# Patient Record
Sex: MALE | Race: WHITE | NOT HISPANIC OR LATINO | Employment: OTHER | URBAN - METROPOLITAN AREA
[De-identification: names, ages, dates, MRNs, and addresses within clinical notes are randomized per-mention and may not be internally consistent; named-entity substitution may affect disease eponyms.]

---

## 2023-02-15 PROBLEM — Z95.1 S/P CABG X 3: Status: ACTIVE | Noted: 2023-02-15

## 2023-02-15 PROBLEM — G25.81 RESTLESS LEGS SYNDROME: Status: ACTIVE | Noted: 2023-02-15

## 2023-02-15 PROBLEM — I73.9 PAD (PERIPHERAL ARTERY DISEASE) (CMS-HCC): Status: ACTIVE | Noted: 2023-02-15

## 2023-02-15 PROBLEM — G43.909 HEMICRANIA: Status: ACTIVE | Noted: 2023-02-15

## 2023-02-15 PROBLEM — R60.9 DEPENDENT EDEMA: Status: ACTIVE | Noted: 2023-02-15

## 2023-02-15 PROBLEM — H25.10 NUCLEAR SCLEROSIS: Status: ACTIVE | Noted: 2023-02-15

## 2023-02-15 PROBLEM — I10 BENIGN ESSENTIAL HYPERTENSION: Status: ACTIVE | Noted: 2023-02-15

## 2023-02-15 PROBLEM — M19.072 OSTEOARTHRITIS OF BOTH ANKLES: Status: ACTIVE | Noted: 2023-02-15

## 2023-02-15 PROBLEM — I73.9 VASCULAR CLAUDICATION (CMS-HCC): Status: ACTIVE | Noted: 2023-02-15

## 2023-02-15 PROBLEM — I70.213 ATHEROSCLEROSIS OF NATIVE ARTERY OF BOTH LOWER EXTREMITIES WITH INTERMITTENT CLAUDICATION (CMS-HCC): Status: ACTIVE | Noted: 2023-02-15

## 2023-02-15 PROBLEM — M19.071 OSTEOARTHRITIS OF BOTH ANKLES: Status: ACTIVE | Noted: 2023-02-15

## 2023-02-15 PROBLEM — E78.00 ELEVATED LOW DENSITY LIPOPROTEIN (LDL) CHOLESTEROL LEVEL: Status: ACTIVE | Noted: 2023-02-15

## 2023-02-15 PROBLEM — E53.8 B12 DEFICIENCY: Status: ACTIVE | Noted: 2023-02-15

## 2023-02-15 PROBLEM — Z98.62 S/P PERIPHERAL ARTERY ANGIOPLASTY: Status: ACTIVE | Noted: 2023-02-15

## 2023-02-15 PROBLEM — R35.1 NOCTURIA ASSOCIATED WITH BENIGN PROSTATIC HYPERPLASIA: Status: ACTIVE | Noted: 2023-02-15

## 2023-02-15 PROBLEM — R53.83 FATIGUE: Status: ACTIVE | Noted: 2023-02-15

## 2023-02-15 PROBLEM — H53.2 DIPLOPIA: Status: ACTIVE | Noted: 2023-02-15

## 2023-02-15 PROBLEM — D37.8 NEOPLASM OF UNCERTAIN BEHAVIOR OF PANCREAS: Status: ACTIVE | Noted: 2023-02-15

## 2023-02-15 PROBLEM — G62.9 NEUROPATHY: Status: ACTIVE | Noted: 2023-02-15

## 2023-02-15 PROBLEM — M54.2 CERVICALGIA: Status: ACTIVE | Noted: 2023-02-15

## 2023-02-15 PROBLEM — C83.90: Status: ACTIVE | Noted: 2023-02-15

## 2023-02-15 PROBLEM — I25.10 CAD (CORONARY ARTERY DISEASE): Status: ACTIVE | Noted: 2023-02-15

## 2023-02-15 PROBLEM — D48.5 NEOPLASM OF UNCERTAIN BEHAVIOR OF SKIN: Status: ACTIVE | Noted: 2023-02-15

## 2023-02-15 PROBLEM — H50.30 ESOTROPIA, INTERMITTENT: Status: ACTIVE | Noted: 2023-02-15

## 2023-02-15 PROBLEM — M48.061 LUMBAR CANAL STENOSIS: Status: ACTIVE | Noted: 2023-02-15

## 2023-02-15 PROBLEM — R25.1 TREMOR: Status: ACTIVE | Noted: 2023-02-15

## 2023-02-15 PROBLEM — D50.9 IRON DEFICIENCY ANEMIA: Status: ACTIVE | Noted: 2023-02-15

## 2023-02-15 PROBLEM — I87.322 STASIS DERMATITIS OF LEFT LOWER EXTREMITY DUE TO PERIPHERAL VENOUS HYPERTENSION: Status: ACTIVE | Noted: 2023-02-15

## 2023-02-15 PROBLEM — N40.1 NOCTURIA ASSOCIATED WITH BENIGN PROSTATIC HYPERPLASIA: Status: ACTIVE | Noted: 2023-02-15

## 2023-02-15 PROBLEM — M77.30 CALCANEAL SPUR: Status: ACTIVE | Noted: 2023-02-15

## 2023-02-15 PROBLEM — R26.9 GAIT DIFFICULTY: Status: ACTIVE | Noted: 2023-02-15

## 2023-02-15 PROBLEM — R11.2 NAUSEA AND VOMITING IN ADULT: Status: ACTIVE | Noted: 2023-02-15

## 2023-02-15 PROBLEM — Z95.2 S/P AVR (AORTIC VALVE REPLACEMENT): Status: ACTIVE | Noted: 2023-02-15

## 2023-02-15 RX ORDER — ASPIRIN 325 MG
500 TABLET, DELAYED RELEASE (ENTERIC COATED) ORAL DAILY
COMMUNITY
End: 2023-10-03 | Stop reason: ALTCHOICE

## 2023-02-15 RX ORDER — PITAVASTATIN CALCIUM 2.09 MG/1
1 TABLET, FILM COATED ORAL DAILY
COMMUNITY
Start: 2021-05-08 | End: 2023-07-31

## 2023-02-15 RX ORDER — VALSARTAN AND HYDROCHLOROTHIAZIDE 80; 12.5 MG/1; MG/1
1 TABLET, FILM COATED ORAL DAILY
COMMUNITY
Start: 2020-04-22 | End: 2023-09-18 | Stop reason: ALTCHOICE

## 2023-02-15 RX ORDER — VIT C/E/ZN/COPPR/LUTEIN/ZEAXAN 250MG-90MG
25 CAPSULE ORAL
COMMUNITY
End: 2024-03-26 | Stop reason: WASHOUT

## 2023-02-15 RX ORDER — TRAZODONE HYDROCHLORIDE 50 MG/1
50 TABLET ORAL NIGHTLY
COMMUNITY
End: 2023-05-09

## 2023-02-15 RX ORDER — EPINEPHRINE 0.22MG
100 AEROSOL WITH ADAPTER (ML) INHALATION 2 TIMES DAILY
COMMUNITY
End: 2024-04-09 | Stop reason: WASHOUT

## 2023-02-15 RX ORDER — MULTIVITAMIN
1 TABLET ORAL 2 TIMES DAILY
COMMUNITY
Start: 2015-01-08

## 2023-02-15 RX ORDER — VITAMIN B COMPLEX
1 CAPSULE ORAL NIGHTLY
COMMUNITY

## 2023-02-15 RX ORDER — METOPROLOL SUCCINATE 25 MG/1
1 TABLET, EXTENDED RELEASE ORAL DAILY
COMMUNITY
Start: 2020-04-22 | End: 2023-04-04

## 2023-02-15 RX ORDER — SPIRONOLACTONE 25 MG
1 TABLET ORAL DAILY
COMMUNITY
Start: 2015-01-08 | End: 2023-04-04

## 2023-02-15 RX ORDER — CLOPIDOGREL BISULFATE 75 MG/1
1 TABLET ORAL DAILY
COMMUNITY
Start: 2020-02-25 | End: 2023-07-21 | Stop reason: SDUPTHER

## 2023-02-15 RX ORDER — PERPHENAZINE/AMITRIPTYLINE HCL 2 MG-25 MG
TABLET ORAL
COMMUNITY
End: 2023-10-03 | Stop reason: ALTCHOICE

## 2023-03-01 ENCOUNTER — DOCUMENTATION (OUTPATIENT)
Dept: PRIMARY CARE | Facility: CLINIC | Age: 88
End: 2023-03-01
Payer: MEDICARE

## 2023-03-01 DIAGNOSIS — I10 HYPERTENSION, UNSPECIFIED TYPE: ICD-10-CM

## 2023-03-01 DIAGNOSIS — I25.10 ATHEROSCLEROSIS OF NATIVE CORONARY ARTERY OF NATIVE HEART, UNSPECIFIED WHETHER ANGINA PRESENT: ICD-10-CM

## 2023-03-20 ENCOUNTER — PATIENT OUTREACH (OUTPATIENT)
Dept: PRIMARY CARE | Facility: CLINIC | Age: 88
End: 2023-03-20
Payer: MEDICARE

## 2023-03-20 DIAGNOSIS — I10 HYPERTENSION, UNSPECIFIED TYPE: ICD-10-CM

## 2023-03-20 DIAGNOSIS — I25.10 ATHEROSCLEROSIS OF NATIVE CORONARY ARTERY OF NATIVE HEART, UNSPECIFIED WHETHER ANGINA PRESENT: ICD-10-CM

## 2023-03-20 PROCEDURE — 99490 CHRNC CARE MGMT STAFF 1ST 20: CPT | Performed by: NURSE PRACTITIONER

## 2023-03-21 NOTE — PROGRESS NOTES
[facility] call to patient. He reports he is doing very well. He reports his new home is so much better for them. He continues to montir his blood pressure. Blood pressure readings as follows,   139/82, 70  121/64  69  116/69  67  128/70  68  111/64  64  102/50  73  He reports he is eating well and sleeping well. He did report he has some discoloration of his ankles. Reports it as being brownish/red and warm. He reports this has been ongooing over the past few years. He denies any swelling or edema. It is bilateral. He does have follow up appointment scheduled in May with Vascular Dr for testing that he has done every year. He reports he is eating well and no issues with urination or bowel movements. He reports he and his wife have  been tano active with the [employer].  He denies any concerns at this time.

## 2023-04-04 ENCOUNTER — OFFICE VISIT (OUTPATIENT)
Dept: PRIMARY CARE | Facility: CLINIC | Age: 88
End: 2023-04-04
Payer: MEDICARE

## 2023-04-04 VITALS
BODY MASS INDEX: 26.37 KG/M2 | OXYGEN SATURATION: 99 % | HEART RATE: 55 BPM | HEIGHT: 74 IN | SYSTOLIC BLOOD PRESSURE: 110 MMHG | DIASTOLIC BLOOD PRESSURE: 60 MMHG

## 2023-04-04 DIAGNOSIS — G62.9 NEUROPATHY: ICD-10-CM

## 2023-04-04 DIAGNOSIS — I70.213 ATHEROSCLEROSIS OF NATIVE ARTERY OF BOTH LOWER EXTREMITIES WITH INTERMITTENT CLAUDICATION (CMS-HCC): ICD-10-CM

## 2023-04-04 DIAGNOSIS — I10 BENIGN ESSENTIAL HYPERTENSION: Primary | ICD-10-CM

## 2023-04-04 DIAGNOSIS — I25.10 CORONARY ARTERY DISEASE INVOLVING NATIVE HEART WITHOUT ANGINA PECTORIS, UNSPECIFIED VESSEL OR LESION TYPE: ICD-10-CM

## 2023-04-04 DIAGNOSIS — I73.9 VASCULAR CLAUDICATION (CMS-HCC): ICD-10-CM

## 2023-04-04 PROCEDURE — 1159F MED LIST DOCD IN RCRD: CPT | Performed by: NURSE PRACTITIONER

## 2023-04-04 PROCEDURE — 99214 OFFICE O/P EST MOD 30 MIN: CPT | Performed by: NURSE PRACTITIONER

## 2023-04-04 PROCEDURE — 3074F SYST BP LT 130 MM HG: CPT | Performed by: NURSE PRACTITIONER

## 2023-04-04 PROCEDURE — 1036F TOBACCO NON-USER: CPT | Performed by: NURSE PRACTITIONER

## 2023-04-04 PROCEDURE — 3078F DIAST BP <80 MM HG: CPT | Performed by: NURSE PRACTITIONER

## 2023-04-04 PROCEDURE — 1160F RVW MEDS BY RX/DR IN RCRD: CPT | Performed by: NURSE PRACTITIONER

## 2023-04-04 RX ORDER — LACTOBACILLUS COMBINATION NO.4 3B CELL
1 CAPSULE ORAL DAILY
COMMUNITY

## 2023-04-04 RX ORDER — GLUCOSAMINE/CHONDR SU A SOD 750-600 MG
1 TABLET ORAL EVERY MORNING
COMMUNITY

## 2023-04-04 ASSESSMENT — ENCOUNTER SYMPTOMS
PSYCHIATRIC NEGATIVE: 1
CARDIOVASCULAR NEGATIVE: 1
CONSTITUTIONAL NEGATIVE: 1
RESPIRATORY NEGATIVE: 1

## 2023-04-04 NOTE — ASSESSMENT & PLAN NOTE
Blood pressure heart rate stable now that patient is no longer taking metoprolol no additional changes needed.  Patient feels less fatigued

## 2023-04-04 NOTE — PROGRESS NOTES
"Subjective   Patient ID: Alexis Armijo is a 87 y.o. male who presents for No chief complaint on file..    HPI     Review of Systems   Constitutional: Negative.    Respiratory: Negative.     Cardiovascular: Negative.    Neurological:         Baseline no increase in symptoms or worsening of symptoms   Psychiatric/Behavioral: Negative.         Objective   /60   Pulse 55   Ht 1.88 m (6' 2\")   SpO2 99%   BMI 26.37 kg/m²     Physical Exam  Constitutional:       Appearance: Normal appearance.   Eyes:      Conjunctiva/sclera: Conjunctivae normal.   Cardiovascular:      Rate and Rhythm: Normal rate.      Heart sounds: Murmur heard.   Pulmonary:      Effort: Pulmonary effort is normal.      Breath sounds: Normal breath sounds.   Skin:     General: Skin is warm.      Capillary Refill: Capillary refill takes less than 2 seconds.   Neurological:      General: No focal deficit present.      Mental Status: He is alert. Mental status is at baseline.   Psychiatric:         Mood and Affect: Mood normal.         Assessment/Plan   Problem List Items Addressed This Visit          Nervous    Neuropathy     Symptoms stable, no changes to plan            Circulatory    Atherosclerosis of native artery of both lower extremities with intermittent claudication (CMS/HCC)     Stable         Benign essential hypertension - Primary     Blood pressure heart rate stable now that patient is no longer taking metoprolol no additional changes needed.  Patient feels less fatigued         CAD (coronary artery disease)     Hypertension stable, on Plavix patient also on statin.  Patient is followed by cardiology.  No changes to plan         Vascular claudication (CMS/HCC)     Patient is on Plavix, statin and blood pressure controlled.  Patient is also follow-up by cardiology               "

## 2023-04-04 NOTE — ASSESSMENT & PLAN NOTE
Patient is on Plavix, statin and blood pressure controlled.  Patient is also follow-up by cardiology

## 2023-04-04 NOTE — PATIENT INSTRUCTIONS
Continue to stay active and eat healthy as you do    I have DC'd the metoprolol for you    Please notify office if you need anything we will follow-up per routine every 3 months

## 2023-04-04 NOTE — ASSESSMENT & PLAN NOTE
Hypertension stable, on Plavix patient also on statin.  Patient is followed by cardiology.  No changes to plan

## 2023-04-20 ENCOUNTER — PATIENT OUTREACH (OUTPATIENT)
Dept: PRIMARY CARE | Facility: CLINIC | Age: 88
End: 2023-04-20
Payer: MEDICARE

## 2023-04-20 DIAGNOSIS — G62.9 NEUROPATHY: ICD-10-CM

## 2023-04-20 DIAGNOSIS — I10 BENIGN ESSENTIAL HYPERTENSION: ICD-10-CM

## 2023-04-24 ENCOUNTER — PATIENT OUTREACH (OUTPATIENT)
Dept: PRIMARY CARE | Facility: CLINIC | Age: 88
End: 2023-04-24
Payer: MEDICARE

## 2023-04-24 DIAGNOSIS — G62.9 NEUROPATHY: ICD-10-CM

## 2023-04-24 DIAGNOSIS — I10 BENIGN ESSENTIAL HYPERTENSION: ICD-10-CM

## 2023-04-24 DIAGNOSIS — I25.10 ATHEROSCLEROSIS OF NATIVE CORONARY ARTERY OF NATIVE HEART, UNSPECIFIED WHETHER ANGINA PRESENT: ICD-10-CM

## 2023-04-24 PROCEDURE — 99490 CHRNC CARE MGMT STAFF 1ST 20: CPT | Performed by: NURSE PRACTITIONER

## 2023-04-24 NOTE — PROGRESS NOTES
Received call back from patient. He reports for the most part he is doing well. He reports he continues to have pain in his legs/feet. He has appointment on 5-18-23 with vascular Dr Nicholas for balloon testing to test his circulation. He reports regardless of what shoes he wears, he still has pain. He reports pain had gotten a little better since stopping metoprolol. He reports last evening his arms were hurting. He did not try to take tylenol as he hates to take medications. Instructed on possibly being arthritis and tylenol may help with arthritic pain. He reports he got up and slept in chair and felt better. He reports eating and drinking with no problems. He is taking all other medications. Monitoring his blood pressure with readings of 115/64, 122/74,103/60,110/59,111/61 and pulse 64-71. He denies headaches or dizziness.  No other concerns at this time.

## 2023-05-05 ENCOUNTER — PATIENT OUTREACH (OUTPATIENT)
Dept: PRIMARY CARE | Facility: CLINIC | Age: 88
End: 2023-05-05
Payer: MEDICARE

## 2023-05-05 DIAGNOSIS — I10 BENIGN ESSENTIAL HYPERTENSION: ICD-10-CM

## 2023-05-05 DIAGNOSIS — I25.10 ATHEROSCLEROSIS OF NATIVE CORONARY ARTERY OF NATIVE HEART, UNSPECIFIED WHETHER ANGINA PRESENT: ICD-10-CM

## 2023-05-05 DIAGNOSIS — I73.9 VASCULAR CLAUDICATION (CMS-HCC): ICD-10-CM

## 2023-05-05 DIAGNOSIS — G62.9 NEUROPATHY: ICD-10-CM

## 2023-05-05 PROCEDURE — 99490 CHRNC CARE MGMT STAFF 1ST 20: CPT | Performed by: NURSE PRACTITIONER

## 2023-05-05 NOTE — PROGRESS NOTES
Received call back from patient. He continues to monitor his blood pressure and pulse that he reports readings of : 106/55, 71; 115/64 64; 122/74 67. He was seen by Dr Jenkins  and had EKG and carotid arteries checked. He stated they were fine except he had a few bouts of Afib that lasted only a few minutes. He is to follow up with carotids being rechecked in Oct.  He had pacemaker checked and does not need to return for 3 years. He continues to have legs pain. He is planning to see vascular Dr on 5-18-23 and will have testing done for circulation. 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 He is planning to have 1 removed on 5-15-23 and will have the other removed shortly after. He denies any other concerns. No headaches or dizziness or shortness of breath. Dr Jenkins aware patient stopped metoprolol but told him to keep taking all other medications. Instructed on importance of eliquis and how it prevents blood clots. Patient stated he will keep taking it. Patient had gone previously for physical therapy. He does not feel he needs it again at this time but he is going to discuss with vascular . He is planning to start walking with the weather getting warmer. No concerns at this time. He will call if any questions.

## 2023-05-09 ENCOUNTER — PATIENT OUTREACH (OUTPATIENT)
Dept: PRIMARY CARE | Facility: CLINIC | Age: 88
End: 2023-05-09

## 2023-05-09 ENCOUNTER — OFFICE VISIT (OUTPATIENT)
Dept: PRIMARY CARE | Facility: CLINIC | Age: 88
End: 2023-05-09
Payer: MEDICARE

## 2023-05-09 VITALS
HEART RATE: 79 BPM | HEIGHT: 72 IN | DIASTOLIC BLOOD PRESSURE: 66 MMHG | SYSTOLIC BLOOD PRESSURE: 105 MMHG | OXYGEN SATURATION: 97 % | BODY MASS INDEX: 25.47 KG/M2 | WEIGHT: 188 LBS

## 2023-05-09 DIAGNOSIS — G62.9 NEUROPATHY: ICD-10-CM

## 2023-05-09 DIAGNOSIS — M54.2 NECK PAIN: Primary | ICD-10-CM

## 2023-05-09 DIAGNOSIS — I10 BENIGN ESSENTIAL HYPERTENSION: ICD-10-CM

## 2023-05-09 PROBLEM — I48.0 PAROXYSMAL ATRIAL FIBRILLATION (MULTI): Status: ACTIVE | Noted: 2023-05-09

## 2023-05-09 PROCEDURE — 1160F RVW MEDS BY RX/DR IN RCRD: CPT | Performed by: FAMILY MEDICINE

## 2023-05-09 PROCEDURE — 99214 OFFICE O/P EST MOD 30 MIN: CPT | Performed by: FAMILY MEDICINE

## 2023-05-09 PROCEDURE — 1159F MED LIST DOCD IN RCRD: CPT | Performed by: FAMILY MEDICINE

## 2023-05-09 PROCEDURE — 3078F DIAST BP <80 MM HG: CPT | Performed by: FAMILY MEDICINE

## 2023-05-09 PROCEDURE — 3074F SYST BP LT 130 MM HG: CPT | Performed by: FAMILY MEDICINE

## 2023-05-09 PROCEDURE — 1036F TOBACCO NON-USER: CPT | Performed by: FAMILY MEDICINE

## 2023-05-09 ASSESSMENT — ENCOUNTER SYMPTOMS
CHILLS: 0
SHORTNESS OF BREATH: 0
TINGLING: 0
WEAKNESS: 0
UNEXPECTED WEIGHT CHANGE: 0
VISUAL CHANGE: 0
BLOOD IN STOOL: 0
HEADACHES: 0
NAUSEA: 0
WHEEZING: 0
TROUBLE SWALLOWING: 0
FEVER: 0
NUMBNESS: 0
NECK PAIN: 1
PHOTOPHOBIA: 0
COUGH: 0
SYNCOPE: 0
DIZZINESS: 0
ABDOMINAL PAIN: 0
VOMITING: 0
PARESIS: 0
DIFFICULTY URINATING: 0
DIARRHEA: 0
DYSURIA: 0
LIGHT-HEADEDNESS: 0
CONFUSION: 0

## 2023-05-09 NOTE — PATIENT INSTRUCTIONS
Please return for a follow-up appointment after 6 weeks of physical therapy, earlier if any question or concern.    For assistance with scheduling referrals or consultations, please call [phone]. For laboratory, radiology, and other tests, please call [phone] ([phone] for pediatrics). Please review prescription inserts and published information for possible adverse effects. Return after testing or consultation to review results and recommendations, if symptoms persist, change, worsen, or return, or if you have any question or concern. For non-emergencies, you may call the office. For emergencies, call 9-1-1 or go to the nearest Emergency Department. Please schedule additional appointment(s) to address concern(s) not addressed today.    In general, results will not be released or discussed over the telephone. Results of tests done through [facility] are released via  [employer]:  [email or web address]    Until we complete our transition to the new system, additional information can be found at [email or web address] or on your Android or iOS (iPhone, iPad) device using the [employer] madyson available free of charge in your device's madyson store.

## 2023-05-09 NOTE — PROGRESS NOTES
Received call from patient reporting over the weekend he thinks he twisted hi neck and is still experiencing neck pain. He was wondering if NP would order x-ray. Spoke with NP who wants patient to be seen. Call back to patient to make aware. Appointment scheduled with Dr Velez for today to be assessed.

## 2023-05-09 NOTE — PROGRESS NOTES
"Subjective   Patient ID: Alexis Armijo is a 87 y.o. male who presents for Neck Pain (Pt presents with spouse complaining of neck pain states aggravated on Sunday unable to fully turn head, pt is requesting an xray.BL).  Neck Pain   This is a new problem. The current episode started in the past 7 days (Saturday/Sunday while working in the garage). The problem occurs constantly. The problem has been unchanged. The pain is associated with a twisting injury. The pain is present in the left side. The quality of the pain is described as stabbing. The pain is at a severity of 7/10. The symptoms are aggravated by twisting and position. Stiffness is present All day. Pertinent negatives include no chest pain, fever, headaches, numbness, pain with swallowing, paresis, photophobia, syncope, tingling, trouble swallowing, visual change or weakness. He has tried heat and ice for the symptoms. The treatment provided moderate relief.         Review of Systems   Constitutional:  Negative for chills, fever and unexpected weight change.   HENT:  Negative for ear pain and trouble swallowing.    Eyes:  Negative for photophobia.   Respiratory:  Negative for cough, shortness of breath and wheezing.    Cardiovascular:  Negative for chest pain and syncope.   Gastrointestinal:  Negative for abdominal pain, blood in stool, diarrhea, nausea and vomiting.   Genitourinary:  Negative for difficulty urinating and dysuria.   Musculoskeletal:  Positive for neck pain.   Skin:  Negative for rash.   Neurological:  Negative for dizziness, tingling, syncope, weakness, light-headedness, numbness and headaches.   Psychiatric/Behavioral:  Negative for behavioral problems and confusion.          Objective   /66   Pulse 79   Ht 1.835 m (6' 0.25\")   Wt 85.3 kg (188 lb)   SpO2 97%   BMI 25.32 kg/m²     Physical Exam  Vitals and nursing note reviewed.   Constitutional:       General: He is not in acute distress.     Appearance: Normal appearance. "   HENT:      Head: Normocephalic and atraumatic.   Eyes:      General: No scleral icterus.     Extraocular Movements: Extraocular movements intact.      Conjunctiva/sclera: Conjunctivae normal.   Pulmonary:      Effort: Pulmonary effort is normal. No respiratory distress.   Musculoskeletal:         General: Tenderness (cervical paravertebrals) present. No swelling, deformity or signs of injury.      Cervical back: Decreased range of motion (especially left sidebending, left rotation, extension).   Skin:     General: Skin is warm and dry.      Coloration: Skin is not jaundiced.   Neurological:      Mental Status: He is alert and oriented to person, place, and time. Mental status is at baseline.   Psychiatric:         Mood and Affect: Mood normal.         Thought Content: Thought content normal.         Assessment/Plan   Problem List Items Addressed This Visit          Nervous    Neck pain - Primary     PT, plain films, recheck after 6 weeks.         Relevant Orders    XR cervical spine 2-3 views    Referral to Physical Therapy

## 2023-05-16 ENCOUNTER — TELEPHONE (OUTPATIENT)
Dept: PRIMARY CARE | Facility: CLINIC | Age: 88
End: 2023-05-16
Payer: MEDICARE

## 2023-05-16 NOTE — TELEPHONE ENCOUNTER
----- Message from Vero Novoa LPN sent at 5/15/2023  4:47 PM EDT -----  LM for pt. To call for results.   ----- Message -----  From: Jonah Velez DO  Sent: 5/15/2023   9:06 AM EDT  To: #    Please make sure patient is aware of the comments or MyChart message.   Your cervical spine x-ray report describes moderate to severe degenerative changes.  Recommendations as discussed at your appointment.  Please return or seek medical attention earlier if symptoms change or worsen.

## 2023-05-16 NOTE — TELEPHONE ENCOUNTER
----- Message from Vero Novoa LPN sent at 5/15/2023  4:47 PM EDT -----  LM for pt. To call for results.   ----- Message -----  From: Jonah Velez DO  Sent: 5/15/2023   9:06 AM EDT  To: #    Please make sure patient is aware of the comments or MyChart message.

## 2023-06-07 ENCOUNTER — PATIENT OUTREACH (OUTPATIENT)
Dept: PRIMARY CARE | Facility: CLINIC | Age: 88
End: 2023-06-07
Payer: MEDICARE

## 2023-06-07 DIAGNOSIS — G62.9 NEUROPATHY: ICD-10-CM

## 2023-06-07 DIAGNOSIS — I73.9 VASCULAR CLAUDICATION (CMS-HCC): ICD-10-CM

## 2023-06-07 DIAGNOSIS — I25.10 ATHEROSCLEROSIS OF NATIVE CORONARY ARTERY OF NATIVE HEART, UNSPECIFIED WHETHER ANGINA PRESENT: ICD-10-CM

## 2023-06-07 DIAGNOSIS — I10 BENIGN ESSENTIAL HYPERTENSION: ICD-10-CM

## 2023-06-07 PROCEDURE — 99490 CHRNC CARE MGMT STAFF 1ST 20: CPT | Performed by: NURSE PRACTITIONER

## 2023-06-07 NOTE — PROGRESS NOTES
[facility] call to patient. He is taking all medications as ordered. Appetite good. His blood pressures have been 110/69,100/52,125/67. His biggest concern is his arms are achey and sometimes as night feels that they are shaky.  He feels the strength in his hands is also decreasing and he is having trouble opening jars. Offered to obtain referral for him to see hand specialist. He declines at this time. He is planning to begin a strengthen ing class through the [facility]. Appetite is good. No issues with urination or bowel movements. Taking medications with no problems. He thinks he just has been doing too much over the past few months.  Instructed also on use of stress ball for strengthening fingers and possibly placing pennies on table and picking them up as this helps with dexterity. He does play a game that requires him to  tiles and he feels that does help some. No other concerns noted at this time.Instructed to call with any questions or concerns.

## 2023-07-10 ENCOUNTER — PATIENT OUTREACH (OUTPATIENT)
Dept: PRIMARY CARE | Facility: CLINIC | Age: 88
End: 2023-07-10
Payer: MEDICARE

## 2023-07-10 DIAGNOSIS — I25.10 ATHEROSCLEROSIS OF NATIVE CORONARY ARTERY OF NATIVE HEART, UNSPECIFIED WHETHER ANGINA PRESENT: ICD-10-CM

## 2023-07-10 DIAGNOSIS — G62.9 NEUROPATHY: ICD-10-CM

## 2023-07-10 DIAGNOSIS — I10 BENIGN ESSENTIAL HYPERTENSION: ICD-10-CM

## 2023-07-10 PROCEDURE — 99490 CHRNC CARE MGMT STAFF 1ST 20: CPT | Performed by: NURSE PRACTITIONER

## 2023-07-10 NOTE — PROGRESS NOTES
[facility] call to patient. Spoke with patient's wife. Wife reports patient is currently at therapy at the [facility] for strengthening of his arms,legs and also receives deep massage to his neck. She has noticed a difference with patient's neck pain. Massage seems to help. Patient does his exercises 3 x daily. She reports he has been doing good otherwise. His appetite has been good. No increased shortness of breath. Blood pressure readings as follows 110/69,111/57,105/56,113/67.91/51,104/58. She reports he takes all his medications as ordered.  They have been trying to stay active. They have podiatry appointment today. She denies him having any other changes. Instructed on adequate hydration and safety and fall precautions as she stated his legs tend to get a little shaky. No other concerns noted at this time. She/he will call with any questions/

## 2023-07-21 ENCOUNTER — TELEPHONE (OUTPATIENT)
Dept: PRIMARY CARE | Facility: CLINIC | Age: 88
End: 2023-07-21
Payer: MEDICARE

## 2023-07-21 DIAGNOSIS — I25.10 CORONARY ARTERY DISEASE INVOLVING NATIVE HEART WITHOUT ANGINA PECTORIS, UNSPECIFIED VESSEL OR LESION TYPE: Primary | ICD-10-CM

## 2023-07-21 RX ORDER — CLOPIDOGREL BISULFATE 75 MG/1
75 TABLET ORAL DAILY
Qty: 30 TABLET | Refills: 0 | Status: SHIPPED | OUTPATIENT
Start: 2023-07-21 | End: 2023-08-07

## 2023-07-21 NOTE — TELEPHONE ENCOUNTER
Rx Refill Request Telephone Encounter  PT is OUT    Name:  Alexis Armijo  :  [id number]  Medication Name:    Clopidogrel 75 mg 1 tab every day - 90 day  Specific Pharmacy location:  [facility]  Date of last appointment:  23  Date of next appointment:  na  Best number to reach patient:  [phone]

## 2023-07-29 DIAGNOSIS — E78.00 ELEVATED LOW DENSITY LIPOPROTEIN (LDL) CHOLESTEROL LEVEL: ICD-10-CM

## 2023-07-31 RX ORDER — PITAVASTATIN CALCIUM 2.09 MG/1
1 TABLET, FILM COATED ORAL DAILY
Qty: 90 TABLET | Refills: 0 | Status: SHIPPED | OUTPATIENT
Start: 2023-07-31 | End: 2023-10-12

## 2023-08-05 DIAGNOSIS — I25.10 CORONARY ARTERY DISEASE INVOLVING NATIVE HEART WITHOUT ANGINA PECTORIS, UNSPECIFIED VESSEL OR LESION TYPE: ICD-10-CM

## 2023-08-07 PROBLEM — R29.3 POOR POSTURE: Status: ACTIVE | Noted: 2023-08-07

## 2023-08-07 PROBLEM — E55.9 MILD VITAMIN D DEFICIENCY: Status: ACTIVE | Noted: 2023-08-07

## 2023-08-07 PROBLEM — H16.223 KERATOCONJUNCTIVITIS SICCA OF BOTH EYES NOT SPECIFIED AS SJOGREN'S: Status: ACTIVE | Noted: 2020-09-11

## 2023-08-07 PROBLEM — H01.02A SQUAMOUS BLEPHARITIS OF UPPER AND LOWER EYELIDS OF BOTH EYES: Status: ACTIVE | Noted: 2021-12-20

## 2023-08-07 PROBLEM — G47.00 INSOMNIA: Status: ACTIVE | Noted: 2023-08-07

## 2023-08-07 PROBLEM — R29.898 DECREASED ROM OF NECK: Status: ACTIVE | Noted: 2023-08-07

## 2023-08-07 PROBLEM — H01.02B SQUAMOUS BLEPHARITIS OF UPPER AND LOWER EYELIDS OF BOTH EYES: Status: ACTIVE | Noted: 2021-12-20

## 2023-08-07 RX ORDER — NIACIN (INOSITOL NIACINATE) 400(500MG)
CAPSULE ORAL
COMMUNITY
End: 2023-09-15 | Stop reason: ALTCHOICE

## 2023-08-07 RX ORDER — TRIAMCINOLONE ACETONIDE 1 MG/G
1 CREAM TOPICAL DAILY
COMMUNITY
Start: 2023-05-31 | End: 2024-04-11 | Stop reason: ALTCHOICE

## 2023-08-07 RX ORDER — SPIRONOLACTONE 25 MG
1 TABLET ORAL DAILY
COMMUNITY
Start: 2010-08-03 | End: 2023-10-03 | Stop reason: ALTCHOICE

## 2023-08-07 RX ORDER — CLOPIDOGREL BISULFATE 75 MG/1
75 TABLET ORAL DAILY
Qty: 30 TABLET | Refills: 0 | Status: SHIPPED | OUTPATIENT
Start: 2023-08-07 | End: 2023-08-10 | Stop reason: SDUPTHER

## 2023-08-09 ENCOUNTER — PATIENT OUTREACH (OUTPATIENT)
Dept: PRIMARY CARE | Facility: CLINIC | Age: 88
End: 2023-08-09
Payer: MEDICARE

## 2023-08-09 DIAGNOSIS — I10 BENIGN ESSENTIAL HYPERTENSION: ICD-10-CM

## 2023-08-09 DIAGNOSIS — I25.10 CORONARY ARTERY DISEASE INVOLVING NATIVE HEART WITHOUT ANGINA PECTORIS, UNSPECIFIED VESSEL OR LESION TYPE: ICD-10-CM

## 2023-08-09 DIAGNOSIS — I25.10 ATHEROSCLEROSIS OF NATIVE CORONARY ARTERY OF NATIVE HEART, UNSPECIFIED WHETHER ANGINA PRESENT: ICD-10-CM

## 2023-08-10 ENCOUNTER — PATIENT OUTREACH (OUTPATIENT)
Dept: PRIMARY CARE | Facility: CLINIC | Age: 88
End: 2023-08-10
Payer: MEDICARE

## 2023-08-10 DIAGNOSIS — I73.9 VASCULAR CLAUDICATION (CMS-HCC): ICD-10-CM

## 2023-08-10 DIAGNOSIS — I25.10 CORONARY ARTERY DISEASE INVOLVING NATIVE HEART WITHOUT ANGINA PECTORIS, UNSPECIFIED VESSEL OR LESION TYPE: ICD-10-CM

## 2023-08-10 DIAGNOSIS — I10 BENIGN ESSENTIAL HYPERTENSION: ICD-10-CM

## 2023-08-10 PROCEDURE — 99490 CHRNC CARE MGMT STAFF 1ST 20: CPT | Performed by: NURSE PRACTITIONER

## 2023-08-10 RX ORDER — CLOPIDOGREL BISULFATE 75 MG/1
75 TABLET ORAL DAILY
Qty: 90 TABLET | Refills: 0 | Status: SHIPPED | OUTPATIENT
Start: 2023-08-10 | End: 2023-11-16

## 2023-08-10 RX ORDER — CLOPIDOGREL BISULFATE 75 MG/1
75 TABLET ORAL DAILY
Qty: 90 TABLET | Refills: 0 | OUTPATIENT
Start: 2023-08-10 | End: 2023-11-08

## 2023-08-10 NOTE — PROGRESS NOTES
[facility] call to patient. He reports he is doing well. He continues to go to the [facility] for therapy for his legs. His legs still continue to be crampy/weak most the time. He has remained active. He reports taking all medications as  ordered. He does need 90 d supply of clopidogrel. Sent to NP . Appetite is good. No issues with headaches or dizziness. He reports the following blood pressure and pulses  121/77  81  103/60 72  107/58  65  117/64 71  Reports he does get tired often but he is sleeping well. Up 1 time throughout the night to use the bathroom and then back to bed for a few hours. Denies any concerns at this time. Medicare Wellness visit scheduled for Oct. Patient is changing to Dr Velez. No concerns at this time.

## 2023-09-11 ENCOUNTER — PATIENT OUTREACH (OUTPATIENT)
Dept: PRIMARY CARE | Facility: CLINIC | Age: 88
End: 2023-09-11
Payer: MEDICARE

## 2023-09-11 DIAGNOSIS — I10 BENIGN ESSENTIAL HYPERTENSION: ICD-10-CM

## 2023-09-11 DIAGNOSIS — I25.10 CORONARY ARTERY DISEASE INVOLVING NATIVE HEART WITHOUT ANGINA PECTORIS, UNSPECIFIED VESSEL OR LESION TYPE: ICD-10-CM

## 2023-09-15 ENCOUNTER — PATIENT OUTREACH (OUTPATIENT)
Dept: PRIMARY CARE | Facility: CLINIC | Age: 88
End: 2023-09-15
Payer: MEDICARE

## 2023-09-15 DIAGNOSIS — I10 BENIGN ESSENTIAL HYPERTENSION: ICD-10-CM

## 2023-09-15 DIAGNOSIS — I25.10 CORONARY ARTERY DISEASE INVOLVING NATIVE HEART WITHOUT ANGINA PECTORIS, UNSPECIFIED VESSEL OR LESION TYPE: ICD-10-CM

## 2023-09-15 DIAGNOSIS — I70.213 ATHEROSCLEROSIS OF NATIVE ARTERY OF BOTH LOWER EXTREMITIES WITH INTERMITTENT CLAUDICATION (CMS-HCC): ICD-10-CM

## 2023-09-15 PROCEDURE — 99490 CHRNC CARE MGMT STAFF 1ST 20: CPT | Performed by: NURSE PRACTITIONER

## 2023-09-15 NOTE — PROGRESS NOTES
[facility] call to patient. He has had no further issues with bleeding from his toe. He did follow up with Dr Rajan. He is concerned about taking plavix and eliquis together as he reports his toe bleed so much and wouldn't stop and had to take 2 trips to ER. He is concerned that what if he should fall and hit his head. He stated Dr Retana told him he need to take both because they work in different ways. Instructed if he should ever fall, to call 911. Instructed for him to carry something in his wallet that states he is taking both medications in the event of an accident or if something happens. He reports he will do so. He reports his blood pressures as follows:  105/56,91/51,104/53,92/49,121/77,103/60,107/58, 120/68. He reports he does still take his Diovan even when his blood pressure is a little lower. He is not convinced that he needs to be on this at all. He has upcoming appointment with Dr Velez and will discuss at that time. Instructed I will send a message to  to inquire about blood pressure parameters. Instructed on potential problems of blood pressure being too low. He denies dizziness but does have tiredness. He has follow up appointment with Dr Retana in Dec. He continues to be going for physical therapy. Appetite good. No swelling. Moving bowels every day to every other day. Remains very active. No other concerns at this time. He is planning to get flu shot while at his appointment and does not intend on getting COVID immunization. Update sent to PCP.  Patient will review all medications at his upcoming appointment with

## 2023-09-18 ENCOUNTER — TELEPHONE (OUTPATIENT)
Dept: PRIMARY CARE | Facility: CLINIC | Age: 88
End: 2023-09-18
Payer: MEDICARE

## 2023-09-18 DIAGNOSIS — I10 BENIGN ESSENTIAL HYPERTENSION: Primary | ICD-10-CM

## 2023-09-18 RX ORDER — VALSARTAN 80 MG/1
80 TABLET ORAL DAILY
Qty: 30 TABLET | Refills: 2 | Status: SHIPPED | OUTPATIENT
Start: 2023-09-18 | End: 2023-10-10 | Stop reason: SDUPTHER

## 2023-09-19 ENCOUNTER — TELEPHONE (OUTPATIENT)
Dept: PRIMARY CARE | Facility: CLINIC | Age: 88
End: 2023-09-19
Payer: MEDICARE

## 2023-10-03 ENCOUNTER — OFFICE VISIT (OUTPATIENT)
Dept: PRIMARY CARE | Facility: CLINIC | Age: 88
End: 2023-10-03
Payer: MEDICARE

## 2023-10-03 VITALS
BODY MASS INDEX: 26.28 KG/M2 | OXYGEN SATURATION: 97 % | HEART RATE: 70 BPM | WEIGHT: 194 LBS | DIASTOLIC BLOOD PRESSURE: 86 MMHG | TEMPERATURE: 98.1 F | HEIGHT: 72 IN | SYSTOLIC BLOOD PRESSURE: 150 MMHG

## 2023-10-03 DIAGNOSIS — Z23 NEED FOR VACCINATION: ICD-10-CM

## 2023-10-03 DIAGNOSIS — E53.8 B12 DEFICIENCY: ICD-10-CM

## 2023-10-03 DIAGNOSIS — Z00.00 MEDICARE ANNUAL WELLNESS VISIT, SUBSEQUENT: Primary | ICD-10-CM

## 2023-10-03 DIAGNOSIS — I25.10 CORONARY ARTERY DISEASE INVOLVING NATIVE HEART WITHOUT ANGINA PECTORIS, UNSPECIFIED VESSEL OR LESION TYPE: ICD-10-CM

## 2023-10-03 PROCEDURE — 3077F SYST BP >= 140 MM HG: CPT | Performed by: FAMILY MEDICINE

## 2023-10-03 PROCEDURE — 90662 IIV NO PRSV INCREASED AG IM: CPT | Performed by: FAMILY MEDICINE

## 2023-10-03 PROCEDURE — 1036F TOBACCO NON-USER: CPT | Performed by: FAMILY MEDICINE

## 2023-10-03 PROCEDURE — 1160F RVW MEDS BY RX/DR IN RCRD: CPT | Performed by: FAMILY MEDICINE

## 2023-10-03 PROCEDURE — G0439 PPPS, SUBSEQ VISIT: HCPCS | Performed by: FAMILY MEDICINE

## 2023-10-03 PROCEDURE — G0008 ADMIN INFLUENZA VIRUS VAC: HCPCS | Performed by: FAMILY MEDICINE

## 2023-10-03 PROCEDURE — 1170F FXNL STATUS ASSESSED: CPT | Performed by: FAMILY MEDICINE

## 2023-10-03 PROCEDURE — 3079F DIAST BP 80-89 MM HG: CPT | Performed by: FAMILY MEDICINE

## 2023-10-03 PROCEDURE — 1159F MED LIST DOCD IN RCRD: CPT | Performed by: FAMILY MEDICINE

## 2023-10-03 RX ORDER — KRILL/OM-3/DHA/EPA/PHOSPHO/AST 350MG-90MG
1 CAPSULE ORAL EVERY MORNING
COMMUNITY

## 2023-10-03 ASSESSMENT — ACTIVITIES OF DAILY LIVING (ADL)
TAKING_MEDICATION: INDEPENDENT
DOING_HOUSEWORK: INDEPENDENT
DRESSING: INDEPENDENT
BATHING: INDEPENDENT
MANAGING_FINANCES: INDEPENDENT
GROCERY_SHOPPING: INDEPENDENT

## 2023-10-03 ASSESSMENT — ENCOUNTER SYMPTOMS
ABDOMINAL PAIN: 0
LIGHT-HEADEDNESS: 0
TROUBLE SWALLOWING: 0
DIARRHEA: 0
NAUSEA: 0
LOSS OF SENSATION IN FEET: 0
DIZZINESS: 0
BLOOD IN STOOL: 0
FEVER: 0
COUGH: 0
NUMBNESS: 0
DIFFICULTY URINATING: 0
UNEXPECTED WEIGHT CHANGE: 0
WEAKNESS: 0
CONFUSION: 0
CHILLS: 0
DEPRESSION: 0
SHORTNESS OF BREATH: 0
DYSURIA: 0
VOMITING: 0
OCCASIONAL FEELINGS OF UNSTEADINESS: 0
WHEEZING: 0

## 2023-10-03 ASSESSMENT — PATIENT HEALTH QUESTIONNAIRE - PHQ9
2. FEELING DOWN, DEPRESSED OR HOPELESS: NOT AT ALL
1. LITTLE INTEREST OR PLEASURE IN DOING THINGS: NOT AT ALL
SUM OF ALL RESPONSES TO PHQ9 QUESTIONS 1 AND 2: 0

## 2023-10-03 NOTE — PROGRESS NOTES
"Subjective   Reason for Visit: Alexis Armijo is an 88 y.o. male here for a Medicare Wellness visit.     Past Medical, Surgical, and Family History reviewed and updated in chart.    Reviewed all medications by prescribing practitioner or clinical pharmacist (such as prescriptions, OTCs, herbal therapies and supplements) and documented in the medical record.    HPI    Presents with wife for MWV. BP at home ~120s SBP.    Reports both Plavix and Eliquis per cardiology.    Patient Care Team:  Jonah Velez DO as PCP - General (Family Medicine)  RITU Camara-CNP [facility]  Mirela Sosa RN as Care Manager (Case Management)  Domenic Bill MD as Consulting Physician (Cardiology)  [facility] (Dermatology)  Zbigniew Khan MD PhD as Consulting Physician (Neuro-Ophthalmology)     Review of Systems   Constitutional:  Negative for chills, fever and unexpected weight change.   HENT:  Negative for ear pain and trouble swallowing.    Respiratory:  Negative for cough, shortness of breath and wheezing.    Cardiovascular:  Negative for chest pain.   Gastrointestinal:  Negative for abdominal pain, blood in stool, diarrhea, nausea and vomiting.   Genitourinary:  Negative for difficulty urinating and dysuria.   Skin:  Negative for rash.   Neurological:  Negative for dizziness, syncope, weakness, light-headedness and numbness.   Psychiatric/Behavioral:  Negative for behavioral problems and confusion.        Objective   Vitals:  /86   Pulse 70   Temp 36.7 °C (98.1 °F)   Ht 1.816 m (5' 11.5\")   Wt 88 kg (194 lb)   SpO2 97%   BMI 26.68 kg/m²       Physical Exam  Vitals and nursing note reviewed.   Constitutional:       General: He is not in acute distress.     Appearance: Normal appearance. He is well-developed.   HENT:      Head: Normocephalic and atraumatic.      Nose: Nose normal.   Eyes:      General: No scleral icterus.     Extraocular Movements: " Extraocular movements intact.      Conjunctiva/sclera: Conjunctivae normal.   Neck:      Thyroid: No thyromegaly.      Vascular: No carotid bruit or JVD.   Cardiovascular:      Rate and Rhythm: Normal rate and regular rhythm.      Heart sounds: Normal heart sounds.   Pulmonary:      Effort: Pulmonary effort is normal. No respiratory distress.      Breath sounds: Normal breath sounds.   Abdominal:      General: Bowel sounds are normal. There is no distension.      Palpations: Abdomen is soft. There is no mass.      Tenderness: There is no abdominal tenderness. There is no guarding or rebound.   Musculoskeletal:      Cervical back: Normal range of motion. No tenderness.      Right lower leg: No edema.      Left lower leg: No edema.   Skin:     General: Skin is warm and dry.      Coloration: Skin is not jaundiced.   Neurological:      General: No focal deficit present.      Mental Status: He is alert and oriented to person, place, and time. Mental status is at baseline.   Psychiatric:         Mood and Affect: Mood normal.         Thought Content: Thought content normal.         Assessment/Plan   Problem List Items Addressed This Visit       B12 deficiency    Relevant Orders    Vitamin B12    CAD (coronary artery disease)    Relevant Orders    Comprehensive Metabolic Panel    CBC    Lipid Panel    Medicare annual wellness visit, subsequent - Primary    Current Assessment & Plan     88yM doing fairly well.          Other Visit Diagnoses       Need for vaccination        Relevant Orders    Flu vaccine, high dose seasonal, preservative free

## 2023-10-03 NOTE — PATIENT INSTRUCTIONS
Please return for a follow-up appointment in 6 months, earlier if any question or concern.    Please return for your next wellness visit in 12 months, earlier if any question or concern.      For assistance with scheduling referrals or consultations, please call [phone]. For laboratory, radiology, and other tests, please call [phone] ([phone] for pediatrics). Please review prescription inserts and published information for possible adverse effects of all medications. Return after testing or consultation to review results and recommendations, if symptoms persist, change, worsen, or return, or if you have any question or concern. If you do not get results within 7-10 days, or you have any question or concern, please send a message, call the office ([phone]), or return to the office for a follow-up appointment. For non-emergencies, you may call the office. For emergencies, call 9-1-1 or go to the nearest Emergency Department. Please schedule additional appointment(s) to address concern(s) not addressed today.    In general, results are not released or discussed over the telephone. Results of tests done through [facility] are released via  [employer] (see below).  [email or web address]   [employer] support line: [phone]    Until we complete our transition to the new system, additional information can be found at [email or web address] or on your Android or iOS (iPhone, iPad) device using the [employer] madyson available free of charge in your device's madyson store.

## 2023-10-05 ENCOUNTER — LAB (OUTPATIENT)
Dept: LAB | Facility: LAB | Age: 88
End: 2023-10-05
Payer: MEDICARE

## 2023-10-05 DIAGNOSIS — I25.10 CORONARY ARTERY DISEASE INVOLVING NATIVE HEART WITHOUT ANGINA PECTORIS, UNSPECIFIED VESSEL OR LESION TYPE: ICD-10-CM

## 2023-10-05 DIAGNOSIS — E53.8 B12 DEFICIENCY: ICD-10-CM

## 2023-10-05 LAB
ALBUMIN SERPL BCP-MCNC: 4.4 G/DL (ref 3.4–5)
ALP SERPL-CCNC: 57 U/L (ref 33–136)
ALT SERPL W P-5'-P-CCNC: 14 U/L (ref 10–52)
ANION GAP SERPL CALC-SCNC: 11 MMOL/L (ref 10–20)
AST SERPL W P-5'-P-CCNC: 19 U/L (ref 9–39)
BILIRUB SERPL-MCNC: 1.6 MG/DL (ref 0–1.2)
BUN SERPL-MCNC: 21 MG/DL (ref 6–23)
CALCIUM SERPL-MCNC: 9 MG/DL (ref 8.6–10.3)
CHLORIDE SERPL-SCNC: 104 MMOL/L (ref 98–107)
CHOLEST SERPL-MCNC: 132 MG/DL (ref 0–199)
CHOLESTEROL/HDL RATIO: 2.3
CO2 SERPL-SCNC: 30 MMOL/L (ref 21–32)
CREAT SERPL-MCNC: 0.89 MG/DL (ref 0.5–1.3)
ERYTHROCYTE [DISTWIDTH] IN BLOOD BY AUTOMATED COUNT: 14.7 % (ref 11.5–14.5)
GFR SERPL CREATININE-BSD FRML MDRD: 83 ML/MIN/1.73M*2
GLUCOSE SERPL-MCNC: 88 MG/DL (ref 74–99)
HCT VFR BLD AUTO: 42.8 % (ref 41–52)
HDLC SERPL-MCNC: 56.7 MG/DL
HGB BLD-MCNC: 13.9 G/DL (ref 13.5–17.5)
LDLC SERPL CALC-MCNC: 64 MG/DL (ref 140–190)
MCH RBC QN AUTO: 31.5 PG (ref 26–34)
MCHC RBC AUTO-ENTMCNC: 32.5 G/DL (ref 32–36)
MCV RBC AUTO: 97 FL (ref 80–100)
NON HDL CHOLESTEROL: 75 MG/DL (ref 0–149)
NRBC BLD-RTO: 0 /100 WBCS (ref 0–0)
PLATELET # BLD AUTO: 165 X10*3/UL (ref 150–450)
PMV BLD AUTO: 10.1 FL (ref 7.5–11.5)
POTASSIUM SERPL-SCNC: 4.4 MMOL/L (ref 3.5–5.3)
PROT SERPL-MCNC: 6.3 G/DL (ref 6.4–8.2)
RBC # BLD AUTO: 4.41 X10*6/UL (ref 4.5–5.9)
SODIUM SERPL-SCNC: 141 MMOL/L (ref 136–145)
TRIGL SERPL-MCNC: 55 MG/DL (ref 0–149)
VIT B12 SERPL-MCNC: 1725 PG/ML (ref 211–911)
VLDL: 11 MG/DL (ref 0–40)
WBC # BLD AUTO: 5.8 X10*3/UL (ref 4.4–11.3)

## 2023-10-05 PROCEDURE — 80061 LIPID PANEL: CPT

## 2023-10-05 PROCEDURE — 36415 COLL VENOUS BLD VENIPUNCTURE: CPT

## 2023-10-05 PROCEDURE — 85027 COMPLETE CBC AUTOMATED: CPT

## 2023-10-05 PROCEDURE — 80053 COMPREHEN METABOLIC PANEL: CPT

## 2023-10-05 PROCEDURE — 82607 VITAMIN B-12: CPT

## 2023-10-10 ENCOUNTER — TELEPHONE (OUTPATIENT)
Dept: PRIMARY CARE | Facility: CLINIC | Age: 88
End: 2023-10-10
Payer: MEDICARE

## 2023-10-10 ENCOUNTER — PATIENT OUTREACH (OUTPATIENT)
Dept: PRIMARY CARE | Facility: CLINIC | Age: 88
End: 2023-10-10
Payer: MEDICARE

## 2023-10-10 DIAGNOSIS — I10 BENIGN ESSENTIAL HYPERTENSION: ICD-10-CM

## 2023-10-10 DIAGNOSIS — I25.10 CORONARY ARTERY DISEASE INVOLVING NATIVE HEART WITHOUT ANGINA PECTORIS, UNSPECIFIED VESSEL OR LESION TYPE: ICD-10-CM

## 2023-10-10 NOTE — PROGRESS NOTES
Late entry as not seen noted in patients chart from call on 9-18-23 with Dr's response    Message left on patient's vm regarding change.    Rather than parameters, let's just have him reduce to 80 mg of Valsartan only (no more hydrochlorothiazide). I've sent in a prescription for this. If he continues to get low readings and feels fatigued with lower readings, then we may have to reduce the Valsartan.

## 2023-10-10 NOTE — PROGRESS NOTES
Received message from patient requesting 90 day refill for valsartan to be sent to pharmacy. Request sent to Dr.  Noted previous conversation with Dr's response not added in patient's chart . ( Noted was empty other than left message for patient) Added Dr response to clarify.

## 2023-10-11 DIAGNOSIS — E78.00 ELEVATED LOW DENSITY LIPOPROTEIN (LDL) CHOLESTEROL LEVEL: ICD-10-CM

## 2023-10-12 DIAGNOSIS — I10 BENIGN ESSENTIAL HYPERTENSION: ICD-10-CM

## 2023-10-12 RX ORDER — VALSARTAN 80 MG/1
80 TABLET ORAL DAILY
Qty: 90 TABLET | Refills: 2 | Status: SHIPPED | OUTPATIENT
Start: 2023-10-12 | End: 2023-10-13 | Stop reason: SDUPTHER

## 2023-10-12 RX ORDER — PITAVASTATIN CALCIUM 2.09 MG/1
1 TABLET, FILM COATED ORAL DAILY
Qty: 90 TABLET | Refills: 0 | Status: SHIPPED | OUTPATIENT
Start: 2023-10-12 | End: 2023-12-26

## 2023-10-13 RX ORDER — VALSARTAN 80 MG/1
80 TABLET ORAL DAILY
Qty: 90 TABLET | Refills: 1 | Status: SHIPPED | OUTPATIENT
Start: 2023-10-13 | End: 2024-02-01 | Stop reason: SDUPTHER

## 2023-10-19 ENCOUNTER — PATIENT OUTREACH (OUTPATIENT)
Dept: PRIMARY CARE | Facility: CLINIC | Age: 88
End: 2023-10-19
Payer: MEDICARE

## 2023-10-19 DIAGNOSIS — I25.10 CORONARY ARTERY DISEASE INVOLVING NATIVE HEART WITHOUT ANGINA PECTORIS, UNSPECIFIED VESSEL OR LESION TYPE: ICD-10-CM

## 2023-10-19 DIAGNOSIS — I25.10 ATHEROSCLEROSIS OF NATIVE CORONARY ARTERY OF NATIVE HEART, UNSPECIFIED WHETHER ANGINA PRESENT: ICD-10-CM

## 2023-10-19 DIAGNOSIS — I10 BENIGN ESSENTIAL HYPERTENSION: ICD-10-CM

## 2023-10-19 DIAGNOSIS — E53.8 B12 DEFICIENCY: ICD-10-CM

## 2023-10-19 PROCEDURE — 99490 CHRNC CARE MGMT STAFF 1ST 20: CPT | Performed by: NURSE PRACTITIONER

## 2023-10-19 NOTE — PROGRESS NOTES
[facility] call to patient. He reports he is doing alright. He is going 2x week for physical therapy. He continues to feel weakness in his legs but is working through it. He is eating well. Checking his blood pressure, 127/72. Denies any swelling. No shortness of breath. He did follow up with Dr Velez. He continues to be taking volsartan. Denies really feeling any different. No concerns at this time. Upcoming appointments scheduled.

## 2023-11-13 ENCOUNTER — PATIENT OUTREACH (OUTPATIENT)
Dept: PRIMARY CARE | Facility: CLINIC | Age: 88
End: 2023-11-13
Payer: MEDICARE

## 2023-11-13 DIAGNOSIS — I25.10 CORONARY ARTERY DISEASE INVOLVING NATIVE HEART WITHOUT ANGINA PECTORIS, UNSPECIFIED VESSEL OR LESION TYPE: ICD-10-CM

## 2023-11-13 DIAGNOSIS — I10 BENIGN ESSENTIAL HYPERTENSION: ICD-10-CM

## 2023-11-13 DIAGNOSIS — I25.10 ATHEROSCLEROSIS OF NATIVE CORONARY ARTERY OF NATIVE HEART, UNSPECIFIED WHETHER ANGINA PRESENT: ICD-10-CM

## 2023-11-13 PROCEDURE — 99490 CHRNC CARE MGMT STAFF 1ST 20: CPT | Performed by: NURSE PRACTITIONER

## 2023-11-13 NOTE — PROGRESS NOTES
Received call from patient. He is concerned as he has been having trouble obtain refill on his Plavix. Rfill request sent to Dr Velez. Instructed if he has any further issues to call and we can see where the breakdown with getting medications filled is.  He stated he verified with Dr Grijalva that he is to be taking Plavix an eliquis together from Dr Purvis.  Dr Grijalva is ordering any vascular testing/studies he will be undergoing next week. He denies any dizziness or lightheadedness. He denies really feeling any changes since change with valsartan approx 1-2 months ago. He is going to therapy 2 x week. He does feel as though this has help strengthen his legs. He is still extremely busy and gets fatigued. He reports the following blood pressure readings 127/62;120/65;114/63;130/72;132/71;142/76. He continues to monitor. Reports appetite is good. They remain very active. He does have follow up appt scheduled with dr Velez for January. No other concerns noted at this time. He will call if he continues to have trouble getting medications refilled.

## 2023-11-16 RX ORDER — CLOPIDOGREL BISULFATE 75 MG/1
75 TABLET ORAL DAILY
Qty: 90 TABLET | Refills: 0 | Status: SHIPPED | OUTPATIENT
Start: 2023-11-16 | End: 2024-01-08

## 2023-12-07 ENCOUNTER — PATIENT OUTREACH (OUTPATIENT)
Dept: PRIMARY CARE | Facility: CLINIC | Age: 88
End: 2023-12-07
Payer: MEDICARE

## 2023-12-07 DIAGNOSIS — I25.10 ATHEROSCLEROSIS OF NATIVE CORONARY ARTERY OF NATIVE HEART, UNSPECIFIED WHETHER ANGINA PRESENT: ICD-10-CM

## 2023-12-07 DIAGNOSIS — I25.10 CORONARY ARTERY DISEASE INVOLVING NATIVE HEART WITHOUT ANGINA PECTORIS, UNSPECIFIED VESSEL OR LESION TYPE: ICD-10-CM

## 2023-12-07 DIAGNOSIS — I73.9 VASCULAR CLAUDICATION (CMS-HCC): ICD-10-CM

## 2023-12-07 DIAGNOSIS — I10 BENIGN ESSENTIAL HYPERTENSION: ICD-10-CM

## 2023-12-07 DIAGNOSIS — G62.9 NEUROPATHY: ICD-10-CM

## 2023-12-07 PROCEDURE — 99490 CHRNC CARE MGMT STAFF 1ST 20: CPT | Performed by: FAMILY MEDICINE

## 2023-12-07 NOTE — PROGRESS NOTES
"Call to patient. Stated that he is still having weakness in his legs, continuing to go to PT/rehab for strength building.  His blood pressure is managed with POC in place. Readings are normally in the 120's/70's.  He did say that he \"tripped\" about 2-3 weeks ago, turning too quickly and 'went down'.  Denied any injuries. Reporting appetite is good. Good on all prescriptions at this time, no refills needed.  In agreement to follow up call next month  "

## 2023-12-26 DIAGNOSIS — E78.00 ELEVATED LOW DENSITY LIPOPROTEIN (LDL) CHOLESTEROL LEVEL: ICD-10-CM

## 2023-12-26 RX ORDER — PITAVASTATIN CALCIUM 2.09 MG/1
1 TABLET, FILM COATED ORAL DAILY
Qty: 90 TABLET | Refills: 0 | Status: SHIPPED | OUTPATIENT
Start: 2023-12-26 | End: 2024-02-16 | Stop reason: ALTCHOICE

## 2023-12-28 ENCOUNTER — APPOINTMENT (OUTPATIENT)
Dept: HEMATOLOGY/ONCOLOGY | Facility: CLINIC | Age: 88
End: 2023-12-28
Payer: MEDICARE

## 2024-01-02 ENCOUNTER — OFFICE VISIT (OUTPATIENT)
Dept: HEMATOLOGY/ONCOLOGY | Facility: CLINIC | Age: 89
End: 2024-01-02
Payer: MEDICARE

## 2024-01-02 ENCOUNTER — APPOINTMENT (OUTPATIENT)
Dept: LAB | Facility: CLINIC | Age: 89
End: 2024-01-02
Payer: MEDICARE

## 2024-01-02 VITALS
SYSTOLIC BLOOD PRESSURE: 155 MMHG | HEIGHT: 72 IN | DIASTOLIC BLOOD PRESSURE: 95 MMHG | OXYGEN SATURATION: 97 % | RESPIRATION RATE: 18 BRPM | BODY MASS INDEX: 26.83 KG/M2 | WEIGHT: 198.08 LBS | TEMPERATURE: 96.8 F | HEART RATE: 75 BPM

## 2024-01-02 DIAGNOSIS — C85.10 B-CELL LYMPHOMA, UNSPECIFIED B-CELL LYMPHOMA TYPE, UNSPECIFIED BODY REGION (MULTI): Primary | ICD-10-CM

## 2024-01-02 LAB
ALBUMIN SERPL BCP-MCNC: 4.6 G/DL (ref 3.4–5)
ALP SERPL-CCNC: 56 U/L (ref 33–136)
ALT SERPL W P-5'-P-CCNC: 12 U/L (ref 10–52)
ANION GAP SERPL CALC-SCNC: 12 MMOL/L (ref 10–20)
AST SERPL W P-5'-P-CCNC: 17 U/L (ref 9–39)
BASOPHILS # BLD AUTO: 0.04 X10*3/UL (ref 0–0.1)
BASOPHILS NFR BLD AUTO: 0.7 %
BILIRUB SERPL-MCNC: 1.5 MG/DL (ref 0–1.2)
BUN SERPL-MCNC: 23 MG/DL (ref 6–23)
CALCIUM SERPL-MCNC: 9.2 MG/DL (ref 8.6–10.3)
CHLORIDE SERPL-SCNC: 104 MMOL/L (ref 98–107)
CO2 SERPL-SCNC: 29 MMOL/L (ref 21–32)
CREAT SERPL-MCNC: 0.95 MG/DL (ref 0.5–1.3)
EOSINOPHIL # BLD AUTO: 0.07 X10*3/UL (ref 0–0.4)
EOSINOPHIL NFR BLD AUTO: 1.3 %
ERYTHROCYTE [DISTWIDTH] IN BLOOD BY AUTOMATED COUNT: 14.4 % (ref 11.5–14.5)
GFR SERPL CREATININE-BSD FRML MDRD: 78 ML/MIN/1.73M*2
GLUCOSE SERPL-MCNC: 108 MG/DL (ref 74–99)
HCT VFR BLD AUTO: 43.4 % (ref 41–52)
HGB BLD-MCNC: 14.2 G/DL (ref 13.5–17.5)
IMM GRANULOCYTES # BLD AUTO: 0.01 X10*3/UL (ref 0–0.5)
IMM GRANULOCYTES NFR BLD AUTO: 0.2 % (ref 0–0.9)
LDH SERPL L TO P-CCNC: 161 U/L (ref 84–246)
LYMPHOCYTES # BLD AUTO: 1.47 X10*3/UL (ref 0.8–3)
LYMPHOCYTES NFR BLD AUTO: 27.2 %
MCH RBC QN AUTO: 30.5 PG (ref 26–34)
MCHC RBC AUTO-ENTMCNC: 32.7 G/DL (ref 32–36)
MCV RBC AUTO: 93 FL (ref 80–100)
MONOCYTES # BLD AUTO: 0.67 X10*3/UL (ref 0.05–0.8)
MONOCYTES NFR BLD AUTO: 12.4 %
NEUTROPHILS # BLD AUTO: 3.14 X10*3/UL (ref 1.6–5.5)
NEUTROPHILS NFR BLD AUTO: 58.2 %
NRBC BLD-RTO: 0 /100 WBCS (ref 0–0)
PLATELET # BLD AUTO: 161 X10*3/UL (ref 150–450)
POTASSIUM SERPL-SCNC: 4.1 MMOL/L (ref 3.5–5.3)
PROT SERPL-MCNC: 6.7 G/DL (ref 6.4–8.2)
RBC # BLD AUTO: 4.65 X10*6/UL (ref 4.5–5.9)
SODIUM SERPL-SCNC: 141 MMOL/L (ref 136–145)
WBC # BLD AUTO: 5.4 X10*3/UL (ref 4.4–11.3)

## 2024-01-02 PROCEDURE — 1126F AMNT PAIN NOTED NONE PRSNT: CPT | Performed by: NURSE PRACTITIONER

## 2024-01-02 PROCEDURE — 99213 OFFICE O/P EST LOW 20 MIN: CPT | Performed by: NURSE PRACTITIONER

## 2024-01-02 PROCEDURE — 1159F MED LIST DOCD IN RCRD: CPT | Performed by: NURSE PRACTITIONER

## 2024-01-02 PROCEDURE — 80053 COMPREHEN METABOLIC PANEL: CPT | Performed by: NURSE PRACTITIONER

## 2024-01-02 PROCEDURE — 1160F RVW MEDS BY RX/DR IN RCRD: CPT | Performed by: NURSE PRACTITIONER

## 2024-01-02 PROCEDURE — 85025 COMPLETE CBC W/AUTO DIFF WBC: CPT | Performed by: NURSE PRACTITIONER

## 2024-01-02 PROCEDURE — 36415 COLL VENOUS BLD VENIPUNCTURE: CPT | Performed by: NURSE PRACTITIONER

## 2024-01-02 PROCEDURE — 3077F SYST BP >= 140 MM HG: CPT | Performed by: NURSE PRACTITIONER

## 2024-01-02 PROCEDURE — 3080F DIAST BP >= 90 MM HG: CPT | Performed by: NURSE PRACTITIONER

## 2024-01-02 PROCEDURE — 83615 LACTATE (LD) (LDH) ENZYME: CPT | Performed by: NURSE PRACTITIONER

## 2024-01-02 PROCEDURE — 1036F TOBACCO NON-USER: CPT | Performed by: NURSE PRACTITIONER

## 2024-01-02 ASSESSMENT — ENCOUNTER SYMPTOMS
OCCASIONAL FEELINGS OF UNSTEADINESS: 0
DEPRESSION: 0
LOSS OF SENSATION IN FEET: 0

## 2024-01-02 ASSESSMENT — PATIENT HEALTH QUESTIONNAIRE - PHQ9
SUM OF ALL RESPONSES TO PHQ9 QUESTIONS 1 AND 2: 0
2. FEELING DOWN, DEPRESSED OR HOPELESS: NOT AT ALL
1. LITTLE INTEREST OR PLEASURE IN DOING THINGS: NOT AT ALL

## 2024-01-02 ASSESSMENT — COLUMBIA-SUICIDE SEVERITY RATING SCALE - C-SSRS
6. HAVE YOU EVER DONE ANYTHING, STARTED TO DO ANYTHING, OR PREPARED TO DO ANYTHING TO END YOUR LIFE?: NO
1. IN THE PAST MONTH, HAVE YOU WISHED YOU WERE DEAD OR WISHED YOU COULD GO TO SLEEP AND NOT WAKE UP?: NO
2. HAVE YOU ACTUALLY HAD ANY THOUGHTS OF KILLING YOURSELF?: NO

## 2024-01-02 ASSESSMENT — PAIN SCALES - GENERAL: PAINLEVEL: 0-NO PAIN

## 2024-01-02 NOTE — PROGRESS NOTES
"Patient ID: Ronal Armijo is a 88 y.o. male.    Cancer History:  Marginal zone Non-Hodgkin's lymphoma stage KRIS diagnosed by an abnormal B-cell population and lambda light chain restricted lymphocytes in the peripheral blood.    Interval History:  Patient comes in today for his routine one-year follow-up visit for his indolent B cell non-Hodgkin's lymphoma.  On presentation today he is feeling well.  He denies any fever, chills or night sweats.  No cough, chest pain or shortness of breath.  No nausea or vomiting.  No constipation or diarrhea.  He has had no drenching night sweats.  He has not had no unexplained weight loss and no new or notable lymphadenopathy.  He has not had any interval infections or hospitalizations.  Primary concern is PVD following with vascular.     Review of Systems:  A review of systems has been completed and are negative for complaints except what is stated in the HPI and/or past medical history    Allergies:  Formaldehyde, terbinafine    Medications:   Medication list reviewed with patient and updated in EMR    Past Medical History:  Non-Hodgkin's lymphoma, peripheral arteriosclerosis, atherosclerosis of native artery of both lower extremities with intermittent claudication, aortic valve stenosis, osteoarthritis, dyslipidemia. HTN, MI, CAD status post coronary artery stent.    Past Surgical History:  Presence of drug coated stent in right coronary artery, blepharoplasty of both eyes, appendectomy, bilateral inguinal hernia repair, bilateral total knee replacement  Left lower extremity vascular surgery on 2020 with Dr. Lopez.    Social History:    Never smoker    Vital Signs:  BP (!) 155/95 (BP Location: Right arm, Patient Position: Sitting, BP Cuff Size: Adult)   Pulse 75   Temp 36 °C (96.8 °F) (Temporal)   Resp 18   Ht 1.838 m (6' 0.36\")   Wt 89.8 kg (198 lb 1.3 oz)   SpO2 97%   BMI 26.60 kg/m²     Physical Exam:  ECO  Pain: 0  Constitutional: Well developed, " awake/alert/oriented x3, no distress, alert and cooperative  Eyes: PER. sclera anicteric  ENMT: Oral mucosa moist  Respiratory/Thorax: Breathing is non-labored. Lungs are clear to auscultation bilaterally. No adventitious breath sounds  Cardiovascular: S1-S2. Regular rate and rhythm. No murmurs, rubs, or gallops appreciated  Gastrointestinal: Abdomen soft nontender, nondistended, normal active bowel sounds. No hepatosplenomegly  Musculoskeletal: ROM intact, no joint swelling, normal strength  Extremities: normal extremities, no cyanosis, no edema, no clubbing  Lymphatics: no palpable peripheral lymphadenopathy   Neurologic: alert and oriented x3. Nonfocal exam. No myoclonus  Psychological: Pleasant, appropriate and easily engaged     Lab Results:  January 2, 2024  WBC 5.4, hemoglobin 14.2, hematocrit 43.4, platelets 161,000    CBC date/time       WBC     HGB     HCT     PLT     Neut      29-Dec-2022 10:20   6.7     14.0    42.6    148(L)  4.55  05-Oct-2022 08:12   5.9     14.6    45.8    168     3.44  22-Dec-2021 08:35   4.9     14.7    45.1    165     2.14  16-Jul-2021 08:13   4.7     13.6    42.0    162     2.07  18-Sep-2020 09:35   4.9     14.4    45.2    175     2.41  15-Jul-2020 08:12   5.7     12.9(L) 41.6    182     N/A  08-Jun-2020 08:52   6.2     10.7(L) 35.8(L) 223     2.27    Assessment:  This is an 88-year-old  gentleman with a history of stage KRIS marginal zone Non-Hodgkin's lymphoma with initial small clone of CLL that is asymptomatic and is likely unrelated to his neuropathy and his weakness.    1. Lymphoma: The patient continues to remain asymptomatic without any significant B symptoms. We will continue with watchful waiting, unless he develops B symptoms, dramatically enlarging lymph nodes orcytopenias.     2. Neuropathy: Hand and feet he will continue following with his neurologist.     3. Immunization: The patient will continue with the flu shot every fall and the Pneumovax every five  years. Has had got both COVID vaccines and is declining the booster.    Plan:  - 1 year follow-up  - labs on return CBCd, CMP, LDH  - offered follow-up here PRN. Prefers annual visits.       Kiran Ryan, APRN-CNP

## 2024-01-02 NOTE — PROGRESS NOTES
Pt presents to clinic with spouse, seen for routine follow up with Kiran Ryan.   Allergies reviewed with patient. Medications reconciled with patient.   Patient notes numbness and tingling in BUE and BLE, which he states has been ongoing/worsening over last three years. Patient BLE noted to have PVD and observed to be red and sore, per pt.   NP Kiran notified. No further complaints by pt.

## 2024-01-07 DIAGNOSIS — I25.10 CORONARY ARTERY DISEASE INVOLVING NATIVE HEART WITHOUT ANGINA PECTORIS, UNSPECIFIED VESSEL OR LESION TYPE: ICD-10-CM

## 2024-01-08 RX ORDER — CLOPIDOGREL BISULFATE 75 MG/1
75 TABLET ORAL DAILY
Qty: 90 TABLET | Refills: 0 | Status: SHIPPED | OUTPATIENT
Start: 2024-01-08 | End: 2024-04-29

## 2024-01-11 ENCOUNTER — PATIENT OUTREACH (OUTPATIENT)
Dept: PRIMARY CARE | Facility: CLINIC | Age: 89
End: 2024-01-11
Payer: MEDICARE

## 2024-01-11 DIAGNOSIS — I10 BENIGN ESSENTIAL HYPERTENSION: ICD-10-CM

## 2024-01-11 DIAGNOSIS — R53.82 CHRONIC FATIGUE: ICD-10-CM

## 2024-01-11 DIAGNOSIS — I73.9 PAD (PERIPHERAL ARTERY DISEASE) (CMS-HCC): ICD-10-CM

## 2024-01-11 DIAGNOSIS — R60.9 DEPENDENT EDEMA: ICD-10-CM

## 2024-01-11 DIAGNOSIS — I48.0 PAROXYSMAL ATRIAL FIBRILLATION (MULTI): ICD-10-CM

## 2024-01-11 DIAGNOSIS — I25.10 CORONARY ARTERY DISEASE INVOLVING NATIVE HEART WITHOUT ANGINA PECTORIS, UNSPECIFIED VESSEL OR LESION TYPE: ICD-10-CM

## 2024-01-11 PROCEDURE — 99490 CHRNC CARE MGMT STAFF 1ST 20: CPT | Performed by: FAMILY MEDICINE

## 2024-01-11 NOTE — PROGRESS NOTES
"[facility] monthly call to patient.  Patient is doing \"ok\".  Stated that he has tried to get an appointment with Rheumatology, but was told it would be a 3-5 month wait.  Provided names of other dr within the system to see if they could assist patient sooner.  Blood pressure has been OK, denies any increased edema, SOB, or CP at time of call.  No refills needed.  In agreement to follow up calls in the future  "

## 2024-02-01 DIAGNOSIS — I10 BENIGN ESSENTIAL HYPERTENSION: ICD-10-CM

## 2024-02-01 NOTE — TELEPHONE ENCOUNTER
Pharmacy: [facility]  MED: Valsartan  DOSE: 80 mg once daily  LOV: 10/05/2023  NOV:  04/11/2024

## 2024-02-01 NOTE — TELEPHONE ENCOUNTER
Should have a refill on this, spoke with pt. And he thought so to, but the pharmacy says they don't have one. PC to [facility] states the script was cancelled so they need a new one. Set up for 90 days x 1 to cover to next appt. Pt. States he has been out for 2 weeks.

## 2024-02-02 RX ORDER — VALSARTAN 80 MG/1
80 TABLET ORAL DAILY
Qty: 90 TABLET | Refills: 1 | Status: SHIPPED | OUTPATIENT
Start: 2024-02-02 | End: 2024-06-03

## 2024-02-12 ENCOUNTER — APPOINTMENT (OUTPATIENT)
Dept: CARDIOLOGY | Facility: HOSPITAL | Age: 89
End: 2024-02-12
Payer: MEDICARE

## 2024-02-12 ENCOUNTER — PATIENT OUTREACH (OUTPATIENT)
Dept: PRIMARY CARE | Facility: CLINIC | Age: 89
End: 2024-02-12
Payer: MEDICARE

## 2024-02-12 ENCOUNTER — APPOINTMENT (OUTPATIENT)
Dept: RADIOLOGY | Facility: HOSPITAL | Age: 89
End: 2024-02-12
Payer: MEDICARE

## 2024-02-12 ENCOUNTER — HOSPITAL ENCOUNTER (OUTPATIENT)
Facility: HOSPITAL | Age: 89
Setting detail: OBSERVATION
Discharge: HOME | End: 2024-02-13
Attending: EMERGENCY MEDICINE | Admitting: INTERNAL MEDICINE
Payer: MEDICARE

## 2024-02-12 DIAGNOSIS — I48.0 PAROXYSMAL ATRIAL FIBRILLATION (MULTI): ICD-10-CM

## 2024-02-12 DIAGNOSIS — Z95.1 S/P CABG X 3: ICD-10-CM

## 2024-02-12 DIAGNOSIS — I73.9 PAD (PERIPHERAL ARTERY DISEASE) (CMS-HCC): ICD-10-CM

## 2024-02-12 DIAGNOSIS — R26.9 GAIT DIFFICULTY: ICD-10-CM

## 2024-02-12 DIAGNOSIS — G45.9 TIA (TRANSIENT ISCHEMIC ATTACK): Primary | ICD-10-CM

## 2024-02-12 DIAGNOSIS — R47.01 EXPRESSIVE APHASIA: ICD-10-CM

## 2024-02-12 DIAGNOSIS — I65.23 BILATERAL CAROTID ARTERY STENOSIS: ICD-10-CM

## 2024-02-12 DIAGNOSIS — R53.82 CHRONIC FATIGUE: ICD-10-CM

## 2024-02-12 DIAGNOSIS — I25.10 CORONARY ARTERY DISEASE INVOLVING NATIVE HEART WITHOUT ANGINA PECTORIS, UNSPECIFIED VESSEL OR LESION TYPE: ICD-10-CM

## 2024-02-12 LAB
ALBUMIN SERPL BCP-MCNC: 4.4 G/DL (ref 3.4–5)
ALP SERPL-CCNC: 74 U/L (ref 33–136)
ALT SERPL W P-5'-P-CCNC: 11 U/L (ref 10–52)
ANION GAP SERPL CALC-SCNC: 13 MMOL/L (ref 10–20)
APTT PPP: 41 SECONDS (ref 27–38)
AST SERPL W P-5'-P-CCNC: 17 U/L (ref 9–39)
ATRIAL RATE: 65 BPM
BASOPHILS # BLD AUTO: 0.04 X10*3/UL (ref 0–0.1)
BASOPHILS NFR BLD AUTO: 0.6 %
BILIRUB SERPL-MCNC: 1.3 MG/DL (ref 0–1.2)
BNP SERPL-MCNC: 121 PG/ML (ref 0–99)
BUN SERPL-MCNC: 25 MG/DL (ref 6–23)
CALCIUM SERPL-MCNC: 9 MG/DL (ref 8.6–10.3)
CARDIAC TROPONIN I PNL SERPL HS: 9 NG/L (ref 0–20)
CHLORIDE SERPL-SCNC: 102 MMOL/L (ref 98–107)
CO2 SERPL-SCNC: 27 MMOL/L (ref 21–32)
CREAT SERPL-MCNC: 0.98 MG/DL (ref 0.5–1.3)
EGFRCR SERPLBLD CKD-EPI 2021: 75 ML/MIN/1.73M*2
EOSINOPHIL # BLD AUTO: 0.02 X10*3/UL (ref 0–0.4)
EOSINOPHIL NFR BLD AUTO: 0.3 %
ERYTHROCYTE [DISTWIDTH] IN BLOOD BY AUTOMATED COUNT: 14.3 % (ref 11.5–14.5)
GLUCOSE BLD MANUAL STRIP-MCNC: 92 MG/DL (ref 74–99)
GLUCOSE SERPL-MCNC: 101 MG/DL (ref 74–99)
HCT VFR BLD AUTO: 43.2 % (ref 41–52)
HGB BLD-MCNC: 14 G/DL (ref 13.5–17.5)
IMM GRANULOCYTES # BLD AUTO: 0.02 X10*3/UL (ref 0–0.5)
IMM GRANULOCYTES NFR BLD AUTO: 0.3 % (ref 0–0.9)
INR PPP: 1.1 (ref 0.9–1.1)
LYMPHOCYTES # BLD AUTO: 1.05 X10*3/UL (ref 0.8–3)
LYMPHOCYTES NFR BLD AUTO: 14.8 %
MCH RBC QN AUTO: 30.5 PG (ref 26–34)
MCHC RBC AUTO-ENTMCNC: 32.4 G/DL (ref 32–36)
MCV RBC AUTO: 94 FL (ref 80–100)
MONOCYTES # BLD AUTO: 0.91 X10*3/UL (ref 0.05–0.8)
MONOCYTES NFR BLD AUTO: 12.8 %
NEUTROPHILS # BLD AUTO: 5.05 X10*3/UL (ref 1.6–5.5)
NEUTROPHILS NFR BLD AUTO: 71.2 %
NRBC BLD-RTO: 0 /100 WBCS (ref 0–0)
P AXIS: 5 DEGREES
P OFFSET: 194 MS
P ONSET: 153 MS
PLATELET # BLD AUTO: 170 X10*3/UL (ref 150–450)
POTASSIUM SERPL-SCNC: 4.1 MMOL/L (ref 3.5–5.3)
PR INTERVAL: 264 MS
PROT SERPL-MCNC: 6.6 G/DL (ref 6.4–8.2)
PROTHROMBIN TIME: 12 SECONDS (ref 9.8–12.8)
Q ONSET: 215 MS
QRS COUNT: 11 BEATS
QRS DURATION: 96 MS
QT INTERVAL: 400 MS
QTC CALCULATION(BAZETT): 416 MS
QTC FREDERICIA: 410 MS
R AXIS: 4 DEGREES
RBC # BLD AUTO: 4.59 X10*6/UL (ref 4.5–5.9)
SODIUM SERPL-SCNC: 138 MMOL/L (ref 136–145)
T AXIS: 20 DEGREES
T OFFSET: 415 MS
VENTRICULAR RATE: 65 BPM
WBC # BLD AUTO: 7.1 X10*3/UL (ref 4.4–11.3)

## 2024-02-12 PROCEDURE — 83880 ASSAY OF NATRIURETIC PEPTIDE: CPT | Performed by: PHYSICIAN ASSISTANT

## 2024-02-12 PROCEDURE — 85025 COMPLETE CBC W/AUTO DIFF WBC: CPT | Performed by: EMERGENCY MEDICINE

## 2024-02-12 PROCEDURE — 70498 CT ANGIOGRAPHY NECK: CPT | Performed by: RADIOLOGY

## 2024-02-12 PROCEDURE — 70498 CT ANGIOGRAPHY NECK: CPT

## 2024-02-12 PROCEDURE — 82947 ASSAY GLUCOSE BLOOD QUANT: CPT | Mod: 59

## 2024-02-12 PROCEDURE — 85730 THROMBOPLASTIN TIME PARTIAL: CPT | Performed by: EMERGENCY MEDICINE

## 2024-02-12 PROCEDURE — 80053 COMPREHEN METABOLIC PANEL: CPT | Performed by: EMERGENCY MEDICINE

## 2024-02-12 PROCEDURE — 70496 CT ANGIOGRAPHY HEAD: CPT | Performed by: RADIOLOGY

## 2024-02-12 PROCEDURE — 70450 CT HEAD/BRAIN W/O DYE: CPT | Performed by: RADIOLOGY

## 2024-02-12 PROCEDURE — 2500000001 HC RX 250 WO HCPCS SELF ADMINISTERED DRUGS (ALT 637 FOR MEDICARE OP): Performed by: PHYSICIAN ASSISTANT

## 2024-02-12 PROCEDURE — 84484 ASSAY OF TROPONIN QUANT: CPT | Performed by: EMERGENCY MEDICINE

## 2024-02-12 PROCEDURE — 36415 COLL VENOUS BLD VENIPUNCTURE: CPT | Performed by: EMERGENCY MEDICINE

## 2024-02-12 PROCEDURE — 83036 HEMOGLOBIN GLYCOSYLATED A1C: CPT | Mod: GEALAB | Performed by: PHYSICIAN ASSISTANT

## 2024-02-12 PROCEDURE — 83735 ASSAY OF MAGNESIUM: CPT | Performed by: FAMILY MEDICINE

## 2024-02-12 PROCEDURE — 70450 CT HEAD/BRAIN W/O DYE: CPT | Mod: 59

## 2024-02-12 PROCEDURE — 99222 1ST HOSP IP/OBS MODERATE 55: CPT | Performed by: PHYSICIAN ASSISTANT

## 2024-02-12 PROCEDURE — 2500000001 HC RX 250 WO HCPCS SELF ADMINISTERED DRUGS (ALT 637 FOR MEDICARE OP): Performed by: INTERNAL MEDICINE

## 2024-02-12 PROCEDURE — 99285 EMERGENCY DEPT VISIT HI MDM: CPT | Mod: 25 | Performed by: EMERGENCY MEDICINE

## 2024-02-12 PROCEDURE — G0378 HOSPITAL OBSERVATION PER HR: HCPCS

## 2024-02-12 PROCEDURE — 85610 PROTHROMBIN TIME: CPT | Performed by: EMERGENCY MEDICINE

## 2024-02-12 PROCEDURE — 93005 ELECTROCARDIOGRAM TRACING: CPT

## 2024-02-12 PROCEDURE — 2550000001 HC RX 255 CONTRASTS: Performed by: INTERNAL MEDICINE

## 2024-02-12 RX ORDER — CLOPIDOGREL BISULFATE 75 MG/1
75 TABLET ORAL DAILY
Status: DISCONTINUED | OUTPATIENT
Start: 2024-02-13 | End: 2024-02-13 | Stop reason: HOSPADM

## 2024-02-12 RX ORDER — CLOPIDOGREL BISULFATE 75 MG/1
75 TABLET ORAL DAILY
Status: DISCONTINUED | OUTPATIENT
Start: 2024-02-12 | End: 2024-02-12

## 2024-02-12 RX ORDER — PANTOPRAZOLE SODIUM 40 MG/1
40 TABLET, DELAYED RELEASE ORAL
Status: DISCONTINUED | OUTPATIENT
Start: 2024-02-13 | End: 2024-02-13 | Stop reason: HOSPADM

## 2024-02-12 RX ORDER — ATORVASTATIN CALCIUM 80 MG/1
80 TABLET, FILM COATED ORAL NIGHTLY
Status: DISCONTINUED | OUTPATIENT
Start: 2024-02-12 | End: 2024-02-13 | Stop reason: HOSPADM

## 2024-02-12 RX ORDER — ENOXAPARIN SODIUM 100 MG/ML
40 INJECTION SUBCUTANEOUS EVERY 24 HOURS
Status: DISCONTINUED | OUTPATIENT
Start: 2024-02-12 | End: 2024-02-12

## 2024-02-12 RX ORDER — PANTOPRAZOLE SODIUM 40 MG/10ML
40 INJECTION, POWDER, LYOPHILIZED, FOR SOLUTION INTRAVENOUS
Status: DISCONTINUED | OUTPATIENT
Start: 2024-02-13 | End: 2024-02-13 | Stop reason: HOSPADM

## 2024-02-12 RX ORDER — VALSARTAN 160 MG/1
80 TABLET ORAL DAILY
Status: DISCONTINUED | OUTPATIENT
Start: 2024-02-12 | End: 2024-02-13 | Stop reason: HOSPADM

## 2024-02-12 RX ORDER — CHOLECALCIFEROL (VITAMIN D3) 25 MCG
1000 TABLET ORAL
Status: DISCONTINUED | OUTPATIENT
Start: 2024-02-12 | End: 2024-02-13 | Stop reason: HOSPADM

## 2024-02-12 RX ORDER — LABETALOL HYDROCHLORIDE 5 MG/ML
10 INJECTION, SOLUTION INTRAVENOUS EVERY 10 MIN PRN
Status: DISCONTINUED | OUTPATIENT
Start: 2024-02-12 | End: 2024-02-13 | Stop reason: HOSPADM

## 2024-02-12 RX ORDER — ASPIRIN 81 MG/1
81 TABLET ORAL DAILY
Status: DISCONTINUED | OUTPATIENT
Start: 2024-02-12 | End: 2024-02-12

## 2024-02-12 RX ORDER — HYDRALAZINE HYDROCHLORIDE 20 MG/ML
10 INJECTION INTRAMUSCULAR; INTRAVENOUS
Status: DISCONTINUED | OUTPATIENT
Start: 2024-02-12 | End: 2024-02-13 | Stop reason: HOSPADM

## 2024-02-12 RX ORDER — HYDRALAZINE HYDROCHLORIDE 25 MG/1
25 TABLET, FILM COATED ORAL EVERY 6 HOURS PRN
Status: DISCONTINUED | OUTPATIENT
Start: 2024-02-14 | End: 2024-02-13 | Stop reason: HOSPADM

## 2024-02-12 RX ORDER — POLYETHYLENE GLYCOL 3350 17 G/17G
17 POWDER, FOR SOLUTION ORAL DAILY
Status: DISCONTINUED | OUTPATIENT
Start: 2024-02-12 | End: 2024-02-13 | Stop reason: HOSPADM

## 2024-02-12 RX ADMIN — APIXABAN 2.5 MG: 2.5 TABLET, FILM COATED ORAL at 21:06

## 2024-02-12 RX ADMIN — ATORVASTATIN CALCIUM 80 MG: 80 TABLET, FILM COATED ORAL at 21:06

## 2024-02-12 RX ADMIN — IOHEXOL 85 ML: 350 INJECTION, SOLUTION INTRAVENOUS at 15:48

## 2024-02-12 SDOH — SOCIAL STABILITY: SOCIAL INSECURITY: WERE YOU ABLE TO COMPLETE ALL THE BEHAVIORAL HEALTH SCREENINGS?: YES

## 2024-02-12 SDOH — SOCIAL STABILITY: SOCIAL INSECURITY: DO YOU FEEL UNSAFE GOING BACK TO THE PLACE WHERE YOU ARE LIVING?: NO

## 2024-02-12 SDOH — SOCIAL STABILITY: SOCIAL INSECURITY: ARE THERE ANY APPARENT SIGNS OF INJURIES/BEHAVIORS THAT COULD BE RELATED TO ABUSE/NEGLECT?: NO

## 2024-02-12 SDOH — SOCIAL STABILITY: SOCIAL INSECURITY: DOES ANYONE TRY TO KEEP YOU FROM HAVING/CONTACTING OTHER FRIENDS OR DOING THINGS OUTSIDE YOUR HOME?: NO

## 2024-02-12 SDOH — SOCIAL STABILITY: SOCIAL INSECURITY: ABUSE: ADULT

## 2024-02-12 SDOH — SOCIAL STABILITY: SOCIAL INSECURITY: DO YOU FEEL ANYONE HAS EXPLOITED OR TAKEN ADVANTAGE OF YOU FINANCIALLY OR OF YOUR PERSONAL PROPERTY?: NO

## 2024-02-12 SDOH — SOCIAL STABILITY: SOCIAL INSECURITY: HAVE YOU HAD THOUGHTS OF HARMING ANYONE ELSE?: NO

## 2024-02-12 SDOH — SOCIAL STABILITY: SOCIAL INSECURITY: ARE YOU OR HAVE YOU BEEN THREATENED OR ABUSED PHYSICALLY, EMOTIONALLY, OR SEXUALLY BY ANYONE?: NO

## 2024-02-12 SDOH — SOCIAL STABILITY: SOCIAL INSECURITY: HAS ANYONE EVER THREATENED TO HURT YOUR FAMILY OR YOUR PETS?: NO

## 2024-02-12 ASSESSMENT — ENCOUNTER SYMPTOMS
UNEXPECTED WEIGHT CHANGE: 0
WOUND: 0
FACIAL ASYMMETRY: 0
WEAKNESS: 0
CONSTIPATION: 0
DYSURIA: 0
HEADACHES: 1
LIGHT-HEADEDNESS: 0
SPEECH DIFFICULTY: 1
TREMORS: 1
CONFUSION: 0
FREQUENCY: 0
VOMITING: 0
PALPITATIONS: 0
TROUBLE SWALLOWING: 0
SEIZURES: 0
CHEST TIGHTNESS: 0
SHORTNESS OF BREATH: 0
DIFFICULTY URINATING: 0
PHOTOPHOBIA: 0
FEVER: 0
DECREASED CONCENTRATION: 0
SLEEP DISTURBANCE: 0
EYE REDNESS: 0
DIZZINESS: 0
HEMATURIA: 0
MYALGIAS: 0
WHEEZING: 0
NUMBNESS: 0
SORE THROAT: 0
COUGH: 0
FLANK PAIN: 0
ABDOMINAL PAIN: 0
DIARRHEA: 0
HALLUCINATIONS: 0
RHINORRHEA: 0
NAUSEA: 0
DIAPHORESIS: 0

## 2024-02-12 ASSESSMENT — ACTIVITIES OF DAILY LIVING (ADL)
BATHING: INDEPENDENT
JUDGMENT_ADEQUATE_SAFELY_COMPLETE_DAILY_ACTIVITIES: YES
GROOMING: INDEPENDENT
WALKS IN HOME: INDEPENDENT
LACK_OF_TRANSPORTATION: NO
HEARING - LEFT EAR: HEARING AID
ADEQUATE_TO_COMPLETE_ADL: YES
TOILETING: INDEPENDENT
PATIENT'S MEMORY ADEQUATE TO SAFELY COMPLETE DAILY ACTIVITIES?: YES
FEEDING YOURSELF: INDEPENDENT
DRESSING YOURSELF: INDEPENDENT
HEARING - RIGHT EAR: HEARING AID
LACK_OF_TRANSPORTATION: NO

## 2024-02-12 ASSESSMENT — COGNITIVE AND FUNCTIONAL STATUS - GENERAL
DAILY ACTIVITIY SCORE: 24
PATIENT BASELINE BEDBOUND: NO
DAILY ACTIVITIY SCORE: 24
MOBILITY SCORE: 24
MOBILITY SCORE: 24

## 2024-02-12 ASSESSMENT — PATIENT HEALTH QUESTIONNAIRE - PHQ9
SUM OF ALL RESPONSES TO PHQ9 QUESTIONS 1 & 2: 0
1. LITTLE INTEREST OR PLEASURE IN DOING THINGS: NOT AT ALL
2. FEELING DOWN, DEPRESSED OR HOPELESS: NOT AT ALL

## 2024-02-12 ASSESSMENT — PAIN - FUNCTIONAL ASSESSMENT: PAIN_FUNCTIONAL_ASSESSMENT: 0-10

## 2024-02-12 ASSESSMENT — PAIN DESCRIPTION - LOCATION: LOCATION: FOOT

## 2024-02-12 ASSESSMENT — LIFESTYLE VARIABLES
AUDIT-C TOTAL SCORE: 0
SKIP TO QUESTIONS 9-10: 1
HOW OFTEN DO YOU HAVE 6 OR MORE DRINKS ON ONE OCCASION: NEVER
HAVE PEOPLE ANNOYED YOU BY CRITICIZING YOUR DRINKING: NO
HOW MANY STANDARD DRINKS CONTAINING ALCOHOL DO YOU HAVE ON A TYPICAL DAY: PATIENT DOES NOT DRINK
AUDIT-C TOTAL SCORE: 0
HAVE YOU EVER FELT YOU SHOULD CUT DOWN ON YOUR DRINKING: NO
EVER FELT BAD OR GUILTY ABOUT YOUR DRINKING: NO
HOW OFTEN DO YOU HAVE A DRINK CONTAINING ALCOHOL: NEVER
EVER HAD A DRINK FIRST THING IN THE MORNING TO STEADY YOUR NERVES TO GET RID OF A HANGOVER: NO

## 2024-02-12 ASSESSMENT — PAIN DESCRIPTION - ORIENTATION: ORIENTATION: RIGHT;LEFT

## 2024-02-12 ASSESSMENT — PAIN SCALES - GENERAL: PAINLEVEL_OUTOF10: 2

## 2024-02-12 NOTE — PROGRESS NOTES
"[facility] monthly call to patient.  Spoke with patient and his wife.  Said that they tried to get him a doctors appointment this morning for the patient, and they were told that it would be the end of May.  When asked what was going on, his wife said that he has been having \"episodes\", the last one happening on Saturday.  Said that he starts mumbling, cannot speak, loss of coordination.  When the patient sits down, it resolves \"after a while\" and then has a headache.  Suggested that they go to the ER to be seen.  Both patient and his wife asked again about a doctors appointment.  Called PCP office, spoke with Dr Agustin Schwartz's nurse, who agrees that the patient should be seen at the ER, as the PCP would send him to ER to be evaluated as they do not have the appropriate equipment in the office.    Call back to patient and spouse, explained the importance of being seen and evaluated in the ER.  Patient agrees to be seen in the ER, but asked if possible to call them to let them know that he is coming.  Call placed to the ER at [facility], information given regarding patient.    "

## 2024-02-12 NOTE — PROGRESS NOTES
"Pharmacy Medication History Review    Alexis Armijo \"Radha" is a 88 y.o. male admitted for TIA (transient ischemic attack). Pharmacy reviewed the patient's [id number] medications and allergies for accuracy.    The list below reflectives the updated PTA list. Please review each medication in order reconciliation for additional clarification and justification.  Prior to Admission medications    Medication Sig Start Date End Date Taking? Authorizing Provider   apixaban (Eliquis) 2.5 mg tablet Take 1 tablet (2.5 mg) by mouth 2 times a day. 11/15/21   Historical Provider, MD   b complex vitamins capsule Take 1 capsule by mouth once daily at bedtime.    Historical Provider, MD   cholecalciferol (Vitamin D-3) 25 MCG (1000 UT) capsule Take 1 capsule (25 mcg) by mouth 2 times a day.    Historical Provider, MD   clopidogrel (Plavix) 75 mg tablet TAKE 1 TABLET BY MOUTH EVERY DAY  Patient taking differently: Take 1 tablet (75 mg) by mouth once daily in the morning. 1/8/24   Jonah Velez,    coenzyme Q-10 100 mg capsule Take 1 capsule (100 mg) by mouth 2 times a day.    Historical Provider, MD   otcpm-aq-2-dha-epa-phospho-ast (Omega-3 Krill Oil) [id number] mg capsule Take 1 capsule by mouth once daily in the morning.    Historical Provider, MD   lactobacillus combination no.4 (Probiotic) 3 billion cell capsule Take 1 capsule by mouth once daily.    Historical Provider, MD   Livalo 2 mg tablet TAKE 1 TABLET DAILY  Patient taking differently: Take 1 tablet by mouth once daily in the evening. 12/26/23   Yosvany George MD   lutein-zeaxanthin 25-5 mg capsule Take 1 capsule by mouth once daily in the morning.    Historical Provider, MD   msm-collagen-coQ10-herb no.226 [phone] mg tablet Take 1 capsule by mouth once daily.    Historical Provider, MD   multivitamin with minerals (multivitamin with folic acid) tablet Take 1 tablet by mouth 2 times a day. 1/8/15   Historical Provider, MD   triamcinolone (Kenalog) " 0.1 % cream Apply 1 Application topically once daily. 5/31/23   Historical Provider, MD   valsartan (Diovan) 80 mg tablet Take 1 tablet (80 mg) by mouth once daily.  Patient taking differently: Take 1 tablet (80 mg) by mouth once daily in the morning. 2/2/24   Jonah Velez,         The list below reflectives the updated allergy list. Please review each documented allergy for additional clarification and justification.  Allergies  Reviewed by John Puckett RN on 2/12/2024   No Known Allergies         Below are additional concerns with the patient's PTA list.      Nicole Grande CPhT

## 2024-02-12 NOTE — H&P
History Of Present Illness  Ronal Armijo is a 88 y.o. male with medical history of HTN, HLD, CAD and PPM presenting with intermittent expressive aphasia, dysarthria and headaches that began 1 week ago.  First episode was 1 week ago with second episode occurring on Saturday while he and his wife were eating dinner.  He states he knew what he wanted to say about was unable to form the words.  Wife reports garbled speech.  Both episodes lasted about 10 mins and resolved without intervention but patient then developed severe headache and went to bed.  He reports feeling fine the next morning.  He denies vision changes, CP, SOB, LH, weakness, numbness, tingling or n/v with these episodes.  He has chronic BUE tremors that are unchanged.  He called his PCP this morning to be seen and was sent to ED. Of note, he took himself off valsartan about six months ago as he said it was making his blood pressure high.  He and his wife are very active and have been in good health and have traveled extensively with their camper.  They had one son with Down Syndrome who passed away in 2023 at age 57.  Patient currently denies any symptoms and wife feels he is acting like his usual self.    In the ED, /99 HR 91 afebrile, SpO2 99% on room air.  Labs s/f , trop neg.  EKG with atrial paced rhythm 65 bpm, non ischemic. CT head without acute changes but showing diffuse volume loss.  He was recommended OBS tele for CVA rule out.  NIHSS 0 in ED.        Past Medical History  He has a past medical history of Age-related nuclear cataract, unspecified eye (05/26/2022), Atherosclerosis of native arteries of extremities with intermittent claudication, bilateral legs (CMS/HCC) (01/03/2023), Atherosclerotic heart disease of native coronary artery without angina pectoris (10/04/2022), Diplopia (02/18/2022), Essential (primary) hypertension (10/04/2022), Heart disease (2010), Other abnormal glucose, Other intervertebral disc degeneration,  lumbar region, Personal history of colonic polyps, 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    Surgical History  He has a past surgical history that includes Other surgical history (02/10/2015); Other surgical history (07/20/2015); Cardiac pacemaker placement (04/16/2018); Total knee arthroplasty (04/16/2018); Colonoscopy w/ polypectomy (01/25/2017); Knee arthroscopy w/ debridement (01/25/2017); Appendectomy (01/25/2017); Coronary angioplasty with stent (01/25/2017); Esophagogastroduodenoscopy (01/25/2017); Hernia repair (01/25/2017); Other surgical history (01/25/2017); and CT angio aorta and bilateral iliofemoral runoff w and or wo IV contrast (11/23/2019).     Social History  He reports that he has never smoked. He has never used smokeless tobacco. He reports that he does not drink alcohol and does not use drugs.    Family History  No family history on file.     Allergies  Patient has no known allergies.    Review of Systems   Constitutional:  Negative for diaphoresis, fever and unexpected weight change.   HENT:  Negative for rhinorrhea, sore throat and trouble swallowing.    Eyes:  Negative for photophobia, redness and visual disturbance.   Respiratory:  Negative for cough, chest tightness, shortness of breath and wheezing.    Cardiovascular:  Negative for chest pain, palpitations and leg swelling.   Gastrointestinal:  Negative for abdominal pain, constipation, diarrhea, nausea and vomiting.   Endocrine: Negative for polyuria.   Genitourinary:  Negative for difficulty urinating, dysuria, flank pain, frequency, hematuria and urgency.   Musculoskeletal:  Negative for gait problem and myalgias.   Skin:   Negative for rash and wound.   Allergic/Immunologic: Negative for immunocompromised state.   Neurological:  Positive for tremors, speech difficulty and headaches. Negative for dizziness, seizures, syncope, facial asymmetry, weakness, light-headedness and numbness.   Psychiatric/Behavioral:  Negative for confusion, decreased concentration, hallucinations and sleep disturbance.         Physical Exam   Physical Exam  Gen: NAD, hearing aides present  Eyes:  EOM intact  ENT: MMM  Neck: No JVD  Respiratory: CTAB, no wheezes/rhonchi  Cardiac: RRR, +SM  Abdomen: soft, NT, +BS  Extremities: no edema or cyanosis, chronic skin changes at bilateral ankles  Neuro: No focal deficits, alert and oriented x 3, NIHSS 0  Psych:  appropriate mood and behavior    Last Recorded Vitals  /82   Pulse 60   Temp 36.2 °C (97.2 °F) (Skin)   Resp 18   Wt 88.5 kg (195 lb)   SpO2 96%        Assessment/Plan   Principal Problem:    TIA (transient ischemic attack)  -Rule out CVA  -OBS tele  -CT head without acute abnormality  -unable to have MRI brain/MRA head/neck 2/2 PPM  -CT angio head and neck  -bedside swallow eval prior to diet  -ASA/Statin  -lipid panel 10/23: , LDL 64  -A1C  -Q4H neuro checks  -permissive hypertension  -echo  -neuro consult  -PT/OT    HTN/HLD  -non compliant with valsartan x 6 months  -allow permissive HTN  -await home meds    CAD  -reports that he is on plavix and eliquis  -trop neg, EKG nonischemic   -await med list  -tele     DVT prophy  -lovenox    Dispo:  OBS Tele for stroke rule out, eLOS<48 hours    Rocio Clifton PA-C  D/w Dr Farias

## 2024-02-12 NOTE — ED TRIAGE NOTES
Patient here for stuttering Has episodes where he begins to stutter and become uncoordinated. Last episode was Saturday. No hx of cortes. States he gets a bad headache after these symptoms. No complaints or signs right now

## 2024-02-12 NOTE — PROGRESS NOTES
02/12/24 1531   Discharge Planning   Living Arrangements Spouse/significant other   Support Systems Spouse/significant other   Assistance Needed A&OX3; independent with ADLs with cane at times; drives; room air baseline and room air now;on Eliquis at home prior to admit   Type of Residence Private residence   Number of Stairs to Enter Residence 0   Number of Stairs Within Residence 0   Do you have animals or pets at home? No   Who is requesting discharge planning? Provider   Patient expects to be discharged to: patient from [place] at [facility]. [place] [facility] back home to independent living with spouse with no needs - pending neuro workup   Does the patient need discharge transport arranged? Yes   RoundTrip coordination needed? Yes   Has discharge transport been arranged? No   Financial Resource Strain   How hard is it for you to pay for the very basics like food, housing, medical care, and heating? Not hard   Housing Stability   In the last 12 months, was there a time when you were not able to pay the mortgage or rent on time? N   In the last 12 months, how many places have you lived? 1   In the last 12 months, was there a time when you did not have a steady place to sleep or slept in a shelter (including now)? N   Transportation Needs   In the past 12 months, has lack of transportation kept you from medical appointments or from getting medications? no   In the past 12 months, has lack of transportation kept you from meetings, work, or from getting things needed for daily living? No     02/12/2024 1534pm  Spoke with patient's spouse bediside in ED (patient off the floor to restroom). Patient and spouse are from Independent Living at [facility]. Spouse denies any home going needs at this time.

## 2024-02-12 NOTE — ED PROVIDER NOTES
HPI   Chief Complaint   Patient presents with    stutter     Has episodes where he begins to stutter and become uncoordinated. Last episode was Saturday. No hx of cortes. States he gets a bad headache after these symptoms. No complaints or signs right now       Alexis is a 88-year-old gentleman who presents with complaint of having episodes with difficulty speaking.  Happened approximately a week ago where he was trying to talk and was making sense his coordination was off and afterwards he had a headache.  The speech episode lasted a few minutes the headache episode lasted a few hours then went away and stayed away for approximately 6 days until this past Saturday when it occurred again.  He had an episode at that time where he also could not speak and was uncoordinated he knew what he wanted to say but could not get the words out his wife says he was jittery.  For the past year or so he has been having trouble with resting tremors of both arms and to eat he put a ball up to his face and scoops it and because otherwise he drops the food.  He denies any fever chills cough or cold.  No recent falls or trauma.  He has been to his doctor but has not had diagnosis for this.  He called the primary to make appointment they recommended he come for evaluation treatment.                          [place] Coma Scale Score: 15         NIH Stroke Scale: 0             Patient History   Past Medical History:   Diagnosis Date    Age-related nuclear cataract, unspecified eye 05/26/2022    Nuclear sclerosis    Atherosclerosis of native arteries of extremities with intermittent claudication, bilateral legs (CMS/HCC) 01/03/2023    Atherosclerosis of native artery of both lower extremities with intermittent claudication    Atherosclerotic heart disease of native coronary artery without angina pectoris 10/04/2022    CAD (coronary artery disease)    Diplopia 02/18/2022    Diplopia    Essential (primary) hypertension 10/04/2022    Benign  essential hypertension    Heart disease 2010    Other abnormal glucose     Hemoglobin A1c less than 7.0%    Other intervertebral disc degeneration, lumbar region     Degenerative disc disease, lumbar    Personal history of colonic polyps     History of colonic polyps    Personal history of malignant neoplasm of other organs and systems     History of squamous cell carcinoma    Personal history of other diseases of the circulatory system 04/16/2018    History of hypertension    Personal history of other endocrine, nutritional and metabolic disease 04/16/2018    History of hyperlipidemia    Personal history of other medical treatment     History of stress test    Presence of prosthetic heart valve 01/03/2023    S/P AVR (aortic valve replacement)    Spondylolysis, lumbar region     Lumbar spondylolysis     Past Surgical History:   Procedure Laterality Date    APPENDECTOMY  01/25/2017    Appendectomy    CARDIAC PACEMAKER PLACEMENT  04/16/2018    Pacemaker Placement    COLONOSCOPY W/ POLYPECTOMY  01/25/2017    Complete Colonoscopy For Polyp Removal    CORONARY ANGIOPLASTY WITH STENT PLACEMENT  01/25/2017    Cath Stent Placement    CT AORTA AND BILATERAL ILIOFEMORAL RUNOFF ANGIOGRAM W AND/OR WO IV CONTRAST  11/23/2019    CT AORTA AND BILATERAL ILIOFEMORAL RUNOFF ANGIOGRAM W AND/OR WO IV CONTRAST 11/23/2019 [facility]    ESOPHAGOGASTRODUODENOSCOPY  01/25/2017    Esophagogastroduodenoscopy With Biopsy    HERNIA REPAIR  01/25/2017    Inguinal Hernia Repair    KNEE ARTHROSCOPY W/ DEBRIDEMENT  01/25/2017    Arthroscopy Knee Left    OTHER SURGICAL HISTORY  02/10/2015    Stent Indications: Acute Myocardial Infarction    OTHER SURGICAL HISTORY  07/20/2015    Blood Vessel Repair Direct Leg    OTHER SURGICAL HISTORY  01/25/2017    Excision Of Lesion Face    TOTAL KNEE ARTHROPLASTY  04/16/2018    Knee Replacement     No family history on file.  Social History     Tobacco Use    Smoking status: Never    Smokeless tobacco:  Never   Vaping Use    Vaping Use: Never used   Substance Use Topics    Alcohol use: Never    Drug use: Never       Physical Exam   ED Triage Vitals [02/12/24 1143]   Temperature Heart Rate Respirations BP   36.2 °C (97.2 °F) 91 18 (!) 150/99      Pulse Ox Temp Source Heart Rate Source Patient Position   99 % Skin Monitor Lying      BP Location FiO2 (%)     Right arm --       Physical Exam  Vitals reviewed.   Constitutional:       General: He is awake.      Appearance: Normal appearance.   HENT:      Head: Normocephalic.      Nose: Nose normal.   Cardiovascular:      Rate and Rhythm: Normal rate and regular rhythm.   Pulmonary:      Effort: Pulmonary effort is normal.      Breath sounds: Normal breath sounds.   Abdominal:      Palpations: Abdomen is soft.   Musculoskeletal:      Cervical back: Normal range of motion.   Skin:     General: Skin is warm.      Capillary Refill: Capillary refill takes less than 2 seconds.   Neurological:      Mental Status: He is alert.      Comments: Patient has resting tremor bilateral upper extremity which is mild at this time.  Normal speech.  No significant deficit other than hearing aids in place.         ED Course & MDM   ED Course as of 02/12/24 1431   Mon Feb 12, 2024   1220 At this time is to workup for possible TIA.  Patient currently has no symptoms NIH stroke scale of 0.  Spoke to patient and his wife at bedside about the differential.  Complex migraine is a consideration.  Patient does not have a history of previous headaches though.  Awaiting CT scan and labs patient is VAN NEGATIVE. [RZ]   1234 EKG done at 1225 interpreted by me shows atrial paced rhythm 65 beats minute no obvious ischemia no significant change versus old March 23, 2020 except less ectopy in today's EKG.  No sign of STEMI. [RZ]   1430 To the hospitalist and patient will be hospitalized overnight I updated the patient and his wife.  Workup for TIA. [RZ]      ED Course User Index  [RZ] Braulio Gramajo MD          Diagnoses as of 02/12/24 1431   TIA (transient ischemic attack)       Medical Decision Making      Procedure  Procedures     Braulio Gramajo MD  02/12/24 1431

## 2024-02-13 ENCOUNTER — APPOINTMENT (OUTPATIENT)
Dept: VASCULAR MEDICINE | Facility: HOSPITAL | Age: 89
End: 2024-02-13
Payer: MEDICARE

## 2024-02-13 ENCOUNTER — APPOINTMENT (OUTPATIENT)
Dept: CARDIOLOGY | Facility: HOSPITAL | Age: 89
End: 2024-02-13
Payer: MEDICARE

## 2024-02-13 VITALS
RESPIRATION RATE: 18 BRPM | WEIGHT: 198 LBS | HEIGHT: 72 IN | BODY MASS INDEX: 26.82 KG/M2 | OXYGEN SATURATION: 98 % | HEART RATE: 60 BPM | SYSTOLIC BLOOD PRESSURE: 165 MMHG | DIASTOLIC BLOOD PRESSURE: 88 MMHG | TEMPERATURE: 97.7 F

## 2024-02-13 DIAGNOSIS — I65.23 BILATERAL CAROTID ARTERY STENOSIS: Primary | ICD-10-CM

## 2024-02-13 LAB
EST. AVERAGE GLUCOSE BLD GHB EST-MCNC: 108 MG/DL
GLUCOSE BLD MANUAL STRIP-MCNC: 75 MG/DL (ref 74–99)
GLUCOSE BLD MANUAL STRIP-MCNC: 82 MG/DL (ref 74–99)
HBA1C MFR BLD: 5.4 %

## 2024-02-13 PROCEDURE — 99231 SBSQ HOSP IP/OBS SF/LOW 25: CPT | Performed by: PHYSICIAN ASSISTANT

## 2024-02-13 PROCEDURE — 82947 ASSAY GLUCOSE BLOOD QUANT: CPT

## 2024-02-13 PROCEDURE — 93880 EXTRACRANIAL BILAT STUDY: CPT | Performed by: SURGERY

## 2024-02-13 PROCEDURE — 2500000001 HC RX 250 WO HCPCS SELF ADMINISTERED DRUGS (ALT 637 FOR MEDICARE OP): Performed by: INTERNAL MEDICINE

## 2024-02-13 PROCEDURE — 93306 TTE W/DOPPLER COMPLETE: CPT

## 2024-02-13 PROCEDURE — 2500000002 HC RX 250 W HCPCS SELF ADMINISTERED DRUGS (ALT 637 FOR MEDICARE OP, ALT 636 FOR OP/ED): Mod: MUE | Performed by: PHYSICIAN ASSISTANT

## 2024-02-13 PROCEDURE — 99222 1ST HOSP IP/OBS MODERATE 55: CPT | Performed by: PHYSICIAN ASSISTANT

## 2024-02-13 PROCEDURE — G0378 HOSPITAL OBSERVATION PER HR: HCPCS

## 2024-02-13 PROCEDURE — 97165 OT EVAL LOW COMPLEX 30 MIN: CPT | Mod: GO

## 2024-02-13 PROCEDURE — 2500000004 HC RX 250 GENERAL PHARMACY W/ HCPCS (ALT 636 FOR OP/ED): Performed by: PHYSICIAN ASSISTANT

## 2024-02-13 PROCEDURE — 99222 1ST HOSP IP/OBS MODERATE 55: CPT | Performed by: PSYCHIATRY & NEUROLOGY

## 2024-02-13 PROCEDURE — 93880 EXTRACRANIAL BILAT STUDY: CPT

## 2024-02-13 RX ORDER — ATORVASTATIN CALCIUM 80 MG/1
80 TABLET, FILM COATED ORAL NIGHTLY
Qty: 30 TABLET | Refills: 0 | Status: SHIPPED | OUTPATIENT
Start: 2024-02-13 | End: 2024-02-16 | Stop reason: SDUPTHER

## 2024-02-13 RX ADMIN — Medication 1000 UNITS: at 10:44

## 2024-02-13 RX ADMIN — PANTOPRAZOLE SODIUM 40 MG: 40 TABLET, DELAYED RELEASE ORAL at 10:44

## 2024-02-13 RX ADMIN — POLYETHYLENE GLYCOL 3350 17 G: 17 POWDER, FOR SOLUTION ORAL at 10:44

## 2024-02-13 RX ADMIN — PERFLUTREN 1 ML OF DILUTION: 6.52 INJECTION, SUSPENSION INTRAVENOUS at 10:02

## 2024-02-13 RX ADMIN — CLOPIDOGREL 75 MG: 75 TABLET ORAL at 10:44

## 2024-02-13 RX ADMIN — APIXABAN 2.5 MG: 2.5 TABLET, FILM COATED ORAL at 10:44

## 2024-02-13 ASSESSMENT — ENCOUNTER SYMPTOMS
HEADACHES: 0
FEVER: 0
WOUND: 0
FACIAL ASYMMETRY: 0
NUMBNESS: 0
ADENOPATHY: 0
SLEEP DISTURBANCE: 0
DIFFICULTY URINATING: 0
SORE THROAT: 0
DIZZINESS: 0
CHILLS: 0
SPEECH DIFFICULTY: 0
TROUBLE SWALLOWING: 0
JOINT SWELLING: 0
WEAKNESS: 0
NECK PAIN: 0
ARTHRALGIAS: 0
VOMITING: 0
PALPITATIONS: 0
SEIZURES: 0
COLOR CHANGE: 0
FATIGUE: 0
CONSTIPATION: 0
DIARRHEA: 0
COUGH: 0
WHEEZING: 0
ABDOMINAL PAIN: 0
NAUSEA: 0
CONFUSION: 0
SHORTNESS OF BREATH: 0
LIGHT-HEADEDNESS: 0

## 2024-02-13 ASSESSMENT — ACTIVITIES OF DAILY LIVING (ADL): ADL_ASSISTANCE: INDEPENDENT

## 2024-02-13 ASSESSMENT — PAIN SCALES - GENERAL
PAINLEVEL_OUTOF10: 0 - NO PAIN
PAINLEVEL_OUTOF10: 2

## 2024-02-13 ASSESSMENT — COGNITIVE AND FUNCTIONAL STATUS - GENERAL: DAILY ACTIVITIY SCORE: 24

## 2024-02-13 ASSESSMENT — PAIN - FUNCTIONAL ASSESSMENT
PAIN_FUNCTIONAL_ASSESSMENT: 0-10
PAIN_FUNCTIONAL_ASSESSMENT: 0-10

## 2024-02-13 NOTE — DISCHARGE INSTRUCTIONS
Follow up in 6 months for repeat carotid ultrasound    Call cardiology office in 2-3 days to get results from echocardiogram and to schedule follow up in next couple of weeks

## 2024-02-13 NOTE — DISCHARGE SUMMARY
Discharge Diagnosis  TIA (transient ischemic attack), carotid stenosis, occluded left jugular vein    Issues Requiring Follow-Up  Increased statin to Lipitor 80 mg  Continue Plavix and eliquis  PCP follow up in 1 week  Cardiology and neuro follow ups in 2-3 weeks  Vascular surgery recommends repeat carotid us in 6 months    Discharge Meds     Your medication list        START taking these medications        Instructions Last Dose Given Next Dose Due   atorvastatin 80 mg tablet  Commonly known as: Lipitor      Take 1 tablet (80 mg) by mouth once daily at bedtime.              CHANGE how you take these medications        Instructions Last Dose Given Next Dose Due   clopidogrel 75 mg tablet  Commonly known as: Plavix  What changed: when to take this      TAKE 1 TABLET BY MOUTH EVERY DAY       valsartan 80 mg tablet  Commonly known as: Diovan  What changed: when to take this      Take 1 tablet (80 mg) by mouth once daily.              CONTINUE taking these medications        Instructions Last Dose Given Next Dose Due   apixaban 2.5 mg tablet  Commonly known as: Eliquis           b complex vitamins capsule           cholecalciferol 25 MCG (1000 UT) capsule  Commonly known as: Vitamin D-3           coenzyme Q-10 100 mg capsule           lutein-zeaxanthin 25-5 mg capsule           msm-collagen-coQ10-herb no.[phone] mg tablet           multivitamin tablet           Omega-3 Krill Oil 795-27-46-50 mg capsule  Generic drug: krill-om-3-dha-epa-phospho-ast           Probiotic 3 billion cell capsule  Generic drug: lactobacillus combination no.4           triamcinolone 0.1 % cream  Commonly known as: Kenalog                  STOP taking these medications      Livalo 2 mg tablet  Generic drug: pitavastatin calcium                  Where to Get Your Medications        These medications were sent to [facility]/pharmacy #[id number] [address] [address]      Phone: [phone]   atorvastatin  80 mg tablet         Test Results Pending At Discharge  Pending Labs       No current pending labs.            Hospital Course   Ronal Armijo is a 88 y.o. male with medical history of HTN, HLD, CAD and PPM presenting with intermittent expressive aphasia, dysarthria and headaches that began 1 week ago.  First episode was 1 week ago with second episode occurring on Saturday while he and his wife were eating dinner.  He states he knew what he wanted to say about was unable to form the words.  Wife reports garbled speech.  Both episodes lasted about 10 mins and resolved without intervention but patient then developed severe headache and went to bed.  He reports feeling fine the next morning.  He denies vision changes, CP, SOB, LH, weakness, numbness, tingling or n/v with these episodes.  He has chronic BUE tremors that are unchanged.  He called his PCP this morning to be seen and was sent to ED. Of note, he took himself off valsartan about six months ago as he said it was making his blood pressure high.  He and his wife are very active and have been in good health and have traveled extensively with their camper.  They had one son with Down Syndrome who passed away in 2023 at age 57.  Patient currently denies any symptoms and wife feels he is acting like his usual self.     Evaluated by neuro who felt likely TIAs, continue Eliquis and Plavix, do not add ASA at this time but follow up with cardiologist.  Rec vascular surgery consult for carotid and jugular disease.  Vascular surgery recommends repeat carotid us in 6 months and no intervention needed at this time.  Echo was not read at time of discharge but patient instructed to call his cardiologist office to schedule follow up in 2 weeks.      On day of discharge, patient denied CP, SOB, n/v/diarrhea/constipation, was tolerating diet and ambulating without issue.  Patient appeared hemodynamically stable at time of discharge. Discussed with attending physician who agreed  with discharge plan.      Pertinent Physical Exam At Time of Discharge  Physical Exam  Physical Exam  Gen: NAD  Eyes:  EOM intact  ENT: MMM  Neck: No JVD  Respiratory: CTAB, no wheezes/rhonchi  Cardiac: RRR, no murmurs rubs or gallops  Abdomen: soft, NT, +BS  Extremities: no edema or cyanosis  Neuro: No focal deficits, alert and oriented x 3, NIHSS 0  Psych:  appropriate mood and behavior    Outpatient Follow-Up  Future Appointments   Date Time Provider Department Center   4/11/2024  9:30 AM Jonah Velez DO [id number] [facility]   5/23/2024 10:00 AM [facility] VASC 3 [facility]   5/23/2024 11:00 AM Penelope Olivas PA-C [id number] [facility]   1/2/2025  9:30 AM Kiran Ryan APRN-CNP [id number] [facility]         Rocio Clifton PA-C

## 2024-02-13 NOTE — CARE PLAN
The patient's goals for the shift include      The clinical goals for the shift include Pt will have no worsening CVA symptoms throughout shift.      Problem: General Stroke  Goal: Establish a mutual long term goal with patient by discharge  Outcome: Progressing  Goal: Demonstrate improvement in neurological exam throughout the shift  Outcome: Progressing  Goal: Maintain BP within ordered limits throughout shift  Outcome: Progressing  Goal: Participate in treatment (ie., meds, therapy) throughout shift  Outcome: Progressing  Goal: Out of bed three times today  Outcome: Progressing     Problem: Skin  Goal: Decreased wound size/increased tissue granulation at next dressing change  Outcome: Progressing  Goal: Participates in plan/prevention/treatment measures  Outcome: Progressing  Goal: Prevent/manage excess moisture  Outcome: Progressing     Problem: Skin  Goal: Decreased wound size/increased tissue granulation at next dressing change  Outcome: Progressing  Goal: Participates in plan/prevention/treatment measures  Outcome: Progressing  Goal: Prevent/manage excess moisture  Outcome: Progressing     Problem: Fall/Injury  Goal: Not fall by end of shift  Outcome: Progressing  Goal: Be free from injury by end of the shift  Outcome: Progressing  Goal: Verbalize understanding of personal risk factors for fall in the hospital  Outcome: Progressing

## 2024-02-13 NOTE — PROGRESS NOTES
02/13/24 1505   Discharge Planning   Living Arrangements Spouse/significant other   Support Systems Spouse/significant other   Assistance Needed Alert and oriented x 3, Independent with ADL's, Drives, Cane, Walker, Wheel chair, Shower chair, Grab bars.   Type of Residence Private residence   Number of Stairs to Enter Residence 0   Number of Stairs Within Residence 0   Do you have animals or pets at home? No   Who is requesting discharge planning? Provider   Home or Post Acute Services None   Patient expects to be discharged to: Home with spouse with no discharge needs identified   Does the patient need discharge transport arranged? No   RoundTrip coordination needed? No   Has discharge transport been arranged? No   Patient Choice   Provider Choice list and CMS website (https://medicare.gov/care-compare#search) for post-acute Quality and Resource Measure Data were provided and reviewed with: Patient   Patient / Family choosing to utilize agency / facility established prior to hospitalization No

## 2024-02-13 NOTE — CARE PLAN
The patient's goals for the shift include      The clinical goals for the shift include keep safe    Over the shift, the patient did remain safe throughout the shift. Patient throughout the shift without issues.

## 2024-02-13 NOTE — CONSULTS
Inpatient consult to Vascular Surgery  Consult performed by: Farrah Moore PA-C  Consult ordered by: Rocio Clifton PA-C  Reason for consult: carotid stenosis        Reason For Consult  Carotid stenosis, TIA    History Of Present Illness  Ronal Armijo is a 88 y.o. male with past medical history of HTN, HLD, CAD, pAfib on Eliquis and plavix and PPM presenting with expressive aphasia, dysarthria and headaches. First occurrence was 1 week ago, most recent episode was this past Saturday. He was unable to form the words he intended to say, wife reported garbled speech. Episodes lasted about 10 minutes, resolved without intervention, followed by a severe headache. Tried to make an appointment with PCP who referred him to ER for further evaluation. CT head without acute changes. CTA head and neck with no evidence of intracranial aneurysm, vascular malformation or branch occlusion. extensive atherosclerotic calcifications at left carotid bifurcation. Vascular surgery consulted for further evaluation. Pt seen and evaluated at bedside. Resting comfortably wife at bedside. No complaints. Eager to go home.      Past Medical History  He has a past medical history of Age-related nuclear cataract, unspecified eye (05/26/2022), Atherosclerosis of native arteries of extremities with intermittent claudication, bilateral legs (CMS/HCC) (01/03/2023), Atherosclerotic heart disease of native coronary artery without angina pectoris (10/04/2022), Diplopia (02/18/2022), Essential (primary) hypertension (10/04/2022), Heart disease (2010), Other abnormal glucose, Other intervertebral disc degeneration, lumbar region, Personal history of colonic polyps, Personal history of malignant neoplasm of other organs and systems, Personal history of other diseases of the circulatory system (04/16/2018), Personal history of other endocrine, nutritional and metabolic disease (04/16/2018), Personal history of other medical treatment, 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    Surgical History  He has a past surgical history that includes Other surgical history (02/10/2015); Other surgical history (07/20/2015); Cardiac pacemaker placement (04/16/2018); Total knee arthroplasty (04/16/2018); Colonoscopy w/ polypectomy (01/25/2017); Knee arthroscopy w/ debridement (01/25/2017); Appendectomy (01/25/2017); Coronary angioplasty with stent (01/25/2017); Esophagogastroduodenoscopy (01/25/2017); Hernia repair (01/25/2017); Other surgical history (01/25/2017); and CT angio aorta and bilateral iliofemoral runoff w and or wo IV contrast (11/23/2019).     Social History  He reports that he has never smoked. He has never used smokeless tobacco. He reports that he does not drink alcohol and does not use drugs.    Family History  No family history on file.     Allergies  Patient has no known allergies.    Review of Systems   Constitutional:  Negative for chills, fatigue and fever.   HENT:  Negative for congestion, sore throat and trouble swallowing.    Eyes:  Negative for visual disturbance.   Respiratory:  Negative for cough, shortness of breath and wheezing.    Cardiovascular:  Negative for chest pain, palpitations and leg swelling.   Gastrointestinal:  Negative for abdominal pain, constipation, diarrhea, nausea and vomiting.   Endocrine: Negative for cold intolerance and heat intolerance.   Genitourinary:  Negative for difficulty urinating.   Musculoskeletal:  Negative for arthralgias, joint swelling and neck pain.   Skin:  Negative for color change and wound.   Neurological:  Negative for dizziness, seizures, syncope, facial asymmetry, speech difficulty, weakness, light-headedness, numbness and headaches.   Hematological:  Negative for adenopathy.   Psychiatric/Behavioral:  Negative for behavioral problems, confusion and sleep disturbance.         Physical Exam  Constitutional:       Appearance: Normal appearance.   HENT:       Head: Normocephalic.      Right Ear: External ear normal.      Left Ear: External ear normal.      Nose: Nose normal.      Mouth/Throat:      Mouth: Mucous membranes are moist.   Eyes:      Extraocular Movements: Extraocular movements intact.   Neck:      Vascular: No carotid bruit.   Cardiovascular:      Rate and Rhythm: Normal rate and regular rhythm.      Pulses: Normal pulses.   Pulmonary:      Effort: Pulmonary effort is normal. No respiratory distress.      Breath sounds: Normal breath sounds.   Abdominal:      Palpations: Abdomen is soft.      Tenderness: There is no abdominal tenderness.   Musculoskeletal:         General: No swelling or tenderness.      Cervical back: Normal range of motion and neck supple.      Right lower leg: No edema.      Left lower leg: No edema.   Skin:     General: Skin is warm and dry.      Capillary Refill: Capillary refill takes less than 2 seconds.      Findings: No lesion.   Neurological:      General: No focal deficit present.      Mental Status: He is alert and oriented to person, place, and time. Mental status is at baseline.   Psychiatric:         Mood and Affect: Mood normal.         Behavior: Behavior normal.         Thought Content: Thought content normal.         Judgment: Judgment normal.          Last Recorded Vitals  Blood pressure (!) 174/95, pulse 82, temperature 36.3 °C (97.3 °F), temperature source Temporal, resp. rate 18, height 1.829 m (6'), weight 89.8 kg (198 lb), SpO2 94 %.    Results for orders placed or performed during the hospital encounter of 02/12/24 (from the past 24 hour(s))   CBC and Auto Differential   Result Value Ref Range    WBC 7.1 4.4 - 11.3 x10*3/uL    nRBC 0.0 0.0 - 0.0 /100 WBCs    RBC 4.59 4.50 - 5.90 x10*6/uL    Hemoglobin 14.0 13.5 - 17.5 g/dL    Hematocrit 43.2 41.0 - 52.0 %    MCV 94 80 - 100 fL    MCH 30.5 26.0 - 34.0 pg    MCHC 32.4 32.0 - 36.0 g/dL    RDW 14.3 11.5 - 14.5 %    Platelets 170 150 - 450 x10*3/uL    Neutrophils % 71.2  40.0 - 80.0 %    Immature Granulocytes %, Automated 0.3 0.0 - 0.9 %    Lymphocytes % 14.8 13.0 - 44.0 %    Monocytes % 12.8 2.0 - 10.0 %    Eosinophils % 0.3 0.0 - 6.0 %    Basophils % 0.6 0.0 - 2.0 %    Neutrophils Absolute 5.05 1.60 - 5.50 x10*3/uL    Immature Granulocytes Absolute, Automated 0.02 0.00 - 0.50 x10*3/uL    Lymphocytes Absolute 1.05 0.80 - 3.00 x10*3/uL    Monocytes Absolute 0.91 (H) 0.05 - 0.80 x10*3/uL    Eosinophils Absolute 0.02 0.00 - 0.40 x10*3/uL    Basophils Absolute 0.04 0.00 - 0.10 x10*3/uL   Comprehensive metabolic panel   Result Value Ref Range    Glucose 101 (H) 74 - 99 mg/dL    Sodium 138 136 - 145 mmol/L    Potassium 4.1 3.5 - 5.3 mmol/L    Chloride 102 98 - 107 mmol/L    Bicarbonate 27 21 - 32 mmol/L    Anion Gap 13 10 - 20 mmol/L    Urea Nitrogen 25 (H) 6 - 23 mg/dL    Creatinine 0.98 0.50 - 1.30 mg/dL    eGFR 75 >60 mL/min/1.73m*2    Calcium 9.0 8.6 - 10.3 mg/dL    Albumin 4.4 3.4 - 5.0 g/dL    Alkaline Phosphatase 74 33 - 136 U/L    Total Protein 6.6 6.4 - 8.2 g/dL    AST 17 9 - 39 U/L    Bilirubin, Total 1.3 (H) 0.0 - 1.2 mg/dL    ALT 11 10 - 52 U/L   Troponin I, High Sensitivity   Result Value Ref Range    Troponin I, High Sensitivity 9 0 - 20 ng/L   Protime-INR   Result Value Ref Range    Protime 12.0 9.8 - 12.8 seconds    INR 1.1 0.9 - 1.1   APTT   Result Value Ref Range    aPTT 41 (H) 27 - 38 seconds   Hemoglobin A1C   Result Value Ref Range    Hemoglobin A1C 5.4 see below %    Estimated Average Glucose 108 Not Established mg/dL   B-Type Natriuretic Peptide   Result Value Ref Range     (H) 0 - 99 pg/mL   ECG 12 lead   Result Value Ref Range    Ventricular Rate 65 BPM    Atrial Rate 65 BPM    MO Interval 264 ms    QRS Duration 96 ms    QT Interval 400 ms    QTC Calculation(Bazett) 416 ms    P Axis 5 degrees    R Axis 4 degrees    T Axis 20 degrees    QRS Count 11 beats    Q Onset 215 ms    P Onset 153 ms    P Offset 194 ms    T Offset 415 ms    QTC Fredericia 410 ms    POCT GLUCOSE   Result Value Ref Range    POCT Glucose 92 74 - 99 mg/dL   POCT GLUCOSE   Result Value Ref Range    POCT Glucose 75 74 - 99 mg/dL   Transthoracic Echo (TTE) Complete   Result Value Ref Range    BSA 2.14 m2   POCT GLUCOSE   Result Value Ref Range    POCT Glucose 82 74 - 99 mg/dL      Scheduled medications  apixaban, 2.5 mg, oral, BID  atorvastatin, 80 mg, oral, Nightly  cholecalciferol, 1,000 Units, oral, BID  clopidogrel, 75 mg, oral, Daily  pantoprazole, 40 mg, oral, Daily before breakfast   Or  pantoprazole, 40 mg, intravenous, Daily before breakfast  polyethylene glycol, 17 g, oral, Daily  [Held by provider] valsartan, 80 mg, oral, Daily      Continuous medications     PRN medications  PRN medications: hydrALAZINE **FOLLOWED BY** [START ON 2/14/2024] hydrALAZINE, labetaloL, oxygen     Relevant Results  CT head non contrast 2/12/24  IMPRESSION:  No acute intracranial hemorrhage or mass-effect.    CTA 2/12/24  Neck      Right carotid  The common carotid artery is normal. There are minor atherosclerotic  changes at the carotid bifurcation without measurable luminal  narrowing or filling defect. The cervical, petrous and cavernous  portions are normal caliber with extensive atherosclerotic  calcifications within the cavernous and supraclinoid portions.      Left carotid  The common carotid artery is normal. There are extensive  atherosclerotic changes at the carotid bifurcation with a proximally  50% residual luminal diameter. No associated filling defect to  suggest thrombus or dissection.. The cervical internal carotid artery  is otherwise normal caliber. There are extensive atherosclerotic  calcifications in the petrous, cavernous and supraclinoid portions..      Vertebrals  The vertebral arteries are asymmetrical with dominance of the left  vertebral artery. Vertebral artery origin is are normal. No evidence  of compression or filling defect within the cervical vertebral  arteries. The  vertebrobasilar junction exhibits marked mineralization  without luminal narrowing. The basilar artery is normal.      Soft tissue neck  Enlarged thrombosed left jugular vein. There is no evidence of mass,  cyst or adenopathy within the neck      Intracranial  There is no evidence of aneurysm, vascular malformation or branch  occlusion.      IMPRESSION:  * Extensive atherosclerotic calcifications in the petrous and  cavernous as well as supraclinoid portions of both internal carotid  arteries. Luminal narrowing not excluded at these levels *Extensive  atherosclerotic calcification at the left carotid bifurcation with  50% residual lumen *No evidence of intracranial aneurysm, vascular  malformation or branch occlusion *Occluded left jugular vein.    Carotid Duplex US 10/30/23  Right: 20-49% internal carotid stenosis. mild plaque was noted at the common, bulb and internal.  PSV:113, EDV:29  Left: 20-49% internal carotid stenosis. Mild plaque was noted at the common. Mild to moderate plaque at the bulb. Moderate plque noted at the internal.   PSV:80, EDV:24    Carotid Duplex US 2/13/24  Right: <50% stenosis of the right proximal internal carotid artery.  PSV:55, EDV:12  Left: <50 % stenosis of the left proximal internal carotid artery  PSV:74, EDV:15       Assessment/Plan    88 year old male possible TIAs vs possible complex migraines. CTA neck revealed 50% left carotid stenosis. Carotid duplex today with less than 50% stenosis bilateral carotid arteries. Echo pending. No indication for acute vascular intervention. Continue Eliquis and plavix per cardiology and neurology. High dose statin. Follow up outpatient with vascular surgery for carotid stenosis surveillance. Patient follows with cardiology Dr Sheth who has been monitoring routine carotid screening, discussed with patient OK to continue with Dr Sheth for routine carotid screening. Discussed above course of care and treatment plan with Dr. Cuenca (Vascular  surgery) who is in agreement. Discussed with primary hospitalist team. Of note patient sees Dr Lopez for PVD and lower extremity intermittent claudication has repeat testing and follow up visit scheduled in May.

## 2024-02-13 NOTE — PROGRESS NOTES
Physical Therapy    Physical Therapy Screen/DC    Patient Name: Ronal Armijo  MRN: [id number]  Today's Date: 2/13/2024        Assessment/Plan      IP OR SWING BED PT PLAN  Inpatient or Swing Bed: Inpatient  PT Plan  PT - OK to Discharge: Yes (screen/DC)      Subjective   General Visit Information:  General  Missed Visit: No  Missed Visit Reason: Cancel (Pt status discussed with OT; no acute PT needs indicated, pt demonstrates S/I without symptoms as assessed. Screen/cancel 1123)          Education Documentation  No documentation found.  Education Comments  No comments found.

## 2024-02-13 NOTE — PROGRESS NOTES
"Occupational Therapy    Evaluation    Patient Name: Alexis Armijo \"Radha"  MRN: [id number]  Today's Date: 2/13/2024  Time Calculation  Start Time: 0848  Stop Time: 0858  Time Calculation (min): 10 min    Assessment  IP OT Assessment  OT Assessment: Pt presents at independent level for ADL's and mobility. No further OT needs  Prognosis: Excellent  Barriers to Discharge: None  Evaluation/Treatment Tolerance: Patient tolerated treatment well  Medical Staff Made Aware: Yes  End of Session Communication: Bedside nurse  End of Session Patient Position: Alarm on (Pt was sitting EOB with call light)  Plan:  No Skilled OT: Independent with ADLs  OT Frequency: OT eval only  OT Discharge Recommendations: No further acute OT  OT Recommended Transfer Status: Independent  OT - OK to Discharge: Yes (per OT POC)    Subjective   Current Problem:  1. TIA (transient ischemic attack)        2. Expressive aphasia  Transthoracic Echo (TTE) Complete    Transthoracic Echo (TTE) Complete      3. Bilateral carotid artery stenosis  Vascular US Carotid Artery Duplex Bilateral    Vascular US Carotid Artery Duplex Bilateral        General:  General  Reason for Referral: Pt is a 87 y/o male admitted for TIA  Past Medical History Relevant to Rehab: medical history of HTN, HLD, CAD and PPM  Family/Caregiver Present: No  Prior to Session Communication: Bedside nurse  Patient Position Received: Bed, 3 rail up, Alarm off, not on at start of session  General Comment: Pt pleasant and agreeable to OT eval.  Precautions:  Medical Precautions: Fall precautions     Pain:  Pain Assessment  Pain Assessment: 0-10  Pain Score: 2  Pain Type: Chronic pain  Pain Location: Foot  Pain Orientation:  (both)    Objective   Cognition:  Overall Cognitive Status: Within Functional Limits  Orientation Level: Oriented X4     Home Living:  Type of Home: Independent living  Lives With: Spouse  Home Adaptive Equipment: Cane, Walker rolling or standard  Home Access: No " concerns  Bathroom Shower/Tub: Walk-in shower  Bathroom Equipment: Grab bars in shower, Built-in shower seat, Grab bars around toilet   Prior Function:  Level of [place]: Independent with ADLs and functional transfers, Independent with homemaking with ambulation  Receives Help From: Family  ADL Assistance: Independent  Homemaking Assistance: Independent  Ambulatory Assistance: Independent  Prior Function Comments: Pt uses cane as needed. Pt does his own meals and laundry     ADL:  ADL Comments: Based on pt's current function anticipate independent with ADL's  Activity Tolerance:  Endurance: Tolerates 10 - 20 min exercise with multiple rests  Bed Mobility/Transfers: Bed Mobility  Bed Mobility: Yes  Bed Mobility 1  Bed Mobility 1: Supine to sitting  Level of Assistance 1: Independent    Transfers  Transfer: Yes  Transfer 1  Transfer From 1: Sit to, Stand to  Transfer to 1: Sit, Stand  Technique 1: Sit to stand, Stand to sit  Transfer Level of Assistance 1: Independent    Ambulation/Gait Training:  Ambulation/Gait Training  Ambulation/Gait Training Performed: Yes  Ambulation/Gait Training 1  Surface 1: Level tile  Device 1: No device  Assistance 1: Distant supervision  Comments/Distance (ft) 1: Pt ambulated household distance with no AD at supervision level  Sitting Balance:  Static Sitting Balance  Static Sitting-Balance Support: Feet supported, No upper extremity supported  Static Sitting-Level of Assistance: Independent  Dynamic Sitting Balance  Dynamic Sitting-Balance Support: Feet supported, No upper extremity supported  Dynamic Sitting-Comments: independent  Standing Balance:  Static Standing Balance  Static Standing-Balance Support: No upper extremity supported  Static Standing-Level of Assistance: Independent  Dynamic Standing Balance  Dynamic Standing-Balance Support: No upper extremity supported  Dynamic Standing-Comments: supervision    Sensation:  Light Touch: No apparent deficits  Strength:  Strength  Comments: B UE's: 5/5      Hand Function:  Hand Function  Coordination: Functional  Extremities: RUE   RUE : Within Functional Limits and LUE   LUE: Within Functional Limits    Outcome Measures: [facility] Daily Activity  Putting on and taking off regular lower body clothing: None  Bathing (including washing, rinsing, drying): None  Putting on and taking off regular upper body clothing: None  Toileting, which includes using toilet, bedpan or urinal: None  Taking care of personal grooming such as brushing teeth: None  Eating Meals: None  Daily Activity - Total Score: 24      Education Documentation  No documentation found.  Education Comments  No comments found.

## 2024-02-13 NOTE — CONSULTS
"Inpatient consult to Neurology  Consult performed by: Jonathon Spring MD  Consult ordered by: Rocio Clifton PA-C          History Of Present Illness  Alexis Armijo \"Ronal\" is a 88 y.o. male presenting with two recent episodes suggestive of TIAs.  The first episode was one week ago, consisting of expressive aphasia/dysarthria, and headache, lasted about 10 minutes, with another similar episode this past weekend, also lasting 10 minutes.  These episodes were witnessed by his wife.  I also spoke with the hospitalist yesterday: Dr. Bob Farias.  The patient is a good historian, and he reports his cardiologist is Dr. Sheth.  He is on Eliquis and Plavix, for a history of PAF, he has a pacemaker- MRI cannot be done.  Other PMH: HTN, HLD, CAD.  There is no prior history of TIA/stroke.  He does have a history of migraine headaches- remote.  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, bilateral legs (CMS/HCC) 01/03/2023    Atherosclerosis of native artery of both lower extremities with intermittent claudication    Atherosclerotic heart disease of native coronary artery without angina pectoris 10/04/2022    CAD (coronary artery disease)    Diplopia 02/18/2022    Diplopia    Essential (primary) hypertension 10/04/2022    Benign essential hypertension    Heart disease 2010    Other abnormal glucose     Hemoglobin A1c less than 7.0%    Other intervertebral disc degeneration, lumbar region     Degenerative disc disease, lumbar    Personal history of colonic polyps     History of colonic polyps    Personal history of malignant neoplasm of other organs and systems     History of squamous cell carcinoma    Personal history of other diseases of the circulatory system 04/16/2018    History of hypertension    Personal history of other endocrine, " nutritional and metabolic disease 04/16/2018    History of hyperlipidemia    Personal history of other medical treatment     History of stress test    Presence of prosthetic heart valve 01/03/2023    S/P AVR (aortic valve replacement)    Spondylolysis, lumbar region     Lumbar spondylolysis     Surgical History  Past Surgical History:   Procedure Laterality Date    APPENDECTOMY  01/25/2017    Appendectomy    CARDIAC PACEMAKER PLACEMENT  04/16/2018    Pacemaker Placement    COLONOSCOPY W/ POLYPECTOMY  01/25/2017    Complete Colonoscopy For Polyp Removal    CORONARY ANGIOPLASTY WITH STENT PLACEMENT  01/25/2017    Cath Stent Placement    CT AORTA AND BILATERAL ILIOFEMORAL RUNOFF ANGIOGRAM W AND/OR WO IV CONTRAST  11/23/2019    CT AORTA AND BILATERAL ILIOFEMORAL RUNOFF ANGIOGRAM W AND/OR WO IV CONTRAST 11/23/2019 [facility]    ESOPHAGOGASTRODUODENOSCOPY  01/25/2017    Esophagogastroduodenoscopy With Biopsy    HERNIA REPAIR  01/25/2017    Inguinal Hernia Repair    KNEE ARTHROSCOPY W/ DEBRIDEMENT  01/25/2017    Arthroscopy Knee Left    OTHER SURGICAL HISTORY  02/10/2015    Stent Indications: Acute Myocardial Infarction    OTHER SURGICAL HISTORY  07/20/2015    Blood Vessel Repair Direct Leg    OTHER SURGICAL HISTORY  01/25/2017    Excision Of Lesion Face    TOTAL KNEE ARTHROPLASTY  04/16/2018    Knee Replacement     Social History  Social History     Tobacco Use    Smoking status: Never    Smokeless tobacco: Never   Vaping Use    Vaping Use: Never used   Substance Use Topics    Alcohol use: Never    Drug use: Never     Allergies  Patient has no known allergies.  Medications Prior to Admission   Medication Sig Dispense Refill Last Dose    apixaban (Eliquis) 2.5 mg tablet Take 1 tablet (2.5 mg) by mouth 2 times a day.   2/12/2024 at am    b complex vitamins capsule Take 1 capsule by mouth once daily at bedtime.   2/11/2024 at pm    cholecalciferol (Vitamin D-3) 25 MCG (1000 UT) capsule Take 1 capsule (25 mcg) by  mouth 2 times a day.   2/12/2024 at am    clopidogrel (Plavix) 75 mg tablet TAKE 1 TABLET BY MOUTH EVERY DAY (Patient taking differently: Take 1 tablet (75 mg) by mouth once daily in the morning.) 90 tablet 0 2/12/2024 at am    coenzyme Q-10 100 mg capsule Take 1 capsule (100 mg) by mouth 2 times a day.   2/12/2024 at am    krill-om-3-dha-epa-phospho-ast (Omega-3 Krill Oil) 919-61-58-50 mg capsule Take 1 capsule by mouth once daily in the morning.   2/12/2024 at am    lactobacillus combination no.4 (Probiotic) 3 billion cell capsule Take 1 capsule by mouth once daily.   2/11/2024 at pm    Livalo 2 mg tablet TAKE 1 TABLET DAILY (Patient taking differently: Take 1 tablet by mouth once daily in the evening.) 90 tablet 0 2/11/2024 at pm    lutein-zeaxanthin 25-5 mg capsule Take 1 capsule by mouth once daily in the morning.   2/12/2024 at am    msm-collagen-coQ10-herb no.226 134-180-193-80 mg tablet Take 1 capsule by mouth once daily.       multivitamin with minerals (multivitamin with folic acid) tablet Take 1 tablet by mouth 2 times a day.   2/12/2024 at am    triamcinolone (Kenalog) 0.1 % cream Apply 1 Application topically once daily.   2/12/2024 at am    valsartan (Diovan) 80 mg tablet Take 1 tablet (80 mg) by mouth once daily. (Patient taking differently: Take 1 tablet (80 mg) by mouth once daily in the morning.) 90 tablet 1 2/12/2024 at am       Review of Systems  Neurological Exam  Mental Status  Awake. Oriented to person, place, time and situation. Recent and remote memory are intact. Language is fluent with no aphasia. Attention and concentration are normal. Fund of knowledge is appropriate for level of education.    Cranial Nerves  CN II: Visual fields full to confrontation.  CN III, IV, VI: Extraocular movements intact bilaterally.   Right pupil: 3 mm. Round. Reactive to light.   Left pupil: 3 mm. Round. Reactive to light.  CN V: Facial sensation is normal.  CN VII: Full and symmetric facial movement.  CN  VIII:  Right: Hearing is decreased.  Left: Hearing is decreased.  CN IX, X: Palate elevates symmetrically  CN XI: Shoulder shrug strength is normal.  CN XII: Tongue midline without atrophy or fasciculations.    Motor  Normal muscle bulk throughout. No fasciculations present. Normal muscle tone. Mild coarse tremor in the upper extremities, in the outstretched position..   Strength is 5/5 throughout all four extremities.    Sensory  Sensation is intact to light touch, pinprick, vibration and proprioception in all four extremities.    Coordination  Right: Finger-to-nose normal.Left: Finger-to-nose normal.    Gait  Casual gait is normal including stance, stride, and arm swing.      Physical Exam  Constitutional:       General: He is awake.   Eyes:      Extraocular Movements: Extraocular movements intact.   Neurological:      Motor: Motor strength is normal.      Last Recorded Vitals  Blood pressure (!) 174/95, pulse 82, temperature 36.3 °C (97.3 °F), temperature source Temporal, resp. rate 18, height 1.829 m (6'), weight 89.8 kg (198 lb), SpO2 94 %.    Relevant Results        NIH Stroke Scale  1A. Level of Consciousness: Alert, Keenly Responsive  1B. Ask Month and Age: Both Questions Right  1C. Blink Eyes & Squeeze Hands: Performs Both Tasks  2. Best Gaze: Normal  3. Visual: No Visual Loss  4. Facial Palsy: Normal Symmetrical Movements  5A. Motor - Left Arm: No Drift  5B. Motor - Right Arm: No Drift  6A. Motor - Left Leg: No Drift  6B. Motor - Right Leg: No Drift  7. Limb Ataxia: Absent  8. Sensory Loss: Normal  9. Best Language: No Aphasia  10. Dysarthria: Normal  11. Extinction and Inattention: No Abnormality  NIH Stroke Scale: 0           [place] Coma Scale  Best Eye Response: Spontaneous  Best Verbal Response: Oriented  Best Motor Response: Follows commands  [place] Coma Scale Score: 15                 I have personally reviewed the following imaging results CT angio head and neck w and wo IV contrast    Result  Date: 2/12/2024  Interpreted By:  Chris Rodriguez, STUDY: CT ANGIO HEAD AND NECK W AND WO IV CONTRAST;  2/12/2024 3:46 pm   INDICATION: Signs/Symptoms:expressive aphasia, CVA rule out, unable to have MR.   COMPARISON: None.   ACCESSION NUMBER(S): [id number]   ORDERING CLINICIAN: EMETERIO ADHIKARI   TECHNIQUE: Following intravenous injection of contrast material, CT was acquired from the aortic arch to the vertex of the skull. Multiplanar reconstructions and 3D reconstructions were made.3D reconstructions, MIPs, Volume Rendered, or Surface Shading image were performed at a separate workstation   FINDINGS: Chest   The aortic arch and origin of great vessels are normal. The visualized mediastinum and pulmonary apices are normal.   Neck   Right carotid The common carotid artery is normal. There are minor atherosclerotic changes at the carotid bifurcation without measurable luminal narrowing or filling defect. The cervical, petrous and cavernous portions are normal caliber with extensive atherosclerotic calcifications within the cavernous and supraclinoid portions.   Left carotid The common carotid artery is normal. There are extensive atherosclerotic changes at the carotid bifurcation with a proximally 50% residual luminal diameter. No associated filling defect to suggest thrombus or dissection.. The cervical internal carotid artery is otherwise normal caliber. There are extensive atherosclerotic calcifications in the petrous, cavernous and supraclinoid portions..   Vertebrals   The vertebral arteries are asymmetrical with dominance of the left vertebral artery. Vertebral artery origin is are normal. No evidence of compression or filling defect within the cervical vertebral arteries. The vertebrobasilar junction exhibits marked mineralization without luminal narrowing. The basilar artery is normal.   Soft tissue neck     Enlarged thrombosed left jugular vein. There is no evidence of mass, cyst or adenopathy within the  neck   Intracranial   There is no evidence of aneurysm, vascular malformation or branch occlusion.       * Extensive atherosclerotic calcifications in the petrous and cavernous as well as supraclinoid portions of both internal carotid arteries. Luminal narrowing not excluded at these levels *Extensive atherosclerotic calcification at the left carotid bifurcation with 50% residual lumen *No evidence of intracranial aneurysm, vascular malformation or branch occlusion *Occluded left jugular vein.   MACRO: none   Signed by: Chris Rodriguez 2/12/2024 4:15 PM Dictation workstation:   [id number]    ECG 12 lead    Result Date: 2/12/2024  Atrial-paced rhythm with prolonged AV conduction Abnormal ECG When compared with ECG of 23-MAR-2020 15:26, Premature ventricular complexes are no longer Present See ED provider note for full interpretation and clinical correlation Confirmed by Kelly Jimenez ([id number]) on 2/12/2024 3:39:05 PM    CT head wo IV contrast    Result Date: 2/12/2024  Interpreted By:  Cammie Swartz, STUDY: CT HEAD WO IV CONTRAST;  2/12/2024 1:33 pm   INDICATION: Signs/Symptoms:Stroke Evaluation.   COMPARISON: 11/23/2019   ACCESSION NUMBER(S): [id number]   ORDERING CLINICIAN: VICKI WALTER   TECHNIQUE: Unenhanced CT images of the head were obtained.   FINDINGS: The ventricles, cisterns and sulci are prominent, consistent with diffuse volume loss. There are areas of nonspecific white matter hypodensity, which are probably age related or microvascular in nature. These findings are similar to the previous exam. There is no acute intracranial hemorrhage, mass effect or midline shift. No extraaxial fluid collection.   No focal calvarial lesion.   Visualized paranasal sinuses are clear.       No acute intracranial hemorrhage or mass-effect.   MACRO: None.   Signed by: Cammie Swartz 2/12/2024 1:57 PM Dictation workstation:   [id number]  . Reviewed     Assessment/Plan   Principal Problem:    TIA (transient ischemic  attack)      Impression: Likely two recent TIAs, in the left hemisphere.  I would continue the Eliquis and Plavis, and other medications.  Recommend vascular surgery consultation regarding possible surgical management of the left ICA atherosclerotic stenosis, and left jular vein occlusion.  Patient can be discharged any time, if echo is negative, and follow up with patient's cardiologist.       I spent 60 minutes in the professional and overall care of this patient.      Jonathon Spring MD

## 2024-02-14 LAB
AORTIC VALVE MEAN GRADIENT: 11 MMHG
AORTIC VALVE PEAK VELOCITY: 2.34 M/S
AV PEAK GRADIENT: 21.9 MMHG
AVA (PEAK VEL): 1.4 CM2
AVA (VTI): 1.49 CM2
EJECTION FRACTION APICAL 4 CHAMBER: 55
EJECTION FRACTION: 53 %
LEFT ATRIUM VOLUME AREA LENGTH INDEX BSA: 49.1 ML/M2
LEFT VENTRICLE INTERNAL DIMENSION DIASTOLE: 4.21 CM (ref 3.5–6)
LEFT VENTRICULAR OUTFLOW TRACT DIAMETER: 2 CM
MITRAL VALVE E/A RATIO: 0.61
MITRAL VALVE E/E' RATIO: 9.5
RIGHT VENTRICLE FREE WALL PEAK S': 7.97 CM/S
RIGHT VENTRICLE PEAK SYSTOLIC PRESSURE: 25.1 MMHG
TRICUSPID ANNULAR PLANE SYSTOLIC EXCURSION: 1.7 CM

## 2024-02-15 ENCOUNTER — DOCUMENTATION (OUTPATIENT)
Dept: PRIMARY CARE | Facility: CLINIC | Age: 89
End: 2024-02-15
Payer: MEDICARE

## 2024-02-15 DIAGNOSIS — G45.9 TIA (TRANSIENT ISCHEMIC ATTACK): ICD-10-CM

## 2024-02-15 DIAGNOSIS — I25.10 CORONARY ARTERY DISEASE INVOLVING NATIVE HEART WITHOUT ANGINA PECTORIS, UNSPECIFIED VESSEL OR LESION TYPE: ICD-10-CM

## 2024-02-15 DIAGNOSIS — I10 BENIGN ESSENTIAL HYPERTENSION: ICD-10-CM

## 2024-02-15 NOTE — PROGRESS NOTES
Discharge facility: [facility]  Discharge diagnosis: TIA   Admission date:02/12/2024  Discharge date: 02/13/2024    PCP Appointment Date:  Specialist Appointment Date: 02/16/2024  Hospital Encounter and Summary: Linked   See Discharge assessment below for further details     Ronal Armijo is a 88 y.o. male with medical history of HTN, HLD, CAD and PPM presenting with intermittent expressive aphasia, dysarthria and headaches that began 1 week ago.  First episode was 1 week ago with second episode occurring on Saturday while he and his wife were eating dinner.  He states he knew what he wanted to say about was unable to form the words.  Wife reports garbled speech.  Both episodes lasted about 10 mins and resolved without intervention but patient then developed severe headache and went to bed.  He reports feeling fine the next morning.  He denies vision changes, CP, SOB, LH, weakness, numbness, tingling or n/v with these episodes.  He has chronic BUE tremors that are unchanged.  He called his PCP this morning to be seen and was sent to ED. Of note, he took himself off valsartan about six months ago as he said it was making his blood pressure high.  He and his wife are very active and have been in good health and have traveled extensively with their camper.  They had one son with Down Syndrome who passed away in 2023 at age 57.  Patient currently denies any symptoms and wife feels he is acting like his usual self.      Evaluated by neuro who felt likely TIAs, continue Eliquis and Plavix, do not add ASA at this time but follow up with cardiologist.  Rec vascular surgery consult for carotid and jugular disease.  Vascular surgery recommends repeat carotid us in 6 months and no intervention needed at this time.  Echo was not read at time of discharge but patient instructed to call his cardiologist office to schedule follow up in 2 weeks.       On day of discharge, patient denied CP, SOB, n/v/diarrhea/constipation, was  tolerating diet and ambulating without issue.  Patient appeared hemodynamically stable at time of discharge. Discussed with attending physician who agreed with discharge plan.

## 2024-02-16 ENCOUNTER — OFFICE VISIT (OUTPATIENT)
Dept: PRIMARY CARE | Facility: CLINIC | Age: 89
End: 2024-02-16
Payer: MEDICARE

## 2024-02-16 VITALS
WEIGHT: 202.38 LBS | SYSTOLIC BLOOD PRESSURE: 112 MMHG | HEART RATE: 73 BPM | BODY MASS INDEX: 27.41 KG/M2 | DIASTOLIC BLOOD PRESSURE: 70 MMHG | OXYGEN SATURATION: 95 % | HEIGHT: 72 IN

## 2024-02-16 DIAGNOSIS — R25.1 TREMOR: ICD-10-CM

## 2024-02-16 DIAGNOSIS — I65.23 BILATERAL CAROTID ARTERY STENOSIS: ICD-10-CM

## 2024-02-16 DIAGNOSIS — R47.01 EXPRESSIVE APHASIA: ICD-10-CM

## 2024-02-16 DIAGNOSIS — I48.0 PAROXYSMAL ATRIAL FIBRILLATION (MULTI): Primary | ICD-10-CM

## 2024-02-16 DIAGNOSIS — G45.9 TIA (TRANSIENT ISCHEMIC ATTACK): ICD-10-CM

## 2024-02-16 LAB — MAGNESIUM SERPL-MCNC: 2.27 MG/DL (ref 1.6–2.4)

## 2024-02-16 PROCEDURE — 1160F RVW MEDS BY RX/DR IN RCRD: CPT | Performed by: FAMILY MEDICINE

## 2024-02-16 PROCEDURE — 1036F TOBACCO NON-USER: CPT | Performed by: FAMILY MEDICINE

## 2024-02-16 PROCEDURE — 99214 OFFICE O/P EST MOD 30 MIN: CPT | Performed by: FAMILY MEDICINE

## 2024-02-16 PROCEDURE — 1126F AMNT PAIN NOTED NONE PRSNT: CPT | Performed by: FAMILY MEDICINE

## 2024-02-16 PROCEDURE — 3074F SYST BP LT 130 MM HG: CPT | Performed by: FAMILY MEDICINE

## 2024-02-16 PROCEDURE — 3078F DIAST BP <80 MM HG: CPT | Performed by: FAMILY MEDICINE

## 2024-02-16 PROCEDURE — 1157F ADVNC CARE PLAN IN RCRD: CPT | Performed by: FAMILY MEDICINE

## 2024-02-16 PROCEDURE — 1159F MED LIST DOCD IN RCRD: CPT | Performed by: FAMILY MEDICINE

## 2024-02-16 RX ORDER — ATORVASTATIN CALCIUM 80 MG/1
80 TABLET, FILM COATED ORAL NIGHTLY
Qty: 90 TABLET | Refills: 1 | Status: SHIPPED | OUTPATIENT
Start: 2024-02-16 | End: 2024-04-11 | Stop reason: SDUPTHER

## 2024-02-16 ASSESSMENT — ENCOUNTER SYMPTOMS
CHILLS: 0
DIARRHEA: 0
NUMBNESS: 0
NAUSEA: 0
COUGH: 0
DIZZINESS: 0
WEAKNESS: 0
CONFUSION: 0
ABDOMINAL PAIN: 0
SHORTNESS OF BREATH: 0
DYSURIA: 0
VOMITING: 0
WHEEZING: 0
BLOOD IN STOOL: 0
HEADACHES: 1
TROUBLE SWALLOWING: 0
DIFFICULTY URINATING: 0
UNEXPECTED WEIGHT CHANGE: 0
LIGHT-HEADEDNESS: 0
FEVER: 0

## 2024-02-16 NOTE — PROGRESS NOTES
"Subjective   Patient ID: Alexis Armijo \"Ronal\" is a 88 y.o. male who presents for Follow-up (Pt presents with spouse in TCM 2/12-2/13 possible TIA, [place], pt feels A1 great, discuss med changes, possibility of rx's.BL).  HPI    c/o stuttering speech, followed by headache, now resolved. Difficulty putting food to mouth, due to tremor, chronic, gradually worsening, feels is a separate issue from the TIA, previously discussed with Dr. Moritz. Went to the ER. Feeling much better.     Seeing Dr. Bill on 2-21 (next week), and neurology Di Padilla PA-C on 3-13.    Requests to check magnesium.    Review of Systems   Constitutional:  Negative for chills, fever and unexpected weight change.   HENT:  Negative for ear pain and trouble swallowing.    Respiratory:  Negative for cough, shortness of breath and wheezing.    Cardiovascular:  Negative for chest pain.   Gastrointestinal:  Negative for abdominal pain, blood in stool, diarrhea, nausea and vomiting.   Genitourinary:  Negative for difficulty urinating and dysuria.   Skin:  Negative for rash.   Neurological:  Positive for headaches (resolved). Negative for dizziness, syncope, weakness, light-headedness and numbness.   Psychiatric/Behavioral:  Negative for behavioral problems and confusion.          Objective   /70   Pulse 73   Ht 1.829 m (6')   Wt 91.8 kg (202 lb 6 oz)   SpO2 95%   BMI 27.45 kg/m²     Physical Exam  Vitals and nursing note reviewed.   Constitutional:       General: He is not in acute distress.     Appearance: Normal appearance.   HENT:      Head: Normocephalic and atraumatic.   Eyes:      General: No scleral icterus.     Extraocular Movements: Extraocular movements intact.      Conjunctiva/sclera: Conjunctivae normal.   Neck:      Vascular: Carotid bruit present.   Cardiovascular:      Rate and Rhythm: Regular rhythm.      Heart sounds: Murmur heard.   Pulmonary:      Effort: Pulmonary effort is normal. No respiratory distress. "   Skin:     General: Skin is warm and dry.      Coloration: Skin is not jaundiced.   Neurological:      Mental Status: He is alert and oriented to person, place, and time. Mental status is at baseline.   Psychiatric:         Behavior: Behavior normal.         Assessment/Plan   Problem List Items Addressed This Visit       Tremor     Keep appointment with neurology.         Paroxysmal atrial fibrillation (CMS/HCC) - Primary     Continue Eliquis, per cardiology.         Relevant Orders    Magnesium    TIA (transient ischemic attack)     Continue Atorvastatin 80 mg, Plavix. Keep appointment with neurology.         Relevant Medications    atorvastatin (Lipitor) 80 mg tablet    Other Relevant Orders    Comprehensive Metabolic Panel    Creatine Kinase    Lipid Panel    Bilateral carotid artery stenosis     Keep appointment with vascular surgery.           Other Visit Diagnoses       Expressive aphasia        Relevant Medications    atorvastatin (Lipitor) 80 mg tablet

## 2024-02-16 NOTE — PATIENT INSTRUCTIONS
"Neurology did not mention to continue low-dose Aspirin; continue Plavix/clopidogrel (anti-platelet medication, helps make the blood more \"slippery\") and Eliquis/apixaban (an anti-coagulant/blood thinner, to prevent blood clots from forming in the heart due to the paroxysmal atrial fibrillation), as prescribed. Also continue Lipitor/atorvastatin (cholesterol medication, to help reduce the risk for heart attack and stroke), instead of Livalo/pitavastatin. Also continue Diovan/valsartan (blood pressure).       Please return for a(n) follow-up appointment in 5 months, earlier if any question or concern.    Avoid taking Biotin for a week prior to any blood tests, as it can interfere with certain results. Fasting for labs means 12 hours, nothing to eat or drink, except water and medications, unless directed otherwise.    For assistance with scheduling referrals or consultations, please call [phone]. For laboratory, radiology, and other tests, please call [phone] ([phone] for pediatrics). Please review prescription inserts and published information for possible adverse effects of all medications. Return after testing or consultation to review results and recommendations, if symptoms persist, change, worsen, or return, or if you have any question or concern. If you do not get results within 7-10 days, or you have any question or concern, please send a message, call the office ([phone]), or return to the office for a follow-up appointment. For non-emergencies, you may call the office. For emergencies, call [phone] or go to the nearest Emergency Department. Please schedule additional appointment(s) to address concern(s) not addressed today.    In general, results are not released or discussed over the telephone, but at an appointment or via  [employer]. Results of tests done through [facility] are released via  [employer] (see below).  [email or web address]   [employer] support line: " [phone]

## 2024-02-19 ENCOUNTER — PATIENT OUTREACH (OUTPATIENT)
Dept: PRIMARY CARE | Facility: CLINIC | Age: 89
End: 2024-02-19
Payer: MEDICARE

## 2024-02-24 ENCOUNTER — HOSPITAL ENCOUNTER (EMERGENCY)
Facility: HOSPITAL | Age: 89
Discharge: HOME | End: 2024-02-25
Payer: MEDICARE

## 2024-02-24 ENCOUNTER — APPOINTMENT (OUTPATIENT)
Dept: RADIOLOGY | Facility: HOSPITAL | Age: 89
End: 2024-02-24
Payer: MEDICARE

## 2024-02-24 DIAGNOSIS — I15.9 SECONDARY HYPERTENSION: Primary | ICD-10-CM

## 2024-02-24 PROCEDURE — 36415 COLL VENOUS BLD VENIPUNCTURE: CPT | Performed by: PHYSICIAN ASSISTANT

## 2024-02-24 PROCEDURE — 84484 ASSAY OF TROPONIN QUANT: CPT | Performed by: PHYSICIAN ASSISTANT

## 2024-02-24 PROCEDURE — 83735 ASSAY OF MAGNESIUM: CPT | Performed by: PHYSICIAN ASSISTANT

## 2024-02-24 PROCEDURE — 93005 ELECTROCARDIOGRAM TRACING: CPT

## 2024-02-24 PROCEDURE — 80053 COMPREHEN METABOLIC PANEL: CPT | Performed by: PHYSICIAN ASSISTANT

## 2024-02-24 PROCEDURE — 99285 EMERGENCY DEPT VISIT HI MDM: CPT | Mod: 25

## 2024-02-24 PROCEDURE — 85025 COMPLETE CBC W/AUTO DIFF WBC: CPT | Performed by: PHYSICIAN ASSISTANT

## 2024-02-24 RX ORDER — ACETAMINOPHEN 325 MG/1
650 TABLET ORAL ONCE
Status: DISCONTINUED | OUTPATIENT
Start: 2024-02-24 | End: 2024-02-25 | Stop reason: HOSPADM

## 2024-02-24 ASSESSMENT — COLUMBIA-SUICIDE SEVERITY RATING SCALE - C-SSRS
1. IN THE PAST MONTH, HAVE YOU WISHED YOU WERE DEAD OR WISHED YOU COULD GO TO SLEEP AND NOT WAKE UP?: NO
6. HAVE YOU EVER DONE ANYTHING, STARTED TO DO ANYTHING, OR PREPARED TO DO ANYTHING TO END YOUR LIFE?: NO
2. HAVE YOU ACTUALLY HAD ANY THOUGHTS OF KILLING YOURSELF?: NO

## 2024-02-25 ENCOUNTER — APPOINTMENT (OUTPATIENT)
Dept: RADIOLOGY | Facility: HOSPITAL | Age: 89
End: 2024-02-25
Payer: MEDICARE

## 2024-02-25 ENCOUNTER — APPOINTMENT (OUTPATIENT)
Dept: CARDIOLOGY | Facility: HOSPITAL | Age: 89
End: 2024-02-25
Payer: MEDICARE

## 2024-02-25 VITALS
TEMPERATURE: 98 F | OXYGEN SATURATION: 98 % | HEIGHT: 72 IN | DIASTOLIC BLOOD PRESSURE: 79 MMHG | BODY MASS INDEX: 26.41 KG/M2 | WEIGHT: 195 LBS | RESPIRATION RATE: 18 BRPM | SYSTOLIC BLOOD PRESSURE: 144 MMHG | HEART RATE: 65 BPM

## 2024-02-25 LAB
ALBUMIN SERPL BCP-MCNC: 4 G/DL (ref 3.4–5)
ALP SERPL-CCNC: 64 U/L (ref 33–136)
ALT SERPL W P-5'-P-CCNC: 13 U/L (ref 10–52)
ANION GAP SERPL CALC-SCNC: 10 MMOL/L (ref 10–20)
AST SERPL W P-5'-P-CCNC: 16 U/L (ref 9–39)
BASOPHILS # BLD AUTO: 0.04 X10*3/UL (ref 0–0.1)
BASOPHILS NFR BLD AUTO: 0.6 %
BILIRUB SERPL-MCNC: 1.4 MG/DL (ref 0–1.2)
BUN SERPL-MCNC: 25 MG/DL (ref 6–23)
CALCIUM SERPL-MCNC: 8.8 MG/DL (ref 8.6–10.3)
CARDIAC TROPONIN I PNL SERPL HS: 11 NG/L (ref 0–20)
CHLORIDE SERPL-SCNC: 104 MMOL/L (ref 98–107)
CO2 SERPL-SCNC: 28 MMOL/L (ref 21–32)
CREAT SERPL-MCNC: 1.04 MG/DL (ref 0.5–1.3)
EGFRCR SERPLBLD CKD-EPI 2021: 70 ML/MIN/1.73M*2
EOSINOPHIL # BLD AUTO: 0.04 X10*3/UL (ref 0–0.4)
EOSINOPHIL NFR BLD AUTO: 0.6 %
ERYTHROCYTE [DISTWIDTH] IN BLOOD BY AUTOMATED COUNT: 14.1 % (ref 11.5–14.5)
GLUCOSE SERPL-MCNC: 104 MG/DL (ref 74–99)
HCT VFR BLD AUTO: 39.9 % (ref 41–52)
HGB BLD-MCNC: 12.9 G/DL (ref 13.5–17.5)
IMM GRANULOCYTES # BLD AUTO: 0.02 X10*3/UL (ref 0–0.5)
IMM GRANULOCYTES NFR BLD AUTO: 0.3 % (ref 0–0.9)
LYMPHOCYTES # BLD AUTO: 1.6 X10*3/UL (ref 0.8–3)
LYMPHOCYTES NFR BLD AUTO: 23.7 %
MAGNESIUM SERPL-MCNC: 2.01 MG/DL (ref 1.6–2.4)
MCH RBC QN AUTO: 30.9 PG (ref 26–34)
MCHC RBC AUTO-ENTMCNC: 32.3 G/DL (ref 32–36)
MCV RBC AUTO: 96 FL (ref 80–100)
MONOCYTES # BLD AUTO: 0.84 X10*3/UL (ref 0.05–0.8)
MONOCYTES NFR BLD AUTO: 12.4 %
NEUTROPHILS # BLD AUTO: 4.21 X10*3/UL (ref 1.6–5.5)
NEUTROPHILS NFR BLD AUTO: 62.4 %
NRBC BLD-RTO: 0 /100 WBCS (ref 0–0)
PLATELET # BLD AUTO: 156 X10*3/UL (ref 150–450)
POTASSIUM SERPL-SCNC: 4 MMOL/L (ref 3.5–5.3)
PROT SERPL-MCNC: 6.2 G/DL (ref 6.4–8.2)
RBC # BLD AUTO: 4.18 X10*6/UL (ref 4.5–5.9)
SODIUM SERPL-SCNC: 138 MMOL/L (ref 136–145)
WBC # BLD AUTO: 6.8 X10*3/UL (ref 4.4–11.3)

## 2024-02-25 PROCEDURE — 70450 CT HEAD/BRAIN W/O DYE: CPT

## 2024-02-25 PROCEDURE — 70450 CT HEAD/BRAIN W/O DYE: CPT | Performed by: STUDENT IN AN ORGANIZED HEALTH CARE EDUCATION/TRAINING PROGRAM

## 2024-02-25 NOTE — ED PROVIDER NOTES
HPI   Chief Complaint   Patient presents with    Hypertension       This is an 88-year-old male coming in for hypertension.  He states that he had a slight headache earlier however it has improved.  Patient's blood pressure was elevated at home.  He does take blood pressure medication and reports that he last took it at 7:00 PM.  Patient denies any other neurological symptoms.  No numbness weakness or paresthesias.  Patient denies any chest pain.  No shortness of breath.  No nausea vomiting diarrhea.  He has not had any fevers or chills.  Again he currently states that his headache is minimal.  He denies any falls or head injury as well.      History provided by:  Patient                      [place] Coma Scale Score: 15                     Patient History   Past Medical History:   Diagnosis Date    Age-related nuclear cataract, unspecified eye 05/26/2022    Nuclear sclerosis    Atherosclerosis of native arteries of extremities with intermittent claudication, bilateral legs (CMS/HCC) 01/03/2023    Atherosclerosis of native artery of both lower extremities with intermittent claudication    Atherosclerotic heart disease of native coronary artery without angina pectoris 10/04/2022    CAD (coronary artery disease)    Diplopia 02/18/2022    Diplopia    Essential (primary) hypertension 10/04/2022    Benign essential hypertension    Heart disease 2010    Other abnormal glucose     Hemoglobin A1c less than 7.0%    Other intervertebral disc degeneration, lumbar region     Degenerative disc disease, lumbar    Personal history of colonic polyps     History of colonic polyps    Personal history of malignant neoplasm of other organs and systems     History of squamous cell carcinoma    Personal history of other diseases of the circulatory system 04/16/2018    History of hypertension    Personal history of other endocrine, nutritional and metabolic disease 04/16/2018    History of hyperlipidemia    Personal history of other  medical treatment     History of stress test    Presence of prosthetic heart valve 01/03/2023    S/P AVR (aortic valve replacement)    Spondylolysis, lumbar region     Lumbar spondylolysis     Past Surgical History:   Procedure Laterality Date    APPENDECTOMY  01/25/2017    Appendectomy    CARDIAC PACEMAKER PLACEMENT  04/16/2018    Pacemaker Placement    COLONOSCOPY W/ POLYPECTOMY  01/25/2017    Complete Colonoscopy For Polyp Removal    CORONARY ANGIOPLASTY WITH STENT PLACEMENT  01/25/2017    Cath Stent Placement    CT AORTA AND BILATERAL ILIOFEMORAL RUNOFF ANGIOGRAM W AND/OR WO IV CONTRAST  11/23/2019    CT AORTA AND BILATERAL ILIOFEMORAL RUNOFF ANGIOGRAM W AND/OR WO IV CONTRAST 11/23/2019 [facility]    ESOPHAGOGASTRODUODENOSCOPY  01/25/2017    Esophagogastroduodenoscopy With Biopsy    HERNIA REPAIR  01/25/2017    Inguinal Hernia Repair    KNEE ARTHROSCOPY W/ DEBRIDEMENT  01/25/2017    Arthroscopy Knee Left    OTHER SURGICAL HISTORY  02/10/2015    Stent Indications: Acute Myocardial Infarction    OTHER SURGICAL HISTORY  07/20/2015    Blood Vessel Repair Direct Leg    OTHER SURGICAL HISTORY  01/25/2017    Excision Of Lesion Face    TOTAL KNEE ARTHROPLASTY  04/16/2018    Knee Replacement     No family history on file.  Social History     Tobacco Use    Smoking status: Never    Smokeless tobacco: Never   Vaping Use    Vaping Use: Never used   Substance Use Topics    Alcohol use: Never    Drug use: Never       Physical Exam   ED Triage Vitals [02/24/24 2304]   Temperature Heart Rate Respirations BP   36.7 °C (98 °F) 65 18 (!) 182/90      Pulse Ox Temp src Heart Rate Source Patient Position   96 % -- -- --      BP Location FiO2 (%)     -- --       Physical Exam  Vitals and nursing note reviewed.   Constitutional:       General: He is not in acute distress.     Appearance: Normal appearance. He is not toxic-appearing.   HENT:      Head: Normocephalic and atraumatic.      Nose: Nose normal.      Mouth/Throat:       Mouth: Mucous membranes are moist.      Pharynx: Oropharynx is clear.   Eyes:      Extraocular Movements: Extraocular movements intact.      Conjunctiva/sclera: Conjunctivae normal.      Pupils: Pupils are equal, round, and reactive to light.   Cardiovascular:      Rate and Rhythm: Normal rate and regular rhythm.      Pulses: Normal pulses.      Heart sounds: Normal heart sounds.   Pulmonary:      Effort: Pulmonary effort is normal. No respiratory distress.      Breath sounds: Normal breath sounds.   Abdominal:      General: Abdomen is flat. Bowel sounds are normal.      Palpations: Abdomen is soft.      Tenderness: There is no abdominal tenderness.   Musculoskeletal:         General: Normal range of motion.      Cervical back: Normal range of motion and neck supple.   Skin:     General: Skin is warm and dry.      Coloration: Skin is not jaundiced or pale.      Findings: No bruising.   Neurological:      General: No focal deficit present.      Mental Status: He is alert and oriented to person, place, and time. Mental status is at baseline.      GCS: GCS eye subscore is 4. GCS verbal subscore is 5. GCS motor subscore is 6.      Cranial Nerves: Cranial nerves 2-12 are intact.      Sensory: Sensation is intact.      Motor: Motor function is intact.      Coordination: Coordination is intact.      Gait: Gait is intact.   Psychiatric:         Mood and Affect: Mood normal.         Behavior: Behavior normal.         ED Course & MDM   Diagnoses as of 02/25/24 0124   Secondary hypertension       Medical Decision Making  Summary:  Medical Decision Making:   Patient presented as described in HPI. Patient case including ROS, PE, and treatment and plan discussed with ED attending if attached as cosigner. Results from labs and or imaging included below if completed. Alexis NOEL Aleksander  is a 88 y.o. coming in for Patient presents with:  Hypertension  .  Patient states that his headache has nearly completely resolved however due to  to the recent stroke family and patient are concerned I would like a head CT completed.  Clinically I do not believe that he needs this.  Lab work was completed.  Patient had no acute concerning abnormalities noted on CT or labs.  Patient's H&H is slightly decreased however he denies any bleeding.  Patient was ambulated.  He had no difficulty ambulating.  He does not feel any headache currently and he does not have any other current symptoms.  He will be discharged advised follow-up closely with his PCP.      Disposition is completed with shared decision making with the patient or guardian present with the patient. They were advised to follow up with PCP or recommended provider in 2-3 days for another evaluation and exam. I advised the patient to return or go to closest emergency room immediately if symptoms change, get worse, or new symptoms develop prior to follow up. I explained the plan and treatment course. Patient/guardian is in agreement with plan, treatment course, and follow up and state that they will comply.    Labs Reviewed  CBC WITH AUTO DIFFERENTIAL - Abnormal     WBC                           6.8                    nRBC                          0.0                    RBC                           4.18 (*)               Hemoglobin                    12.9 (*)               Hematocrit                    39.9 (*)               MCV                           96                     MCH                           30.9                   MCHC                          32.3                   RDW                           14.1                   Platelets                     156                    Neutrophils %                 62.4                   Immature Granulocytes %, Automated   0.3                    Lymphocytes %                 23.7                   Monocytes %                   12.4                   Eosinophils %                 0.6                    Basophils %                   0.6                     Neutrophils Absolute          4.21                   Immature Granulocytes Absolute, Au*   0.02                   Lymphocytes Absolute          1.60                   Monocytes Absolute            0.84 (*)               Eosinophils Absolute          0.04                   Basophils Absolute            0.04                COMPREHENSIVE METABOLIC PANEL - Abnormal     Glucose                       104 (*)                Sodium                        138                    Potassium                     4.0                    Chloride                      104                    Bicarbonate                   28                     Anion Gap                     10                     Urea Nitrogen                 25 (*)                 Creatinine                    1.04                   eGFR                          70                     Calcium                       8.8                    Albumin                       4.0                    Alkaline Phosphatase          64                     Total Protein                 6.2 (*)                AST                           16                     Bilirubin, Total              1.4 (*)                ALT                           13                  MAGNESIUM - Normal     Magnesium                     2.01                TROPONIN I, HIGH SENSITIVITY - Normal   CT head wo IV contrast   Final Result    1. No acute intracranial abnormality.          2. 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                MACRO:    None.          Signed by: Lenard Seo 2/25/2024 1:06 AM    Dictation workstation:   [id number]         Tests/Medications/Escalations of Care considered but not given: As in MDM    Patient care discussed with: N/A  Social Determinants affecting care: N/A    Final diagnosis and disposition as documented     Diagnoses as of 02/25/24 0125  Secondary hypertension       Shared decision making was completed and  determined that patient will be discharged. I discussed the differential; results and discharge plan with the patient and/or family/friend/caregiver if present.  I emphasized the importance of follow-up with the physician I referred them to in the timeframe recommended.  I explained reasons for the patient to return to the Emergency Department. They agreed that if they feel their condition is worsening or if they have any other concern they should call 911 immediately for further assistance. I gave the patient an opportunity to ask all questions they had and answered all of them accordingly. They understand return precautions and discharge instructions. The patient and/or family/friend/caregiver expressed understanding verbally and that they would comply.     Disposition: Discharge      This note has been transcribed using voice recognition and may contain grammatical errors, misplaced words, incorrect words, incorrect phrases or other errors.         Procedure  Procedures     Braulio Hilario PA-C  02/25/24 0126

## 2024-02-27 ENCOUNTER — OFFICE VISIT (OUTPATIENT)
Dept: NEUROLOGY | Facility: CLINIC | Age: 89
End: 2024-02-27
Payer: MEDICARE

## 2024-02-27 VITALS
BODY MASS INDEX: 27.09 KG/M2 | SYSTOLIC BLOOD PRESSURE: 148 MMHG | HEART RATE: 70 BPM | DIASTOLIC BLOOD PRESSURE: 80 MMHG | HEIGHT: 72 IN | WEIGHT: 200 LBS

## 2024-02-27 DIAGNOSIS — R44.0 AUDITORY HALLUCINATIONS: ICD-10-CM

## 2024-02-27 DIAGNOSIS — G45.9 TIA (TRANSIENT ISCHEMIC ATTACK): Primary | ICD-10-CM

## 2024-02-27 PROCEDURE — 1159F MED LIST DOCD IN RCRD: CPT

## 2024-02-27 PROCEDURE — 3077F SYST BP >= 140 MM HG: CPT

## 2024-02-27 PROCEDURE — 1160F RVW MEDS BY RX/DR IN RCRD: CPT

## 2024-02-27 PROCEDURE — 1126F AMNT PAIN NOTED NONE PRSNT: CPT

## 2024-02-27 PROCEDURE — 99214 OFFICE O/P EST MOD 30 MIN: CPT

## 2024-02-27 PROCEDURE — 1157F ADVNC CARE PLAN IN RCRD: CPT

## 2024-02-27 PROCEDURE — 3079F DIAST BP 80-89 MM HG: CPT

## 2024-02-27 PROCEDURE — 1036F TOBACCO NON-USER: CPT

## 2024-02-27 NOTE — PROGRESS NOTES
"Sahil Armijo \"Ronal\" is a  88 y.o. male with a past medical history of HTN, HLD, CAD, and PPM presenting with intermittent  with   Chief Complaint   Patient presents with    Transient Ischemic Attack       Visit type: follow up visit    HPI   Patient presents today for hospital follow up of stroke, and also called office this morning stating he has been hearing music.     To review:  Pt had initial sx the first week of February where he had been eating with his wife and was unable to form the words he wanted to say, and wife reported garbled speech. He had a second episode a week later in which he had expressive aphasia, dysarthria and headaches. Both episodes lasted 10 mins and resolved without intervention. Then, pt got severe headache and went to bed, felt fine the next day. No other neurological sx. He called his PCP who sent him to the ED. Notably, he had taken himself off his Valsartan six months prior. In the ED, Bp was 150/99, EKG w paced rhythm, non ischemic, CTH without acute changes. NIH 0. He was admitted to [facility] for CVA workup. CTA demonstrated extensive atherosclerotic calcifications of both ICAs, extensive atherosclerotic calcifications at L carotid bifurcation w 50% residual lumen. Dr. Spring evaluated the patient. He is on eliquis and plavix for PAF, has pacemaker. MRI was not able to be obtained due to pacemaker. He felt he likely had two TIAs and recommending continuing those medications. Also recommended vascular surgery consult-- they did see him and got carotid US w <50% stenosis bilaterally and recommended no vascular intervention. Sees Dr. Sheth.     Pt called the office this morning 2/27 stating that he went to the ED on 2/24/24 with BP of 200/99 and headache, and normally runs in the 120s/30s systolic. Head CT and hospital was OK, gave him tylenol, and discharged him home. Pt reports that since he has been home the evening of the ED, he has been hearing fire sirens and " music including national anthems and Taoism music. Has sometimes been a woman or man or a group. Not keeping him up at night. He has a hx of tinnitus and wears hearing aids. No other neurological sx. States the hallucinations are constant, but they change throughout the day as far as what hes hearing. When asked about the TIA events, he has full recollection, no loss of awareness, no other seizure like activity. Also notes he was drifting across the center line to the left while driving recently. Has double vision bilaterally, seeing Dr. Khan.     Review of Systems  10 point review of systems performed and is negative except as noted in the HPI.     All other systems have been reviewed and are negative for complaint.    Past Medical History:   Diagnosis Date    Age-related nuclear cataract, unspecified eye 05/26/2022    Nuclear sclerosis    Atherosclerosis of native arteries of extremities with intermittent claudication, bilateral legs (CMS/HCC) 01/03/2023    Atherosclerosis of native artery of both lower extremities with intermittent claudication    Atherosclerotic heart disease of native coronary artery without angina pectoris 10/04/2022    CAD (coronary artery disease)    Diplopia 02/18/2022    Diplopia    Essential (primary) hypertension 10/04/2022    Benign essential hypertension    Heart disease 2010    Other abnormal glucose     Hemoglobin A1c less than 7.0%    Other intervertebral disc degeneration, lumbar region     Degenerative disc disease, lumbar    Personal history of colonic polyps     History of colonic polyps    Personal history of malignant neoplasm of other organs and systems     History of squamous cell carcinoma    Personal history of other diseases of the circulatory system 04/16/2018    History of hypertension    Personal history of other endocrine, nutritional and metabolic disease 04/16/2018    History of hyperlipidemia    Personal history of other medical treatment     History of stress  test    Presence of prosthetic heart valve 01/03/2023    S/P AVR (aortic valve replacement)    Spondylolysis, lumbar region     Lumbar spondylolysis       No relevant family history has been documented for this patient.    Past Surgical History:   Procedure Laterality Date    APPENDECTOMY  01/25/2017    Appendectomy    CARDIAC PACEMAKER PLACEMENT  04/16/2018    Pacemaker Placement    COLONOSCOPY W/ POLYPECTOMY  01/25/2017    Complete Colonoscopy For Polyp Removal    CORONARY ANGIOPLASTY WITH STENT PLACEMENT  01/25/2017    Cath Stent Placement    CT AORTA AND BILATERAL ILIOFEMORAL RUNOFF ANGIOGRAM W AND/OR WO IV CONTRAST  11/23/2019    CT AORTA AND BILATERAL ILIOFEMORAL RUNOFF ANGIOGRAM W AND/OR WO IV CONTRAST 11/23/2019 [facility]    ESOPHAGOGASTRODUODENOSCOPY  01/25/2017    Esophagogastroduodenoscopy With Biopsy    HERNIA REPAIR  01/25/2017    Inguinal Hernia Repair    KNEE ARTHROSCOPY W/ DEBRIDEMENT  01/25/2017    Arthroscopy Knee Left    OTHER SURGICAL HISTORY  02/10/2015    Stent Indications: Acute Myocardial Infarction    OTHER SURGICAL HISTORY  07/20/2015    Blood Vessel Repair Direct Leg    OTHER SURGICAL HISTORY  01/25/2017    Excision Of Lesion Face    TOTAL KNEE ARTHROPLASTY  04/16/2018    Knee Replacement       Social History     Substance and Sexual Activity   Drug Use Never     Tobacco Use: Low Risk  (2/27/2024)    Patient History     Smoking Tobacco Use: Never     Smokeless Tobacco Use: Never     Passive Exposure: Not on file     Alcohol Use: Not At Risk (2/12/2024)    AUDIT-C     Frequency of Alcohol Consumption: Never     Average Number of Drinks: Patient does not drink     Frequency of Binge Drinking: Never           Objective   Mental Status: Awake and alert. Oriented to person, place and time. Speech was fluent to history. Naming, repetition and comprehension were intact. Short term memory intact tested by word recall and attention intact tested by serial sevens.    CN: Pupils were 4/4mm  to 2/2mm. VF intact to finger counting. Ocular versions were intact. Facial sensation was intact to light touch bilaterally. Facial expression was symmetric. Palate elevated symmetrically. Shoulder shrug was symmetric. Tongue protruded midline.     Motor: Normal muscle bulk and tone. Strength was 5/5 bilaterally for shoulder abduction, elbow flexion/extension,  strength, hip flexion, knee flexion/extension, ankle dorsi- and plantar flexion. There were no abnormal movements.     Sensory: Intact to light touch and temperature in all 4 extremities. Does not extinguish to double simultaneous touch.     Coordination: Finger to nose and heel to shin were intact with no dysmetria.     Gait: Narrow based and normal. Intact heel-raise, toe-raise and tandem gait.          Results for orders placed or performed during the hospital encounter of 02/24/24   CT head wo IV contrast    Narrative    Interpreted By:  Lenard Seo,   STUDY:  CT HEAD WO IV CONTRAST;  2/25/2024 12:11 am      INDICATION:  Signs/Symptoms:HA.      COMPARISON:  02/12/2024      ACCESSION NUMBER(S):  [id number]      ORDERING CLINICIAN:  VICKI GONSALES      TECHNIQUE:  Noncontrast axial CT images of head were obtained with coronal and  sagittal reconstructed images.      FINDINGS:  BRAIN PARENCHYMA: Moderate periventricular and subcortical  hemispheric white matter hypodensities are most compatible with  chronic small vessel ischemic disease. No acute intraparenchymal  hemorrhage or parenchymal evidence of acute large territory ischemic  infarct. No mass-effect. Gray-white matter distinction is preserved.      VENTRICLES and EXTRA-AXIAL SPACES: Dense intracranial ICA  calcification again noted. Similarly, there is a calcification of the  left intracranial vertebral artery. No acute extra-axial or  intraventricular hemorrhage. No effacement of cerebral sulci.  Ventricles and sulci are age-concordant.      PARANASAL SINUSES/MASTOIDS:  No hemorrhage or  air-fluid levels within  the visualized paranasal sinuses. The mastoids are well aerated.      CALVARIUM/ORBITS:  No skull fracture.  The orbits and globes are  intact to the extent visualized.      EXTRACRANIAL SOFT TISSUES: No discernible abnormality.        Impression    1. No acute intracranial abnormality.      2. Stable moderate burden of supratentorial chronic small vessel  ischemic disease.          MACRO:  None.      Signed by: Lenard Seo 2/25/2024 1:06 AM  Dictation workstation:   [id number]   Results for orders placed or performed during the hospital encounter of 02/12/24   Vascular US Carotid Artery Duplex Bilateral    Narrative              [place] [id number]   Tel [phone] and Fax [phone]       Vascular Lab Report  [facility] US CAROTID ARTERY DUPLEX BILATERAL       Patient Name:     CECIL Del Rio Physician: [id number] Aretha Valencia MD  Study Date:       2/13/2024           Ordering           [id number] EMETERIO ADHIKARI                                        Physician:  MRN/PID:          [id number]            Technologist:      Kimmie Rousseau LISET  Accession#:       [id number]        Technologist 2:  Date of           1935 / 88      Encounter#:        [id number]  Birth/Age:        years  Gender:           M  Admission Status: Inpatient           Location           [facility]                                        Performed:       Diagnosis/ICD: Occlusion and stenosis of bilateral carotid arteries-I65.23  CPT Codes:     41422 Cerebrovascular Carotid Duplex scan complete       CONCLUSIONS:  Right Carotid: Findings are consistent with less than 50% stenosis of the right proximal internal carotid artery. Laminar flow seen by color Doppler. Right external carotid artery appears patent with no evidence of stenosis. The right vertebral artery is patent with antegrade flow. No  evidence of hemodynamically significant stenosis in the right subclavian artery.  Left Carotid: Findings are consistent with less than 50% stenosis of the left proximal internal carotid artery. Laminar flow seen by color Doppler. Left external carotid artery appears patent with no evidence of stenosis. The left vertebral artery is patent with antegrade flow. No evidence of hemodynamically significant stenosis in the left subclavian artery.     Comparison:  Compared with study from 5/1/2018, no significant change.     Imaging & Doppler Findings:  Right Plaque Morph: The proximal right internal carotid artery demonstrates heterogenous and calcified plaque. The proximal right external carotid artery demonstrates heterogenous plaque. The mid right common carotid artery demonstrates heterogenous plaque. The distal right common carotid artery demonstrates heterogenous and calcified plaque.  Left Plaque Morph: The proximal left internal carotid artery demonstrates heterogenous and calcified plaque. The proximal left external carotid artery demonstrates heterogenous and calcified plaque. The distal left common carotid artery demonstrates heterogenous and calcified plaque.      Right                        Left    PSV      EDV                PSV      EDV  82 cm/s            CCA P    91 cm/s  76 cm/s            CCA D    82 cm/s  55 cm/s  12 cm/s   ICA P    74 cm/s  15 cm/s  63 cm/s            ICA M    62 cm/s  64 cm/s            ICA D    54 cm/s  64 cm/s             ECA     125 cm/s  27 cm/s          Vertebral  42 cm/s  130 cm/s         Subclavian 188 cm/s                  Right Left  ICA/CCA Ratio  0.7  0.9          [id number] Aretha Valencia MD  Electronically signed by [id number] Aretha Valencia MD on 2/13/2024 at 3:51:00 PM         ** Final **     CT angio head and neck w and wo IV contrast    Narrative    Interpreted By:  Chris Rodriguez,   STUDY:  CT ANGIO HEAD AND NECK W AND WO IV CONTRAST;  2/12/2024 3:46 pm       INDICATION:  Signs/Symptoms:expressive aphasia, CVA rule out, unable to have MR.      COMPARISON:  None.      ACCESSION NUMBER(S):  [id number]      ORDERING CLINICIAN:  EMETERIO ADHIKARI      TECHNIQUE:  Following intravenous injection of contrast material, CT was acquired  from the aortic arch to the vertex of the skull. Multiplanar  reconstructions and 3D reconstructions were made.3D reconstructions,  MIPs, Volume Rendered, or Surface Shading image were performed at a  separate workstation      FINDINGS:  Chest      The aortic arch and origin of great vessels are normal. The  visualized mediastinum and pulmonary apices are normal.      Neck      Right carotid  The common carotid artery is normal. There are minor atherosclerotic  changes at the carotid bifurcation without measurable luminal  narrowing or filling defect. The cervical, petrous and cavernous  portions are normal caliber with extensive atherosclerotic  calcifications within the cavernous and supraclinoid portions.      Left carotid  The common carotid artery is normal. There are extensive  atherosclerotic changes at the carotid bifurcation with a proximally  50% residual luminal diameter. No associated filling defect to  suggest thrombus or dissection.. The cervical internal carotid artery  is otherwise normal caliber. There are extensive atherosclerotic  calcifications in the petrous, cavernous and supraclinoid portions..      Vertebrals      The vertebral arteries are asymmetrical with dominance of the left  vertebral artery. Vertebral artery origin is are normal. No evidence  of compression or filling defect within the cervical vertebral  arteries. The vertebrobasilar junction exhibits marked mineralization  without luminal narrowing. The basilar artery is normal.      Soft tissue neck          Enlarged thrombosed left jugular vein. There is no evidence of mass,  cyst or adenopathy within the neck      Intracranial      There is no evidence of  aneurysm, vascular malformation or branch  occlusion.        Impression    * Extensive atherosclerotic calcifications in the petrous and  cavernous as well as supraclinoid portions of both internal carotid  arteries. Luminal narrowing not excluded at these levels *Extensive  atherosclerotic calcification at the left carotid bifurcation with  50% residual lumen *No evidence of intracranial aneurysm, vascular  malformation or branch occlusion *Occluded left jugular vein.      MACRO:  none      Signed by: Chris Rodriguez 2/12/2024 4:15 PM  Dictation workstation:   [id number]   Results for orders placed or performed in visit on 05/09/23   XR cervical spine complete 4-5 views    Narrative    Interpreted By:  ZHENG LANGFORD MD  MRN: [id number]  Patient Name: CECIL HOFFMAN     STUDY:  SPINE, CERVICAL  MIN 4 VIEWS;  5/9/2023 10:59 am     INDICATION:  pain, strain.     COMPARISON:  11/03/2016     ACCESSION NUMBER(S):  [id number]     ORDERING CLINICIAN:  RONAL WINKLER     FINDINGS:  C-spine, five views     There is moderate to severe multilevel disc space narrowing  osteophytosis throughout the cervical spine worse at C5-C6 and C6-7.  There is mild anterolisthesis of C4 on C5. There is moderate facet  disease throughout the cervical spine as well. There is multilevel  moderate neural foraminal narrowing bilaterally worse on the right.  No fracture seen.       Impression    Moderate severe multilevel spondylosis worse at C5-C6 and C6-7. Facet  disease as well.  Moderate bilateral neural foraminal narrowing throughout the cervical  spine worse on the right   Results for orders placed or performed in visit on 04/02/20   CT LUMBAR SPINE WO IV CONTRAST    Narrative    MRN: [id number]  Patient Name: CECIL HOFFMAN     STUDY:  CT L-SPINE WO CONTRAST;; 4/2/2020 11:17 am     INDICATION:  M54.5 Low back pain  M54.16 Radiculopathy, lumbar region.     COMPARISON:  07/31/2014     ACCESSION NUMBER(S):  [id number]     ORDERING  CLINICIAN:  RODRIGO RIVAS     TECHNIQUE:  Axial sections of the lumbar spine was performed without intravenous  contrast administration, coronal and sagittal reconstructions were  performed.     FINDINGS:  Mild levoscoliosis noted. Visualized lung bases are unremarkable.     Atherosclerotic changes in the aorta and its branches. Pre and  paravertebral soft tissues are unremarkable.     Lumbosacral transitional vertebra noted. Partial lumbarization of the  S1 vertebral body.     At T11-T12 mild degenerated disc and facet joint arthropathy noted  causing mild bilateral lateral recess stenosis, left greater than the  right, moderate to severe bilateral neural foramina narrowing.     At T12-L1 minimal degenerated disc, facet joint arthropathy noted  without any significant central spinal canal or neural foramina  stenosis. Vacuum phenomenon noted at T12-L1.     At L1-L2 minimal disc bulge, facet joint arthropathy noted without  any spinal canal stenosis, mild encroachment of the left neural  foramina. Partial nonunion of the left transverse process with the L1  vertebral body.     At L2-L3 mild broad-based disc bulge, mild facet joint arthropathy,  ligamentum flavum hypertrophy in combination noted causing mild  encroachment of the central spinal canal without any significant  neural foramina stenosis.     At L3-L4 broad-based disc bulge, mild-to-moderate facet joint  arthropathy, ligamentum flavum hypertrophy in combination noted  causing mild encroachment of the right neural foraminal and right  lateral recess stenosis and central spinal canal.     At L4-L5 broad-based degenerated disc osteophyte, moderate facet  joint arthropathy, asymmetric on the right side in combination noted  causing moderate central spinal canal, severe right lateral recess,  mild-to-moderate left lateral recess, moderate left neural foramina,  severe right neural foramina narrowing.     At L5-S1 mild disc osteophyte, facet joint arthropathy in  combination  noted causing severe right lateral recess, and bilateral neural  foramina narrowing, mild-to-moderate left lateral recess stenosis.     IMPRESSION:  Multilevel degenerative changes in the lower thoracic and lumbar  spine, most significant changes are noted at L4-L5, L5-S1, stable to  minimally progressed at L4-L5 and L5-S1.      Results for orders placed or performed in visit on 11/23/19   CT HEAD W AND WO IV CONTRAST    Narrative    MRN: [id number]  Patient Name: CECIL HOFFMAN     STUDY:  CT HEAD W/WO CONTRAST; 11/23/2019 10:17 am     INDICATION:  R26.9 Unspecified abnormalities of gait and mobility   H53.2 Diplopia  G43.909 Migraine, unspecified, not intractable, without status  migrainosus  diplopia gait difficulty.     COMPARISON:  02/10/2019     ACCESSION NUMBER(S):  [id number]     ORDERING CLINICIAN:  JOHN MORITZ     TECHNIQUE:  Unenhanced CT images of the head were obtained. Images were repeated  following IV administration 119 cc Isovue 370 contrast material.     FINDINGS:  The ventricles, cisterns and sulci are prominent, consistent with  diffuse volume loss. There are areas of nonspecific white matter  hypodensity, which are probably age related or microvascular in  nature. These findings are similar to the previous exam. No acute  intracranial hemorrhage, abnormal parenchymal enhancement, mass, or  mass effect. No extra-axial fluid collection.     No focal calvarial lesion     The visualized paranasal sinuses are clear.     IMPRESSION:  Chronic/age-related changes. No abnormal parenchymal enhancement or  mass effect.   Results for orders placed or performed in visit on 02/13/15   CT CERVICAL SPINE WO IV CONTRAST    Narrative    EXAMINATION:  Cervical spine CT     CLINICAL HISTORY:  Signs/Symptoms: Neck pain and gait difficulty     TECHNIQUE:  Contiguous axial CT images were obtained at 2 mm slice thickness and   1 mm overlap through the cervical spine without contrast   administration. The  images were then reconstructed in the coronal and   sagital planes.     FINDINGS:  There is no CT evidence of acute fracture identified.  The vertebral   bodies are well aligned without evidence of subluxation.  No   prevertebral soft tissue swelling is identified.     There is moderate to severe disc space narrowing, endplate sclerosis   and bulky marginal osteophyte formation identified at the C5-6 and   C6-7 levels.  Mild to moderate discogenic degenerative changes are   present at the C4-5 and C7-T1 levels.  Posterior facet hypertrophic   degenerative changes are seen throughout the cervical spine, greatest   on the left at the C2-3 level and greatest on the right at the C3-4   and C4-5 levels.     At the C3-4 level, there is severe right-sided neural foraminal   narrowing identified.  Minimal left-sided foraminal narrowing and no   significant central canal narrowing is seen.     At the C4-5 level, there is severe right-sided and moderate   left-sided neural foraminal narrowing identified.  No significant   central canal narrowing is seen.     At the C5-6 level, there is severe right-sided and moderate to severe   left-sided neural foraminal narrowing identified.  Minimal central   canal narrowing is seen.     At the C6-7 level, there is moderate neural foraminal narrowing   identified bilaterally.  Minimal central canal narrowing is present.     At the C7-T1 level, there is moderate right-sided neural foraminal   narrowing identified.  No significant left foraminal or central   narrowing is seen.     Evaluation of the visualized soft tissues of the neck is limited by   the lack of intravenous contrast.  Within this limitation, no gross   mass or lymphadenopathy is identified.  Atherosclerotic   calcifications are seen in the carotid bulbs bilaterally.        IMPRESSION:     1.  No CT evidence of acute fracture.     2.  Degenerative changes, as described above.        This study was interpreted and dictated at  [facility] in [place], [place]              Assessment/Plan   Problem List Items Addressed This Visit       TIA (transient ischemic attack) - Primary    Overview     Pt presented with two episodes of aphasia/dysarthria and was evaluated by Dr. Spring in the hospital. He was felt to have had 2 TIAs and was continued on plavix and apixaban and statin.   - CT head without acute process, does show atrophy  - CTA head and neck with extensive atherosclerotic calcifications of both ICAs, extensive atherosclerotic calcifications at L carotid bifurcation w 50% residual lumen.  - Carotid US with <50% stenosis of bilateral ICAs, vascular surgery saw and recommended no vascular intervention          Current Assessment & Plan     Continue plavix, apixaban, statin.   Continue BP meds and risk factor mgmt  Continue f/u with Dr. Sheth for CAD, PAF, and carotid surveillance.          Auditory hallucinations    Overview     Pt reports that for the last few days he has been having musical hallucinations including Advent songs and the national anthem          Current Assessment & Plan     Pt does have some risk factors of musical hallucinations including age, tinnitus/hearing loss, antihypertensives, and cerebral atrophy  - D/w Dr Sheth's office, pacemaker is not MRI compatible. Will get CT head w and wo contrast.   - [place]

## 2024-02-27 NOTE — ASSESSMENT & PLAN NOTE
Continue plavix, apixaban, statin.   Continue BP meds and risk factor mgmt  Continue f/u with Dr. Sheth for CAD, PAF, and carotid surveillance.

## 2024-02-27 NOTE — PATIENT INSTRUCTIONS
"Continue plavix, eliquis, and statin for TIA.   For the hallucinations, we will get a CT head with and without contrast and an EEG.   Consider discussing optimizing tinnitus treatment and hearing aids with audiology at your next appt with them.       STROKE AND TIA EDUCATION      The Acronym \"BE FAST\" can help you remember stroke symptoms to watch out for:  Balance - Is the person having trouble standing or walking?  Eyes - Is the person having trouble with their vision?  Face - Does the person's face look uneven or droop on 1 side?  Arm - Does the person have weakness or numbness in 1 or both arms? Does 1 arm drift down if they try to hold both arms out?  Speech - Is the person having trouble speaking? Does their speech sound strange?  Time - If you notice any of these stroke signs, call for an ambulance (call [id number]). You need to act FAST. The sooner treatment begins, the better the chances of recovery.    A stroke caused by bleeding in the brain can also cause a sudden, severe headache.    TIA stands for \"transient ischemic attack.\" This is what people call a mini-stroke. It is like a stroke, but does not cause permanent damage to the brain. TIAs happen when an artery in the brain gets clogged or closes off, then reopens on its own. This can happen if a blood clot forms and then moves away or dissolves.Even though TIAs do not cause lasting symptoms, they are serious. If you have a TIA, you are at high risk of having a stroke. It's important to see a doctor and take steps to prevent that from happening. Do not ignore the symptoms of a stroke even if they go away!    It is important to control your stroke risk factors. These risk factors can increase the risk of stroke but strokes can still happen in people who do not know they are at risk or do not have risk factors. Ischemic stroke risk factors include the following: age older than 40 years, heart disease, high blood pressure, smoking, diabetes, high cholesterol, " illegal drug use, recent childbirth, previous TIA history, lack of exercise, obesity, current or past history of blood clots, and family history of heart disease and/or stroke. Try to eat a Mediterranean style diet including lean meats and proteins, healthy fats, limiting sugars, and salt. The American Heart Association recommends 150 minutes of moderate intensity aerobic exercise per week-- strive for this as tolerated and safe for you. Continue tobacco cessation and limiting alcohol.       Hemorrhagic stroke risk factors include the following: high blood pressure, smoking, illegal drug use, and use of blood thinners.

## 2024-02-27 NOTE — ASSESSMENT & PLAN NOTE
Pt does have some risk factors of musical hallucinations including age, tinnitus/hearing loss, antihypertensives, and cerebral atrophy  - D/w Dr Sheth's office, pacemaker is not MRI compatible. Will get CT head w and wo contrast.   - [place]

## 2024-02-29 ENCOUNTER — HOSPITAL ENCOUNTER (OUTPATIENT)
Dept: NEUROLOGY | Facility: HOSPITAL | Age: 89
Discharge: HOME | End: 2024-02-29
Payer: MEDICARE

## 2024-02-29 DIAGNOSIS — R44.0 AUDITORY HALLUCINATIONS: ICD-10-CM

## 2024-02-29 PROCEDURE — 95819 EEG AWAKE AND ASLEEP: CPT

## 2024-03-01 ENCOUNTER — DOCUMENTATION (OUTPATIENT)
Dept: PRIMARY CARE | Facility: CLINIC | Age: 89
End: 2024-03-01
Payer: MEDICARE

## 2024-03-01 DIAGNOSIS — I48.0 PAROXYSMAL ATRIAL FIBRILLATION (MULTI): ICD-10-CM

## 2024-03-01 DIAGNOSIS — I10 BENIGN ESSENTIAL HYPERTENSION: ICD-10-CM

## 2024-03-01 DIAGNOSIS — R60.9 DEPENDENT EDEMA: ICD-10-CM

## 2024-03-01 DIAGNOSIS — G45.9 TIA (TRANSIENT ISCHEMIC ATTACK): ICD-10-CM

## 2024-03-01 DIAGNOSIS — I73.9 VASCULAR CLAUDICATION (CMS-HCC): ICD-10-CM

## 2024-03-01 DIAGNOSIS — R26.9 GAIT DIFFICULTY: ICD-10-CM

## 2024-03-01 NOTE — PROGRESS NOTES
"[facility] monthly call with patient.  Stated that he was in the ER last week for hypertensive crisis.  Blood pressure was 200/99, patient was symptomatic.  Was observed for a while in the ER, and when BP returned to normal, patient was discharged.  Patient reporting that since that incident, he has been hearing \"beautiful songs\" in his head.  Said that they wake him up at times.  Has seen a neurologist, ordered a bunch of tests and scans per patient report.  Had CT of head yesterday, and has the other tests scheduled for later today and next week.  Said that his pacer has not gone off at all.  Noticed vision changes, but is seeing 'ok'.  Had eye exam and they said that one eye is seeing better than the other, prisms in glasses are no longer effective, new ones ordered.  No prescription refills needed at time of call.  In agreement to follow up calls in the future.   "

## 2024-03-03 LAB
ATRIAL RATE: 63 BPM
P OFFSET: 193 MS
P ONSET: 140 MS
PR INTERVAL: 282 MS
Q ONSET: 223 MS
QRS COUNT: 10 BEATS
QRS DURATION: 74 MS
QT INTERVAL: 408 MS
QTC CALCULATION(BAZETT): 417 MS
QTC FREDERICIA: 414 MS
R AXIS: -9 DEGREES
T AXIS: -8 DEGREES
T OFFSET: 427 MS
VENTRICULAR RATE: 63 BPM

## 2024-03-04 ENCOUNTER — HOSPITAL ENCOUNTER (OUTPATIENT)
Dept: RADIOLOGY | Facility: HOSPITAL | Age: 89
Discharge: HOME | End: 2024-03-04
Payer: MEDICARE

## 2024-03-04 DIAGNOSIS — R44.0 AUDITORY HALLUCINATIONS: ICD-10-CM

## 2024-03-04 PROCEDURE — 2550000001 HC RX 255 CONTRASTS

## 2024-03-04 PROCEDURE — 70470 CT HEAD/BRAIN W/O & W/DYE: CPT

## 2024-03-04 RX ADMIN — IOHEXOL 75 ML: 350 INJECTION, SOLUTION INTRAVENOUS at 15:27

## 2024-03-12 ENCOUNTER — OFFICE VISIT (OUTPATIENT)
Dept: OPHTHALMOLOGY | Facility: CLINIC | Age: 89
End: 2024-03-12
Payer: MEDICARE

## 2024-03-12 DIAGNOSIS — H53.2 DIPLOPIA: ICD-10-CM

## 2024-03-12 DIAGNOSIS — H50.30 ESOTROPIA, INTERMITTENT: Primary | ICD-10-CM

## 2024-03-12 PROCEDURE — 99214 OFFICE O/P EST MOD 30 MIN: CPT | Performed by: PSYCHIATRY & NEUROLOGY

## 2024-03-12 ASSESSMENT — ENCOUNTER SYMPTOMS
MUSCULOSKELETAL NEGATIVE: 0
ENDOCRINE NEGATIVE: 0
GASTROINTESTINAL NEGATIVE: 0
CARDIOVASCULAR NEGATIVE: 0
PSYCHIATRIC NEGATIVE: 0
ALLERGIC/IMMUNOLOGIC NEGATIVE: 0
HEMATOLOGIC/LYMPHATIC NEGATIVE: 0
RESPIRATORY NEGATIVE: 0
NEUROLOGICAL NEGATIVE: 0
EYES NEGATIVE: 1
CONSTITUTIONAL NEGATIVE: 0

## 2024-03-12 ASSESSMENT — VISUAL ACUITY
OS_PH_CC: 20/25-1
CORRECTION_TYPE: GLASSES
OS_CC: 20/30+1
OD_PH_CC: 20/20-2
METHOD: SNELLEN - LINEAR
OD_CC: 20/25+1

## 2024-03-12 ASSESSMENT — TONOMETRY
OS_IOP_MMHG: 12
OD_IOP_MMHG: 12
IOP_METHOD: GOLDMANN APPLANATION

## 2024-03-12 ASSESSMENT — REFRACTION_FINALRX
OD_HPRISM: 2.0
OS_HPRISM: 2.0
OD_HBASE: OUT
OS_HBASE: OUT

## 2024-03-12 ASSESSMENT — SLIT LAMP EXAM - LIDS
COMMENTS: NORMAL
COMMENTS: NORMAL

## 2024-03-12 ASSESSMENT — CUP TO DISC RATIO
OD_RATIO: 0.2
OS_RATIO: 0.2

## 2024-03-12 ASSESSMENT — EXTERNAL EXAM - LEFT EYE: OS_EXAM: NORMAL

## 2024-03-12 ASSESSMENT — EXTERNAL EXAM - RIGHT EYE: OD_EXAM: NORMAL

## 2024-03-12 NOTE — PROGRESS NOTES
Assessment and Plan    03/04/2024 CT head without contrast, which I personally reviewed, shows no acute lesion.    Interval head imaging.    05/03/2022 CT orbits with contrast, which I personally reviewed previously, shows no lesion.  11/23/2019 CT Head w/wo contrast, which I personally reviewed previously, shows age-related cortical atrophy no acute lesions or hemorrhage   Prior Head Imaging    10/05/2023 B12 1725.  01/03/2023 B12 570.  10/05/2022 B12 741.  09/03/2021 acetylcholine receptor binding, blocking & modulating antibodies, thyroid peroxidase antibody, thyroid stimulating immunoglobulin, thyrotropin receptor antibody negative.  09/18/2020 Vitamin B12 541  08/24/2019 TSH 0.74  01/08/2015 Thiamine 135    This 88 year-old man with a history of ocular myasthenia, non-Hodgkin`s lymphoma, ataxia, HTN, CAD s/p CABG, aortic stenosis s/p AVR, PAD s/p right leg bypass surgery, and HLD presents in follow up for evaluation of diplopia with esotropia in lateral gazes and small left hypotropia.    He has some increase in lateral gaze esotropia and some small left hypotropia / right hypertropia in right and superior gazes. 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 The main other consideration is ocular myasthenia, which I think it much less likely without more symptoms. We discussed prism glasses, pyridostigmine and EMG. He also has right monocular diplopia, which is best treated with updated refraction and artificial tears.    The episode of pixelation is consistent with late life migraine accompaniment. I doubt seizure given longer duration. His examination is benign without an evident cause.    Plan    Try the updated prescription with Dr. Dickson, but with prism 2 PD base-out prism (WING) both eyes.  Keep a log of double vision events covering each eye individually to determine whether the double is in each individual eye or only when looking through both eyes together.  Defer electrodiagnostic  testing.  Defer pyridostigmine and prednisone.    Follow up if and when more symptomatic (dilated 3/12/2024)

## 2024-03-13 ENCOUNTER — APPOINTMENT (OUTPATIENT)
Dept: NEUROLOGY | Facility: CLINIC | Age: 89
End: 2024-03-13
Payer: MEDICARE

## 2024-03-26 ENCOUNTER — OFFICE VISIT (OUTPATIENT)
Dept: NEUROLOGY | Facility: CLINIC | Age: 89
End: 2024-03-26
Payer: MEDICARE

## 2024-03-26 VITALS
BODY MASS INDEX: 26.41 KG/M2 | WEIGHT: 195 LBS | HEIGHT: 72 IN | HEART RATE: 68 BPM | SYSTOLIC BLOOD PRESSURE: 138 MMHG | DIASTOLIC BLOOD PRESSURE: 78 MMHG

## 2024-03-26 DIAGNOSIS — R44.0 AUDITORY HALLUCINATIONS: Primary | ICD-10-CM

## 2024-03-26 PROCEDURE — 99213 OFFICE O/P EST LOW 20 MIN: CPT

## 2024-03-26 PROCEDURE — 1157F ADVNC CARE PLAN IN RCRD: CPT

## 2024-03-26 PROCEDURE — 3075F SYST BP GE 130 - 139MM HG: CPT

## 2024-03-26 PROCEDURE — 1036F TOBACCO NON-USER: CPT

## 2024-03-26 PROCEDURE — 1159F MED LIST DOCD IN RCRD: CPT

## 2024-03-26 PROCEDURE — 1160F RVW MEDS BY RX/DR IN RCRD: CPT

## 2024-03-26 PROCEDURE — 3078F DIAST BP <80 MM HG: CPT

## 2024-03-26 NOTE — PATIENT INSTRUCTIONS
You are doing great. I am glad symptoms are resolved and your workup was reassuring. Follow up as needed with neurology.

## 2024-03-26 NOTE — ASSESSMENT & PLAN NOTE
Doing great. Sx spontaneously resolved after 2 weeks.   Discussed w pt again that he is at increased risk of musical hallucinations given age, tinnitus and hearing loss, anti HTN meds, and cerebral atrophy.   Workup reassuring   Continue f/u with audiology/ENT as scheduled in the future  F/u with neuro as needed

## 2024-03-26 NOTE — PROGRESS NOTES
"Sahil Armijo \"Ronal\" is a  88 y.o. male with a past medical history of HTN, HLD, CAD, and PPM presenting with auditory hallucinations  Chief Complaint   Patient presents with    auditory hallucinations       Visit type: follow up visit    HPI   Last seen by me on 2/27/24. See description of CC below. For his TIA, I recommended plavix, apixaban, statin to continue, as well as BP meds and risk factor mgmt, continued f/u with Dr. Sheth for CAD, PAF, and carotid surveillance. For the auditory hallucinations, we discussed risk factors of musical hallucinations including age, tinnitus/hearing loss, antiHTN, and cerebral atrophy. I recommended CT head w/ and w/o contrast as pacemaker not MRI compatible and routine EEG.     3/26/24:  Since then, pt has been doing great. Symptoms have completely resolved. The music lasted for 2 weeks then for a few days he would not hear it at night when he woke up, then it resolved completely. The tinnitus is still present but at his baseline. He saw Dr. Khan who changed his glasses and his double vision has been helped from this.   - CT head w and w/o WNL   - EEG demonstrated mild encephalopathy but no epileptiform discharges or lateralizing signs.    To review:  Pt had initial sx the first week of February where he had been eating with his wife and was unable to form the words he wanted to say, and wife reported garbled speech. He had a second episode a week later in which he had expressive aphasia, dysarthria and headaches. Both episodes lasted 10 mins and resolved without intervention. Then, pt got severe headache and went to bed, felt fine the next day. No other neurological sx. He called his PCP who sent him to the ED. Notably, he had taken himself off his Valsartan six months prior. In the ED, Bp was 150/99, EKG w paced rhythm, non ischemic, CTH without acute changes. NIH 0. He was admitted to [facility] for CVA workup. CTA demonstrated extensive atherosclerotic " calcifications of both ICAs, extensive atherosclerotic calcifications at L carotid bifurcation w 50% residual lumen. Dr. Spring evaluated the patient. He is on eliquis and plavix for PAF, has pacemaker. MRI was not able to be obtained due to pacemaker. He felt he likely had two TIAs and recommending continuing those medications. Also recommended vascular surgery consult-- they did see him and got carotid US w <50% stenosis bilaterally and recommended no vascular intervention. Sees Dr. Sheth. Pt called the office this morning 2/27 stating that he went to the ED on 2/24/24 with BP of 200/99 and headache, and normally runs in the 120s/30s systolic. Head CT and hospital was OK, gave him tylenol, and discharged him home. Pt reports that since he has been home the evening of the ED, he has been hearing fire sirens and music including national anthems and Jainism music. Has sometimes been a woman or man or a group. Not keeping him up at night. He has a hx of tinnitus and wears hearing aids. No other neurological sx. States the hallucinations are constant, but they change throughout the day as far as what hes hearing. When asked about the TIA events, he has full recollection, no loss of awareness, no other seizure like activity. Also notes he was drifting across the center line to the left while driving recently. Has double vision bilaterally, seeing Dr. Khan.     Review of Systems  10 point review of systems performed and is negative except as noted in the HPI.     All other systems have been reviewed and are negative for complaint.    Past Medical History:   Diagnosis Date    Age-related nuclear cataract, unspecified eye 05/26/2022    Nuclear sclerosis    Atherosclerosis of native arteries of extremities with intermittent claudication, bilateral legs (CMS/HCC) 01/03/2023    Atherosclerosis of native artery of both lower extremities with intermittent claudication    Atherosclerotic heart disease of native coronary  artery without angina pectoris 10/04/2022    CAD (coronary artery disease)    Diplopia 02/18/2022    Diplopia    Essential (primary) hypertension 10/04/2022    Benign essential hypertension    Heart disease 2010    Other abnormal glucose     Hemoglobin A1c less than 7.0%    Other intervertebral disc degeneration, lumbar region     Degenerative disc disease, lumbar    Personal history of colonic polyps     History of colonic polyps    Personal history of malignant neoplasm of other organs and systems     History of squamous cell carcinoma    Personal history of other diseases of the circulatory system 04/16/2018    History of hypertension    Personal history of other endocrine, nutritional and metabolic disease 04/16/2018    History of hyperlipidemia    Personal history of other medical treatment     History of stress test    Presence of prosthetic heart valve 01/03/2023    S/P AVR (aortic valve replacement)    Spondylolysis, lumbar region     Lumbar spondylolysis       No relevant family history has been documented for this patient.    Past Surgical History:   Procedure Laterality Date    APPENDECTOMY  01/25/2017    Appendectomy    CARDIAC PACEMAKER PLACEMENT  04/16/2018    Pacemaker Placement    COLONOSCOPY W/ POLYPECTOMY  01/25/2017    Complete Colonoscopy For Polyp Removal    CORONARY ANGIOPLASTY WITH STENT PLACEMENT  01/25/2017    Cath Stent Placement    CT AORTA AND BILATERAL ILIOFEMORAL RUNOFF ANGIOGRAM W AND/OR WO IV CONTRAST  11/23/2019    CT AORTA AND BILATERAL ILIOFEMORAL RUNOFF ANGIOGRAM W AND/OR WO IV CONTRAST 11/23/2019 [facility]    ESOPHAGOGASTRODUODENOSCOPY  01/25/2017    Esophagogastroduodenoscopy With Biopsy    HERNIA REPAIR  01/25/2017    Inguinal Hernia Repair    KNEE ARTHROSCOPY W/ DEBRIDEMENT  01/25/2017    Arthroscopy Knee Left    OTHER SURGICAL HISTORY  02/10/2015    Stent Indications: Acute Myocardial Infarction    OTHER SURGICAL HISTORY  07/20/2015    Blood Vessel Repair Direct  Leg    OTHER SURGICAL HISTORY  01/25/2017    Excision Of Lesion Face    TOTAL KNEE ARTHROPLASTY  04/16/2018    Knee Replacement       Social History     Substance and Sexual Activity   Drug Use Never     Tobacco Use: Low Risk  (3/26/2024)    Patient History     Smoking Tobacco Use: Never     Smokeless Tobacco Use: Never     Passive Exposure: Not on file     Alcohol Use: Not At Risk (2/12/2024)    AUDIT-C     Frequency of Alcohol Consumption: Never     Average Number of Drinks: Patient does not drink     Frequency of Binge Drinking: Never           Objective   Mental Status: Awake and alert. Oriented to person, place and time. Speech was fluent to history. Naming, repetition and comprehension were intact.     CN: Pupils were 4/4mm to 2/2mm. VF intact to finger counting. Ocular versions were intact. Facial sensation was intact to light touch bilaterally. Facial expression was symmetric. Palate elevated symmetrically. Shoulder shrug was symmetric. Tongue protruded midline.     Motor: Normal muscle bulk and tone. Strength was 5/5 bilaterally for shoulder abduction, elbow flexion/extension,  strength, hip flexion, knee flexion/extension, ankle dorsi- and plantar flexion. There were no abnormal movements.     Sensory: Intact to light touch and temperature in all 4 extremities. Does not extinguish to double simultaneous touch.     Coordination: Finger to nose and heel to shin were intact with no dysmetria.     Gait: Narrow based and normal. Intact heel-raise, toe-raise and tandem gait.            Results for orders placed or performed during the hospital encounter of 03/04/24   CT head w and wo IV contrast    Narrative    Interpreted By:  Kady Valladares,   STUDY:  CT HEAD W AND WO IV CONTRAST;  3/4/2024 3:25 pm      INDICATION:  Signs/Symptoms:new onset musical hallucinations, unable to get MRI  brain.      COMPARISON:  CT head from 02/25/2024      ACCESSION NUMBER(S):  [id number]      ORDERING CLINICIAN:  JALIL  SACHIN      TECHNIQUE:  Noncontrast axial CT scan of head was performed. Angled reformats in  brain and bone windows were generated. The images were reviewed in  bone, brain, blood and soft tissue windows.      FINDINGS:  CSF Spaces: The ventricles, sulci and basal cisterns are diffusely  prominent indicating mild diffuse cerebral volume loss.  There is no  extra-axial fluid collection.      Parenchyma:      There are patchy hypodensities within bilateral periventricular and  subcortical white matter which likely reflect sequela of chronic  small vessel ischemic change.      There is no mass effect or midline shift.      Paranasal Sinuses and Mastoids: Visualized paranasal sinuses are  clear.      Bilateral mastoids are clear.            Impression    No acute intracranial findings.      Signed by: Kady Valladares 3/4/2024 3:50 PM  Dictation workstation:   [id number]   Results for orders placed or performed during the hospital encounter of 02/24/24   CT head wo IV contrast    Narrative    Interpreted By:  Lenard Seo,   STUDY:  CT HEAD WO IV CONTRAST;  2/25/2024 12:11 am      INDICATION:  Signs/Symptoms:HA.      COMPARISON:  02/12/2024      ACCESSION NUMBER(S):  [id number]      ORDERING CLINICIAN:  VICKI GONSALES      TECHNIQUE:  Noncontrast axial CT images of head were obtained with coronal and  sagittal reconstructed images.      FINDINGS:  BRAIN PARENCHYMA: Moderate periventricular and subcortical  hemispheric white matter hypodensities are most compatible with  chronic small vessel ischemic disease. No acute intraparenchymal  hemorrhage or parenchymal evidence of acute large territory ischemic  infarct. No mass-effect. Gray-white matter distinction is preserved.      VENTRICLES and EXTRA-AXIAL SPACES: Dense intracranial ICA  calcification again noted. Similarly, there is a calcification of the  left intracranial vertebral artery. No acute extra-axial or  intraventricular hemorrhage. No effacement of cerebral  sulci.  Ventricles and sulci are age-concordant.      PARANASAL SINUSES/MASTOIDS:  No hemorrhage or air-fluid levels within  the visualized paranasal sinuses. The mastoids are well aerated.      CALVARIUM/ORBITS:  No skull fracture.  The orbits and globes are  intact to the extent visualized.      EXTRACRANIAL SOFT TISSUES: No discernible abnormality.        Impression    1. No acute intracranial abnormality.      2. Stable moderate burden of supratentorial chronic small vessel  ischemic disease.          MACRO:  None.      Signed by: Lenard Seo 2/25/2024 1:06 AM  Dictation workstation:   [id number]   Results for orders placed or performed during the hospital encounter of 02/12/24   Vascular US Carotid Artery Duplex Bilateral    Narrative              [place] [address]   Tel [phone] and Fax [phone]       Vascular Lab Report  [facility] US CAROTID ARTERY DUPLEX BILATERAL       Patient Name:     CECIL Del Rio Physician: [id number] Aretha Valencia MD  Study Date:       2/13/2024           Ordering           [id number] EMETERIO ADHIKARI                                        Physician:  MRN/PID:          [id number]            Technologist:      Kimmie Rousseau LISET  Accession#:       [id number]        Technologist 2:  Date of           1935 / 88      Encounter#:        [id number]  Birth/Age:        years  Gender:           M  Admission Status: Inpatient           Location           [facility]                                        Performed:       Diagnosis/ICD: Occlusion and stenosis of bilateral carotid arteries-I65.23  CPT Codes:     23950 Cerebrovascular Carotid Duplex scan complete       CONCLUSIONS:  Right Carotid: Findings are consistent with less than 50% stenosis of the right proximal internal carotid artery. Laminar flow seen by color Doppler. Right external carotid artery appears  patent with no evidence of stenosis. The right vertebral artery is patent with antegrade flow. No evidence of hemodynamically significant stenosis in the right subclavian artery.  Left Carotid: Findings are consistent with less than 50% stenosis of the left proximal internal carotid artery. Laminar flow seen by color Doppler. Left external carotid artery appears patent with no evidence of stenosis. The left vertebral artery is patent with antegrade flow. No evidence of hemodynamically significant stenosis in the left subclavian artery.     Comparison:  Compared with study from 5/1/2018, no significant change.     Imaging & Doppler Findings:  Right Plaque Morph: The proximal right internal carotid artery demonstrates heterogenous and calcified plaque. The proximal right external carotid artery demonstrates heterogenous plaque. The mid right common carotid artery demonstrates heterogenous plaque. The distal right common carotid artery demonstrates heterogenous and calcified plaque.  Left Plaque Morph: The proximal left internal carotid artery demonstrates heterogenous and calcified plaque. The proximal left external carotid artery demonstrates heterogenous and calcified plaque. The distal left common carotid artery demonstrates heterogenous and calcified plaque.      Right                        Left    PSV      EDV                PSV      EDV  82 cm/s            CCA P    91 cm/s  76 cm/s            CCA D    82 cm/s  55 cm/s  12 cm/s   ICA P    74 cm/s  15 cm/s  63 cm/s            ICA M    62 cm/s  64 cm/s            ICA D    54 cm/s  64 cm/s             ECA     125 cm/s  27 cm/s          Vertebral  42 cm/s  130 cm/s         Subclavian 188 cm/s                  Right Left  ICA/CCA Ratio  0.7  0.9          [id number] Aretha Valencia MD  Electronically signed by [id number] Aretha Valencia MD on 2/13/2024 at 3:51:00 PM         ** Final **     CT angio head and neck w and wo IV contrast    Narrative    Interpreted By:  Jennifer  Chris,   STUDY:  CT ANGIO HEAD AND NECK W AND WO IV CONTRAST;  2/12/2024 3:46 pm      INDICATION:  Signs/Symptoms:expressive aphasia, CVA rule out, unable to have MR.      COMPARISON:  None.      ACCESSION NUMBER(S):  [id number]      ORDERING CLINICIAN:  EMETERIO ADHIKARI      TECHNIQUE:  Following intravenous injection of contrast material, CT was acquired  from the aortic arch to the vertex of the skull. Multiplanar  reconstructions and 3D reconstructions were made.3D reconstructions,  MIPs, Volume Rendered, or Surface Shading image were performed at a  separate workstation      FINDINGS:  Chest      The aortic arch and origin of great vessels are normal. The  visualized mediastinum and pulmonary apices are normal.      Neck      Right carotid  The common carotid artery is normal. There are minor atherosclerotic  changes at the carotid bifurcation without measurable luminal  narrowing or filling defect. The cervical, petrous and cavernous  portions are normal caliber with extensive atherosclerotic  calcifications within the cavernous and supraclinoid portions.      Left carotid  The common carotid artery is normal. There are extensive  atherosclerotic changes at the carotid bifurcation with a proximally  50% residual luminal diameter. No associated filling defect to  suggest thrombus or dissection.. The cervical internal carotid artery  is otherwise normal caliber. There are extensive atherosclerotic  calcifications in the petrous, cavernous and supraclinoid portions..      Vertebrals      The vertebral arteries are asymmetrical with dominance of the left  vertebral artery. Vertebral artery origin is are normal. No evidence  of compression or filling defect within the cervical vertebral  arteries. The vertebrobasilar junction exhibits marked mineralization  without luminal narrowing. The basilar artery is normal.      Soft tissue neck          Enlarged thrombosed left jugular vein. There is no evidence of  mass,  cyst or adenopathy within the neck      Intracranial      There is no evidence of aneurysm, vascular malformation or branch  occlusion.        Impression    * Extensive atherosclerotic calcifications in the petrous and  cavernous as well as supraclinoid portions of both internal carotid  arteries. Luminal narrowing not excluded at these levels *Extensive  atherosclerotic calcification at the left carotid bifurcation with  50% residual lumen *No evidence of intracranial aneurysm, vascular  malformation or branch occlusion *Occluded left jugular vein.      MACRO:  none      Signed by: Chris Rodriguez 2/12/2024 4:15 PM  Dictation workstation:   [id number]   Results for orders placed or performed in visit on 05/09/23   XR cervical spine complete 4-5 views    Narrative    Interpreted By:  ZHENG LANGFORD MD  MRN: [id number]  Patient Name: CECIL HOFFMAN     STUDY:  SPINE, CERVICAL  MIN 4 VIEWS;  5/9/2023 10:59 am     INDICATION:  pain, strain.     COMPARISON:  11/03/2016     ACCESSION NUMBER(S):  [id number]     ORDERING CLINICIAN:  RONAL WINKLER     FINDINGS:  C-spine, five views     There is moderate to severe multilevel disc space narrowing  osteophytosis throughout the cervical spine worse at C5-C6 and C6-7.  There is mild anterolisthesis of C4 on C5. There is moderate facet  disease throughout the cervical spine as well. There is multilevel  moderate neural foraminal narrowing bilaterally worse on the right.  No fracture seen.       Impression    Moderate severe multilevel spondylosis worse at C5-C6 and C6-7. Facet  disease as well.  Moderate bilateral neural foraminal narrowing throughout the cervical  spine worse on the right   Results for orders placed or performed in visit on 04/02/20   CT LUMBAR SPINE WO IV CONTRAST    Narrative    MRN: [id number]  Patient Name: CECIL HOFFMAN     STUDY:  CT L-SPINE WO CONTRAST;; 4/2/2020 11:17 am     INDICATION:  M54.5 Low back pain  M54.16 Radiculopathy, lumbar  region.     COMPARISON:  07/31/2014     ACCESSION NUMBER(S):  [id number]     ORDERING CLINICIAN:  RODRIGO RIVAS     TECHNIQUE:  Axial sections of the lumbar spine was performed without intravenous  contrast administration, coronal and sagittal reconstructions were  performed.     FINDINGS:  Mild levoscoliosis noted. Visualized lung bases are unremarkable.     Atherosclerotic changes in the aorta and its branches. Pre and  paravertebral soft tissues are unremarkable.     Lumbosacral transitional vertebra noted. Partial lumbarization of the  S1 vertebral body.     At T11-T12 mild degenerated disc and facet joint arthropathy noted  causing mild bilateral lateral recess stenosis, left greater than the  right, moderate to severe bilateral neural foramina narrowing.     At T12-L1 minimal degenerated disc, facet joint arthropathy noted  without any significant central spinal canal or neural foramina  stenosis. Vacuum phenomenon noted at T12-L1.     At L1-L2 minimal disc bulge, facet joint arthropathy noted without  any spinal canal stenosis, mild encroachment of the left neural  foramina. Partial nonunion of the left transverse process with the L1  vertebral body.     At L2-L3 mild broad-based disc bulge, mild facet joint arthropathy,  ligamentum flavum hypertrophy in combination noted causing mild  encroachment of the central spinal canal without any significant  neural foramina stenosis.     At L3-L4 broad-based disc bulge, mild-to-moderate facet joint  arthropathy, ligamentum flavum hypertrophy in combination noted  causing mild encroachment of the right neural foraminal and right  lateral recess stenosis and central spinal canal.     At L4-L5 broad-based degenerated disc osteophyte, moderate facet  joint arthropathy, asymmetric on the right side in combination noted  causing moderate central spinal canal, severe right lateral recess,  mild-to-moderate left lateral recess, moderate left neural foramina,  severe right  neural foramina narrowing.     At L5-S1 mild disc osteophyte, facet joint arthropathy in combination  noted causing severe right lateral recess, and bilateral neural  foramina narrowing, mild-to-moderate left lateral recess stenosis.     IMPRESSION:  Multilevel degenerative changes in the lower thoracic and lumbar  spine, most significant changes are noted at L4-L5, L5-S1, stable to  minimally progressed at L4-L5 and L5-S1.      Results for orders placed or performed in visit on 02/13/15   CT CERVICAL SPINE WO IV CONTRAST    Narrative    EXAMINATION:  Cervical spine CT     CLINICAL HISTORY:  Signs/Symptoms: Neck pain and gait difficulty     TECHNIQUE:  Contiguous axial CT images were obtained at 2 mm slice thickness and   1 mm overlap through the cervical spine without contrast   administration. The images were then reconstructed in the coronal and   sagital planes.     FINDINGS:  There is no CT evidence of acute fracture identified.  The vertebral   bodies are well aligned without evidence of subluxation.  No   prevertebral soft tissue swelling is identified.     There is moderate to severe disc space narrowing, endplate sclerosis   and bulky marginal osteophyte formation identified at the C5-6 and   C6-7 levels.  Mild to moderate discogenic degenerative changes are   present at the C4-5 and C7-T1 levels.  Posterior facet hypertrophic   degenerative changes are seen throughout the cervical spine, greatest   on the left at the C2-3 level and greatest on the right at the C3-4   and C4-5 levels.     At the C3-4 level, there is severe right-sided neural foraminal   narrowing identified.  Minimal left-sided foraminal narrowing and no   significant central canal narrowing is seen.     At the C4-5 level, there is severe right-sided and moderate   left-sided neural foraminal narrowing identified.  No significant   central canal narrowing is seen.     At the C5-6 level, there is severe right-sided and moderate to severe    left-sided neural foraminal narrowing identified.  Minimal central   canal narrowing is seen.     At the C6-7 level, there is moderate neural foraminal narrowing   identified bilaterally.  Minimal central canal narrowing is present.     At the C7-T1 level, there is moderate right-sided neural foraminal   narrowing identified.  No significant left foraminal or central   narrowing is seen.     Evaluation of the visualized soft tissues of the neck is limited by   the lack of intravenous contrast.  Within this limitation, no gross   mass or lymphadenopathy is identified.  Atherosclerotic   calcifications are seen in the carotid bulbs bilaterally.        IMPRESSION:     1.  No CT evidence of acute fracture.     2.  Degenerative changes, as described above.        This study was interpreted and dictated at [facility] in [place], [place]       Assessment/Plan   Problem List Items Addressed This Visit       Auditory hallucinations - Primary    Overview     Pt reported that for the last few days he has been having musical hallucinations including Scientologist songs and the national anthem   -CT head w/ and w/o unremarkable   -EEG demonstrated mild encephalopathy but no epileptiform discharges or lateralizing signs.             Current Assessment & Plan     Doing great. Sx spontaneously resolved after 2 weeks.   Discussed w pt again that he is at increased risk of musical hallucinations given age, tinnitus and hearing loss, anti HTN meds, and cerebral atrophy.   Workup reassuring   Continue f/u with audiology/ENT as scheduled in the future  F/u with neuro as needed

## 2024-04-09 PROBLEM — D64.9 ANEMIA: Status: ACTIVE | Noted: 2024-04-09

## 2024-04-09 PROBLEM — M51.36 DEGENERATION OF INTERVERTEBRAL DISC OF LUMBAR REGION: Status: ACTIVE | Noted: 2024-04-09

## 2024-04-09 PROBLEM — I35.0 AORTIC VALVE STENOSIS: Status: ACTIVE | Noted: 2023-02-15

## 2024-04-09 PROBLEM — J18.9 COMMUNITY ACQUIRED PNEUMONIA: Status: RESOLVED | Noted: 2024-04-09 | Resolved: 2024-04-09

## 2024-04-09 PROBLEM — G70.00 MYASTHENIA GRAVIS (MULTI): Status: ACTIVE | Noted: 2024-04-09

## 2024-04-09 PROBLEM — I15.9 SECONDARY HYPERTENSION: Status: ACTIVE | Noted: 2024-04-09

## 2024-04-09 PROBLEM — L60.9 DISEASE OF NAIL: Status: ACTIVE | Noted: 2024-04-09

## 2024-04-09 PROBLEM — K92.2 DUODENAL HEMORRHAGE: Status: ACTIVE | Noted: 2024-04-09

## 2024-04-09 PROBLEM — M79.609 PAIN IN EXTREMITY: Status: RESOLVED | Noted: 2024-04-09 | Resolved: 2024-04-09

## 2024-04-09 PROBLEM — C85.90 NON-HODGKIN'S LYMPHOMA (MULTI): Status: ACTIVE | Noted: 2023-02-15

## 2024-04-09 PROBLEM — Z86.39 HISTORY OF ELEVATED LIPIDS: Status: ACTIVE | Noted: 2024-04-09

## 2024-04-09 PROBLEM — M51.369 DEGENERATION OF INTERVERTEBRAL DISC OF LUMBAR REGION: Status: ACTIVE | Noted: 2024-04-09

## 2024-04-09 PROBLEM — R47.02 EXPRESSIVE DYSPHASIA: Status: ACTIVE | Noted: 2024-04-09

## 2024-04-09 PROBLEM — Z86.79 HISTORY OF HYPERTENSION: Status: ACTIVE | Noted: 2024-04-09

## 2024-04-09 PROBLEM — G43.909 MIGRAINE HEADACHE: Status: ACTIVE | Noted: 2023-02-15

## 2024-04-09 PROBLEM — I73.9 INTERMITTENT CLAUDICATION (CMS-HCC): Status: ACTIVE | Noted: 2023-02-15

## 2024-04-09 PROBLEM — M43.00 SPONDYLOLYSIS: Status: ACTIVE | Noted: 2024-04-09

## 2024-04-09 PROBLEM — L03.119 CELLULITIS OF LOWER EXTREMITY: Status: RESOLVED | Noted: 2024-04-09 | Resolved: 2024-04-09

## 2024-04-09 PROBLEM — H50.22 HYPOTROPIA OF LEFT EYE: Status: ACTIVE | Noted: 2021-09-03

## 2024-04-09 PROBLEM — K52.9 CHRONIC DIARRHEA: Status: ACTIVE | Noted: 2024-04-09

## 2024-04-09 RX ORDER — PREDNISONE 20 MG/1
TABLET ORAL
COMMUNITY
Start: 2020-02-26 | End: 2024-04-11 | Stop reason: ALTCHOICE

## 2024-04-09 RX ORDER — NIACIN (INOSITOL NIACINATE) 400(500MG)
CAPSULE ORAL
COMMUNITY

## 2024-04-09 RX ORDER — ONDANSETRON 4 MG/1
TABLET, FILM COATED ORAL
COMMUNITY
Start: 2020-03-21 | End: 2024-04-11 | Stop reason: ALTCHOICE

## 2024-04-11 ENCOUNTER — OFFICE VISIT (OUTPATIENT)
Dept: PRIMARY CARE | Facility: CLINIC | Age: 89
End: 2024-04-11
Payer: MEDICARE

## 2024-04-11 VITALS
SYSTOLIC BLOOD PRESSURE: 116 MMHG | OXYGEN SATURATION: 96 % | HEART RATE: 65 BPM | BODY MASS INDEX: 27.77 KG/M2 | WEIGHT: 205 LBS | HEIGHT: 72 IN | DIASTOLIC BLOOD PRESSURE: 69 MMHG

## 2024-04-11 DIAGNOSIS — I10 BENIGN ESSENTIAL HYPERTENSION: Primary | ICD-10-CM

## 2024-04-11 DIAGNOSIS — G45.9 TIA (TRANSIENT ISCHEMIC ATTACK): ICD-10-CM

## 2024-04-11 DIAGNOSIS — I25.10 CORONARY ARTERY DISEASE INVOLVING NATIVE HEART WITHOUT ANGINA PECTORIS, UNSPECIFIED VESSEL OR LESION TYPE: ICD-10-CM

## 2024-04-11 PROCEDURE — 3078F DIAST BP <80 MM HG: CPT | Performed by: FAMILY MEDICINE

## 2024-04-11 PROCEDURE — 3074F SYST BP LT 130 MM HG: CPT | Performed by: FAMILY MEDICINE

## 2024-04-11 PROCEDURE — 1159F MED LIST DOCD IN RCRD: CPT | Performed by: FAMILY MEDICINE

## 2024-04-11 PROCEDURE — 1036F TOBACCO NON-USER: CPT | Performed by: FAMILY MEDICINE

## 2024-04-11 PROCEDURE — 1160F RVW MEDS BY RX/DR IN RCRD: CPT | Performed by: FAMILY MEDICINE

## 2024-04-11 PROCEDURE — 99214 OFFICE O/P EST MOD 30 MIN: CPT | Performed by: FAMILY MEDICINE

## 2024-04-11 PROCEDURE — 1157F ADVNC CARE PLAN IN RCRD: CPT | Performed by: FAMILY MEDICINE

## 2024-04-11 RX ORDER — ATORVASTATIN CALCIUM 80 MG/1
80 TABLET, FILM COATED ORAL NIGHTLY
Qty: 90 TABLET | Refills: 1 | Status: SHIPPED | OUTPATIENT
Start: 2024-04-11

## 2024-04-11 ASSESSMENT — ENCOUNTER SYMPTOMS
UNEXPECTED WEIGHT CHANGE: 0
WHEEZING: 0
CHILLS: 0
DEPRESSION: 0
DIFFICULTY URINATING: 0
ABDOMINAL PAIN: 0
NUMBNESS: 0
TROUBLE SWALLOWING: 0
OCCASIONAL FEELINGS OF UNSTEADINESS: 0
DIZZINESS: 0
NAUSEA: 0
COUGH: 0
VOMITING: 0
FEVER: 0
BLOOD IN STOOL: 0
CONFUSION: 0
DYSURIA: 0
DIARRHEA: 0
LOSS OF SENSATION IN FEET: 0
WEAKNESS: 0
SHORTNESS OF BREATH: 0
LIGHT-HEADEDNESS: 0

## 2024-04-11 NOTE — PROGRESS NOTES
"Subjective   Patient ID: Alexis Armijo \"Radha" is a 88 y.o. male who presents for Follow-up (Pt presents with spouse for 6 month check up, no concerns, no rx's.BL).  HPI Historian(s): Self and Wife    Symptoms from recent neurological event have cleared up, no more musical hallucinations. Checking BP at home 110s-120s SBP. Denies faintness/lightheadedness, excessive fatigue.    Seeing Dr. Domenic SHARMA within next few weeks.    Review of Systems   Constitutional:  Negative for chills, fever and unexpected weight change.   HENT:  Negative for ear pain and trouble swallowing.    Respiratory:  Negative for cough, shortness of breath and wheezing.    Cardiovascular:  Negative for chest pain.   Gastrointestinal:  Negative for abdominal pain, blood in stool, diarrhea, nausea and vomiting.   Genitourinary:  Negative for difficulty urinating and dysuria.   Skin:  Negative for rash.   Neurological:  Negative for dizziness, syncope, weakness, light-headedness and numbness.   Psychiatric/Behavioral:  Negative for behavioral problems and confusion.          Objective   /69   Pulse 65   Ht 1.835 m (6' 0.25\")   Wt 93 kg (205 lb)   SpO2 96%   BMI 27.61 kg/m²     Physical Exam  Vitals and nursing note reviewed.   Constitutional:       General: He is not in acute distress.     Appearance: He is not diaphoretic.      Comments: No assistive device presently being used.   HENT:      Head: Normocephalic and atraumatic.   Eyes:      General: No scleral icterus.     Conjunctiva/sclera: Conjunctivae normal.   Cardiovascular:      Rate and Rhythm: Normal rate and regular rhythm.      Heart sounds: Murmur heard.   Pulmonary:      Effort: Pulmonary effort is normal.      Breath sounds: Normal breath sounds. No wheezing, rhonchi or rales.   Musculoskeletal:      Right lower leg: No edema.      Left lower leg: No edema.   Skin:     General: Skin is warm and dry.   Neurological:      General: No focal deficit present.      Mental " Status: He is alert. Mental status is at baseline.   Psychiatric:         Mood and Affect: Mood normal.         Thought Content: Thought content normal.         Assessment/Plan   Problem List Items Addressed This Visit       Benign essential hypertension - Primary     Well controlled.  Continue to monitor at home. Return 6 months, earlier PRN.         Relevant Orders    Follow Up In Primary Care - Medicare Annual    CAD (coronary artery disease)     Continue statin, low-dose Eliquis, Plavix; recommendations per cardiology.         Relevant Medications    atorvastatin (Lipitor) 80 mg tablet    Other Relevant Orders    Follow Up In Primary Care - Medicare Annual    TIA (transient ischemic attack)     Symptoms resolved.         Relevant Medications    atorvastatin (Lipitor) 80 mg tablet

## 2024-04-11 NOTE — PATIENT INSTRUCTIONS
Please return for a(n) blood pressure, cholesterol, medication follow-up appointment and Wellness visit in 6 months, earlier if any question or concern.    Avoid taking Biotin for a week prior to any blood tests, as it can interfere with certain results. Fasting for labs means 12 hours, nothing to eat or drink, except water and medications, unless directed otherwise.    For assistance with scheduling referrals or consultations, please call [phone]. For laboratory, radiology, and other tests, please call [phone] ([phone] for pediatrics). Please review prescription inserts and published information for possible adverse effects of all medications. Return after testing or consultation to review results and recommendations, if symptoms persist, change, worsen, or return, or if you have any question or concern. If you do not get results within 7-10 days, or you have any question or concern, please send a message, call the office ([phone]), or return to the office for a follow-up appointment. For non-emergencies, you may call the office. For emergencies, call 9-1-1 or go to the nearest Emergency Department. Please schedule additional appointment(s) to address concern(s) not addressed today.    In general, results are not released or discussed over the telephone, but at an appointment or via  [employer]. Results of tests done through [facility] are released via  [employer] (see below).  [email or web address]   [employer] support line: [phone]

## 2024-04-16 ENCOUNTER — PATIENT OUTREACH (OUTPATIENT)
Dept: PRIMARY CARE | Facility: CLINIC | Age: 89
End: 2024-04-16
Payer: MEDICARE

## 2024-04-16 DIAGNOSIS — I25.10 CORONARY ARTERY DISEASE INVOLVING NATIVE HEART WITHOUT ANGINA PECTORIS, UNSPECIFIED VESSEL OR LESION TYPE: ICD-10-CM

## 2024-04-16 DIAGNOSIS — G45.9 TIA (TRANSIENT ISCHEMIC ATTACK): ICD-10-CM

## 2024-04-16 DIAGNOSIS — I10 BENIGN ESSENTIAL HYPERTENSION: ICD-10-CM

## 2024-04-16 NOTE — PROGRESS NOTES
"Monthly CCM call with patient. Patient stated that he is doing \"OK\".  Said that he is seeing vascular on 05/23 for issues with his leg. Said it is a chronic condition that started about 20 years ago, and had one failed surgery.  Patient was wondering why he should stop biotin a week prior to blood work.  Provided education regarding this topic.  Does check blood pressure daily, not at the same time every day.  Pt said that the tremors in his hands are still there, making it difficult to hold utensils or items.  Noticed that his thumbs and index finders on both hands are the worst.  Discussed some non pharm interventions that may help.  Denies any falls, acute pain/injuries, or shortness of breath.  No refills needed at this time.  Is in agreement of future follow up calls.   "

## 2024-04-27 DIAGNOSIS — I25.10 CORONARY ARTERY DISEASE INVOLVING NATIVE HEART WITHOUT ANGINA PECTORIS, UNSPECIFIED VESSEL OR LESION TYPE: ICD-10-CM

## 2024-04-29 RX ORDER — CLOPIDOGREL BISULFATE 75 MG/1
75 TABLET ORAL DAILY
Qty: 90 TABLET | Refills: 1 | Status: SHIPPED | OUTPATIENT
Start: 2024-04-29

## 2024-05-22 ENCOUNTER — LAB (OUTPATIENT)
Dept: LAB | Facility: LAB | Age: 89
End: 2024-05-22
Payer: MEDICARE

## 2024-05-22 DIAGNOSIS — G45.9 TIA (TRANSIENT ISCHEMIC ATTACK): ICD-10-CM

## 2024-05-22 LAB
ALBUMIN SERPL BCP-MCNC: 4.4 G/DL (ref 3.4–5)
ALP SERPL-CCNC: 68 U/L (ref 33–136)
ALT SERPL W P-5'-P-CCNC: 21 U/L (ref 10–52)
ANION GAP SERPL CALC-SCNC: 9 MMOL/L (ref 10–20)
AST SERPL W P-5'-P-CCNC: 22 U/L (ref 9–39)
BILIRUB SERPL-MCNC: 1.9 MG/DL (ref 0–1.2)
BUN SERPL-MCNC: 19 MG/DL (ref 6–23)
CALCIUM SERPL-MCNC: 9 MG/DL (ref 8.6–10.3)
CHLORIDE SERPL-SCNC: 104 MMOL/L (ref 98–107)
CHOLEST SERPL-MCNC: 106 MG/DL (ref 0–199)
CHOLESTEROL/HDL RATIO: 2.4
CK SERPL-CCNC: 58 U/L (ref 0–325)
CO2 SERPL-SCNC: 31 MMOL/L (ref 21–32)
CREAT SERPL-MCNC: 0.9 MG/DL (ref 0.5–1.3)
EGFRCR SERPLBLD CKD-EPI 2021: 83 ML/MIN/1.73M*2
GLUCOSE SERPL-MCNC: 95 MG/DL (ref 74–99)
HDLC SERPL-MCNC: 44.6 MG/DL
LDLC SERPL CALC-MCNC: 50 MG/DL
NON HDL CHOLESTEROL: 61 MG/DL (ref 0–149)
POTASSIUM SERPL-SCNC: 4.1 MMOL/L (ref 3.5–5.3)
PROT SERPL-MCNC: 6.1 G/DL (ref 6.4–8.2)
SODIUM SERPL-SCNC: 140 MMOL/L (ref 136–145)
TRIGL SERPL-MCNC: 55 MG/DL (ref 0–149)
VLDL: 11 MG/DL (ref 0–40)

## 2024-05-22 PROCEDURE — 36415 COLL VENOUS BLD VENIPUNCTURE: CPT

## 2024-05-22 PROCEDURE — 80053 COMPREHEN METABOLIC PANEL: CPT

## 2024-05-22 PROCEDURE — 82550 ASSAY OF CK (CPK): CPT

## 2024-05-22 PROCEDURE — 80061 LIPID PANEL: CPT

## 2024-05-23 ENCOUNTER — OFFICE VISIT (OUTPATIENT)
Dept: CARDIOLOGY | Facility: HOSPITAL | Age: 89
End: 2024-05-23
Payer: MEDICARE

## 2024-05-23 ENCOUNTER — HOSPITAL ENCOUNTER (OUTPATIENT)
Dept: VASCULAR MEDICINE | Facility: HOSPITAL | Age: 89
Discharge: HOME | End: 2024-05-23
Payer: MEDICARE

## 2024-05-23 VITALS
SYSTOLIC BLOOD PRESSURE: 157 MMHG | WEIGHT: 199 LBS | BODY MASS INDEX: 26.95 KG/M2 | DIASTOLIC BLOOD PRESSURE: 88 MMHG | HEART RATE: 77 BPM | HEIGHT: 72 IN

## 2024-05-23 DIAGNOSIS — L97.509 TOE ULCER (MULTI): ICD-10-CM

## 2024-05-23 DIAGNOSIS — I70.222 CRITICAL LIMB ISCHEMIA OF LEFT LOWER EXTREMITY (MULTI): Primary | ICD-10-CM

## 2024-05-23 DIAGNOSIS — I25.10 ATHEROSCLEROTIC HEART DISEASE OF NATIVE CORONARY ARTERY WITHOUT ANGINA PECTORIS: ICD-10-CM

## 2024-05-23 DIAGNOSIS — I73.9 PERIPHERAL VASCULAR DISEASE, UNSPECIFIED (CMS-HCC): ICD-10-CM

## 2024-05-23 DIAGNOSIS — I73.9 PAD (PERIPHERAL ARTERY DISEASE) (CMS-HCC): ICD-10-CM

## 2024-05-23 DIAGNOSIS — I77.1 STRICTURE OF ARTERY (CMS-HCC): ICD-10-CM

## 2024-05-23 PROCEDURE — 3077F SYST BP >= 140 MM HG: CPT | Performed by: NURSE PRACTITIONER

## 2024-05-23 PROCEDURE — 93924 LWR XTR VASC STDY BILAT: CPT

## 2024-05-23 PROCEDURE — 99214 OFFICE O/P EST MOD 30 MIN: CPT | Performed by: NURSE PRACTITIONER

## 2024-05-23 PROCEDURE — 1159F MED LIST DOCD IN RCRD: CPT | Performed by: NURSE PRACTITIONER

## 2024-05-23 PROCEDURE — 1160F RVW MEDS BY RX/DR IN RCRD: CPT | Performed by: NURSE PRACTITIONER

## 2024-05-23 PROCEDURE — 3079F DIAST BP 80-89 MM HG: CPT | Performed by: NURSE PRACTITIONER

## 2024-05-23 PROCEDURE — 1157F ADVNC CARE PLAN IN RCRD: CPT | Performed by: NURSE PRACTITIONER

## 2024-05-23 PROCEDURE — 93924 LWR XTR VASC STDY BILAT: CPT | Performed by: INTERNAL MEDICINE

## 2024-05-23 NOTE — PATIENT INSTRUCTIONS
Thank you for coming to see us in the [facility] today.      I have reviewed the vascular testing which showed some decline in the top pressure on the left side from 94% to 56% today; the right remained relatively unchanged, therefore no invasive procedure is recommended at this time.  However, you have toe deformity of both right and left great toe that seem to be having pressure from your shoe. I recommend you get some foam or silicone toe sleeves to prevent toe pressure and injury.    For leg swelling you need to keep legs elevated above the level of the heart and also consider getting a compression stocking with mild pressure 15-20 mmHg.    Continue follow up with podiatry for foot and nail care.      Continue medical therapy with Plavix and Atorvastatin.     Continue aggressive risk factor modifications with diet, exercise (walking to improve collateral blood flow) and foot protection.    Follow up with us in 1 year with repeat vascular testing or sooner if your develop wounds.    If you have any questions or concerns please give us a call at  option 1

## 2024-05-23 NOTE — PROGRESS NOTES
Chief complaint: Routine annual follow-up     History of Present Illness  Alexis Armijo is a 89 y.o. year old male patient with h/o HTN, CAD s/p CABGx3 and AVR 5/18 (pt of Dr Adalberto Beyer), GI bleed, Anemia, back pain, PAD s/p remote right leg bypass surgery by Dr Prince, On 2/11/20, he underwent successful atherectomy and angioplasty of occluded Left popliteal and TP trunk arteries and PTA of L peroneal artery with Dr. Lopez for claudication.  Today pt presents with his wife for an annual follow-up. He still trying to stay active, with walking and bicycling every day, with no claudication or rest pain, and no open wounds.  He reports he has his usual arthritic pain in his hips and knees. He has bilateral great toe pain with redness and no wounds. He continues to follow-up with podiatry routinely every 10 weeks for foot and nail care.  He reports some leg swelling but denies shortness of breath, chest pain or palpitation. he is compliant with his medications, with no bleeding issues.     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, bilateral legs (CMS-[facility]) 01/03/2023    Atherosclerosis of native artery of both lower extremities with intermittent claudication    Atherosclerotic heart disease of native coronary artery without angina pectoris 10/04/2022    CAD (coronary artery disease)    Cellulitis of lower extremity 04/09/2024    Community acquired pneumonia 04/09/2024    Diplopia 02/18/2022    Diplopia    Essential (primary) hypertension 10/04/2022    Benign essential hypertension    Heart disease 2010    Other abnormal glucose     Hemoglobin A1c less than 7.0%    Other intervertebral disc degeneration, lumbar region     Degenerative disc disease, lumbar    Pain in extremity 04/09/2024    Personal history of colonic polyps     History  of colonic polyps    Personal history of malignant neoplasm of other organs and systems     History of squamous cell carcinoma    Personal history of other diseases of the circulatory system 04/16/2018    History of hypertension    Personal history of other endocrine, nutritional and metabolic disease 04/16/2018    History of hyperlipidemia    Personal history of other medical treatment     History of stress test    Presence of prosthetic heart valve 01/03/2023    S/P AVR (aortic valve replacement)    Spondylolysis, lumbar region     Lumbar spondylolysis       Past Surgical History  Past Surgical History:   Procedure Laterality Date    APPENDECTOMY  01/25/2017    Appendectomy    CARDIAC PACEMAKER PLACEMENT  04/16/2018    Pacemaker Placement    COLONOSCOPY W/ POLYPECTOMY  01/25/2017    Complete Colonoscopy For Polyp Removal    CORONARY ANGIOPLASTY WITH STENT PLACEMENT  01/25/2017    Cath Stent Placement    CT AORTA AND BILATERAL ILIOFEMORAL RUNOFF ANGIOGRAM W AND/OR WO IV CONTRAST  11/23/2019    CT AORTA AND BILATERAL ILIOFEMORAL RUNOFF ANGIOGRAM W AND/OR WO IV CONTRAST 11/23/2019 [facility]    ESOPHAGOGASTRODUODENOSCOPY  01/25/2017    Esophagogastroduodenoscopy With Biopsy    HERNIA REPAIR  01/25/2017    Inguinal Hernia Repair    KNEE ARTHROSCOPY W/ DEBRIDEMENT  01/25/2017    Arthroscopy Knee Left    OTHER SURGICAL HISTORY  02/10/2015    Stent Indications: Acute Myocardial Infarction    OTHER SURGICAL HISTORY  07/20/2015    Blood Vessel Repair Direct Leg    OTHER SURGICAL HISTORY  01/25/2017    Excision Of Lesion Face    TOTAL KNEE ARTHROPLASTY  04/16/2018    Knee Replacement       Social History  Social History     Tobacco Use    Smoking status: Never    Smokeless tobacco: Never   Vaping Use    Vaping status: Never Used   Substance Use Topics    Alcohol use: Never    Drug use: Never       Family History   No family history on file.    Review of Systems  As per HPI, all other systems reviewed and  negative.    Allergies:  No Known Allergies     Outpatient Medications:  Current Outpatient Medications   Medication Instructions    apixaban (Eliquis) 2.5 mg tablet 1 tablet, oral, 2 times daily    atorvastatin (LIPITOR) 80 mg, oral, Nightly    b complex vitamins capsule 1 capsule, oral, Nightly    clopidogrel (PLAVIX) 75 mg, oral, Daily    coenzyme Q10-vitamin E 100-5 mg-unit capsule Co Q-10 100 MG Oral Capsule TAKE AS DIRECTED. Quantity: 0 Refills: 0 Ordered: 14-May-2020 DO Active    collagen, hydrolysate, bovine, (COLLAGEN, HYDR, BOVINE,, BULK, MISC) 1 Scoop, oral, Daily RT    krill-om-3-dha-epa-phospho-ast (Omega-3 Krill Oil) 379-44-60-50 mg capsule 1 capsule, oral, Every morning    lactobacillus combination no.4 (Probiotic) 3 billion cell capsule 1 tablet, oral, Daily    lutein-zeaxanthin 25-5 mg capsule 1 capsule, oral, Every morning    multivitamin with minerals (multivitamin with folic acid) tablet 1 tablet, oral, 2 times daily    valsartan (DIOVAN) 80 mg, oral, Daily         Vitals:  Vitals:    05/23/24 1104   BP: 157/88   Pulse: 77       Physical Exam:  Constitutional: Well developed, NAD, pleasant, cooperative     Head/Neck: Neck supple, No JVD, no carotid bruits           Respiratory/Thorax: CTAB, thorax symmetric     Cardiovascular: Regular, rate and rhythm, no murmurs, normal S 1 and S 2    Gastrointestinal: Nondistended, soft, non-tender, +BS       Skin: Warm and dry, bilateral lower extremity with stasis dermatitis          Extremities: SALAZAR, +2 bilateral LE edema, no open sores, bilateral great toe bone deformity with stage I pressure injury.  Neuro: non-focal, awake/alert/oriented x3,   Psychological: Appropriate mood and behavior        Reviewed Study(s):    Vascular Studies   Vascular US PVR with exercise  Preliminary Cardiology Report               [place] [address]    Tel [phone] and Fax [phone]       Preliminary Vascular Lab Report       VAS US PVR WITHOUT EXERCISE       Patient Name:     CECIL NOEL       Reading Physician: [id number] DALTON Amado MD                            RPVI  Study Date:       5/23/2024      Ordering           [id number] AARON MA                                   Physician:  MRN/PID:          [id number]       Technologist:      Alayna nSyder RVT  Accession#:       [id number]   Technologist 2:  Date of           1935      Encounter#:        [id number]  Birth/Age:  Gender:           M  Admission Status: Outpatient     Location           [facility]                                   Performed:       Diagnosis/ICD: Peripheral vascular disease, unspecified-I73.9; Stricture of                 artery-I77.1  Procedure/CPT: 93352 Peripheral artery PVR (multi segmental pressure)       PRELIMINARY CONCLUSIONS:  Right Lower PVR: Right pressures of >220 mmHg suggest no compressibility of vessels and may make absolute Segmental Limb Pressures (SLP) unreliable. There is evidence of moderate disease at the femoral popliteal level. Decreased digital perfusion noted. Monophasic flow is noted in the right posterior tibial artery and right dorsalis pedis artery. Biphasic flow is noted in the right popliteal artery. Triphasic flow is noted in the right common femoral artery. Right leg high thigh result called per waveform due to calcified arterial pressure.  Left Lower PVR: Left pressures of >220 mmHg suggest no compressibility of vessels and may make absolute Segmental Limb Pressures (SLP) unreliable. There is evidence of mild disease at the femoral level. Decreased digital perfusion noted. Monophasic flow is noted in the left common femoral artery, left popliteal artery, left posterior tibial artery and left dorsalis pedis artery. Left leg results called per waveforms due to calcified arterial pressures.     Imaging & Doppler Findings:     RIGHT Lower PVR                Pressures Ratios  Right High Thigh                255 mmHg  1.78  Right Low Thigh                134 mmHg  0.94  Right Calf                     124 mmHg  0.87  Right Posterior Tibial (Ankle) 107 mmHg  0.75  Right Dorsalis Pedis (Ankle)   147 mmHg  1.03  Right Digit (Great Toe)        55 mmHg   0.38          LEFT Lower PVR                Pressures Ratios  Left High Thigh               255 mmHg  1.78  Left Low Thigh                255 mmHg  1.78  Left Calf                     255 mmHg  1.78  Left Posterior Tibial (Ankle) 255 mmHg  1.78  Left Dorsalis Pedis (Ankle)   255 mmHg  1.78  Left Digit (Great Toe)        80 mmHg   0.56                             Right     Left  Brachial Pressure 139 mmHg 143 mmHg            VASCULAR PRELIMINARY REPORT  completed by Alayna Snyder LISET on 5/23/2024 at 11:12:53 AM       ** Final **       Assessment  Alexis Armijo is a 89 y.o. year old male patient with h/o HTN, CAD s/p CABGx3 and AVR 5/18 (pt of Dr Adalberto Beyer), GI bleed, Anemia, back pain, PAD s/p remote right leg bypass surgery by Dr Prince, On 2/11/20, he underwent successful atherectomy and angioplasty of occluded Left popliteal and TP trunk arteries and PTA of L peroneal artery with Dr. Lopez for claudication.  Today pt presents with his wife for an annual follow-up. He still trying to stay active, with walking and bicycling every day, with no claudication or rest pain, and no open wounds.  He reports he has his usual arthritic pain in his hips and knees. He has bilateral great toe pain with redness and no wounds. He continues to follow-up with podiatry routinely every 10 weeks for foot and nail care.  He reports some leg swelling but denies shortness of breath, chest pain or palpitation. he is compliant with his medications, with no bleeding issues.     Pt has no significant claudication or rest pain and no wounds at this time.    5/23/24 PVR test done today shows moderate arterial disease bilaterally with noncompressible arteries.  RT 1.03/0.38 Lt NC//0.56  right side digital pressure remained with no significant change.  Left side show decrease in digital pressure from last year 0.94 to 0.56 today. (Preliminary results was reviewed and discussed with the patient and wife)    This patient has no significant symptoms or wounds invasive procedure is not recommended at this time.    Plan     - Will continue medical therapy with Plavix and atorvastatin  - toe deformity of both right and left great toe that seem to be having pressure from your shoe. I recommend to get some foam or silicone toe sleeves to prevent pressure injury and wounds.   - Patient educated on leg elevation above the level of the heart and getting compression stocking with mild pressure 15-20 mmHg.  - Patient to continue routine podiatry care.  - Continue aggressive risk factor modifications with diet, exercise (walking to improve collateral blood flow) and foot protection.  - Follow up in 1 year with repeat vascular testing or sooner if your develop wounds or pain.      RITU Bauman-CNP

## 2024-05-24 ENCOUNTER — PATIENT OUTREACH (OUTPATIENT)
Dept: PRIMARY CARE | Facility: CLINIC | Age: 89
End: 2024-05-24
Payer: MEDICARE

## 2024-05-24 DIAGNOSIS — I10 BENIGN ESSENTIAL HYPERTENSION: ICD-10-CM

## 2024-05-24 DIAGNOSIS — I73.9 PAD (PERIPHERAL ARTERY DISEASE) (CMS-HCC): ICD-10-CM

## 2024-05-24 DIAGNOSIS — I25.10 CORONARY ARTERY DISEASE INVOLVING NATIVE HEART WITHOUT ANGINA PECTORIS, UNSPECIFIED VESSEL OR LESION TYPE: ICD-10-CM

## 2024-05-24 NOTE — PROGRESS NOTES
Monthly CCM call with patient.  Patient stated that him and his wife are walking outside daily, and when he cannot walk outside he rides a stationary bike.  Had a vascular appointment yesterday as was told to keep active, and there was not much that they could do for him at this time.  Has been checking blood pressure at least 4-5 times per week.  Sleeping and eating well.  Denies any falls, shortness of breath, acute pain. No prescription refills needed at time of call.  In agreement of future follow up calls.

## 2024-06-01 DIAGNOSIS — I10 BENIGN ESSENTIAL HYPERTENSION: ICD-10-CM

## 2024-06-03 ENCOUNTER — PATIENT OUTREACH (OUTPATIENT)
Dept: PRIMARY CARE | Facility: CLINIC | Age: 89
End: 2024-06-03
Payer: MEDICARE

## 2024-06-03 DIAGNOSIS — I25.10 CORONARY ARTERY DISEASE INVOLVING NATIVE HEART WITHOUT ANGINA PECTORIS, UNSPECIFIED VESSEL OR LESION TYPE: ICD-10-CM

## 2024-06-03 DIAGNOSIS — R53.82 CHRONIC FATIGUE: ICD-10-CM

## 2024-06-03 DIAGNOSIS — R26.9 GAIT DIFFICULTY: ICD-10-CM

## 2024-06-03 DIAGNOSIS — I10 BENIGN ESSENTIAL HYPERTENSION: ICD-10-CM

## 2024-06-03 RX ORDER — VALSARTAN 80 MG/1
80 TABLET ORAL DAILY
Qty: 90 TABLET | Refills: 1 | Status: SHIPPED | OUTPATIENT
Start: 2024-06-03

## 2024-06-03 NOTE — PROGRESS NOTES
"[facility] call with patient.  Patient stated that he has noticed that the tremors in his hands are getting worse.  Said that at times, it is difficult to feed self. Tremors are worse mid-day.  Said that he is checking his blood pressure daily, prior to taking any medications.  Said that he is exercising daily with his wife, as well as riding a stationary bike for approximately 15 minutes daily.  Patient reporting that his feet are constantly hurting him. He has seen doctor for this and it is a result of having a vein removed \"in ankle\" for a cardiac procedure.  Denies any pitting edema.  Does fatigue easily, but is able to 'rest and recharge' throughout the day.  Denies any recent falls.  Explained that a new  is now working in the office, and that she will be calling them in July.  No prescription refills needed at time of call.  In agreement of future follow up calls.     Case reassigned to Maria C RNCM  "

## 2024-06-17 ENCOUNTER — OFFICE VISIT (OUTPATIENT)
Dept: PRIMARY CARE | Facility: CLINIC | Age: 89
End: 2024-06-17
Payer: MEDICARE

## 2024-06-17 VITALS
SYSTOLIC BLOOD PRESSURE: 127 MMHG | DIASTOLIC BLOOD PRESSURE: 75 MMHG | HEIGHT: 72 IN | BODY MASS INDEX: 27.11 KG/M2 | WEIGHT: 200.13 LBS | HEART RATE: 68 BPM | OXYGEN SATURATION: 98 %

## 2024-06-17 DIAGNOSIS — G45.9 TIA (TRANSIENT ISCHEMIC ATTACK): ICD-10-CM

## 2024-06-17 DIAGNOSIS — I25.10 CORONARY ARTERY DISEASE INVOLVING NATIVE HEART WITHOUT ANGINA PECTORIS, UNSPECIFIED VESSEL OR LESION TYPE: ICD-10-CM

## 2024-06-17 DIAGNOSIS — I25.10 CORONARY ARTERY DISEASE INVOLVING NATIVE HEART WITHOUT ANGINA PECTORIS, UNSPECIFIED VESSEL OR LESION TYPE: Primary | ICD-10-CM

## 2024-06-17 DIAGNOSIS — L98.491 SKIN ULCER, LIMITED TO BREAKDOWN OF SKIN (MULTI): ICD-10-CM

## 2024-06-17 PROCEDURE — 1157F ADVNC CARE PLAN IN RCRD: CPT | Performed by: FAMILY MEDICINE

## 2024-06-17 PROCEDURE — 99214 OFFICE O/P EST MOD 30 MIN: CPT | Performed by: FAMILY MEDICINE

## 2024-06-17 PROCEDURE — 1036F TOBACCO NON-USER: CPT | Performed by: FAMILY MEDICINE

## 2024-06-17 PROCEDURE — 1159F MED LIST DOCD IN RCRD: CPT | Performed by: FAMILY MEDICINE

## 2024-06-17 PROCEDURE — 3074F SYST BP LT 130 MM HG: CPT | Performed by: FAMILY MEDICINE

## 2024-06-17 PROCEDURE — 3078F DIAST BP <80 MM HG: CPT | Performed by: FAMILY MEDICINE

## 2024-06-17 RX ORDER — ATORVASTATIN CALCIUM 40 MG/1
80 TABLET, FILM COATED ORAL NIGHTLY
Qty: 90 TABLET | Refills: 1 | Status: SHIPPED | OUTPATIENT
Start: 2024-06-17 | End: 2024-06-19

## 2024-06-17 ASSESSMENT — ENCOUNTER SYMPTOMS
TREMORS: 1
MYALGIAS: 1
FEVER: 0
WOUND: 1
EYE PAIN: 1
CHILLS: 0
DIAPHORESIS: 0

## 2024-06-17 NOTE — PATIENT INSTRUCTIONS
Please return for a follow-up appointment or follow-up at the wound care clinic in one week, earlier if any question or concern.      Please return for a(n) follow-up appointment in October 2024, earlier if any question or concern.    Avoid taking Biotin (a vitamin, shows up particularly in hair/nail supplements) for a week prior to any blood tests, as it can interfere with certain results. Fasting for labs means 12 hours, nothing to eat or drink, except water and medications, unless directed otherwise.    For assistance with scheduling referrals or consultations, please call [phone]. For laboratory, radiology, and other tests, please call [phone] ([phone] for pediatrics). Please review prescription inserts and published information for possible adverse effects of all medications. Return after testing or consultation to review results and recommendations, if symptoms persist, change, worsen, or return, or if you have any question or concern. If you do not get results within 7-10 days, or you have any question or concern, please send a message, call the office ([phone]), or return to the office for a follow-up appointment. For non-emergencies, you may call the office. For emergencies, call 9-1-1 or go to the nearest Emergency Department. Please schedule additional appointment(s) to address concern(s) not addressed today.    In general, results are not released or discussed over the telephone, but at an appointment or via  [facility]. Results of tests done through [facility] are released via  [employer] (see below).  [email or web address]   [employer] support line: [phone]

## 2024-06-17 NOTE — PROGRESS NOTES
"Subjective   Patient ID: Alexis Armijo \"Ronal\" is a 89 y.o. male who presents for Generalized Body Aches (Pt presents of body aches, shaking, eyes hurt, weeping blister on back of L calf since changing meds. BL).  HPI Historian(s): Self and Wife    c/o bad muscle aches with Atorvastatin 80 mg. Had tolerated Livalo well.    c/o wound posterior left lower leg. first noticed a week ago today. Thinks he injured it on a vibrating device he uses for back pain. Has since stopped using it.    Review of Systems   Constitutional:  Negative for chills, diaphoresis and fever.   Eyes:  Positive for pain.   Musculoskeletal:  Positive for myalgias.   Skin:  Positive for wound.   Neurological:  Positive for tremors.   All other systems reviewed and are negative.        Objective   /75   Pulse 68   Ht 1.829 m (6')   Wt 90.8 kg (200 lb 2 oz)   SpO2 98%   BMI 27.14 kg/m²         Physical Exam  Vitals and nursing note reviewed.   Constitutional:       General: He is not in acute distress.     Appearance: Normal appearance.      Comments: No assistive device presently being used.   HENT:      Head: Normocephalic and atraumatic.   Eyes:      General: No scleral icterus.     Extraocular Movements: Extraocular movements intact.      Conjunctiva/sclera: Conjunctivae normal.   Pulmonary:      Effort: Pulmonary effort is normal. No respiratory distress.   Skin:     General: Skin is warm and dry.      Coloration: Skin is not jaundiced.      Findings: Lesion (4 x 3.5 cm superficial ulcer left posterior lower leg, serous exudate, no purulence) present.   Neurological:      Mental Status: He is alert and oriented to person, place, and time. Mental status is at baseline.   Psychiatric:         Behavior: Behavior normal.         Assessment/Plan   Problem List Items Addressed This Visit       CAD (coronary artery disease) - Primary    Current Assessment & Plan     Not tolerating Atorvastatin 80 mg, so try 40 mg.         Relevant " "Medications    atorvastatin (Lipitor) 40 mg tablet    TIA (transient ischemic attack)    Overview     Pt presented with two episodes of aphasia/dysarthria and was evaluated by Dr. Spring in the hospital. He was felt to have had 2 TIAs and was continued on plavix and apixaban and statin.   - CT head without acute process, does show atrophy  - CTA head and neck with extensive atherosclerotic calcifications of both ICAs, extensive atherosclerotic calcifications at L carotid bifurcation w 50% residual lumen.  - Carotid US with <50% stenosis of bilateral ICAs, vascular surgery saw and recommended no vascular intervention          Relevant Medications    atorvastatin (Lipitor) 40 mg tablet    Skin ulcer, limited to breakdown of skin (Multi)    Current Assessment & Plan     Sterile calcium alginate dressings, return in a week or follow-up at wound care clinic.         Relevant Medications    calcium alginate 4 X 4 \" bandage    Other Relevant Orders    Referral to Wound Clinic                  "

## 2024-06-18 ENCOUNTER — TELEPHONE (OUTPATIENT)
Dept: PRIMARY CARE | Facility: CLINIC | Age: 89
End: 2024-06-18
Payer: MEDICARE

## 2024-06-18 DIAGNOSIS — I25.10 CORONARY ARTERY DISEASE INVOLVING NATIVE HEART WITHOUT ANGINA PECTORIS, UNSPECIFIED VESSEL OR LESION TYPE: ICD-10-CM

## 2024-06-18 DIAGNOSIS — G45.9 TIA (TRANSIENT ISCHEMIC ATTACK): ICD-10-CM

## 2024-06-18 NOTE — TELEPHONE ENCOUNTER
Pt states they pharmacy cannot fill the atorvastatin - it was supposed to be a decrease to 40 mg 1x daily from the previous 80 mg daily that he could not take.  It was sent as 2 tabs instead of 1 - can this be corrected with pharmacy so they can fill it?

## 2024-06-18 NOTE — TELEPHONE ENCOUNTER
MEDICATION REFILL    Pharmacy Name:  [  ]   Medication requested [  ]   Dosage [  ]   Quantity 30 or 90 days  [  ]   Allergies  [  ]  Date of last visit [  ]  Date of Next Appointment [  ]

## 2024-06-19 ENCOUNTER — OFFICE VISIT (OUTPATIENT)
Dept: WOUND CARE | Facility: HOSPITAL | Age: 89
End: 2024-06-19
Payer: MEDICARE

## 2024-06-19 DIAGNOSIS — L98.491 SKIN ULCER, LIMITED TO BREAKDOWN OF SKIN (MULTI): ICD-10-CM

## 2024-06-19 PROCEDURE — 99213 OFFICE O/P EST LOW 20 MIN: CPT | Mod: 25

## 2024-06-19 PROCEDURE — 11042 DBRDMT SUBQ TIS 1ST 20SQCM/<: CPT

## 2024-06-19 RX ORDER — ROSUVASTATIN CALCIUM 20 MG/1
20 TABLET, COATED ORAL NIGHTLY
Qty: 90 TABLET | Refills: 1 | Status: SHIPPED | OUTPATIENT
Start: 2024-06-19

## 2024-06-19 NOTE — TELEPHONE ENCOUNTER
Pia came into office and states that pt needs the the atorvastatin sent to [facility]/ changed to 40mg once a day and is asking for a 90 day supply.     Also, pt no longer needs the wound care patches. Wound Care has taken care of the patches/pads.

## 2024-06-21 ENCOUNTER — TELEPHONE (OUTPATIENT)
Dept: PRIMARY CARE | Facility: CLINIC | Age: 89
End: 2024-06-21

## 2024-06-21 RX ORDER — ATORVASTATIN CALCIUM 40 MG/1
40 TABLET, FILM COATED ORAL NIGHTLY
Qty: 90 TABLET | Refills: 1 | Status: SHIPPED | OUTPATIENT
Start: 2024-06-21

## 2024-06-21 RX ORDER — GAUZE BANDAGE 4" X 4"
BANDAGE TOPICAL
Qty: 10 EACH | Refills: 0 | Status: SHIPPED | OUTPATIENT
Start: 2024-06-21

## 2024-06-26 ENCOUNTER — OFFICE VISIT (OUTPATIENT)
Dept: WOUND CARE | Facility: HOSPITAL | Age: 89
End: 2024-06-26
Payer: MEDICARE

## 2024-06-26 PROCEDURE — 11042 DBRDMT SUBQ TIS 1ST 20SQCM/<: CPT

## 2024-07-09 ENCOUNTER — APPOINTMENT (OUTPATIENT)
Dept: WOUND CARE | Facility: HOSPITAL | Age: 89
End: 2024-07-09
Payer: MEDICARE

## 2024-07-10 ENCOUNTER — OFFICE VISIT (OUTPATIENT)
Dept: WOUND CARE | Facility: HOSPITAL | Age: 89
End: 2024-07-10
Payer: MEDICARE

## 2024-07-10 PROCEDURE — 99212 OFFICE O/P EST SF 10 MIN: CPT

## 2024-07-25 ENCOUNTER — DOCUMENTATION (OUTPATIENT)
Dept: PRIMARY CARE | Facility: CLINIC | Age: 89
End: 2024-07-25
Payer: MEDICARE

## 2024-07-29 ENCOUNTER — PATIENT OUTREACH (OUTPATIENT)
Dept: PHARMACY | Facility: HOSPITAL | Age: 89
End: 2024-07-29

## 2024-07-29 ENCOUNTER — PATIENT OUTREACH (OUTPATIENT)
Dept: PRIMARY CARE | Facility: CLINIC | Age: 89
End: 2024-07-29
Payer: MEDICARE

## 2024-07-29 DIAGNOSIS — I70.213 ATHEROSCLEROSIS OF NATIVE ARTERY OF BOTH LOWER EXTREMITIES WITH INTERMITTENT CLAUDICATION (CMS-HCC): ICD-10-CM

## 2024-07-29 DIAGNOSIS — G45.9 TIA (TRANSIENT ISCHEMIC ATTACK): ICD-10-CM

## 2024-07-29 DIAGNOSIS — I10 BENIGN ESSENTIAL HYPERTENSION: ICD-10-CM

## 2024-07-29 DIAGNOSIS — M19.071 OSTEOARTHRITIS OF BOTH ANKLES, UNSPECIFIED OSTEOARTHRITIS TYPE: ICD-10-CM

## 2024-07-29 DIAGNOSIS — L98.491 SKIN ULCER, LIMITED TO BREAKDOWN OF SKIN (MULTI): ICD-10-CM

## 2024-07-29 DIAGNOSIS — I73.9 PAD (PERIPHERAL ARTERY DISEASE) (CMS-HCC): ICD-10-CM

## 2024-07-29 DIAGNOSIS — M19.072 OSTEOARTHRITIS OF BOTH ANKLES, UNSPECIFIED OSTEOARTHRITIS TYPE: ICD-10-CM

## 2024-07-29 DIAGNOSIS — I25.10 CORONARY ARTERY DISEASE INVOLVING NATIVE HEART WITHOUT ANGINA PECTORIS, UNSPECIFIED VESSEL OR LESION TYPE: ICD-10-CM

## 2024-07-29 RX ORDER — CHOLECALCIFEROL (VITAMIN D3) 25 MCG
1000 TABLET ORAL DAILY
COMMUNITY

## 2024-07-29 SDOH — HEALTH STABILITY: PHYSICAL HEALTH: EXERCISE LEVEL: MODERATE

## 2024-07-29 SDOH — HEALTH STABILITY: MENTAL HEALTH: CAFFEINE CONSUMPTION: DAILY

## 2024-07-29 NOTE — PROGRESS NOTES
"Transitional Care Management: Pharmacy    Subjective   Alexis Armijo \"Don\" is a 89 y.o. male who was referred to Clinical Pharmacy Team to complete TCM medication reconciliation prior to office visit with Jonah Velez DO on 7/29/24. A comprehensive medication review was completed with the patient. patient had all medications and/or an updated medication list in front of them during the telephone encounter.     General Medication Management    Type of medication management: comprehensive medication review  Referred by: nurse  Targeting Criteria Met: multiple chronic diseases/multiple Part D drugs/cost threshold  Is the Beneficiary in a Long Term Care Facility at the Time of the First CMR Offer?: no  Cognitive ability: good  Recipient: beneficiary  Provider: hospital pharmacist  Visit type: initial  Method of contact: by telephone  Medications at visit: brings in medications  Caffeine use: daily   Caffeine use comment: 1 cup of coffee daily   Physical activity level: moderate  Diet: excellent       MEDICATION RECONCILIATION  Added:   Turmeric 500mg daily  Vitamin D3 25 mcg daily  Removed: Crestor 20mg daily - patient is taking Lipitor 40mg daily    Current Outpatient Medications   Medication Instructions    apixaban (Eliquis) 2.5 mg tablet 1 tablet, oral, 2 times daily    atorvastatin (LIPITOR) 40 mg, oral, Nightly    b complex vitamins capsule 1 capsule, oral, Nightly    cholecalciferol (VITAMIN D3) 1,000 Units, oral, Daily    clopidogrel (PLAVIX) 75 mg, oral, Daily    collagen, hydrolysate, bovine, (COLLAGEN, HYDR, BOVINE,, BULK, MISC) 1 Scoop, oral, Daily RT    krill-om-3-dha-epa-phospho-ast (Omega-3 Krill Oil) 942-43-90-50 mg capsule 1 capsule, oral, Every morning    lactobacillus combination no.4 (Probiotic) 3 billion cell capsule 1 tablet, oral, Daily    lutein-zeaxanthin 25-5 mg capsule 1 capsule, oral, Every morning    multivitamin with minerals (multivitamin with folic acid) tablet 1 tablet, oral, 2 " times daily    TURMERIC ORAL 500 mg, oral, Daily    valsartan (DIOVAN) 80 mg, oral, Daily     Allergies   Allergen Reactions    Lipitor [Atorvastatin] Myalgia       [id number] - [place], [place] - [address] [address]  Phone: [phone] Fax: [phone]    [facility]  [address] [facility] [address]  Phone: [phone] Fax: [phone]    No issues reported in regards to accessibility affordability adherence adverse effects organization.  Medication Adherence    What concerns does the patient have in regards to their medications: Interactions with OTC medications and prescription medications  Patient reported X missed doses in the last 7 days: 0  Any gaps in refill history greater than 2 weeks in the last 3 months: no  Demonstrates understanding of importance of adherence: yes  Informant: patient  Reliability of informant: reliable  Estimated medication adherence level: %  Adherence estimation source: claims history  Reasons for non-adherence: no problems identified  Adherence tools used: medication list, alarm  Support network for adherence: family member       Objective     LAB  Wt Readings from Last 3 Encounters:   06/17/24 90.8 kg (200 lb 2 oz)   05/23/24 90.3 kg (199 lb)   04/11/24 93 kg (205 lb)     Pulse Readings from Last 3 Encounters:   06/17/24 68   05/23/24 77   04/11/24 65     BP Readings from Last 3 Encounters:   06/17/24 127/75   05/23/24 157/88   04/11/24 116/69      Lab Results   Component Value Date    BILITOT 1.9 (H) 05/22/2024    CALCIUM 9.0 05/22/2024    CO2 31 05/22/2024     05/22/2024    CREATININE 0.90 05/22/2024    CREATININE 1.04 02/24/2024    CREATININE 0.98 02/12/2024    GLUCOSE 95 05/22/2024    ALKPHOS 68 05/22/2024    K 4.1 05/22/2024    PROT 6.1 (L) 05/22/2024     05/22/2024    AST 22 05/22/2024    ALT 21 05/22/2024    BUN 19 05/22/2024    ANIONGAP 9 (L) 05/22/2024    MG 2.01 02/24/2024    PHOS 2.7 03/24/2020    LDH  161 01/02/2024    ALBUMIN 4.4 05/22/2024    LIPASE 26 03/23/2020    GFRMALE 82 12/29/2022    GFRMALE 69 10/05/2022    GFRMALE 70 03/16/2022     Lab Results   Component Value Date    TRIG 55 05/22/2024    TRIG 55 10/05/2023    TRIG 76 10/05/2022    CHOL 106 05/22/2024    CHOL 132 10/05/2023    CHOL 131 10/05/2022    LDLCALC 50 05/22/2024    LDLCALC 64 (L) 10/05/2023    HDL 44.6 05/22/2024    HDL 56.7 10/05/2023    HDL 53.5 10/05/2022     Lab Results   Component Value Date    HGBA1C 5.4 02/12/2024    HGBA1C 5.6 05/01/2018     DRUG INTERACTIONS  No pertinent drug-drug interactions at this time    Assessment/Plan    Indication, effectiveness, safety and convenience of his medications were reviewed today. The patient's medical conditions were assessed, evaluated, and deemed meeting goals of drug therapy.    Comments/Recommendations to PCP:  Tolerating 40mg of Atorvastatin as prescribed last month, was having myalgias with 80mg dose  No major DDI with OTCs and RX medications at this time.   Advised patient with history of blood thinner use to avoid Gingko Biloba/Prevagen (which contains Gingko Biloba)    Sugey Landin, Dana    Verbal consent to manage patient's drug therapy was obtained from the patient and/or an individual authorized to act on behalf of a patient. They were informed they may decline to participate or withdraw from participation in pharmacy services at any time.

## 2024-07-29 NOTE — PROGRESS NOTES
[facility] Monthly Outreach 7/29/2024:  LUIS RN spoke with pt over the phone. Pt states he is doing well. He lives at home with his wife Hayley, uses a cane, and drives. Pt reports being independent with ADLs. Pt states he uses his exercise bike about 4-5 times per week.  HTN: Ronal states that he takes his blood pressure a few times per week and it stays about the same. Last readings: 120/72, 122/70. Heart rate has been in the 70s. Pt states he does not add salt to his diet.   Ronal is happy that the ulcer on his left leg is fully healed. He had a great experience with the wound center. He does report that he still follows up with vascular because of his PAD. He reports having surgeries on his legs that have helped his circulation. Pulses are still present, as pt does monitor this.   Pt states his appetite is good and he takes in adequate nutrition/hydration. He also weights himself daily and is usually just under 200 pounds.   Ronal is frustrated by his ongoing tremor because it does effect his ADLs some. He reports seeing neurology in the past for this and auditory hallucinations (which have resolved). He did not like his neurologist and feels that they did not help him. He also does not want to take any more medications. CM RN will research a different neurologist to see. Pt is agreeable to this and is aware that he may have to drive out of the [facility] to see one.   Pt reports some concerns about interactions with his prescription medication and OTC/natural supplements. He is not having any side effects from anything. CM RN consulted pharmacist to review meds per pt request.   LUIS RN provided contact info and advised pt to reach out with any questions/issues/concerns.   Will follow.

## 2024-08-21 ENCOUNTER — OFFICE VISIT (OUTPATIENT)
Dept: VASCULAR SURGERY | Facility: HOSPITAL | Age: 89
End: 2024-08-21
Payer: MEDICARE

## 2024-08-21 ENCOUNTER — HOSPITAL ENCOUNTER (OUTPATIENT)
Dept: VASCULAR MEDICINE | Facility: HOSPITAL | Age: 89
Discharge: HOME | End: 2024-08-21
Payer: MEDICARE

## 2024-08-21 VITALS
DIASTOLIC BLOOD PRESSURE: 79 MMHG | BODY MASS INDEX: 25.85 KG/M2 | OXYGEN SATURATION: 97 % | WEIGHT: 190.6 LBS | SYSTOLIC BLOOD PRESSURE: 130 MMHG | HEART RATE: 86 BPM

## 2024-08-21 DIAGNOSIS — I65.23 CAROTID STENOSIS, BILATERAL: Primary | ICD-10-CM

## 2024-08-21 DIAGNOSIS — I65.23 BILATERAL CAROTID ARTERY STENOSIS: ICD-10-CM

## 2024-08-21 PROCEDURE — 99214 OFFICE O/P EST MOD 30 MIN: CPT | Performed by: PHYSICIAN ASSISTANT

## 2024-08-21 PROCEDURE — 1159F MED LIST DOCD IN RCRD: CPT | Performed by: PHYSICIAN ASSISTANT

## 2024-08-21 PROCEDURE — 1157F ADVNC CARE PLAN IN RCRD: CPT | Performed by: PHYSICIAN ASSISTANT

## 2024-08-21 PROCEDURE — 93880 EXTRACRANIAL BILAT STUDY: CPT

## 2024-08-21 PROCEDURE — 93880 EXTRACRANIAL BILAT STUDY: CPT | Performed by: INTERNAL MEDICINE

## 2024-08-21 PROCEDURE — 3075F SYST BP GE 130 - 139MM HG: CPT | Performed by: PHYSICIAN ASSISTANT

## 2024-08-21 PROCEDURE — 3078F DIAST BP <80 MM HG: CPT | Performed by: PHYSICIAN ASSISTANT

## 2024-08-21 NOTE — PROGRESS NOTES
"Subjective   Patient ID: Alexis Armijo \"Radha" is a 89 y.o. male who presents for No chief complaint on file..  HPI  89 year old with past medical history of HTN, HLD, CAD, pAfib on Eliquis and plavix and PPM with history of possible TIAs vs possible complex migraines back in 2/2024. CTA neck revealed 50% left carotid stenosis. Carotid duplex today with less than 50% stenosis bilateral carotid arteries.  Presents today for 6 month follow up visit. No recurrence of symptoms. No amaurosis fugax, no speech difficulties, no weakness or paresthesias. No complaints today.     8/21/24 Carotid Duplex  CONCLUSIONS:  Right Carotid: Findings are consistent with less than 50% stenosis of the right proximal internal carotid artery. Right external carotid artery appears patent with no evidence of stenosis. The right vertebral artery demonstrates a high resistance waveform which may be suggestive of a more distal stenosis or occlusion. No evidence of hemodynamically significant stenosis in the right subclavian artery.  Left Carotid: Findings are consistent with less than 50% stenosis of the left proximal internal carotid artery. Left external carotid artery appears patent with no evidence of stenosis. The left vertebral artery is patent with antegrade flow. No evidence of hemodynamically significant stenosis in the left subclavian artery.     Imaging & Doppler Findings:  Right Plaque Morph: The proximal right internal carotid artery demonstrates heterogenous and calcified plaque. The proximal right external carotid artery demonstrates heterogenous and calcified plaque. The distal right common carotid artery demonstrates heterogenous and calcified plaque.  Left Plaque Morph: The proximal left internal carotid artery demonstrates heterogenous and calcified plaque. The proximal left external carotid artery demonstrates heterogenous and calcified plaque. The mid left common carotid artery demonstrates heterogenous plaque. The distal " left common carotid artery demonstrates heterogenous plaque.      Right                        Left    PSV      EDV                PSV      EDV  145 cm/s           CCA P    152 cm/s  75 cm/s            CCA D    68 cm/s  64 cm/s  15 cm/s   ICA P    78 cm/s  15 cm/s  66 cm/s            ICA M    73 cm/s  69 cm/s            ICA D    68 cm/s  99 cm/s             ECA     105 cm/s  27 cm/s          Vertebral  40 cm/s  168 cm/s         Subclavian 173 cm/s                   Right Left  ICA/CCA Ratio  0.9  1.1  Review of Systems   Constitutional:  Negative for chills, fatigue and fever.   HENT:  Negative for congestion, sore throat and trouble swallowing.    Eyes:  Negative for visual disturbance.   Respiratory:  Negative for cough, shortness of breath and wheezing.    Cardiovascular:  Negative for chest pain, palpitations and leg swelling.   Gastrointestinal:  Negative for abdominal pain, constipation, diarrhea, nausea and vomiting.   Endocrine: Negative for cold intolerance and heat intolerance.   Genitourinary:  Negative for difficulty urinating.   Musculoskeletal:  Negative for arthralgias, joint swelling and neck pain.   Skin:  Negative for color change and wound.   Neurological:  Negative for dizziness, seizures, syncope, facial asymmetry, speech difficulty, weakness, light-headedness, numbness and headaches.   Hematological:  Negative for adenopathy.   Psychiatric/Behavioral:  Negative for behavioral problems, confusion and sleep disturbance.      Vitals:    08/21/24 1331   BP: 130/79   Pulse: 86   SpO2: 97%   Weight: 86.5 kg (190 lb 9.6 oz)      Objective   Physical Exam  Constitutional:       Appearance: Normal appearance.   HENT:      Head: Normocephalic.      Right Ear: External ear normal.      Left Ear: External ear normal.      Nose: Nose normal.      Mouth/Throat:      Mouth: Mucous membranes are moist.   Eyes:      Extraocular Movements: Extraocular movements intact.   Neck:      Vascular: No carotid bruit.    Cardiovascular:      Rate and Rhythm: Normal rate and regular rhythm.      Pulses: Normal pulses.   Pulmonary:      Effort: Pulmonary effort is normal. No respiratory distress.      Breath sounds: Normal breath sounds.   Abdominal:      Palpations: Abdomen is soft.      Tenderness: There is no abdominal tenderness.   Musculoskeletal:         General: No swelling or tenderness.      Cervical back: Normal range of motion and neck supple.      Right lower leg: No edema.      Left lower leg: No edema.   Skin:     General: Skin is warm and dry.      Capillary Refill: Capillary refill takes less than 2 seconds.      Findings: No lesion.   Neurological:      General: No focal deficit present.      Mental Status: He is alert and oriented to person, place, and time. Mental status is at baseline.   Psychiatric:         Mood and Affect: Mood normal.         Behavior: Behavior normal.         Thought Content: Thought content normal.         Judgment: Judgment normal.         Assessment/Plan   Problem List Items Addressed This Visit    None  Visit Diagnoses         Codes    Carotid stenosis, bilateral    -  Primary I65.23    Relevant Orders    Vascular US Carotid Artery Duplex Bilateral        89 year old with past medical history of HTN, HLD, CAD, pAfib on Eliquis and plavix and PPM with history of possible TIAs vs possible complex migraines back in 2/2024. CTA neck revealed 50% left carotid stenosis. Carotid duplex today with less than 50% stenosis bilateral carotid arteries.  Presents today for 6 month follow up visit. No recurrence of symptoms. No amaurosis fugax, no speech difficulties, no weakness or paresthesias. No complaints today.   -Reviewed and discussed carotid duplex from today. <50% stenosis bilateral carotid. Unchanged from 2/2024 carotid duplex  -asymptomatic  -repeat carotid duplex 1 year followed by office visit  -continues on plavix and eliquis  -signs and symptoms of TIA/CVA and indications to seek urgent  medical attention reviewed  -Of note patient has hx PAD follows with Dr Bauman, has follow up visit scheduled  -patient to call with questions or concerns          Farrah Moore PA-C 08/21/24 1:57 PM

## 2024-08-23 ENCOUNTER — PATIENT OUTREACH (OUTPATIENT)
Dept: PRIMARY CARE | Facility: CLINIC | Age: 89
End: 2024-08-23
Payer: MEDICARE

## 2024-08-23 DIAGNOSIS — E78.00 ELEVATED LOW DENSITY LIPOPROTEIN (LDL) CHOLESTEROL LEVEL: ICD-10-CM

## 2024-08-23 DIAGNOSIS — I70.213 ATHEROSCLEROSIS OF NATIVE ARTERY OF BOTH LOWER EXTREMITIES WITH INTERMITTENT CLAUDICATION (CMS-HCC): ICD-10-CM

## 2024-08-23 DIAGNOSIS — I48.0 PAROXYSMAL ATRIAL FIBRILLATION (MULTI): ICD-10-CM

## 2024-08-23 DIAGNOSIS — I25.10 CORONARY ARTERY DISEASE INVOLVING NATIVE HEART WITHOUT ANGINA PECTORIS, UNSPECIFIED VESSEL OR LESION TYPE: ICD-10-CM

## 2024-08-23 DIAGNOSIS — I73.9 PAD (PERIPHERAL ARTERY DISEASE) (CMS-HCC): ICD-10-CM

## 2024-08-23 DIAGNOSIS — I10 BENIGN ESSENTIAL HYPERTENSION: ICD-10-CM

## 2024-08-23 NOTE — PROGRESS NOTES
[facility] Monthly Outreach 8/23/2024  LUIS RN spoke with pt over the phone today. Pt reports feeling good with no major complaints.  Pt is independent with ADLs and uses a cane. He is exercising on his stationary bike daily.   Ronal states he is checking his bp intermittently but has been very busy lately so he has missed some readings. Last office visit reading on 8/21 was 130/79.  Pt denies CP/SOB/dizziness/lightheadedness. No edema.   Ronal followed up with his vascular MD on 8/21 and had his annual carotid US which showed <50% stenosis. Pt reports checking pedal pulses daily due to history of occlusion. Reports + in BLE. He is not due for cardiology follow up until next year.   Pt states he is eating a drinking adequately and is maintaining his intentional weight loss. Ronal has been going to a farmers market in [place] for fresh vegetables and fruits. Taking daily weights. Last was 190.6 lb on Wednesday which pt states is about average.   Ronal was pleased to speak with one of the pharmacists to review his prescribed and OTC meds. After this meeting, he did stop taking coq10.   LUIS RN advised pt to reach out with any questions/concerns/issues.  Will follow.

## 2024-08-28 ASSESSMENT — ENCOUNTER SYMPTOMS
DIFFICULTY URINATING: 0
DIARRHEA: 0
LIGHT-HEADEDNESS: 0
NUMBNESS: 0
SPEECH DIFFICULTY: 0
TROUBLE SWALLOWING: 0
FACIAL ASYMMETRY: 0
COLOR CHANGE: 0
VOMITING: 0
PALPITATIONS: 0
WOUND: 0
SEIZURES: 0
SHORTNESS OF BREATH: 0
WHEEZING: 0
FEVER: 0
ABDOMINAL PAIN: 0
ARTHRALGIAS: 0
SLEEP DISTURBANCE: 0
CONFUSION: 0
NECK PAIN: 0
CHILLS: 0
HEADACHES: 0
SORE THROAT: 0
WEAKNESS: 0
COUGH: 0
DIZZINESS: 0
JOINT SWELLING: 0
FATIGUE: 0
ADENOPATHY: 0
CONSTIPATION: 0
NAUSEA: 0

## 2024-09-08 ENCOUNTER — HOSPITAL ENCOUNTER (EMERGENCY)
Facility: HOSPITAL | Age: 89
Discharge: HOME | End: 2024-09-08
Attending: EMERGENCY MEDICINE
Payer: MEDICARE

## 2024-09-08 ENCOUNTER — HOSPITAL ENCOUNTER (OUTPATIENT)
Dept: CARDIOLOGY | Facility: HOSPITAL | Age: 89
Discharge: HOME | End: 2024-09-08
Payer: MEDICARE

## 2024-09-08 ENCOUNTER — APPOINTMENT (OUTPATIENT)
Dept: RADIOLOGY | Facility: HOSPITAL | Age: 89
End: 2024-09-08
Payer: MEDICARE

## 2024-09-08 VITALS
HEIGHT: 73 IN | DIASTOLIC BLOOD PRESSURE: 98 MMHG | OXYGEN SATURATION: 100 % | HEART RATE: 63 BPM | TEMPERATURE: 98.2 F | BODY MASS INDEX: 25.18 KG/M2 | SYSTOLIC BLOOD PRESSURE: 190 MMHG | RESPIRATION RATE: 20 BRPM | WEIGHT: 190 LBS

## 2024-09-08 DIAGNOSIS — S01.81XA LACERATION OF FOREHEAD, INITIAL ENCOUNTER: ICD-10-CM

## 2024-09-08 DIAGNOSIS — D68.9 COAGULOPATHY (MULTI): ICD-10-CM

## 2024-09-08 DIAGNOSIS — S12.501A CLOSED NONDISPLACED FRACTURE OF SIXTH CERVICAL VERTEBRA, UNSPECIFIED FRACTURE MORPHOLOGY, INITIAL ENCOUNTER: ICD-10-CM

## 2024-09-08 DIAGNOSIS — Z23 TETANUS TOXOID INOCULATION: ICD-10-CM

## 2024-09-08 DIAGNOSIS — M54.2 NECK PAIN: ICD-10-CM

## 2024-09-08 DIAGNOSIS — S01.21XA LACERATION OF NOSE, INITIAL ENCOUNTER: ICD-10-CM
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
## 2024-09-08 LAB
ANION GAP SERPL CALC-SCNC: 15 MMOL/L (ref 10–20)
BASOPHILS # BLD AUTO: 0.04 X10*3/UL (ref 0–0.1)
BASOPHILS NFR BLD AUTO: 0.5 %
BUN SERPL-MCNC: 29 MG/DL (ref 6–23)
CALCIUM SERPL-MCNC: 9.2 MG/DL (ref 8.6–10.3)
CHLORIDE SERPL-SCNC: 101 MMOL/L (ref 98–107)
CO2 SERPL-SCNC: 27 MMOL/L (ref 21–32)
CREAT SERPL-MCNC: 0.96 MG/DL (ref 0.5–1.3)
EGFRCR SERPLBLD CKD-EPI 2021: 77 ML/MIN/1.73M*2
EOSINOPHIL # BLD AUTO: 0.06 X10*3/UL (ref 0–0.4)
EOSINOPHIL NFR BLD AUTO: 0.8 %
ERYTHROCYTE [DISTWIDTH] IN BLOOD BY AUTOMATED COUNT: 14.6 % (ref 11.5–14.5)
GLUCOSE SERPL-MCNC: 104 MG/DL (ref 74–99)
HCT VFR BLD AUTO: 42.8 % (ref 41–52)
HGB BLD-MCNC: 13.9 G/DL (ref 13.5–17.5)
IMM GRANULOCYTES # BLD AUTO: 0.02 X10*3/UL (ref 0–0.5)
IMM GRANULOCYTES NFR BLD AUTO: 0.3 % (ref 0–0.9)
INR PPP: 1.1 (ref 0.9–1.1)
LYMPHOCYTES # BLD AUTO: 3.24 X10*3/UL (ref 0.8–3)
LYMPHOCYTES NFR BLD AUTO: 42.7 %
MCH RBC QN AUTO: 30.8 PG (ref 26–34)
MCHC RBC AUTO-ENTMCNC: 32.5 G/DL (ref 32–36)
MCV RBC AUTO: 95 FL (ref 80–100)
MONOCYTES # BLD AUTO: 0.91 X10*3/UL (ref 0.05–0.8)
MONOCYTES NFR BLD AUTO: 12 %
NEUTROPHILS # BLD AUTO: 3.32 X10*3/UL (ref 1.6–5.5)
NEUTROPHILS NFR BLD AUTO: 43.7 %
NRBC BLD-RTO: 0 /100 WBCS (ref 0–0)
PLATELET # BLD AUTO: 173 X10*3/UL (ref 150–450)
POTASSIUM SERPL-SCNC: 4.1 MMOL/L (ref 3.5–5.3)
PROTHROMBIN TIME: 12.1 SECONDS (ref 9.8–12.8)
RBC # BLD AUTO: 4.52 X10*6/UL (ref 4.5–5.9)
SODIUM SERPL-SCNC: 139 MMOL/L (ref 136–145)
WBC # BLD AUTO: 7.6 X10*3/UL (ref 4.4–11.3)

## 2024-09-08 PROCEDURE — 12002 RPR S/N/AX/GEN/TRNK2.6-7.5CM: CPT

## 2024-09-08 PROCEDURE — 70450 CT HEAD/BRAIN W/O DYE: CPT

## 2024-09-08 PROCEDURE — 72125 CT NECK SPINE W/O DYE: CPT

## 2024-09-08 PROCEDURE — 85610 PROTHROMBIN TIME: CPT | Performed by: EMERGENCY MEDICINE

## 2024-09-08 PROCEDURE — G0390 TRAUMA RESPONS W/HOSP CRITI: HCPCS

## 2024-09-08 PROCEDURE — 2500000001 HC RX 250 WO HCPCS SELF ADMINISTERED DRUGS (ALT 637 FOR MEDICARE OP): Performed by: EMERGENCY MEDICINE

## 2024-09-08 PROCEDURE — 70486 CT MAXILLOFACIAL W/O DYE: CPT

## 2024-09-08 PROCEDURE — 2500000001 HC RX 250 WO HCPCS SELF ADMINISTERED DRUGS (ALT 637 FOR MEDICARE OP): Performed by: NURSE PRACTITIONER

## 2024-09-08 PROCEDURE — 76377 3D RENDER W/INTRP POSTPROCES: CPT

## 2024-09-08 PROCEDURE — 36415 COLL VENOUS BLD VENIPUNCTURE: CPT | Performed by: EMERGENCY MEDICINE

## 2024-09-08 PROCEDURE — 80048 BASIC METABOLIC PNL TOTAL CA: CPT | Performed by: EMERGENCY MEDICINE

## 2024-09-08 PROCEDURE — 2500000005 HC RX 250 GENERAL PHARMACY W/O HCPCS: Performed by: NURSE PRACTITIONER

## 2024-09-08 PROCEDURE — 2500000004 HC RX 250 GENERAL PHARMACY W/ HCPCS (ALT 636 FOR OP/ED): Performed by: EMERGENCY MEDICINE

## 2024-09-08 PROCEDURE — 99285 EMERGENCY DEPT VISIT HI MDM: CPT | Mod: 25

## 2024-09-08 PROCEDURE — 99291 CRITICAL CARE FIRST HOUR: CPT | Mod: 25 | Performed by: EMERGENCY MEDICINE

## 2024-09-08 PROCEDURE — 90715 TDAP VACCINE 7 YRS/> IM: CPT | Performed by: EMERGENCY MEDICINE

## 2024-09-08 PROCEDURE — 90471 IMMUNIZATION ADMIN: CPT | Performed by: EMERGENCY MEDICINE

## 2024-09-08 PROCEDURE — 93005 ELECTROCARDIOGRAM TRACING: CPT

## 2024-09-08 PROCEDURE — 85025 COMPLETE CBC W/AUTO DIFF WBC: CPT | Performed by: EMERGENCY MEDICINE

## 2024-09-08 RX ORDER — LIDOCAINE HYDROCHLORIDE 20 MG/ML
10 INJECTION, SOLUTION INFILTRATION; PERINEURAL ONCE
Status: COMPLETED | OUTPATIENT
Start: 2024-09-08 | End: 2024-09-08

## 2024-09-08 RX ORDER — ACETAMINOPHEN 325 MG/1
TABLET ORAL
Status: DISCONTINUED
Start: 2024-09-08 | End: 2024-09-08 | Stop reason: HOSPADM

## 2024-09-08 RX ORDER — AMOXICILLIN 500 MG/1
500 CAPSULE ORAL 3 TIMES DAILY
Qty: 30 CAPSULE | Refills: 0 | Status: SHIPPED | OUTPATIENT
Start: 2024-09-08 | End: 2024-09-18

## 2024-09-08 RX ORDER — ACETAMINOPHEN 325 MG/1
650 TABLET ORAL ONCE
Status: COMPLETED | OUTPATIENT
Start: 2024-09-08 | End: 2024-09-08

## 2024-09-08 RX ORDER — AMOXICILLIN 250 MG/1
500 CAPSULE ORAL EVERY 8 HOURS SCHEDULED
Status: DISCONTINUED | OUTPATIENT
Start: 2024-09-08 | End: 2024-09-08 | Stop reason: HOSPADM

## 2024-09-08 ASSESSMENT — COLUMBIA-SUICIDE SEVERITY RATING SCALE - C-SSRS
1. IN THE PAST MONTH, HAVE YOU WISHED YOU WERE DEAD OR WISHED YOU COULD GO TO SLEEP AND NOT WAKE UP?: NO
2. HAVE YOU ACTUALLY HAD ANY THOUGHTS OF KILLING YOURSELF?: NO
6. HAVE YOU EVER DONE ANYTHING, STARTED TO DO ANYTHING, OR PREPARED TO DO ANYTHING TO END YOUR LIFE?: NO

## 2024-09-08 ASSESSMENT — LIFESTYLE VARIABLES
EVER HAD A DRINK FIRST THING IN THE MORNING TO STEADY YOUR NERVES TO GET RID OF A HANGOVER: NO
HAVE PEOPLE ANNOYED YOU BY CRITICIZING YOUR DRINKING: NO
EVER FELT BAD OR GUILTY ABOUT YOUR DRINKING: NO
HAVE YOU EVER FELT YOU SHOULD CUT DOWN ON YOUR DRINKING: NO
TOTAL SCORE: 0

## 2024-09-08 ASSESSMENT — PAIN DESCRIPTION - LOCATION
LOCATION: FACE
LOCATION: NECK

## 2024-09-08 ASSESSMENT — PAIN DESCRIPTION - PAIN TYPE: TYPE: ACUTE PAIN

## 2024-09-08 ASSESSMENT — PAIN DESCRIPTION - DESCRIPTORS: DESCRIPTORS: ACHING

## 2024-09-08 ASSESSMENT — PAIN - FUNCTIONAL ASSESSMENT: PAIN_FUNCTIONAL_ASSESSMENT: 0-10

## 2024-09-08 ASSESSMENT — PAIN SCALES - GENERAL
PAINLEVEL_OUTOF10: 4
PAINLEVEL_OUTOF10: 7

## 2024-09-08 NOTE — ED PROCEDURE NOTE
Procedure  Laceration Repair    Performed by: YOLIS Banks  Authorized by: Braulio Gramajo MD    Consent:     Consent obtained:  Verbal    Consent given by:  Patient and spouse    Risks, benefits, and alternatives were discussed: yes      Risks discussed:  Infection, need for additional repair and nerve damage    Alternatives discussed:  No treatment  Anesthesia:     Anesthesia method:  None  Laceration details:     Location:  Scalp    Scalp location:  Frontal    Wound length (cm): 6.    Laceration depth: 0.9.  Pre-procedure details:     Preparation:  Patient was prepped and draped in usual sterile fashion  Exploration:     Limited defect created (wound extended): no      Hemostasis achieved with:  Direct pressure    Imaging outcome: foreign body not noted      Contaminated: yes    Treatment:     Area cleansed with:  Ina    Amount of cleaning:  Standard    Irrigation solution:  Sterile saline    Irrigation volume:  250 cc    Irrigation method:  Syringe    Visualized foreign bodies/material removed: yes      Debridement:  None  Skin repair:     Repair method:  Sutures and staples    Number of staples: 15.  Approximation:     Approximation:  Close  Repair type:     Repair type:  Intermediate  Post-procedure details:     Dressing:  Bulky dressing    Procedure completion:  Tolerated with difficulty  Comments:      Wound cleaned of debris grass, gravel                 YOLIS Banks  09/08/24 [id number]

## 2024-09-08 NOTE — ED TRIAGE NOTES
Patient was outside when he tripped trying to step onto a concrete slab and fell hitting his face on the concrete. Patient denies LOC, he does have lacerations to the bridge of his nose and forehead that are still bleeding. Patient is on blood thinners.

## 2024-09-08 NOTE — ED PROCEDURE NOTE
Procedure  Critical Care    Performed by: Braulio Gramajo MD  Authorized by: Braulio Gramajo MD    Critical care provider statement:     Critical care time (minutes):  40    Critical care time was exclusive of:  Separately billable procedures and treating other patients    Critical care was necessary to treat or prevent imminent or life-threatening deterioration of the following conditions:  Trauma    Critical care was time spent personally by me on the following activities:  Ordering and performing treatments and interventions, ordering and review of laboratory studies, ordering and review of radiographic studies, pulse oximetry, re-evaluation of patient's condition, examination of patient, obtaining history from patient or surrogate, evaluation of patient's response to treatment and development of treatment plan with patient or surrogate               Braulio Gramajo MD  09/09/24 0549

## 2024-09-08 NOTE — PROGRESS NOTES
"Alexis Armijo \"Ronal\" is a 89 y.o. male on day 0 of admission presenting with No Principal Problem: There is no principal problem currently on the Problem List. Please update the Problem List and refresh..    Subjective   Pain fell on eliquis outside. Has large scalp hematoma and nasal laceration. Patient is on eliquis. See procedure notes and photos below.  Asked by attending for lac repair see photo below.  Patient tolerated procedures fairly well. There was debris in the wound from fall outside.                Last Recorded Vitals  Blood pressure (!) 190/98, pulse 63, temperature 36.8 °C (98.2 °F), temperature source Temporal, resp. rate 20, height 1.854 m (6' 1\"), weight 86.2 kg (190 lb), SpO2 100%.  Intake/Output last 3 Shifts:  No intake/output data recorded.    Relevant Results               Scheduled medications  amoxicillin, 500 mg, oral, q8h AGUEDA      Continuous medications     PRN medications             Assessment/Plan       Pia Frias, APRN-CNP      "

## 2024-09-08 NOTE — ED PROCEDURE NOTE
Procedure  Laceration Repair    Performed by: RITU Banks-CNP  Authorized by: Braulio Gramajo MD    Consent:     Consent obtained:  Verbal    Consent given by:  Patient and spouse    Risks, benefits, and alternatives were discussed: yes      Risks discussed:  Infection and need for additional repair    Alternatives discussed:  No treatment  Universal protocol:     Procedure explained and questions answered to patient or proxy's satisfaction: yes      Relevant documents present and verified: yes      Test results available: yes      Imaging studies available: yes      Patient identity confirmed:  Verbally with patient and arm band  Anesthesia:     Anesthesia method:  Topical application and local infiltration    Topical anesthetic:  LET    Local anesthetic:  Lidocaine 2% w/o epi  Laceration details:     Location:  Face    Face location:  Nose    Wound length (cm): 2.    Laceration depth: 0.9.  Pre-procedure details:     Preparation:  Patient was prepped and draped in usual sterile fashion and imaging obtained to evaluate for foreign bodies  Exploration:     Hemostasis achieved with:  Direct pressure    Imaging outcome: foreign body not noted      Contaminated: yes    Treatment:     Area cleansed with:  [place]    Amount of cleaning:  Standard    Irrigation solution:  Sterile saline    Irrigation volume:  250 cc    Irrigation method:  Syringe    Visualized foreign bodies/material removed: no      Debridement:  None  Skin repair:     Repair method:  Sutures    Suture size:  4-0    Suture material:  Prolene    Suture technique:  Simple interrupted    Number of sutures: 4.  Approximation:     Approximation:  Loose  Repair type:     Repair type:  Intermediate  Post-procedure details:     Dressing:  Non-adherent dressing    Procedure completion:  Tolerated well, no immediate complications  Comments:      Pt with grass in wound. Patient had piece of skin missing from nose 0.25 cm x 0.25 cm v shaped.               Pia Frias, APRN-CNP  09/08/24 1831

## 2024-09-08 NOTE — ED PROVIDER NOTES
HPI   Chief Complaint   Patient presents with    Fall       Alexis is a 89-year-old man who was trying to plug something in on his  porch when he slipped and fell.  He normally walks with a walker per his wife he landed on his face cutting his nose and forehead he did not lose consciousness but he does take blood thinners, Eliquis for occasional A-fib.  He denies any arm leg pain.  He denies any nausea or vomiting.  He does have some neck pain which he says is chronic.  History comes from patient EMR and significant other.  Worked up as an HIA due to the thinners and hitting his head.  He has some oozing from his nose and forehead from cuts.              Patient History   Past Medical History:   Diagnosis Date    Age-related nuclear cataract, unspecified eye 05/26/2022    Nuclear sclerosis    Atherosclerosis of native arteries of extremities with intermittent claudication, bilateral legs (CMS-[facility]) 01/03/2023    Atherosclerosis of native artery of both lower extremities with intermittent claudication    Atherosclerotic heart disease of native coronary artery without angina pectoris 10/04/2022    CAD (coronary artery disease)    Cellulitis of lower extremity 04/09/2024    Community acquired pneumonia 04/09/2024    Diplopia 02/18/2022    Diplopia    Essential (primary) hypertension 10/04/2022    Benign essential hypertension    Heart disease 2010    Other abnormal glucose     Hemoglobin A1c less than 7.0%    Other intervertebral disc degeneration, lumbar region     Degenerative disc disease, lumbar    Pain in extremity 04/09/2024    Personal history of colonic polyps     History of colonic polyps    Personal history of malignant neoplasm of other organs and systems     History of squamous cell carcinoma    Personal history of other diseases of the circulatory system 04/16/2018    History of hypertension    Personal history of other endocrine, nutritional and metabolic disease 04/16/2018    History of hyperlipidemia     Personal history of other medical treatment     History of stress test    Presence of prosthetic heart valve 01/03/2023    S/P AVR (aortic valve replacement)    Spondylolysis, lumbar region     Lumbar spondylolysis     Past Surgical History:   Procedure Laterality Date    APPENDECTOMY  01/25/2017    Appendectomy    CARDIAC PACEMAKER PLACEMENT  04/16/2018    Pacemaker Placement    COLONOSCOPY W/ POLYPECTOMY  01/25/2017    Complete Colonoscopy For Polyp Removal    CORONARY ANGIOPLASTY WITH STENT PLACEMENT  01/25/2017    Cath Stent Placement    CT AORTA AND BILATERAL ILIOFEMORAL RUNOFF ANGIOGRAM W AND/OR WO IV CONTRAST  11/23/2019    CT AORTA AND BILATERAL ILIOFEMORAL RUNOFF ANGIOGRAM W AND/OR WO IV CONTRAST 11/23/2019 [facility]    ESOPHAGOGASTRODUODENOSCOPY  01/25/2017    Esophagogastroduodenoscopy With Biopsy    HERNIA REPAIR  01/25/2017    Inguinal Hernia Repair    KNEE ARTHROSCOPY W/ DEBRIDEMENT  01/25/2017    Arthroscopy Knee Left    OTHER SURGICAL HISTORY  02/10/2015    Stent Indications: Acute Myocardial Infarction    OTHER SURGICAL HISTORY  07/20/2015    Blood Vessel Repair Direct Leg    OTHER SURGICAL HISTORY  01/25/2017    Excision Of Lesion Face    TOTAL KNEE ARTHROPLASTY  04/16/2018    Knee Replacement     No family history on file.  Social History     Tobacco Use    Smoking status: Never    Smokeless tobacco: Never   Vaping Use    Vaping status: Never Used   Substance Use Topics    Alcohol use: Never    Drug use: Never       Physical Exam   ED Triage Vitals [09/08/24 1621]   Temperature Heart Rate Respirations BP   36.8 °C (98.2 °F) 73 20 (!) 178/104      Pulse Ox Temp Source Heart Rate Source Patient Position   97 % Temporal Monitor --      BP Location FiO2 (%)     -- --       Physical Exam  Vitals reviewed.   Constitutional:       General: He is awake.      Appearance: Normal appearance.   HENT:      Head: Normocephalic.        Comments: Laceration of the forehead see diagram for approximate  location     Nose:        Comments: Laceration in the midline superior portion of the nose that is oozing.  See diagram for approximate location  Neck:      Comments: Some pain with palpation of the C-spine posteriorly no step-off  Cardiovascular:      Rate and Rhythm: Normal rate and regular rhythm.   Pulmonary:      Effort: Pulmonary effort is normal.      Breath sounds: Normal breath sounds.   Abdominal:      Palpations: Abdomen is soft.   Skin:     General: Skin is warm.      Capillary Refill: Capillary refill takes less than 2 seconds.   Neurological:      Mental Status: He is alert.           ED Course & MDM   ED Course as of 09/08/24 1812   Sun Sep 08, 2024   1635 Patient fell at home while trying to change a light bulb and cut his nose and forehead.  He did not lose conscious.  He does take blood thinners because of occasional A-fib.  Plan is to workup as an HIA with CT scan and labs.  He is oozing from his nose and his forehead. [RZ]   1710 Patient was found to have small nasal fracture and avulsion fracture of C6 vertebral body near the bone spur.  I talked to the radiologist about it.  Patient was given antibiotics and some Tylenol and I can contact the transfer center to run this by trauma/spine.  Currently he has a little bit of neck pain but has a history of neck pain.  I updated the patient and his wife at the bedside. [RZ]   1731 I talked to the trauma surgeon Dr. Nieto who recommends he run this by spine.  The transfer center will try to contact Dr. Hill [RZ]   1759 Dr. Hill reports we can discharge the patient if we can get a [place] collar on him and he can ambulate. [RZ]   1808 Able to ambulate per nursing bleeding controlled patient be discharged home. [RZ]      ED Course User Index  [RZ] Braulio Gramajo MD         Diagnoses as of 09/08/24 1812   Fall, initial encounter   Coagulopathy (Multi)   Laceration of forehead, initial encounter   Laceration of nose, initial encounter   Neck pain    Closed nondisplaced fracture of sixth cervical vertebra, unspecified fracture morphology, initial encounter (Multi)   Tetanus toxoid inoculation                 No data recorded     [place] Coma Scale Score: 15 (09/08/24 1622 : Joe Rodarte RN)                           Medical Decision Making      Procedure  Procedures     Braulio Gramajo MD  09/08/24 7330

## 2024-09-09 ENCOUNTER — PATIENT OUTREACH (OUTPATIENT)
Dept: PRIMARY CARE | Facility: CLINIC | Age: 89
End: 2024-09-09
Payer: MEDICARE

## 2024-09-09 DIAGNOSIS — M19.072 OSTEOARTHRITIS OF BOTH ANKLES, UNSPECIFIED OSTEOARTHRITIS TYPE: ICD-10-CM

## 2024-09-09 DIAGNOSIS — I25.10 CORONARY ARTERY DISEASE INVOLVING NATIVE HEART WITHOUT ANGINA PECTORIS, UNSPECIFIED VESSEL OR LESION TYPE: ICD-10-CM

## 2024-09-09 DIAGNOSIS — I70.213 ATHEROSCLEROSIS OF NATIVE ARTERY OF BOTH LOWER EXTREMITIES WITH INTERMITTENT CLAUDICATION (CMS-HCC): ICD-10-CM

## 2024-09-09 DIAGNOSIS — I73.9 PAD (PERIPHERAL ARTERY DISEASE) (CMS-HCC): ICD-10-CM

## 2024-09-09 DIAGNOSIS — I10 BENIGN ESSENTIAL HYPERTENSION: ICD-10-CM

## 2024-09-09 DIAGNOSIS — M19.071 OSTEOARTHRITIS OF BOTH ANKLES, UNSPECIFIED OSTEOARTHRITIS TYPE: ICD-10-CM

## 2024-09-09 NOTE — PROGRESS NOTES
[facility] Monthly Outreach 9/9/2024  CM RN called pt for outreach and to checkup after ED visit yesterday. Don had a fall at home while working outside. Denies LOC. Pt was evaluated in the ED, received staples for a facial lac, c collar for cervical fracture, and course of prophylactic amoxicillin. Pt is following all orders at home. Wearing his c collar continuously. Taking amoxicillin. Per wife, pt was advised to not take eliquis for 2 days. Wounds are still ozzing some. CM RN advised pts wife Pat to reinforce dressings as needed and to change if they become saturated. Steri strips will fall off on their own.  Pt already has appt with PCP and CM RN advised to move up if needed. Pt already has an appt scheduled to follow up with ortho/spine specialist (Dr. Mak) on 9/18. Pts wife has left message for the would clinic to follow up there as well.   Pt is feeling sore and generalized pain which CM RN explained is to be expected and may last for some time. Pt advised in the ED to take as needed tylenol. BP was noted to be elevated in ED as well. CM RN advised pts wife to monitor at home.   CM RN discussed pts concern for plavix/lipitor interaction. Advised that CM RN spoke with PCP and in house pharmacist who explained that there is no contraindication to taking these medications together. Any further s/s should be reported and to follow up with cardiology as well. Pt and spouse aware.  CM RN advised to reach out with any questions/concerns/issues.  Will follow.

## 2024-09-15 LAB
ATRIAL RATE: 66 BPM
P OFFSET: 190 MS
P ONSET: 137 MS
PR INTERVAL: 296 MS
Q ONSET: 223 MS
QRS COUNT: 11 BEATS
QRS DURATION: 78 MS
QT INTERVAL: 394 MS
QTC CALCULATION(BAZETT): 413 MS
QTC FREDERICIA: 407 MS
R AXIS: -7 DEGREES
T AXIS: 10 DEGREES
T OFFSET: 420 MS
VENTRICULAR RATE: 66 BPM

## 2024-09-16 ENCOUNTER — OFFICE VISIT (OUTPATIENT)
Dept: WOUND CARE | Facility: HOSPITAL | Age: 89
End: 2024-09-16
Payer: MEDICARE

## 2024-09-16 PROCEDURE — 99214 OFFICE O/P EST MOD 30 MIN: CPT

## 2024-09-16 PROCEDURE — 99213 OFFICE O/P EST LOW 20 MIN: CPT | Performed by: SURGERY

## 2024-09-18 ENCOUNTER — OFFICE VISIT (OUTPATIENT)
Dept: ORTHOPEDIC SURGERY | Facility: CLINIC | Age: 89
End: 2024-09-18
Payer: MEDICARE

## 2024-09-18 ENCOUNTER — HOSPITAL ENCOUNTER (OUTPATIENT)
Dept: RADIOLOGY | Facility: CLINIC | Age: 89
Discharge: HOME | End: 2024-09-18
Payer: MEDICARE

## 2024-09-18 DIAGNOSIS — S12.500A: Primary | ICD-10-CM

## 2024-09-18 DIAGNOSIS — S12.500A: ICD-10-CM

## 2024-09-18 DIAGNOSIS — M54.2 NECK PAIN: ICD-10-CM

## 2024-09-18 PROCEDURE — 72040 X-RAY EXAM NECK SPINE 2-3 VW: CPT

## 2024-09-18 PROCEDURE — 99214 OFFICE O/P EST MOD 30 MIN: CPT | Performed by: ORTHOPAEDIC SURGERY

## 2024-09-18 PROCEDURE — 99204 OFFICE O/P NEW MOD 45 MIN: CPT | Performed by: ORTHOPAEDIC SURGERY

## 2024-09-18 PROCEDURE — 1036F TOBACCO NON-USER: CPT | Performed by: ORTHOPAEDIC SURGERY

## 2024-09-18 PROCEDURE — 1159F MED LIST DOCD IN RCRD: CPT | Performed by: ORTHOPAEDIC SURGERY

## 2024-09-18 PROCEDURE — 1157F ADVNC CARE PLAN IN RCRD: CPT | Performed by: ORTHOPAEDIC SURGERY

## 2024-09-18 NOTE — PROGRESS NOTES
Chief Complaint: C6 fracture    HPI: Alexis Armijo is a 89 y.o. year old male patient with PMH significant for hypertension, coronary artery disease status post CABG on Eliquis and Plavix, peripheral artery disease, non-Hodgkin lymphoma who presents with C6 vertebral body fracture after a fall on September 8, 2024.  He is 10 days out from his injury.  He was trying to plug something in on his porch when he slipped and fell and hit his face on the grill.  Did not lose consciousness.  He was seen in the emergency room.  CT scan shows an avulsion fracture off C6 vertebral body.  Patient otherwise neurologically intact.  He was placed in [place] collar and is here to follow-up with his wife today.  He denies any neck pain.  Denies any radicular arm pain numbness tingling or paresthesias. He has been compliant with the collar according to his wife.    Review of Systems    All other systems have been reviewed and are negative for complaint. All pertinent positive and negative as listed in history of present illness.    Past Medical History:   Diagnosis Date    Age-related nuclear cataract, unspecified eye 05/26/2022    Nuclear sclerosis    Atherosclerosis of native arteries of extremities with intermittent claudication, bilateral legs (CMS-[facility]) 01/03/2023    Atherosclerosis of native artery of both lower extremities with intermittent claudication    Atherosclerotic heart disease of native coronary artery without angina pectoris 10/04/2022    CAD (coronary artery disease)    Cellulitis of lower extremity 04/09/2024    Community acquired pneumonia 04/09/2024    Diplopia 02/18/2022    Diplopia    Essential (primary) hypertension 10/04/2022    Benign essential hypertension    Heart disease 2010    Other abnormal glucose     Hemoglobin A1c less than 7.0%    Other intervertebral disc degeneration, lumbar region     Degenerative disc disease, lumbar    Pain in extremity 04/09/2024    Personal history of colonic polyps      History of colonic polyps    Personal history of malignant neoplasm of other organs and systems     History of squamous cell carcinoma    Personal history of other diseases of the circulatory system 04/16/2018    History of hypertension    Personal history of other endocrine, nutritional and metabolic disease 04/16/2018    History of hyperlipidemia    Personal history of other medical treatment     History of stress test    Presence of prosthetic heart valve 01/03/2023    S/P AVR (aortic valve replacement)    Spondylolysis, lumbar region     Lumbar spondylolysis        Past Surgical History:   Procedure Laterality Date    APPENDECTOMY  01/25/2017    Appendectomy    CARDIAC PACEMAKER PLACEMENT  04/16/2018    Pacemaker Placement    COLONOSCOPY W/ POLYPECTOMY  01/25/2017    Complete Colonoscopy For Polyp Removal    CORONARY ANGIOPLASTY WITH STENT PLACEMENT  01/25/2017    Cath Stent Placement    CT AORTA AND BILATERAL ILIOFEMORAL RUNOFF ANGIOGRAM W AND/OR WO IV CONTRAST  11/23/2019    CT AORTA AND BILATERAL ILIOFEMORAL RUNOFF ANGIOGRAM W AND/OR WO IV CONTRAST 11/23/2019 [facility]    ESOPHAGOGASTRODUODENOSCOPY  01/25/2017    Esophagogastroduodenoscopy With Biopsy    HERNIA REPAIR  01/25/2017    Inguinal Hernia Repair    KNEE ARTHROSCOPY W/ DEBRIDEMENT  01/25/2017    Arthroscopy Knee Left    OTHER SURGICAL HISTORY  02/10/2015    Stent Indications: Acute Myocardial Infarction    OTHER SURGICAL HISTORY  07/20/2015    Blood Vessel Repair Direct Leg    OTHER SURGICAL HISTORY  01/25/2017    Excision Of Lesion Face    TOTAL KNEE ARTHROPLASTY  04/16/2018    Knee Replacement        Allergies   Allergen Reactions    Lipitor [Atorvastatin] Myalgia        Current Outpatient Medications on File Prior to Visit   Medication Sig Dispense Refill    amoxicillin (Amoxil) 500 mg capsule Take 1 capsule (500 mg) by mouth 3 times a day for 10 days. 30 capsule 0    apixaban (Eliquis) 2.5 mg tablet Take 1 tablet (2.5 mg) by mouth 2  times a day.      atorvastatin (Lipitor) 40 mg tablet Take 1 tablet (40 mg) by mouth once daily at bedtime. 90 tablet 1    b complex vitamins capsule Take 1 capsule by mouth once daily at bedtime.      cholecalciferol (Vitamin D3) 25 MCG (1000 UT) tablet Take 1 tablet (1,000 Units) by mouth once daily.      clopidogrel (Plavix) 75 mg tablet TAKE 1 TABLET BY MOUTH EVERY DAY 90 tablet 1    collagen, hydrolysate, bovine, (COLLAGEN, HYDR, BOVINE,, BULK, MISC) Take 1 Scoop by mouth once daily.      krill-om-3-dha-epa-phospho-ast (Omega-3 Krill Oil) 731-31-53-50 mg capsule Take 1 capsule by mouth once daily in the morning.      lactobacillus combination no.4 (Probiotic) 3 billion cell capsule Take 1 capsule by mouth once daily.      lutein-zeaxanthin 25-5 mg capsule Take 1 capsule by mouth once daily in the morning.      multivitamin with minerals (multivitamin with folic acid) tablet Take 1 tablet by mouth 2 times a day.      TURMERIC ORAL Take 500 mg by mouth once daily.      valsartan (Diovan) 80 mg tablet TAKE 1 TABLET BY MOUTH ONCE DAILY. 90 tablet 1     No current facility-administered medications on file prior to visit.            PE:   General: Patient appears well-nourished and well-developed in no acute distress, Alert and Oriented x3  Psych: Pleasant mood and affect  HEENT: Extraocular muscles intact, pupils equal and round. Sclerae anicteric   Cardio: extremities warm and well perfused  Resp: unlabored symmetric breathing  Skin: He has some abrasions on his face.    Musculoskeletal/Neuro Exam: No tenderness to palpation along the cervical midline spine [place] collar in place..     Upper extremity:    Motor: Right upper extremity was 5 out of 5 strength with shoulder abduction, elbow flexion and extension, wrist flexion and extension and  against resistance  Left upper extremity with 5 out of 5 strength with shoulder abduction, elbow flexion and extension, wrist flexion and extension and  against  resistance    Sensation to light touch intact along C5-T1 distribution bilaterally        Lower extremity    Motor: Right leg with 5 out of 5 motor strength with hip flexion, knee extension, ankle dorsiflexion plantarflexion EHL against resistance  Left leg with 5 out of 5 motor strength with hip flexion, knee extension, ankle dorsiflexion plantarflexion EHL against resistance    Sensation to light touch intact along L2-S1 distribution bilaterally            Imaging:    I reviewed the CT scan of the cervical spine from September 8, 2024 which shows avulsion fracture of the C6 vertebral body.  Without extension posteriorly.    I reviewed x-rays of the cervical spine obtained in clinic today AP lateral views with the collar on.  Stable alignment.  No distraction at the level of C6.          A/P: Alexis Armijo is a 89 y.o. year old male patient with history of multiple comorbidities who sustained a fall on September 8, 2024 with a C6 avulsion fracture.  Otherwise neurologically intact.  Will attempt close management in [place] collar.  He is currently 1.5 weeks out from his injury.  He will continue his [place] collar.  He was fitted for more comfortable aspen collar today.  Since I will be going on maternity leave he will be following up with our PA's in 4-week.  He will have repeat x-rays of his cervical spine AP lateral view with the collar on to assess overall alignment.    He will then be seen again at 10-12 weeks zee and at that time we will have standing upright AP lateral flexion-extension views of the cervical spine with the collar off.  If fracture is stable at that time he can discontinue the collar.    He will then follow-up with me in January and at that time we will need AP lateral flexion-extension views of the cervical spine.    After our discussion, the patient articulated understanding of the plan and felt that all questions had been answered satisfactorily. The patient was pleased with the visit and  very appreciative for the care rendered.    **Please excuse any errors in grammar or translation related to this dictation. Voice recognition software was utilized to prepare this document. **                  Kylie Mak MD    Department of Orthopaedic Surgery  [facility]  [email or web address]

## 2024-10-07 ENCOUNTER — TELEMEDICINE (OUTPATIENT)
Dept: PRIMARY CARE | Facility: CLINIC | Age: 89
End: 2024-10-07
Payer: MEDICARE

## 2024-10-07 ENCOUNTER — TELEPHONE (OUTPATIENT)
Dept: PRIMARY CARE | Facility: CLINIC | Age: 89
End: 2024-10-07

## 2024-10-07 DIAGNOSIS — U07.1 COVID-19: Primary | ICD-10-CM

## 2024-10-07 PROCEDURE — 99213 OFFICE O/P EST LOW 20 MIN: CPT | Performed by: FAMILY MEDICINE

## 2024-10-07 PROCEDURE — 1159F MED LIST DOCD IN RCRD: CPT | Performed by: FAMILY MEDICINE

## 2024-10-07 PROCEDURE — 1157F ADVNC CARE PLAN IN RCRD: CPT | Performed by: FAMILY MEDICINE

## 2024-10-07 PROCEDURE — 1036F TOBACCO NON-USER: CPT | Performed by: FAMILY MEDICINE

## 2024-10-07 RX ORDER — NIRMATRELVIR AND RITONAVIR 300-100 MG
3 KIT ORAL 2 TIMES DAILY
Qty: 30 TABLET | Refills: 0 | Status: SHIPPED | OUTPATIENT
Start: 2024-10-07 | End: 2024-10-07

## 2024-10-07 RX ORDER — NIRMATRELVIR AND RITONAVIR 300-100 MG
3 KIT ORAL 2 TIMES DAILY
Qty: 30 TABLET | Refills: 0 | Status: SHIPPED | OUTPATIENT
Start: 2024-10-07 | End: 2024-10-12

## 2024-10-07 RX ORDER — ROSUVASTATIN CALCIUM 5 MG/1
1 TABLET, COATED ORAL
COMMUNITY
Start: 2024-08-30

## 2024-10-07 ASSESSMENT — ENCOUNTER SYMPTOMS: COUGH: 1

## 2024-10-07 NOTE — PROGRESS NOTES
"Subjective   Alexis Armijo \"Don\" is a 89 y.o. male who presents for Covid-19 Home Monitoring Video Visit (Pts started with a cough and nasal congestion on Friday tested positive for COVID on Sunday).    HPI    Symptoms started Friday. Very runny nose.    Pain and bruising he reports from the fall, otherwise denies chest/side/back pain, pain with deep inhalation, SOB, difficulty breathing, or leg bruising/redness/pain/swelling.    Is taking Rosuvastatin 5 mg. Denies    Review of Systems   HENT:  Positive for congestion.    Respiratory:  Positive for cough.    All other systems reviewed and are negative.      Objective   Physical Exam  Within the limits of a virtual encounter, patient appears to be in no acute distress.     Assessment & Plan  COVID-19  Day 3, supportive care, requests Paxlovid.  Orders:    nirmatrelvir-ritonavir (Paxlovid) 300 mg (150 mg x 2)-100 mg tablet therapy pack; Take 3 tablets by mouth 2 times a day for 5 days. Follow the instructions on the package         This visit was completed via videoconference due to the restrictions of the COVID-19 pandemic. All issues as above were discussed and addressed but no physical exam was performed. If it was felt that the patient should be evaluated in clinic then they were directed there. The patient verbally consented to this visit.  Spent 16 minutes with patient face-to-face and more than 50% of this time was spent in counseling and coordination of care.        We attempted to have a virtual visit via audio and video.  We experienced technical difficulties with the video portion of the visit but did complete the virtual visit by telephone.    Jonah Velez, DO   "

## 2024-10-07 NOTE — ASSESSMENT & PLAN NOTE
Day 3, supportive care, requests Paxlovid.  Orders:    nirmatrelvir-ritonavir (Paxlovid) 300 mg (150 mg x 2)-100 mg tablet therapy pack; Take 3 tablets by mouth 2 times a day for 5 days. Follow the instructions on the package

## 2024-10-07 NOTE — PATIENT INSTRUCTIONS
"Hold Rosuvastatin while taking Paxlovid, and for 3 days after completing Paxlovid. Hold Eliquis while taking Paxlovid, and for 3 days after completing Paxlovid. Call Dr. Domenic Bill, to make sure it's ok to skip Eliquis for 8 days. Carefully monitor your blood pressure, since Paxlovid can increase the effect of Valsartan; hold Valsartan or cut in half, if necessary, to keep your systolic blood pressure (the top number) greater than 100. Hold off on non-prescription supplements while taking Paxlovid, and for 3 days after completing.    If taken, Paxlovid should be started within 5 days of the onset of symptoms. Please carefully review the drug information and interactions prior to taking Paxlovid (see below). Impaired kidney and/or liver function may affect the dosing or safe use of Paxlovid. Most common adverse effects include altered sense of taste, diarrhea. Some other side effect highlights include but are not limited to: headache, vomiting, abdominal pain, nausea, high blood pressure, malaise.    The FDA has approved Paxlovid for adults with mild-to-moderate COVID-19 at high risk for severe disease, hospitalization, and death. According to the CDC, Paxlovid cuts the risk of hospitalization due to COVID-19 by over half, and the risk of death by 75%.    Paxlovid (nirmatrelvir and ritonavir tablets)  https://www.paxlovid.com/  https://www.paxlovid.com/faq  See especially the section \"Important safety information & use.\"    Drug interaction check  https://jcybf03-gtrygffojooojgqe.org/    You should seek immediate medical attention or call 911 if you have any new or worsening shortness of breath, difficulty breathing, chest/side/back pain, pain or swelling in your arms or legs, new confusion, inability to wake or stay awake, pale/gray/blue skin/lips/nail beds, or other concern.    Information and recommendations regarding COVID-19 may change at any time. Please refer to the CDC for additional " information.  https://www.cdc.gov/respiratory-viruses/guidance/index.html  https://www.cdc.gov/respiratory-viruses/guidance/faqs.html    Generally, isolation for respiratory viruses may be discontinued, if for at least 24 hours, BOTH:  1 - Your symptoms are getting better, and   2 - You are fever-free without the use of fever-reducing medication.  After ending isolation, wear a mask for at least an additional 5 days, and practice social distancing.     A limited number of persons with severe illness may produce replication-competent virus beyond the expected timeframe that may warrant extending duration of isolation and precautions.

## 2024-10-08 ENCOUNTER — APPOINTMENT (OUTPATIENT)
Dept: PRIMARY CARE | Facility: CLINIC | Age: 89
End: 2024-10-08
Payer: MEDICARE

## 2024-10-08 ENCOUNTER — PATIENT OUTREACH (OUTPATIENT)
Dept: PRIMARY CARE | Facility: CLINIC | Age: 89
End: 2024-10-08

## 2024-10-08 DIAGNOSIS — M19.072 OSTEOARTHRITIS OF BOTH ANKLES, UNSPECIFIED OSTEOARTHRITIS TYPE: ICD-10-CM

## 2024-10-08 DIAGNOSIS — I10 BENIGN ESSENTIAL HYPERTENSION: ICD-10-CM

## 2024-10-08 DIAGNOSIS — M19.071 OSTEOARTHRITIS OF BOTH ANKLES, UNSPECIFIED OSTEOARTHRITIS TYPE: ICD-10-CM

## 2024-10-08 DIAGNOSIS — I73.9 PAD (PERIPHERAL ARTERY DISEASE) (CMS-HCC): ICD-10-CM

## 2024-10-08 DIAGNOSIS — I25.10 CORONARY ARTERY DISEASE INVOLVING NATIVE HEART WITHOUT ANGINA PECTORIS, UNSPECIFIED VESSEL OR LESION TYPE: ICD-10-CM

## 2024-10-08 DIAGNOSIS — I48.0 PAROXYSMAL ATRIAL FIBRILLATION (MULTI): ICD-10-CM

## 2024-10-08 NOTE — PROGRESS NOTES
[facility] Monthly Outreach  Date: 10/8/2024  Chart reviewed.    Spoke with pt over the phone today. Ronal was recovering well from his recent fall, but has since tested positive for covid after his wife did also and was experiencing s/s. Pt had a telemedicine visit with PCP and was prescribed paxlovid. He was able to have someone  this rx. S/s include runny nose, cough, congestion, and body aches. Afebrile.  Ronal is performing his ADLs with the help of his wife and walker. He reports feeling weak and experiencing intermittent pain related to the fall but is hopeful that his recovery will continue to progress. Educated pt on the importance of sitting before standing to avoid dizziness and always moving with help there. Denies any further falls.   Ronal followed up with ortho where is received a new c collar and has been wearing that 24/7 as recommended. He is to follow up again for repeat xrays. Also following with MD at the wound clinic for facial lacs sustained during fall. Per wife, the oozing has stopped and Eliquis has been restarted.  Pts elevated BP has resolved, last reading 122/60. Denies CP/SOB/dizziness/lightheadedness.   Taking medications as directed.   Ronal is eating and staying hydrated.    RN CM  advised pt to reach out with any questions/concerns/issues.  Will follow.

## 2024-10-15 DIAGNOSIS — S12.500A: ICD-10-CM

## 2024-10-17 ENCOUNTER — APPOINTMENT (OUTPATIENT)
Dept: ORTHOPEDIC SURGERY | Facility: CLINIC | Age: 89
End: 2024-10-17
Payer: COMMERCIAL

## 2024-10-17 ENCOUNTER — HOSPITAL ENCOUNTER (OUTPATIENT)
Dept: RADIOLOGY | Facility: HOSPITAL | Age: 89
Discharge: HOME | End: 2024-10-17
Payer: MEDICARE

## 2024-10-17 DIAGNOSIS — S12.500A: Primary | ICD-10-CM

## 2024-10-17 DIAGNOSIS — S12.500A: ICD-10-CM

## 2024-10-17 PROCEDURE — 1159F MED LIST DOCD IN RCRD: CPT | Performed by: PHYSICIAN ASSISTANT

## 2024-10-17 PROCEDURE — 99213 OFFICE O/P EST LOW 20 MIN: CPT | Performed by: PHYSICIAN ASSISTANT

## 2024-10-17 PROCEDURE — 1157F ADVNC CARE PLAN IN RCRD: CPT | Performed by: PHYSICIAN ASSISTANT

## 2024-10-17 PROCEDURE — 72040 X-RAY EXAM NECK SPINE 2-3 VW: CPT

## 2024-10-17 PROCEDURE — 1036F TOBACCO NON-USER: CPT | Performed by: PHYSICIAN ASSISTANT

## 2024-10-17 ASSESSMENT — PAIN - FUNCTIONAL ASSESSMENT: PAIN_FUNCTIONAL_ASSESSMENT: NO/DENIES PAIN

## 2024-10-17 NOTE — PROGRESS NOTES
Ronal returns to clinic today for reevaluation of a C6 vertebral body fracture after a fall on September 8, 2024.    He has been in a hard [place] collar since 9/8.  He has not taken off for any reason.  He currently has no neck pain.  No pain radiating down his arms.  He is able to ambulate with a walker.  He is overall neurologically intact.    On exam, patient examined in his wheelchair, they did not bring a walker today.  Strength intact in the upper extremities.  We did remove his hard collar so I could change the pads they brought with them.  No evidence of skin breakdown.  Sensation intact without pathologic reflexes.    I personally reviewed x-rays completed today.  There is no evidence of instability.  Stable alignment of known C6 fracture.  Plain films fairly unchanged since 9/18.    He can continue with activities as tolerated.  He is to remain in the hard collar for another 4 weeks for a total of 10 weeks.  We discussed that he can continue walking with his walker.  He also has a floor peddler that he can use at home for exercise.  He can also do some light upper extremity exercises, mostly just keeping his range of motion.  He will follow-up with me on 11/21 with repeat x-rays AP/LAT and flexion-extension views out of the collar.    **This note was dictated using speech recognition software and was not corrected for spelling or grammatical errors**

## 2024-10-21 ENCOUNTER — CLINICAL SUPPORT (OUTPATIENT)
Dept: PRIMARY CARE | Facility: CLINIC | Age: 89
End: 2024-10-21
Payer: COMMERCIAL

## 2024-10-21 DIAGNOSIS — Z23 ENCOUNTER FOR IMMUNIZATION: ICD-10-CM

## 2024-10-21 PROCEDURE — 90662 IIV NO PRSV INCREASED AG IM: CPT | Performed by: FAMILY MEDICINE

## 2024-10-21 PROCEDURE — 90471 IMMUNIZATION ADMIN: CPT | Performed by: FAMILY MEDICINE

## 2024-11-07 DIAGNOSIS — I25.10 CORONARY ARTERY DISEASE INVOLVING NATIVE HEART WITHOUT ANGINA PECTORIS, UNSPECIFIED VESSEL OR LESION TYPE: ICD-10-CM

## 2024-11-07 DIAGNOSIS — I10 BENIGN ESSENTIAL HYPERTENSION: ICD-10-CM

## 2024-11-08 ENCOUNTER — PATIENT OUTREACH (OUTPATIENT)
Dept: PRIMARY CARE | Facility: CLINIC | Age: 89
End: 2024-11-08
Payer: MEDICARE

## 2024-11-08 DIAGNOSIS — I73.9 PAD (PERIPHERAL ARTERY DISEASE) (CMS-HCC): ICD-10-CM

## 2024-11-08 DIAGNOSIS — I25.10 CORONARY ARTERY DISEASE INVOLVING NATIVE HEART WITHOUT ANGINA PECTORIS, UNSPECIFIED VESSEL OR LESION TYPE: ICD-10-CM

## 2024-11-08 DIAGNOSIS — M19.071 OSTEOARTHRITIS OF BOTH ANKLES, UNSPECIFIED OSTEOARTHRITIS TYPE: ICD-10-CM

## 2024-11-08 DIAGNOSIS — M19.072 OSTEOARTHRITIS OF BOTH ANKLES, UNSPECIFIED OSTEOARTHRITIS TYPE: ICD-10-CM

## 2024-11-08 DIAGNOSIS — I73.9 VASCULAR CLAUDICATION (CMS-HCC): ICD-10-CM

## 2024-11-08 DIAGNOSIS — I10 BENIGN ESSENTIAL HYPERTENSION: ICD-10-CM

## 2024-11-08 NOTE — PROGRESS NOTES
[facility] Monthly Outreach  Date: 11/8/2024  Chart reviewed.    Spoke with pt over the phone today. Ronal is in good spirits and is recovering well from both his fall and recent covid infection. Pt states that he is completely recovered from covid. Is getting around much better and feels stronger after his fall. Denies any further falls. Pt is using his walker to perform his ADLs and to walk around the house for exercise. He has been allowed by ortho to perform upper arm ROM exercises but must remain in c collar until 11/21. Per last ortho note, pts injury is healing well and he will have more xrays and c collar removed at next visit. He will then need to begin PT for his neck. Ronal is hoping to have in home PT as he in unable to drive at this time. His spouse, Hayley does not drive so they have had friends and family assist with transportation.   Pt is monitoring bp at home. Last reading 122/71. Denies s/s of hyper/hypotension. Taking all medications as directed without issue.   Pt states he is not experiencing much pain anymore and his lacerations are healing nicely. Staples and sutures have been removed.   Ronal's appetite has been good but he does find it difficult to eat while wearing the c collar.     RN CM  advised pt to reach out with any questions/concerns/issues.  Will follow.

## 2024-11-13 RX ORDER — VALSARTAN 80 MG/1
80 TABLET ORAL DAILY
Qty: 90 TABLET | Refills: 1 | Status: SHIPPED | OUTPATIENT
Start: 2024-11-13

## 2024-11-13 RX ORDER — CLOPIDOGREL BISULFATE 75 MG/1
75 TABLET ORAL DAILY
Qty: 90 TABLET | Refills: 1 | Status: SHIPPED | OUTPATIENT
Start: 2024-11-13

## 2024-11-20 DIAGNOSIS — M54.2 NECK PAIN: ICD-10-CM

## 2024-11-21 ENCOUNTER — APPOINTMENT (OUTPATIENT)
Dept: ORTHOPEDIC SURGERY | Facility: CLINIC | Age: 89
End: 2024-11-21
Payer: COMMERCIAL

## 2024-11-21 ENCOUNTER — HOSPITAL ENCOUNTER (OUTPATIENT)
Dept: RADIOLOGY | Facility: HOSPITAL | Age: 89
Discharge: HOME | End: 2024-11-21
Payer: MEDICARE

## 2024-11-21 DIAGNOSIS — S12.500A: ICD-10-CM

## 2024-11-21 PROCEDURE — 72050 X-RAY EXAM NECK SPINE 4/5VWS: CPT | Performed by: RADIOLOGY

## 2024-11-21 PROCEDURE — 99213 OFFICE O/P EST LOW 20 MIN: CPT | Performed by: PHYSICIAN ASSISTANT

## 2024-11-21 PROCEDURE — 1036F TOBACCO NON-USER: CPT | Performed by: PHYSICIAN ASSISTANT

## 2024-11-21 PROCEDURE — 72050 X-RAY EXAM NECK SPINE 4/5VWS: CPT

## 2024-11-21 PROCEDURE — 1157F ADVNC CARE PLAN IN RCRD: CPT | Performed by: PHYSICIAN ASSISTANT

## 2024-11-21 PROCEDURE — 1160F RVW MEDS BY RX/DR IN RCRD: CPT | Performed by: PHYSICIAN ASSISTANT

## 2024-11-21 PROCEDURE — 1159F MED LIST DOCD IN RCRD: CPT | Performed by: PHYSICIAN ASSISTANT

## 2024-11-21 ASSESSMENT — PAIN - FUNCTIONAL ASSESSMENT: PAIN_FUNCTIONAL_ASSESSMENT: NO/DENIES PAIN

## 2024-11-21 NOTE — PROGRESS NOTES
Ronal returns to clinic today for reevaluation of a C6 vertebral body fracture after a fall on September 8, 2024.    He has been in a hard Aspen collar since 9/8.  He has not taken this off for any reason.  He currently has no neck pain.  No pain radiating down his arms.  He is able to ambulate with a walker.  He is overall neurologically intact.    On exam, patient examined in his wheelchair, they did not bring a walker today.  Strength intact in the upper extremities.  Collar was removed today, cervical range of motion is supple.  Sensation intact without pathologic reflexes.    I personally reviewed x-rays completed today.  There is no evidence of instability.  Stable alignment of the cervical spine.    He can continue with activities as tolerated.  He no longer needs the cervical collar, it was removed at today's appointment.  He is going to continue with an at-home exercise program, we did discuss outpatient physical therapy, he does not feel this is necessary at this time he has everything he needs at home.  He does not need refills of any medications today.  He will follow-up with Dr. Mak in January with repeat x-rays, cervical AP lateral and flexion-extension views.    **This note was dictated using speech recognition software and was not corrected for spelling or grammatical errors**    **This note was dictated using speech recognition software and was not corrected for spelling or grammatical errors**

## 2024-12-13 ENCOUNTER — PATIENT OUTREACH (OUTPATIENT)
Dept: PRIMARY CARE | Facility: CLINIC | Age: 89
End: 2024-12-13
Payer: MEDICARE

## 2024-12-13 DIAGNOSIS — M54.2 NECK PAIN: ICD-10-CM

## 2024-12-13 DIAGNOSIS — I10 BENIGN ESSENTIAL HYPERTENSION: ICD-10-CM

## 2024-12-13 DIAGNOSIS — R26.9 GAIT DIFFICULTY: ICD-10-CM

## 2024-12-13 NOTE — PROGRESS NOTES
Chart reviewed prior to call. Don stated he is doing well. He was able to get the C-collar off 11/21 and has felt good since. Denies any pain and is now able to drive. Eye exam at [facility] completed on 11/22. Denies any pain. Denies any falls. Continues to monitor BP. Denies any needs or concerns at this time.

## 2025-01-02 ENCOUNTER — APPOINTMENT (OUTPATIENT)
Dept: HEMATOLOGY/ONCOLOGY | Facility: CLINIC | Age: 89
End: 2025-01-02
Payer: MEDICARE

## 2025-01-13 ENCOUNTER — PATIENT OUTREACH (OUTPATIENT)
Dept: PRIMARY CARE | Facility: CLINIC | Age: 89
End: 2025-01-13
Payer: MEDICARE

## 2025-01-13 DIAGNOSIS — R26.9 GAIT DIFFICULTY: ICD-10-CM

## 2025-01-13 DIAGNOSIS — I48.0 PAROXYSMAL ATRIAL FIBRILLATION (MULTI): ICD-10-CM

## 2025-01-13 DIAGNOSIS — I10 BENIGN ESSENTIAL HYPERTENSION: ICD-10-CM

## 2025-01-13 NOTE — PROGRESS NOTES
Chart reviewed prior to call. Don stated that he still has the difficulty walking at times. That hasn't changed at all. He says he has cardioversion coming up with Dr. TIARRA Bill for the Afib. BP was 119/67 with HR 71. He also has an appointment on 1/22 with ortho for neck injury follow up. On 1/24, appointment with oncology. He has cataract surgery at [place] on 2/13. He has no needs today.

## 2025-01-15 ENCOUNTER — TELEPHONE (OUTPATIENT)
Dept: HEMATOLOGY/ONCOLOGY | Facility: CLINIC | Age: 89
End: 2025-01-15
Payer: MEDICARE

## 2025-01-15 ENCOUNTER — LAB (OUTPATIENT)
Dept: LAB | Facility: LAB | Age: 89
End: 2025-01-15
Payer: MEDICARE

## 2025-01-15 DIAGNOSIS — D50.8 IRON DEFICIENCY ANEMIA SECONDARY TO INADEQUATE DIETARY IRON INTAKE: ICD-10-CM

## 2025-01-15 LAB
ALBUMIN SERPL BCP-MCNC: 4.7 G/DL (ref 3.4–5)
ALP SERPL-CCNC: 49 U/L (ref 33–136)
ALT SERPL W P-5'-P-CCNC: 11 U/L (ref 10–52)
ANION GAP SERPL CALC-SCNC: 12 MMOL/L (ref 10–20)
AST SERPL W P-5'-P-CCNC: 19 U/L (ref 9–39)
BASOPHILS # BLD AUTO: 0.06 X10*3/UL (ref 0–0.1)
BASOPHILS NFR BLD AUTO: 1 %
BILIRUB SERPL-MCNC: 0.9 MG/DL (ref 0–1.2)
BUN SERPL-MCNC: 32 MG/DL (ref 6–23)
CALCIUM SERPL-MCNC: 9.2 MG/DL (ref 8.6–10.3)
CHLORIDE SERPL-SCNC: 103 MMOL/L (ref 98–107)
CO2 SERPL-SCNC: 30 MMOL/L (ref 21–32)
CREAT SERPL-MCNC: 0.96 MG/DL (ref 0.5–1.3)
EGFRCR SERPLBLD CKD-EPI 2021: 77 ML/MIN/1.73M*2
EOSINOPHIL # BLD AUTO: 0.05 X10*3/UL (ref 0–0.4)
EOSINOPHIL NFR BLD AUTO: 0.8 %
ERYTHROCYTE [DISTWIDTH] IN BLOOD BY AUTOMATED COUNT: 15.1 % (ref 11.5–14.5)
FERRITIN SERPL-MCNC: 107 NG/ML (ref 20–300)
GLUCOSE SERPL-MCNC: 103 MG/DL (ref 74–99)
HCT VFR BLD AUTO: 42.1 % (ref 41–52)
HGB BLD-MCNC: 13.5 G/DL (ref 13.5–17.5)
IMM GRANULOCYTES # BLD AUTO: 0.01 X10*3/UL (ref 0–0.5)
IMM GRANULOCYTES NFR BLD AUTO: 0.2 % (ref 0–0.9)
IRON SATN MFR SERPL: 15 % (ref 25–45)
IRON SERPL-MCNC: 45 UG/DL (ref 35–150)
LDH SERPL L TO P-CCNC: 164 U/L (ref 84–246)
LYMPHOCYTES # BLD AUTO: 1.73 X10*3/UL (ref 0.8–3)
LYMPHOCYTES NFR BLD AUTO: 28.4 %
MCH RBC QN AUTO: 30.1 PG (ref 26–34)
MCHC RBC AUTO-ENTMCNC: 32.1 G/DL (ref 32–36)
MCV RBC AUTO: 94 FL (ref 80–100)
MONOCYTES # BLD AUTO: 0.86 X10*3/UL (ref 0.05–0.8)
MONOCYTES NFR BLD AUTO: 14.1 %
NEUTROPHILS # BLD AUTO: 3.39 X10*3/UL (ref 1.6–5.5)
NEUTROPHILS NFR BLD AUTO: 55.5 %
NRBC BLD-RTO: 0 /100 WBCS (ref 0–0)
PLATELET # BLD AUTO: 182 X10*3/UL (ref 150–450)
POTASSIUM SERPL-SCNC: 4.4 MMOL/L (ref 3.5–5.3)
PROT SERPL-MCNC: 6.6 G/DL (ref 6.4–8.2)
RBC # BLD AUTO: 4.49 X10*6/UL (ref 4.5–5.9)
SODIUM SERPL-SCNC: 141 MMOL/L (ref 136–145)
TIBC SERPL-MCNC: 303 UG/DL (ref 240–445)
UIBC SERPL-MCNC: 258 UG/DL (ref 110–370)
WBC # BLD AUTO: 6.1 X10*3/UL (ref 4.4–11.3)

## 2025-01-15 PROCEDURE — 82784 ASSAY IGA/IGD/IGG/IGM EACH: CPT

## 2025-01-15 PROCEDURE — 82728 ASSAY OF FERRITIN: CPT

## 2025-01-15 PROCEDURE — 84165 PROTEIN E-PHORESIS SERUM: CPT | Performed by: NURSE PRACTITIONER

## 2025-01-15 PROCEDURE — 85025 COMPLETE CBC W/AUTO DIFF WBC: CPT

## 2025-01-15 PROCEDURE — 86334 IMMUNOFIX E-PHORESIS SERUM: CPT

## 2025-01-15 PROCEDURE — 82607 VITAMIN B-12: CPT

## 2025-01-15 PROCEDURE — 83540 ASSAY OF IRON: CPT

## 2025-01-15 PROCEDURE — 83615 LACTATE (LD) (LDH) ENZYME: CPT

## 2025-01-15 PROCEDURE — 83550 IRON BINDING TEST: CPT

## 2025-01-15 PROCEDURE — 84155 ASSAY OF PROTEIN SERUM: CPT

## 2025-01-15 PROCEDURE — 80053 COMPREHEN METABOLIC PANEL: CPT

## 2025-01-15 PROCEDURE — 84165 PROTEIN E-PHORESIS SERUM: CPT

## 2025-01-15 PROCEDURE — 83521 IG LIGHT CHAINS FREE EACH: CPT

## 2025-01-15 PROCEDURE — 86320 SERUM IMMUNOELECTROPHORESIS: CPT | Performed by: NURSE PRACTITIONER

## 2025-01-15 NOTE — TELEPHONE ENCOUNTER
Attempted to start an IV times two by me and Israel BLACK. Unsuccessful. Doctor made aware. Changed orders.    TCT Don with request to have labs drawn prior to appt to allow provider to allow her to review at time of visit. He verbalized understanding with teach-back method.

## 2025-01-16 LAB
IGA SERPL-MCNC: 91 MG/DL (ref 70–400)
IGG SERPL-MCNC: 775 MG/DL (ref 700–1600)
IGM SERPL-MCNC: 46 MG/DL (ref 40–230)
KAPPA LC SERPL-MCNC: 1.63 MG/DL (ref 0.33–1.94)
KAPPA LC/LAMBDA SER: 1.48 {RATIO} (ref 0.26–1.65)
LAMBDA LC SERPL-MCNC: 1.1 MG/DL (ref 0.57–2.63)
PROT SERPL-MCNC: 7 G/DL (ref 6.4–8.2)
VIT B12 SERPL-MCNC: 612 PG/ML (ref 211–911)

## 2025-01-20 LAB
ALBUMIN: 4.7 G/DL (ref 3.4–5)
ALPHA 1 GLOBULIN: 0.3 G/DL (ref 0.2–0.6)
ALPHA 2 GLOBULIN: 0.6 G/DL (ref 0.4–1.1)
BETA GLOBULIN: 0.7 G/DL (ref 0.5–1.2)
GAMMA GLOBULIN: 0.7 G/DL (ref 0.5–1.4)
IMMUNOFIXATION COMMENT: NORMAL
PATH REVIEW - SERUM IMMUNOFIXATION: NORMAL
PATH REVIEW-SERUM PROTEIN ELECTROPHORESIS: NORMAL
PROTEIN ELECTROPHORESIS COMMENT: NORMAL

## 2025-01-22 ENCOUNTER — OFFICE VISIT (OUTPATIENT)
Dept: ORTHOPEDIC SURGERY | Facility: CLINIC | Age: 89
End: 2025-01-22
Payer: MEDICARE

## 2025-01-22 ENCOUNTER — HOSPITAL ENCOUNTER (OUTPATIENT)
Dept: RADIOLOGY | Facility: CLINIC | Age: 89
Discharge: HOME | End: 2025-01-22
Payer: MEDICARE

## 2025-01-22 VITALS — HEIGHT: 73 IN | WEIGHT: 189 LBS | BODY MASS INDEX: 25.05 KG/M2

## 2025-01-22 DIAGNOSIS — S12.500D: Primary | ICD-10-CM

## 2025-01-22 DIAGNOSIS — S12.500A: ICD-10-CM

## 2025-01-22 PROCEDURE — 1036F TOBACCO NON-USER: CPT | Performed by: ORTHOPAEDIC SURGERY

## 2025-01-22 PROCEDURE — 1159F MED LIST DOCD IN RCRD: CPT | Performed by: ORTHOPAEDIC SURGERY

## 2025-01-22 PROCEDURE — 99213 OFFICE O/P EST LOW 20 MIN: CPT | Performed by: ORTHOPAEDIC SURGERY

## 2025-01-22 PROCEDURE — 72050 X-RAY EXAM NECK SPINE 4/5VWS: CPT

## 2025-01-22 PROCEDURE — 72050 X-RAY EXAM NECK SPINE 4/5VWS: CPT | Performed by: RADIOLOGY

## 2025-01-22 ASSESSMENT — ENCOUNTER SYMPTOMS
OCCASIONAL FEELINGS OF UNSTEADINESS: 1
DEPRESSION: 0
LOSS OF SENSATION IN FEET: 0

## 2025-01-22 ASSESSMENT — PAIN SCALES - GENERAL: PAINLEVEL_OUTOF10: 0 - NO PAIN

## 2025-01-22 ASSESSMENT — PAIN - FUNCTIONAL ASSESSMENT: PAIN_FUNCTIONAL_ASSESSMENT: 0-10

## 2025-01-22 NOTE — PROGRESS NOTES
S: Alexis Armijo is a 89 y.o. year old male patient who is here to follow up on his C6 vertebral body fracture after fall on September 8, 2024.  He is now little more than 4 months out from his injury.  His cervical collar was discontinued in November.  He has been doing very well.  Denies any neck pain.  Denies any radicular symptoms or myelopathic symptoms no new falls.  He is driving he is back to his normal activities.      O:     General: Patient appears well-nourished and well-developed in no acute distress, Alert and Oriented x3  Psych: Pleasant mood and affect  HEENT: Extraocular muscles intact, pupils equal and round. Sclerae anicteric   Cardio: extremities warm and well perfused  Resp: unlabored symmetric breathing  Skin: No open wounds or rashes  Musculoskeletal/Neuro Exam: Crotch gait with a cane.  No tenderness to palpation along the cervical midline and paraspinal regions.  No pain with cervical range of motion he does have stiffness with flexion extension and rotation    Upper extremity:    Motor: Right upper extremity was 5 out of 5 strength with shoulder abduction, elbow flexion and extension, wrist flexion and extension and  against resistance  Left upper extremity with 5 out of 5 strength with shoulder abduction, elbow flexion and extension, wrist flexion and extension and  against resistance    Sensation to light touch intact along C5-T1 distribution bilaterally      Lower extremity    Motor: Right leg with 5 out of 5 motor strength with hip flexion, knee extension, ankle dorsiflexion plantarflexion EHL against resistance  Left leg with 5 out of 5 motor strength with hip flexion, knee extension, ankle dorsiflexion plantarflexion EHL against resistance    Sensation to light touch intact along L2-S1 bilaterally            Imaging:    I personally reviewed xray of the cervical spine obtained in clinic today. AP, Lateral, flexion and extension xrays were reviewed.  There is sclerosis at the  fracture site at C6 likely healed.  He has multilevel degenerative changes the space narrowing facet arthrosis.  No new acute fractures.          A/P: Alexis Armijo is a 89 y.o. year old male patient who is 4 months out status post C6 extension teardrop fracture.  Treated nonoperatively.  He is doing well.  Fracture healed.  At this point he has no restrictions.  X-rays look stable.  Patient can follow-up as needed.    After our discussion, the patient articulated understanding of the plan and felt that all questions had been answered satisfactorily. The patient was pleased with the visit and very appreciative for the care rendered.    **Please excuse any errors in grammar or translation related to this dictation. Voice recognition software was utilized to prepare this document. **                  Kylie Mak MD    Department of Orthopaedic Surgery  [facility]  [email or web address]

## 2025-01-24 ENCOUNTER — OFFICE VISIT (OUTPATIENT)
Dept: HEMATOLOGY/ONCOLOGY | Facility: CLINIC | Age: 89
End: 2025-01-24
Payer: MEDICARE

## 2025-01-24 VITALS
OXYGEN SATURATION: 97 % | RESPIRATION RATE: 17 BRPM | HEIGHT: 73 IN | DIASTOLIC BLOOD PRESSURE: 92 MMHG | HEART RATE: 79 BPM | TEMPERATURE: 97.3 F | SYSTOLIC BLOOD PRESSURE: 164 MMHG | BODY MASS INDEX: 25.77 KG/M2 | WEIGHT: 194.45 LBS

## 2025-01-24 DIAGNOSIS — D50.8 IRON DEFICIENCY ANEMIA SECONDARY TO INADEQUATE DIETARY IRON INTAKE: Primary | ICD-10-CM

## 2025-01-24 PROCEDURE — 3077F SYST BP >= 140 MM HG: CPT | Performed by: NURSE PRACTITIONER

## 2025-01-24 PROCEDURE — 1159F MED LIST DOCD IN RCRD: CPT | Performed by: NURSE PRACTITIONER

## 2025-01-24 PROCEDURE — 99214 OFFICE O/P EST MOD 30 MIN: CPT | Performed by: NURSE PRACTITIONER

## 2025-01-24 PROCEDURE — 1126F AMNT PAIN NOTED NONE PRSNT: CPT | Performed by: NURSE PRACTITIONER

## 2025-01-24 PROCEDURE — 3080F DIAST BP >= 90 MM HG: CPT | Performed by: NURSE PRACTITIONER

## 2025-01-24 ASSESSMENT — ENCOUNTER SYMPTOMS
COUGH: 0
PALPITATIONS: 0
ABDOMINAL DISTENTION: 0
WOUND: 0
FATIGUE: 1
TROUBLE SWALLOWING: 0
LEG SWELLING: 0
NAUSEA: 0
EXTREMITY WEAKNESS: 0
DIAPHORESIS: 0
ARTHRALGIAS: 0
BLOOD IN STOOL: 0
RESPIRATORY NEGATIVE: 1
BRUISES/BLEEDS EASILY: 0
UNEXPECTED WEIGHT CHANGE: 0
ABDOMINAL PAIN: 0
FLANK PAIN: 0
NUMBNESS: 0
EYE PROBLEMS: 0
BACK PAIN: 0
DIZZINESS: 0
CONSTIPATION: 0
MUSCULOSKELETAL NEGATIVE: 1
EYES NEGATIVE: 1
GASTROINTESTINAL NEGATIVE: 1
SHORTNESS OF BREATH: 0
CHILLS: 0
ADENOPATHY: 0
DIARRHEA: 0
HEMATOLOGIC/LYMPHATIC NEGATIVE: 1
CHEST TIGHTNESS: 0
CARDIOVASCULAR NEGATIVE: 1
FEVER: 0
SORE THROAT: 0

## 2025-01-24 ASSESSMENT — PAIN SCALES - GENERAL: PAINLEVEL_OUTOF10: 0-NO PAIN

## 2025-01-24 NOTE — PROGRESS NOTES
Patient ID: Ronal Armijo is a 89 y.o. male.    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     Interval History:  Patient comes in today for his routine one-year follow-up visit for his indolent B cell non-Hodgkin's lymphoma.  On presentation today he is feeling well.  He denies any fever, chills or night sweats.  No cough, chest pain or shortness of breath.  No nausea or vomiting.  No constipation or diarrhea.  He has had no drenching night sweats.  He has not had no unexplained weight loss and no new or notable lymphadenopathy.  He fell and broke his neck which had to be immobilized for extensive period of time.  He is using a cane.  He is on Eloquis for atrial fibrillation.   Primary concern is PVD following with vascular.    Review of Systems   Constitutional:  Positive for fatigue. Negative for chills, diaphoresis, fever and unexpected weight change.   HENT:   Negative for hearing loss, lump/mass, mouth sores, nosebleeds, sore throat, tinnitus and trouble swallowing.    Eyes: Negative.  Negative for eye problems.   Respiratory: Negative.  Negative for chest tightness, cough and shortness of breath.    Cardiovascular: Negative.  Negative for chest pain, leg swelling and palpitations.   Gastrointestinal: Negative.  Negative for abdominal distention, abdominal pain, blood in stool, constipation, diarrhea and nausea.   Musculoskeletal: Negative.  Negative for arthralgias, back pain, flank pain and gait problem.   Skin: Negative.  Negative for itching, rash and wound.   Neurological:  Negative for dizziness, extremity weakness, gait problem and numbness.   Hematological: Negative.  Negative for adenopathy. Does not bruise/bleed easily.      Objective   BSA: 2.13 meters squared  BP (!) 164/92   Pulse 79   Temp  "36.3 °C (97.3 °F) (Temporal)   Resp 17   Ht (S) 1.847 m (6' 0.72\")   Wt 88.2 kg (194 lb 7.1 oz)   SpO2 97%   BMI 25.85 kg/m²      has a past medical history of Age-related nuclear cataract, unspecified eye (05/26/2022), Atherosclerosis of native arteries of extremities with intermittent claudication, bilateral legs (CMS-[facility]) (01/03/2023), Atherosclerotic heart disease of native coronary artery without angina pectoris (10/04/2022), Cellulitis of lower extremity (04/09/2024), Community acquired pneumonia (04/09/2024), Diplopia (02/18/2022), Essential (primary) hypertension (10/04/2022), Heart disease (2010), Other abnormal glucose, Other intervertebral disc degeneration, lumbar region, Pain in extremity (04/09/2024), Personal history of colonic polyps, 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   has a past surgical history that includes Other surgical history (02/10/2015); Other surgical history (07/20/2015); Cardiac pacemaker placement (04/16/2018); Total knee arthroplasty (04/16/2018); Colonoscopy w/ polypectomy (01/25/2017); Knee arthroscopy w/ debridement (01/25/2017); Appendectomy (01/25/2017); Coronary angioplasty with stent (01/25/2017); Esophagogastroduodenoscopy (01/25/2017); Hernia repair (01/25/2017); Other surgical history (01/25/2017); and CT angio aorta and bilateral iliofemoral runoff including without contrast if performed (11/23/2019).  No family history on file.      Alexis Armijo \"Don\"  reports that he has never smoked. He has never used smokeless tobacco.  He  reports no history of alcohol use.  He  reports no history of drug use.    Physical Exam  Constitutional:       Appearance: " Normal appearance.   Eyes:      Conjunctiva/sclera: Conjunctivae normal.      Pupils: Pupils are equal, round, and reactive to light.   Cardiovascular:      Rate and Rhythm: Normal rate and regular rhythm.      Pulses: Normal pulses.      Heart sounds: Normal heart sounds.      Comments: BP elevated  Chronic peripheral vascular disease with some skin changes, indent at sock line  Pulmonary:      Effort: No respiratory distress.      Breath sounds: No stridor. No wheezing or rhonchi.   Abdominal:      General: There is no distension.      Palpations: Abdomen is soft. There is no mass.      Tenderness: There is no abdominal tenderness.   Musculoskeletal:         General: Swelling present. Normal range of motion.   Lymphadenopathy:      Cervical: No cervical adenopathy.   Skin:     Coloration: Skin is not jaundiced or pale.      Findings: No bruising.   Neurological:      Motor: No weakness.     WBC   Date/Time Value Ref Range Status   01/15/2025 04:34 PM 6.1 4.4 - 11.3 x10*3/uL Final   09/08/2024 04:39 PM 7.6 4.4 - 11.3 x10*3/uL Final   02/24/2024 11:56 PM 6.8 4.4 - 11.3 x10*3/uL Final     nRBC   Date Value Ref Range Status   01/15/2025 0.0 0.0 - 0.0 /100 WBCs Final   09/08/2024 0.0 0.0 - 0.0 /100 WBCs Final   02/24/2024 0.0 0.0 - 0.0 /100 WBCs Final     RBC   Date Value Ref Range Status   01/15/2025 4.49 (L) 4.50 - 5.90 x10*6/uL Final   09/08/2024 4.52 4.50 - 5.90 x10*6/uL Final   02/24/2024 4.18 (L) 4.50 - 5.90 x10*6/uL Final     Hemoglobin   Date Value Ref Range Status   01/15/2025 13.5 13.5 - 17.5 g/dL Final   09/08/2024 13.9 13.5 - 17.5 g/dL Final   02/24/2024 12.9 (L) 13.5 - 17.5 g/dL Final     Hematocrit   Date Value Ref Range Status   01/15/2025 42.1 41.0 - 52.0 % Final   09/08/2024 42.8 41.0 - 52.0 % Final   02/24/2024 39.9 (L) 41.0 - 52.0 % Final     MCV   Date/Time Value Ref Range Status   01/15/2025 04:34 PM 94 80 - 100 fL Final   09/08/2024 04:39 PM 95 80 - 100 fL Final   02/24/2024 11:56 PM 96 80 - 100  fL Final     MCH   Date/Time Value Ref Range Status   01/15/2025 04:34 PM 30.1 26.0 - 34.0 pg Final   09/08/2024 04:39 PM 30.8 26.0 - 34.0 pg Final   02/24/2024 11:56 PM 30.9 26.0 - 34.0 pg Final     MCHC   Date/Time Value Ref Range Status   01/15/2025 04:34 PM 32.1 32.0 - 36.0 g/dL Final   09/08/2024 04:39 PM 32.5 32.0 - 36.0 g/dL Final   02/24/2024 11:56 PM 32.3 32.0 - 36.0 g/dL Final     RDW   Date/Time Value Ref Range Status   01/15/2025 04:34 PM 15.1 (H) 11.5 - 14.5 % Final   09/08/2024 04:39 PM 14.6 (H) 11.5 - 14.5 % Final   02/24/2024 11:56 PM 14.1 11.5 - 14.5 % Final     Platelets   Date/Time Value Ref Range Status   01/15/2025 04:34  150 - 450 x10*3/uL Final   09/08/2024 04:39  150 - 450 x10*3/uL Final   02/24/2024 11:56  150 - 450 x10*3/uL Final     MPV   Date/Time Value Ref Range Status   10/05/2023 08:05 AM 10.1 7.5 - 11.5 fL Final   12/07/2020 11:20 AM 10.2 7.0 - 12.6 CU Final   12/03/2019 09:09 AM 9.9 7.0 - 12.6 CU Final     Neutrophils %   Date/Time Value Ref Range Status   01/15/2025 04:34 PM 55.5 40.0 - 80.0 % Final   09/08/2024 04:39 PM 43.7 40.0 - 80.0 % Final   02/24/2024 11:56 PM 62.4 40.0 - 80.0 % Final     Immature Granulocytes %, Automated   Date/Time Value Ref Range Status   01/15/2025 04:34 PM 0.2 0.0 - 0.9 % Final     Comment:     Immature Granulocyte Count (IG) includes promyelocytes, myelocytes and metamyelocytes but does not include bands. Percent differential counts (%) should be interpreted in the context of the absolute cell counts (cells/UL).   09/08/2024 04:39 PM 0.3 0.0 - 0.9 % Final     Comment:     Immature Granulocyte Count (IG) includes promyelocytes, myelocytes and metamyelocytes but does not include bands. Percent differential counts (%) should be interpreted in the context of the absolute cell counts (cells/UL).   02/24/2024 11:56 PM 0.3 0.0 - 0.9 % Final     Comment:     Immature Granulocyte Count (IG) includes promyelocytes, myelocytes and metamyelocytes  but does not include bands. Percent differential counts (%) should be interpreted in the context of the absolute cell counts (cells/UL).     Lymphocytes %   Date/Time Value Ref Range Status   01/15/2025 04:34 PM 28.4 13.0 - 44.0 % Final   09/08/2024 04:39 PM 42.7 13.0 - 44.0 % Final   02/24/2024 11:56 PM 23.7 13.0 - 44.0 % Final     Monocytes %   Date/Time Value Ref Range Status   01/15/2025 04:34 PM 14.1 2.0 - 10.0 % Final   09/08/2024 04:39 PM 12.0 2.0 - 10.0 % Final   02/24/2024 11:56 PM 12.4 2.0 - 10.0 % Final     Eosinophils %   Date/Time Value Ref Range Status   01/15/2025 04:34 PM 0.8 0.0 - 6.0 % Final   09/08/2024 04:39 PM 0.8 0.0 - 6.0 % Final   02/24/2024 11:56 PM 0.6 0.0 - 6.0 % Final     Basophils %   Date/Time Value Ref Range Status   01/15/2025 04:34 PM 1.0 0.0 - 2.0 % Final   09/08/2024 04:39 PM 0.5 0.0 - 2.0 % Final   02/24/2024 11:56 PM 0.6 0.0 - 2.0 % Final     Neutrophils Absolute   Date/Time Value Ref Range Status   01/15/2025 04:34 PM 3.39 1.60 - 5.50 x10*3/uL Final     Comment:     Percent differential counts (%) should be interpreted in the context of the absolute cell counts (cells/uL).   09/08/2024 04:39 PM 3.32 1.60 - 5.50 x10*3/uL Final     Comment:     Percent differential counts (%) should be interpreted in the context of the absolute cell counts (cells/uL).   02/24/2024 11:56 PM 4.21 1.60 - 5.50 x10*3/uL Final     Comment:     Percent differential counts (%) should be interpreted in the context of the absolute cell counts (cells/uL).     Immature Granulocytes Absolute, Automated   Date/Time Value Ref Range Status   01/15/2025 04:34 PM 0.01 0.00 - 0.50 x10*3/uL Final   09/08/2024 04:39 PM 0.02 0.00 - 0.50 x10*3/uL Final   02/24/2024 11:56 PM 0.02 0.00 - 0.50 x10*3/uL Final     Lymphocytes Absolute   Date/Time Value Ref Range Status   01/15/2025 04:34 PM 1.73 0.80 - 3.00 x10*3/uL Final   09/08/2024 04:39 PM 3.24 (H) 0.80 - 3.00 x10*3/uL Final   02/24/2024 11:56 PM 1.60 0.80 - 3.00  "x10*3/uL Final     Monocytes Absolute   Date/Time Value Ref Range Status   01/15/2025 04:34 PM 0.86 (H) 0.05 - 0.80 x10*3/uL Final   09/08/2024 04:39 PM 0.91 (H) 0.05 - 0.80 x10*3/uL Final   02/24/2024 11:56 PM 0.84 (H) 0.05 - 0.80 x10*3/uL Final     Eosinophils Absolute   Date/Time Value Ref Range Status   01/15/2025 04:34 PM 0.05 0.00 - 0.40 x10*3/uL Final   09/08/2024 04:39 PM 0.06 0.00 - 0.40 x10*3/uL Final   02/24/2024 11:56 PM 0.04 0.00 - 0.40 x10*3/uL Final     Basophils Absolute   Date/Time Value Ref Range Status   01/15/2025 04:34 PM 0.06 0.00 - 0.10 x10*3/uL Final   09/08/2024 04:39 PM 0.04 0.00 - 0.10 x10*3/uL Final   02/24/2024 11:56 PM 0.04 0.00 - 0.10 x10*3/uL Final       No components found for: \"PT\"  aPTT   Date/Time Value Ref Range Status   02/12/2024 12:21 PM 41 (H) 27 - 38 seconds Final   05/23/2018 07:50 AM 27 25 - 36 sec Final     Comment:       THE APTT IS NO LONGER USED FOR MONITORING     UNFRACTIONATED HEPARIN THERAPY.    FOR MONITORING HEPARIN THERAPY,     USE THE HEPARIN ASSAY.     05/22/2018 06:50 AM 27 25 - 36 sec Final     Comment:       THE APTT IS NO LONGER USED FOR MONITORING     UNFRACTIONATED HEPARIN THERAPY.    FOR MONITORING HEPARIN THERAPY,     USE THE HEPARIN ASSAY.     05/21/2018 09:00 AM 28 25 - 36 sec Final     Comment:       THE APTT IS NO LONGER USED FOR MONITORING     UNFRACTIONATED HEPARIN THERAPY.    FOR MONITORING HEPARIN THERAPY,     USE THE HEPARIN ASSAY.         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     1. Lymphoma: The patient continues to remain asymptomatic without any significant B symptoms. We will continue with watchful waiting, unless he develops B symptoms, dramatically enlarging lymph nodes orcytopenias.      2. Neuropathy: Hand and feet he will continue " following with his neurologist.      3. Immunization: The patient will continue with the flu shot every fall and the Pneumovax every five years. Has had got both COVID vaccines and is declining the booster.     Plan:  - 1 year follow-up  - labs on return CBCd, CMP, LDH  - offered follow-up here PRN. Prefers annual visits.               Nicole Mar PA-C

## 2025-01-24 NOTE — PROGRESS NOTES
Patient here for follow up visit with Nicole Mar for Dx of anemia   Patient here with his wife . He ambulated with his cane. Falls precautions maintained.     Medications and Allergies reviewed and reconciled this visit.    No concerns or complaints noted at this time.     Pt reports appetite is  good.   Pt stated he fell and broke his neck in September. He is back to his baseline. He is taking his eliquis without incident.       Follow up per  PA  request.    Pt. reports availability and use of mychart, Reviewed this is a good place to communicate with the team as well as review labs and upcoming orders.     No barriers to education noted, patient agrees to current plan and verbalized understanding using teach back method.

## 2025-02-03 ENCOUNTER — HOSPITAL ENCOUNTER (OUTPATIENT)
Dept: CARDIOLOGY | Facility: HOSPITAL | Age: 89
Setting detail: OUTPATIENT SURGERY
Discharge: HOME | End: 2025-02-03
Payer: MEDICARE

## 2025-02-03 ENCOUNTER — ANESTHESIA EVENT (OUTPATIENT)
Dept: CARDIOLOGY | Facility: HOSPITAL | Age: 89
End: 2025-02-03
Payer: MEDICARE

## 2025-02-03 ENCOUNTER — ANESTHESIA (OUTPATIENT)
Dept: CARDIOLOGY | Facility: HOSPITAL | Age: 89
End: 2025-02-03
Payer: MEDICARE

## 2025-02-03 ENCOUNTER — HOSPITAL ENCOUNTER (OUTPATIENT)
Dept: CARDIOLOGY | Facility: HOSPITAL | Age: 89
Discharge: HOME | End: 2025-02-03
Payer: MEDICARE

## 2025-02-03 VITALS
OXYGEN SATURATION: 98 % | SYSTOLIC BLOOD PRESSURE: 128 MMHG | DIASTOLIC BLOOD PRESSURE: 71 MMHG | WEIGHT: 194.45 LBS | BODY MASS INDEX: 25.85 KG/M2 | HEART RATE: 61 BPM | RESPIRATION RATE: 16 BRPM

## 2025-02-03 DIAGNOSIS — I48.0 PAF (PAROXYSMAL ATRIAL FIBRILLATION) (MULTI): ICD-10-CM

## 2025-02-03 LAB
ATRIAL RATE: 79 BPM
P AXIS: 41 DEGREES
Q ONSET: 191 MS
QRS COUNT: 13 BEATS
QRS DURATION: 184 MS
QT INTERVAL: 478 MS
QTC CALCULATION(BAZETT): 551 MS
QTC FREDERICIA: 526 MS
R AXIS: -74 DEGREES
T AXIS: 83 DEGREES
T OFFSET: 430 MS
VENTRICULAR RATE: 80 BPM

## 2025-02-03 PROCEDURE — 92960 CARDIOVERSION ELECTRIC EXT: CPT

## 2025-02-03 PROCEDURE — 3700000002 HC GENERAL ANESTHESIA TIME - EACH INCREMENTAL 1 MINUTE

## 2025-02-03 PROCEDURE — A92960 PR CARDIOVERSION, ELECTIVE;EXTERN: Performed by: NURSE ANESTHETIST, CERTIFIED REGISTERED

## 2025-02-03 PROCEDURE — 99100 ANES PT EXTEME AGE<1 YR&>70: CPT | Performed by: ANESTHESIOLOGY

## 2025-02-03 PROCEDURE — A92960 PR CARDIOVERSION, ELECTIVE;EXTERN: Performed by: ANESTHESIOLOGY

## 2025-02-03 PROCEDURE — 3700000001 HC GENERAL ANESTHESIA TIME - INITIAL BASE CHARGE

## 2025-02-03 PROCEDURE — 93005 ELECTROCARDIOGRAM TRACING: CPT

## 2025-02-03 PROCEDURE — 2500000004 HC RX 250 GENERAL PHARMACY W/ HCPCS (ALT 636 FOR OP/ED): Performed by: NURSE ANESTHETIST, CERTIFIED REGISTERED

## 2025-02-03 PROCEDURE — 7100000010 HC PHASE TWO TIME - EACH INCREMENTAL 1 MINUTE

## 2025-02-03 PROCEDURE — 7100000009 HC PHASE TWO TIME - INITIAL BASE CHARGE

## 2025-02-03 RX ORDER — PROPOFOL 10 MG/ML
INJECTION, EMULSION INTRAVENOUS AS NEEDED
Status: DISCONTINUED | OUTPATIENT
Start: 2025-02-03 | End: 2025-02-03

## 2025-02-03 RX ADMIN — PROPOFOL 10 MG: 10 INJECTION, EMULSION INTRAVENOUS at 07:26

## 2025-02-03 RX ADMIN — PROPOFOL 30 MG: 10 INJECTION, EMULSION INTRAVENOUS at 07:25

## 2025-02-03 SDOH — HEALTH STABILITY: MENTAL HEALTH: CURRENT SMOKER: 0

## 2025-02-03 ASSESSMENT — COLUMBIA-SUICIDE SEVERITY RATING SCALE - C-SSRS
6. HAVE YOU EVER DONE ANYTHING, STARTED TO DO ANYTHING, OR PREPARED TO DO ANYTHING TO END YOUR LIFE?: NO
6. HAVE YOU EVER DONE ANYTHING, STARTED TO DO ANYTHING, OR PREPARED TO DO ANYTHING TO END YOUR LIFE?: NO
2. HAVE YOU ACTUALLY HAD ANY THOUGHTS OF KILLING YOURSELF?: NO
2. HAVE YOU ACTUALLY HAD ANY THOUGHTS OF KILLING YOURSELF?: NO
1. IN THE PAST MONTH, HAVE YOU WISHED YOU WERE DEAD OR WISHED YOU COULD GO TO SLEEP AND NOT WAKE UP?: NO
1. IN THE PAST MONTH, HAVE YOU WISHED YOU WERE DEAD OR WISHED YOU COULD GO TO SLEEP AND NOT WAKE UP?: NO

## 2025-02-03 ASSESSMENT — PAIN - FUNCTIONAL ASSESSMENT
PAIN_FUNCTIONAL_ASSESSMENT: 0-10

## 2025-02-03 ASSESSMENT — PAIN SCALES - GENERAL
PAINLEVEL_OUTOF10: 0 - NO PAIN

## 2025-02-03 NOTE — ANESTHESIA PREPROCEDURE EVALUATION
"Patient: Alexis Armijo \"Don\"    Procedure Information       Date/Time: 02/03/25 0730    Scheduled providers: Domenic Bill MD    Procedure: CARDIOVERSION EXTERNAL    Location: [facility]          Vitals:    02/03/25 0721   BP: 153/80   Pulse: 74   Resp: 16   SpO2: 97%       Past Surgical History:   Procedure Laterality Date    APPENDECTOMY  01/25/2017    Appendectomy    CARDIAC PACEMAKER PLACEMENT  04/16/2018    Pacemaker Placement    COLONOSCOPY W/ POLYPECTOMY  01/25/2017    Complete Colonoscopy For Polyp Removal    CORONARY ANGIOPLASTY WITH STENT PLACEMENT  01/25/2017    Cath Stent Placement    CT ANGIO AORTA AND BILATERAL ILIOFEMORAL RUN OFF INCLUDING WITHOUT CONTRAST IF PERFORMED  11/23/2019    CT AORTA AND BILATERAL ILIOFEMORAL RUNOFF ANGIOGRAM W AND/OR WO IV CONTRAST 11/23/2019 [facility]    ESOPHAGOGASTRODUODENOSCOPY  01/25/2017    Esophagogastroduodenoscopy With Biopsy    HERNIA REPAIR  01/25/2017    Inguinal Hernia Repair    KNEE ARTHROSCOPY W/ DEBRIDEMENT  01/25/2017    Arthroscopy Knee Left    OTHER SURGICAL HISTORY  02/10/2015    Stent Indications: Acute Myocardial Infarction    OTHER SURGICAL HISTORY  07/20/2015    Blood Vessel Repair Direct Leg    OTHER SURGICAL HISTORY  01/25/2017    Excision Of Lesion Face    TOTAL KNEE ARTHROPLASTY  04/16/2018    Knee Replacement     Past Medical History:   Diagnosis Date    Age-related nuclear cataract, unspecified eye 05/26/2022    Nuclear sclerosis    Atherosclerosis of native arteries of extremities with intermittent claudication, bilateral legs (CMS-HCC) 01/03/2023    Atherosclerosis of native artery of both lower extremities with intermittent claudication    Atherosclerotic heart disease of native coronary artery without angina pectoris 10/04/2022    CAD (coronary artery disease)    Cellulitis of lower extremity 04/09/2024    Community acquired pneumonia 04/09/2024    Diplopia 02/18/2022    Diplopia    Essential (primary) " hypertension 10/04/2022    Benign essential hypertension    Heart disease 2010    Hyperlipidemia     Other abnormal glucose     Hemoglobin A1c less than 7.0%    Other intervertebral disc degeneration, lumbar region     Degenerative disc disease, lumbar    Pain in extremity 04/09/2024    Personal history of colonic polyps     History of colonic polyps    Personal history of malignant neoplasm of other organs and systems     History of squamous cell carcinoma    Personal history of other diseases of the circulatory system 04/16/2018    History of hypertension    Personal history of other endocrine, nutritional and metabolic disease 04/16/2018    History of hyperlipidemia    Personal history of other medical treatment     History of stress test    Presence of prosthetic heart valve 01/03/2023    S/P AVR (aortic valve replacement)    Spondylolysis, lumbar region     Lumbar spondylolysis       Current Outpatient Medications:     apixaban (Eliquis) 2.5 mg tablet, Take 1 tablet (2.5 mg) by mouth 2 times a day., Disp: , Rfl:     b complex vitamins capsule, Take 1 capsule by mouth once daily at bedtime., Disp: , Rfl:     cholecalciferol (Vitamin D3) 25 MCG (1000 UT) tablet, Take 1 tablet (1,000 Units) by mouth once daily., Disp: , Rfl:     clopidogrel (Plavix) 75 mg tablet, TAKE 1 TABLET BY MOUTH EVERY DAY, Disp: 90 tablet, Rfl: 1    collagen, hydrolysate, bovine, (COLLAGEN, HYDR, BOVINE,, BULK, MISC), Take 1 Scoop by mouth once daily., Disp: , Rfl:     krill-om-3-dha-epa-phospho-ast (Omega-3 Krill Oil) 934-28-32-50 mg capsule, Take 1 capsule by mouth once daily in the morning., Disp: , Rfl:     lactobacillus combination no.4 (Probiotic) 3 billion cell capsule, Take 1 capsule by mouth once daily., Disp: , Rfl:     lutein-zeaxanthin 25-5 mg capsule, Take 1 capsule by mouth once daily in the morning., Disp: , Rfl:     multivitamin with minerals (multivitamin with folic acid) tablet, Take 1 tablet by mouth 2 times a day., Disp:  , Rfl:     rosuvastatin (Crestor) 5 mg tablet, Take 1 tablet (5 mg) by mouth early in the morning.., Disp: , Rfl:     TURMERIC ORAL, Take 500 mg by mouth once daily., Disp: , Rfl:     valsartan (Diovan) 80 mg tablet, TAKE 1 TABLET BY MOUTH ONCE DAILY., Disp: 90 tablet, Rfl: 1  No current facility-administered medications for this encounter.    Facility-Administered Medications Ordered in Other Encounters:     propofol (Diprivan) injection, , intravenous, PRN, Karen Weinstein, RITU-CRNA, 10 mg at 02/03/25 0726  Prior to Admission medications    Medication Sig Start Date End Date Taking? Authorizing Provider   apixaban (Eliquis) 2.5 mg tablet Take 1 tablet (2.5 mg) by mouth 2 times a day. 11/15/21  Yes Historical Provider, MD   b complex vitamins capsule Take 1 capsule by mouth once daily at bedtime.   Yes Historical Provider, MD   cholecalciferol (Vitamin D3) 25 MCG (1000 UT) tablet Take 1 tablet (1,000 Units) by mouth once daily.   Yes Historical Provider, MD   clopidogrel (Plavix) 75 mg tablet TAKE 1 TABLET BY MOUTH EVERY DAY 11/13/24  Yes Jonah Velez,    collagen, hydrolysate, bovine, (COLLAGEN, HYDR, BOVINE,, BULK, MISC) Take 1 Scoop by mouth once daily.   Yes Historical Provider, MD   nledp-lu-4-dha-epa-phospho-ast (Omega-3 Krill Oil) 939-29-85-50 mg capsule Take 1 capsule by mouth once daily in the morning.   Yes Historical Provider, MD   lactobacillus combination no.4 (Probiotic) 3 billion cell capsule Take 1 capsule by mouth once daily.   Yes Historical Provider, MD   lutein-zeaxanthin 25-5 mg capsule Take 1 capsule by mouth once daily in the morning.   Yes Historical Provider, MD   multivitamin with minerals (multivitamin with folic acid) tablet Take 1 tablet by mouth 2 times a day. 1/8/15  Yes Historical Provider, MD   rosuvastatin (Crestor) 5 mg tablet Take 1 tablet (5 mg) by mouth early in the morning.. 8/30/24  Yes Historical Provider, MD   TURMERIC ORAL Take 500 mg by mouth once daily.   Yes  Historical Provider, MD burksrtan (Diovan) 80 mg tablet TAKE 1 TABLET BY MOUTH ONCE DAILY. 11/13/24  Yes Jonah NOEL Agustin, DO     No Known Allergies  Social History     Tobacco Use    Smoking status: Never    Smokeless tobacco: Never   Substance Use Topics    Alcohol use: Never         Chemistry    Lab Results   Component Value Date/Time     01/15/2025 1634    K 4.4 01/15/2025 1634     01/15/2025 1634    CO2 30 01/15/2025 1634    BUN 32 (H) 01/15/2025 1634    CREATININE 0.96 01/15/2025 1634    Lab Results   Component Value Date/Time    CALCIUM 9.2 01/15/2025 1634    ALKPHOS 49 01/15/2025 1634    AST 19 01/15/2025 1634    ALT 11 01/15/2025 1634    BILITOT 0.9 01/15/2025 1634          Lab Results   Component Value Date/Time    WBC 6.1 01/15/2025 1634    HGB 13.5 01/15/2025 1634    HCT 42.1 01/15/2025 1634     01/15/2025 1634     Lab Results   Component Value Date/Time    PROTIME 12.1 09/08/2024 1639    INR 1.1 09/08/2024 1639     Encounter Date: 09/08/24   ECG 12 lead   Result Value    Ventricular Rate 66    Atrial Rate 66    AR Interval 296    QRS Duration 78    QT Interval 394    QTC Calculation(Bazett) 413    R Axis -7    T Axis 10    QRS Count 11    Q Onset 223    P Onset 137    P Offset 190    T Offset 420    QTC Fredericia 407    Narrative    Atrial-paced rhythm with prolonged AV conduction  Minimal voltage criteria for LVH, may be normal variant ( R in aVL )  Abnormal ECG  When compared with ECG of 24-FEB-2024 23:54,  Criteria for Septal infarct are no longer Present  See ED provider note for full interpretation and clinical correlation  Confirmed by Agueda Diane ([id number]) on 9/15/2024 10:47:50 AM     No results found for this or any previous visit from the past 1095 days.      Vitals:    02/03/25 0721   BP: 153/80   Pulse: 74   Resp: 16   SpO2: 97%       Past Surgical History:   Procedure Laterality Date    APPENDECTOMY  01/25/2017    Appendectomy    CARDIAC PACEMAKER PLACEMENT   04/16/2018    Pacemaker Placement    COLONOSCOPY W/ POLYPECTOMY  01/25/2017    Complete Colonoscopy For Polyp Removal    CORONARY ANGIOPLASTY WITH STENT PLACEMENT  01/25/2017    Cath Stent Placement    CT ANGIO AORTA AND BILATERAL ILIOFEMORAL RUN OFF INCLUDING WITHOUT CONTRAST IF PERFORMED  11/23/2019    CT AORTA AND BILATERAL ILIOFEMORAL RUNOFF ANGIOGRAM W AND/OR WO IV CONTRAST 11/23/2019 [facility]    ESOPHAGOGASTRODUODENOSCOPY  01/25/2017    Esophagogastroduodenoscopy With Biopsy    HERNIA REPAIR  01/25/2017    Inguinal Hernia Repair    KNEE ARTHROSCOPY W/ DEBRIDEMENT  01/25/2017    Arthroscopy Knee Left    OTHER SURGICAL HISTORY  02/10/2015    Stent Indications: Acute Myocardial Infarction    OTHER SURGICAL HISTORY  07/20/2015    Blood Vessel Repair Direct Leg    OTHER SURGICAL HISTORY  01/25/2017    Excision Of Lesion Face    TOTAL KNEE ARTHROPLASTY  04/16/2018    Knee Replacement     Past Medical History:   Diagnosis Date    Age-related nuclear cataract, unspecified eye 05/26/2022    Nuclear sclerosis    Atherosclerosis of native arteries of extremities with intermittent claudication, bilateral legs (CMS-HCC) 01/03/2023    Atherosclerosis of native artery of both lower extremities with intermittent claudication    Atherosclerotic heart disease of native coronary artery without angina pectoris 10/04/2022    CAD (coronary artery disease)    Cellulitis of lower extremity 04/09/2024    Community acquired pneumonia 04/09/2024    Diplopia 02/18/2022    Diplopia    Essential (primary) hypertension 10/04/2022    Benign essential hypertension    Heart disease 2010    Hyperlipidemia     Other abnormal glucose     Hemoglobin A1c less than 7.0%    Other intervertebral disc degeneration, lumbar region     Degenerative disc disease, lumbar    Pain in extremity 04/09/2024    Personal history of colonic polyps     History of colonic polyps    Personal history of malignant neoplasm of other organs and systems      History of squamous cell carcinoma    Personal history of other diseases of the circulatory system 04/16/2018    History of hypertension    Personal history of other endocrine, nutritional and metabolic disease 04/16/2018    History of hyperlipidemia    Personal history of other medical treatment     History of stress test    Presence of prosthetic heart valve 01/03/2023    S/P AVR (aortic valve replacement)    Spondylolysis, lumbar region     Lumbar spondylolysis       Current Outpatient Medications:     apixaban (Eliquis) 2.5 mg tablet, Take 1 tablet (2.5 mg) by mouth 2 times a day., Disp: , Rfl:     b complex vitamins capsule, Take 1 capsule by mouth once daily at bedtime., Disp: , Rfl:     cholecalciferol (Vitamin D3) 25 MCG (1000 UT) tablet, Take 1 tablet (1,000 Units) by mouth once daily., Disp: , Rfl:     clopidogrel (Plavix) 75 mg tablet, TAKE 1 TABLET BY MOUTH EVERY DAY, Disp: 90 tablet, Rfl: 1    collagen, hydrolysate, bovine, (COLLAGEN, HYDR, BOVINE,, BULK, MISC), Take 1 Scoop by mouth once daily., Disp: , Rfl:     krill-om-3-dha-epa-phospho-ast (Omega-3 Krill Oil) 202-43-74-50 mg capsule, Take 1 capsule by mouth once daily in the morning., Disp: , Rfl:     lactobacillus combination no.4 (Probiotic) 3 billion cell capsule, Take 1 capsule by mouth once daily., Disp: , Rfl:     lutein-zeaxanthin 25-5 mg capsule, Take 1 capsule by mouth once daily in the morning., Disp: , Rfl:     multivitamin with minerals (multivitamin with folic acid) tablet, Take 1 tablet by mouth 2 times a day., Disp: , Rfl:     rosuvastatin (Crestor) 5 mg tablet, Take 1 tablet (5 mg) by mouth early in the morning.., Disp: , Rfl:     TURMERIC ORAL, Take 500 mg by mouth once daily., Disp: , Rfl:     valsartan (Diovan) 80 mg tablet, TAKE 1 TABLET BY MOUTH ONCE DAILY., Disp: 90 tablet, Rfl: 1  No current facility-administered medications for this encounter.    Facility-Administered Medications Ordered in Other Encounters:     propofol  (Diprivan) injection, , intravenous, PRN, Karen PERES Weinstein, APRN-CRNA, 10 mg at 02/03/25 0726  Prior to Admission medications    Medication Sig Start Date End Date Taking? Authorizing Provider   apixaban (Eliquis) 2.5 mg tablet Take 1 tablet (2.5 mg) by mouth 2 times a day. 11/15/21  Yes Historical Provider, MD   b complex vitamins capsule Take 1 capsule by mouth once daily at bedtime.   Yes Historical Provider, MD   cholecalciferol (Vitamin D3) 25 MCG (1000 UT) tablet Take 1 tablet (1,000 Units) by mouth once daily.   Yes Historical Provider, MD   clopidogrel (Plavix) 75 mg tablet TAKE 1 TABLET BY MOUTH EVERY DAY 11/13/24  Yes Jonah Velez DO   collagen, hydrolysate, bovine, (COLLAGEN, HYDR, BOVINE,, BULK, MISC) Take 1 Scoop by mouth once daily.   Yes Historical Provider, MD   tdsac-ua-1-dha-epa-phospho-ast (Omega-3 Krill Oil) 329-55-58-50 mg capsule Take 1 capsule by mouth once daily in the morning.   Yes Historical Provider, MD   lactobacillus combination no.4 (Probiotic) 3 billion cell capsule Take 1 capsule by mouth once daily.   Yes Historical Provider, MD   lutein-zeaxanthin 25-5 mg capsule Take 1 capsule by mouth once daily in the morning.   Yes Historical Provider, MD   multivitamin with minerals (multivitamin with folic acid) tablet Take 1 tablet by mouth 2 times a day. 1/8/15  Yes Historical Provider, MD   rosuvastatin (Crestor) 5 mg tablet Take 1 tablet (5 mg) by mouth early in the morning.. 8/30/24  Yes Historical Provider, MD   TURMERIC ORAL Take 500 mg by mouth once daily.   Yes Historical Provider, MD   valsartan (Diovan) 80 mg tablet TAKE 1 TABLET BY MOUTH ONCE DAILY. 11/13/24  Yes Jonah Velez DO     No Known Allergies  Social History     Tobacco Use    Smoking status: Never    Smokeless tobacco: Never   Substance Use Topics    Alcohol use: Never         Chemistry    Lab Results   Component Value Date/Time     01/15/2025 1634    K 4.4 01/15/2025 1634     01/15/2025 1634    CO2  30 01/15/2025 1634    BUN 32 (H) 01/15/2025 1634    CREATININE 0.96 01/15/2025 1634    Lab Results   Component Value Date/Time    CALCIUM 9.2 01/15/2025 1634    ALKPHOS 49 01/15/2025 1634    AST 19 01/15/2025 1634    ALT 11 01/15/2025 1634    BILITOT 0.9 01/15/2025 1634          Lab Results   Component Value Date/Time    WBC 6.1 01/15/2025 1634    HGB 13.5 01/15/2025 1634    HCT 42.1 01/15/2025 1634     01/15/2025 1634     Lab Results   Component Value Date/Time    PROTIME 12.1 09/08/2024 1639    INR 1.1 09/08/2024 1639     Encounter Date: 09/08/24   ECG 12 lead   Result Value    Ventricular Rate 66    Atrial Rate 66    CA Interval 296    QRS Duration 78    QT Interval 394    QTC Calculation(Bazett) 413    R Axis -7    T Axis 10    QRS Count 11    Q Onset 223    P Onset 137    P Offset 190    T Offset 420    QTC Fredericia 407    Narrative    Atrial-paced rhythm with prolonged AV conduction  Minimal voltage criteria for LVH, may be normal variant ( R in aVL )  Abnormal ECG  When compared with ECG of 24-FEB-2024 23:54,  Criteria for Septal infarct are no longer Present  See ED provider note for full interpretation and clinical correlation  Confirmed by Agueda Diane ([id number]) on 9/15/2024 10:47:50 AM     No results found for this or any previous visit from the past 1095 days.    Relevant Problems   Cardiac   (+) Aortic valve stenosis   (+) Benign essential hypertension   (+) CAD (coronary artery disease)   (+) PAD (peripheral artery disease) (CMS-HCC)   (+) Paroxysmal atrial fibrillation (Multi)   (+) Secondary hypertension      Neuro   (+) Bilateral carotid artery stenosis   (+) Hemicrania   (+) Myasthenia gravis   (+) TIA (transient ischemic attack)      GI   (+) Chronic diarrhea   (+) Duodenal hemorrhage      Hematology   (+) Anemia   (+) Iron deficiency anemia   (+) Non-Hodgkin's lymphoma   (+) Non-follicular lymphoma      Musculoskeletal   (+) Degeneration of intervertebral disc of lumbar region   (+)  Lumbar canal stenosis   (+) Osteoarthritis of both ankles      ID   (+) COVID-19       Clinical information reviewed:   Tobacco  Allergies  Meds   Med Hx  Surg Hx   Fam Hx  Soc Hx        NPO Detail:  NPO/Void Status  Date of Last Liquid: 02/03/25  Time of Last Liquid: 0001  Date of Last Solid: 02/03/25  Time of Last Solid: 0001         Physical Exam    Airway  Mallampati: II  TM distance: >3 FB     Cardiovascular   Rhythm: irregular  Rate: normal     Dental - normal exam     Pulmonary    Abdominal          Anesthesia Plan    History of general anesthesia?: yes  History of complications of general anesthesia?: no    ASA 3     general     The patient is not a current smoker.  Patient was not previously instructed to abstain from smoking on day of procedure.  Patient did not smoke on day of procedure.    Anesthetic plan and risks discussed with patient.    Plan discussed with attending.

## 2025-02-03 NOTE — ANESTHESIA POSTPROCEDURE EVALUATION
"Patient: Alexis Armijo \"Don\"    Procedure Summary       Date: 02/03/25 Room / Location: [facility]    Anesthesia Start: 0724 Anesthesia Stop: 0730    Procedure: CARDIOVERSION EXTERNAL Diagnosis: PAF (paroxysmal atrial fibrillation) (Multi)    Scheduled Providers: Domenic Bill MD Responsible Provider: Crispin Carroll MD    Anesthesia Type: general ASA Status: 3            Anesthesia Type: general    Vitals Value Taken Time   /78 02/03/25 0730   Temp  02/03/25 0745   Pulse 61 02/03/25 0730   Resp 18 02/03/25 0730   SpO2 98 % 02/03/25 0730       Anesthesia Post Evaluation    Patient location during evaluation: PACU  Patient participation: complete - patient participated  Level of consciousness: awake  Pain management: adequate  Multimodal analgesia pain management approach  Airway patency: patent  Two or more strategies used to mitigate risk of obstructive sleep apnea  Cardiovascular status: acceptable  Respiratory status: acceptable  Hydration status: acceptable  Postoperative Nausea and Vomiting: none        No notable events documented.    "

## 2025-02-03 NOTE — H&P
"History Of Present Illness  Alexis Armijo \"Ronal\" is a 89 y.o. male presenting with AF.     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, bilateral legs (CMS-HCC) 01/03/2023    Atherosclerosis of native artery of both lower extremities with intermittent claudication    Atherosclerotic heart disease of native coronary artery without angina pectoris 10/04/2022    CAD (coronary artery disease)    Cellulitis of lower extremity 04/09/2024    Community acquired pneumonia 04/09/2024    Diplopia 02/18/2022    Diplopia    Essential (primary) hypertension 10/04/2022    Benign essential hypertension    Heart disease 2010    Hyperlipidemia     Other abnormal glucose     Hemoglobin A1c less than 7.0%    Other intervertebral disc degeneration, lumbar region     Degenerative disc disease, lumbar    Pain in extremity 04/09/2024    Personal history of colonic polyps     History of colonic polyps    Personal history of malignant neoplasm of other organs and systems     History of squamous cell carcinoma    Personal history of other diseases of the circulatory system 04/16/2018    History of hypertension    Personal history of other endocrine, nutritional and metabolic disease 04/16/2018    History of hyperlipidemia    Personal history of other medical treatment     History of stress test    Presence of prosthetic heart valve 01/03/2023    S/P AVR (aortic valve replacement)    Spondylolysis, lumbar region     Lumbar spondylolysis       Surgical History  Past Surgical History:   Procedure Laterality Date    APPENDECTOMY  01/25/2017    Appendectomy    CARDIAC PACEMAKER PLACEMENT  04/16/2018    Pacemaker Placement    COLONOSCOPY W/ POLYPECTOMY  01/25/2017    Complete Colonoscopy For Polyp Removal    CORONARY ANGIOPLASTY WITH STENT PLACEMENT  01/25/2017    Cath " Stent Placement    CT ANGIO AORTA AND BILATERAL ILIOFEMORAL RUN OFF INCLUDING WITHOUT CONTRAST IF PERFORMED  11/23/2019    CT AORTA AND BILATERAL ILIOFEMORAL RUNOFF ANGIOGRAM W AND/OR WO IV CONTRAST 11/23/2019 [facility]    ESOPHAGOGASTRODUODENOSCOPY  01/25/2017    Esophagogastroduodenoscopy With Biopsy    HERNIA REPAIR  01/25/2017    Inguinal Hernia Repair    KNEE ARTHROSCOPY W/ DEBRIDEMENT  01/25/2017    Arthroscopy Knee Left    OTHER SURGICAL HISTORY  02/10/2015    Stent Indications: Acute Myocardial Infarction    OTHER SURGICAL HISTORY  07/20/2015    Blood Vessel Repair Direct Leg    OTHER SURGICAL HISTORY  01/25/2017    Excision Of Lesion Face    TOTAL KNEE ARTHROPLASTY  04/16/2018    Knee Replacement        Social History  He reports that he has never smoked. He has never used smokeless tobacco. He reports that he does not drink alcohol and does not use drugs.    Family History  No family history on file.     Allergies  Patient has no known allergies.    Review of Systems   Comprehensive 10-point review of systems negative otherwise as noted above in HPI    Physical Exam   Constitutional: Well developed, awake/alert/oriented x3, no distress, alert and cooperative  Eyes: PERRL, EOMI, clear sclera  ENMT: mucous membranes moist, no apparent injury, no lesions seen  Head/Neck: Neck supple, no apparent injury, thyroid without mass or tenderness, No JVD, trachea midline, no bruits  Respiratory/Thorax: Patent airways, CTAB, normal breath sounds with good chest expansion, thorax symmetric  Cardiovascular: Regular, rate and rhythm, no murmurs, 2+ equal pulses of the extremities, normal S 1and S 2  Gastrointestinal: Nondistended, soft, non-tender, no rebound tenderness or guarding, no masses palpable, no organomegaly, +BS, no bruits  Musculoskeletal: ROM intact, no joint swelling, normal strength  Extremities: normal extremities, no cyanosis edema, contusions or wounds, no clubbing  Neurological: alert and  oriented x3, intact senses, motor, response and reflexes, normal strength  Lymphatic: No significant lymphadenopathy  Psychological: Appropriate mood and behavior  Skin: Warm and dry, no lesions, no rashes    Last Recorded Vitals  Blood pressure 148/78, pulse 61, resp. rate 18, weight 88.2 kg (194 lb 7.1 oz), SpO2 98%.    Relevant Results        See office notes for details     Assessment/Plan   Assessment & Plan  PAF (paroxysmal atrial fibrillation) (Multi)      Consent obtained for cardioversion       I spent 20 minutes in the professional and overall care of this patient.      Domenic Bill MD

## 2025-02-18 ENCOUNTER — PATIENT OUTREACH (OUTPATIENT)
Dept: PRIMARY CARE | Facility: CLINIC | Age: 89
End: 2025-02-18
Payer: MEDICARE

## 2025-02-18 DIAGNOSIS — I48.0 PAROXYSMAL ATRIAL FIBRILLATION (MULTI): ICD-10-CM

## 2025-02-18 DIAGNOSIS — I10 BENIGN ESSENTIAL HYPERTENSION: ICD-10-CM

## 2025-02-18 DIAGNOSIS — I25.10 CORONARY ARTERY DISEASE INVOLVING NATIVE HEART WITHOUT ANGINA PECTORIS, UNSPECIFIED VESSEL OR LESION TYPE: ICD-10-CM

## 2025-02-18 NOTE — PROGRESS NOTES
Chart reviewed prior to call. Ronal stated he is doing well. He had cardioversion a couple weeks ago. He said it went well and his EKG looked good yesterday. He also had cataract surgery on his right eye. It is improving every day. He is putting eye drops in 4 times a day. He will be getting the left eye done on 3/13. BP today 124/78, HR 71.

## 2025-02-19 RX ORDER — CLOPIDOGREL BISULFATE 75 MG/1
75 TABLET ORAL DAILY
Qty: 90 TABLET | Refills: 0 | Status: SHIPPED | OUTPATIENT
Start: 2025-02-19

## 2025-03-21 ENCOUNTER — PATIENT OUTREACH (OUTPATIENT)
Dept: PRIMARY CARE | Facility: CLINIC | Age: 89
End: 2025-03-21
Payer: MEDICARE

## 2025-03-21 DIAGNOSIS — R26.9 GAIT DIFFICULTY: ICD-10-CM

## 2025-03-21 DIAGNOSIS — I10 BENIGN ESSENTIAL HYPERTENSION: ICD-10-CM

## 2025-03-21 NOTE — PROGRESS NOTES
Chart reviewed prior to call.  Done stated that he has an appointment today at [facility] for a follow-up with his eyes.  He states his vision is much better and he is continuing with his drops at this time.  His leg circulation has been bad for quite some time. He said he started having problems before 2010.  He has leg aches and weakness as well as bilateral knee pain.  He says when he goes shopping sometimes he has to sit down because it is too much for him to continue walking.  He has a follow-up with the vascular doctor on May 31.  His blood pressure has been good with the last reading as 132/73 and heart rate of 68.  Denies any medication needs at this time.

## 2025-03-27 PROBLEM — U07.1 COVID-19: Status: RESOLVED | Noted: 2024-10-07 | Resolved: 2025-03-27

## 2025-03-27 PROBLEM — H91.90 HOH (HARD OF HEARING): Status: ACTIVE | Noted: 2025-03-04

## 2025-03-27 PROBLEM — Z95.0 PRESENCE OF CARDIAC PACEMAKER: Status: ACTIVE | Noted: 2023-08-18

## 2025-03-27 PROBLEM — I65.29 CAROTID STENOSIS: Status: ACTIVE | Noted: 2025-01-28

## 2025-03-27 RX ORDER — PREDNISOLONE ACETATE 10 MG/ML
SUSPENSION/ DROPS OPHTHALMIC
COMMUNITY
Start: 2025-02-18

## 2025-03-27 RX ORDER — AMOXICILLIN 500 MG/1
CAPSULE ORAL
COMMUNITY
Start: 2025-03-26

## 2025-03-31 ENCOUNTER — APPOINTMENT (OUTPATIENT)
Dept: PRIMARY CARE | Facility: CLINIC | Age: 89
End: 2025-03-31
Payer: MEDICARE

## 2025-03-31 VITALS
OXYGEN SATURATION: 97 % | HEART RATE: 71 BPM | WEIGHT: 189.5 LBS | TEMPERATURE: 98.1 F | DIASTOLIC BLOOD PRESSURE: 74 MMHG | HEIGHT: 71 IN | BODY MASS INDEX: 26.53 KG/M2 | SYSTOLIC BLOOD PRESSURE: 121 MMHG

## 2025-03-31 DIAGNOSIS — I10 BENIGN ESSENTIAL HYPERTENSION: ICD-10-CM

## 2025-03-31 DIAGNOSIS — I25.10 CORONARY ARTERY DISEASE INVOLVING NATIVE HEART WITHOUT ANGINA PECTORIS, UNSPECIFIED VESSEL OR LESION TYPE: ICD-10-CM

## 2025-03-31 DIAGNOSIS — R73.01 IFG (IMPAIRED FASTING GLUCOSE): ICD-10-CM

## 2025-03-31 DIAGNOSIS — I73.9 PAD (PERIPHERAL ARTERY DISEASE) (CMS-HCC): ICD-10-CM

## 2025-03-31 DIAGNOSIS — Z00.00 MEDICARE ANNUAL WELLNESS VISIT, SUBSEQUENT: Primary | ICD-10-CM

## 2025-03-31 PROCEDURE — 1158F ADVNC CARE PLAN TLK DOCD: CPT | Performed by: FAMILY MEDICINE

## 2025-03-31 PROCEDURE — 1123F ACP DISCUSS/DSCN MKR DOCD: CPT | Performed by: FAMILY MEDICINE

## 2025-03-31 PROCEDURE — 3078F DIAST BP <80 MM HG: CPT | Performed by: FAMILY MEDICINE

## 2025-03-31 PROCEDURE — 1159F MED LIST DOCD IN RCRD: CPT | Performed by: FAMILY MEDICINE

## 2025-03-31 PROCEDURE — 99214 OFFICE O/P EST MOD 30 MIN: CPT | Performed by: FAMILY MEDICINE

## 2025-03-31 PROCEDURE — 1036F TOBACCO NON-USER: CPT | Performed by: FAMILY MEDICINE

## 2025-03-31 PROCEDURE — 3074F SYST BP LT 130 MM HG: CPT | Performed by: FAMILY MEDICINE

## 2025-03-31 PROCEDURE — G0439 PPPS, SUBSEQ VISIT: HCPCS | Performed by: FAMILY MEDICINE

## 2025-03-31 PROCEDURE — 1170F FXNL STATUS ASSESSED: CPT | Performed by: FAMILY MEDICINE

## 2025-03-31 RX ORDER — CLOPIDOGREL BISULFATE 75 MG/1
75 TABLET ORAL DAILY
Qty: 100 TABLET | Refills: 3 | Status: SHIPPED | OUTPATIENT
Start: 2025-03-31

## 2025-03-31 RX ORDER — VALSARTAN 80 MG/1
80 TABLET ORAL DAILY
Qty: 100 TABLET | Refills: 3 | Status: SHIPPED | OUTPATIENT
Start: 2025-03-31

## 2025-03-31 ASSESSMENT — ACTIVITIES OF DAILY LIVING (ADL)
DOING_HOUSEWORK: INDEPENDENT
DRESSING: INDEPENDENT
MANAGING_FINANCES: INDEPENDENT
BATHING: INDEPENDENT
GROCERY_SHOPPING: INDEPENDENT
TAKING_MEDICATION: INDEPENDENT

## 2025-03-31 ASSESSMENT — ENCOUNTER SYMPTOMS
OCCASIONAL FEELINGS OF UNSTEADINESS: 1
LOSS OF SENSATION IN FEET: 1
DEPRESSION: 0

## 2025-03-31 NOTE — PATIENT INSTRUCTIONS
Please return for a(n) blood pressure and medication follow-up appointment in 6 months, after tests to review results and options, earlier if any question or concern. Please return for your next Wellness visit in 12 months. Please schedule additional problem-focused appointment(s) to address additional problem(s).    Recommended vaccines:  Influenza, annual  Respiratory Syncytial Virus (RSV)  Avoid taking Biotin (a vitamin, shows up particularly in hair/nail supplements) for a week prior to any blood tests, as it can interfere with certain results. Fasting for labs means 12 hours, nothing to eat or drink, except water and medications, unless directed otherwise.    For assistance with scheduling referrals or consultations, please call [phone]. For laboratory, radiology, and other tests, please call [phone] ([phone] for pediatrics). Please review prescription inserts and published information for possible adverse effects of all medications. Return after testing or consultation to review results and recommendations, if symptoms persist, change, worsen, or return, or if you have any question or concern. If you do not get results within 7-10 days, or you have any question or concern, please send a message, call the office ([phone]), or return to the office for a follow-up appointment. For non-emergencies, you may call the office. For emergencies, call 9-1-1 or go to the nearest Emergency Department. Please schedule additional appointment(s) to address concern(s) not addressed today. An annual Wellness visit is strongly recommended. A Wellness visit should be dedicated to addressing routine health maintenance matters (e.g., cancer screenings, cardiovascular screening, etc.). Problem-focused visits, typically prompted by symptoms or specific concerns, are usually conducted separately, particularly if multiple or complex problems need to be addressed.    In general, results are not released or  discussed over the telephone, but at an appointment or via  olookt. Results of tests done through [facility] are released via  Grow (see below).  [email or web address]   Grow support line: [phone]

## 2025-03-31 NOTE — PROGRESS NOTES
Subjective   Reason for Visit: Alexis Armijo is an 89 y.o. male here for Medicare Annual Wellness Visit Subsequent (Pt presents for MCV, medication review, no refills needed,)     Past Medical, Surgical, and Family History reviewed and updated in chart.    Reviewed all medications by prescribing practitioner or clinical pharmacist (such as prescriptions, OTCs, herbal therapies and supplements) and documented in the medical record.    HPI  Historian(s): Self and Wife    Generally feeling well.    Tripped and fell (mechanical).    Patient Care Team:  Jonah Velez DO as PCP - General (Family Medicine)  Jonah Velez DO as PCP - [facility] Attributed Provider  Domenic Bill MD as Consulting Physician (Cardiology)  [facility] (Dermatology)  Zbigniew Khan MD PhD as Consulting Physician (Neuro-Ophthalmology)  Jessica Jacques RN as Registered Nurse  Isabella Lott as Care Manager (Case Management)  Nicole Mar PA-C as Physician Assistant (Hematology and Oncology)  Farrah Moore PA-C as Physician Assistant (Vascular Surgery)     Review of Systems   All other systems reviewed and are negative.      Current Outpatient Medications   Medication Instructions    apixaban (Eliquis) 2.5 mg tablet 1 tablet, 2 times daily    b complex vitamins capsule 1 capsule, Nightly    cholecalciferol (VITAMIN D3) 1,000 Units, Daily    clopidogrel (PLAVIX) 75 mg, oral, Daily    collagen, hydrolysate, bovine, (COLLAGEN, HYDR, BOVINE,, BULK, MISC) 1 Scoop, Daily RT    krill-om-3-dha-epa-phospho-ast (Omega-3 Krill Oil) 564-42-02-50 mg capsule 1 capsule, Every morning    lactobacillus combination no.4 (Probiotic) 3 billion cell capsule 1 tablet, Daily    lutein-zeaxanthin 25-5 mg capsule 1 capsule, Every morning    multivitamin with minerals (multivitamin with folic acid) tablet 1 tablet, 2 times daily    prednisoLONE acetate (Pred-Forte) 1 % ophthalmic suspension USE 1 DROP IN THE RIGHT EYE FOUR TIMES DAILY. ONE DROP  "IN OPERATIVE EYE FOUR TIMES A DAY    rosuvastatin (Crestor) 5 mg tablet 1 tablet, Daily (0630)    TURMERIC ORAL 500 mg, Daily    valsartan (DIOVAN) 80 mg, oral, Daily       Objective   Vitals:  /74   Pulse 71   Temp 36.7 °C (98.1 °F)   Ht 1.81 m (5' 11.25\")   Wt 86 kg (189 lb 8 oz)   SpO2 97%   BMI 26.24 kg/m²             Physical Exam  Vitals and nursing note reviewed.   Constitutional:       General: He is not in acute distress.     Appearance: Normal appearance. He is not diaphoretic.      Comments: Cane   HENT:      Head: Normocephalic and atraumatic.   Eyes:      General: No scleral icterus.     Extraocular Movements: Extraocular movements intact.      Conjunctiva/sclera: Conjunctivae normal.   Cardiovascular:      Rate and Rhythm: Normal rate and regular rhythm.      Heart sounds: Normal heart sounds.   Pulmonary:      Effort: Pulmonary effort is normal. No respiratory distress.      Breath sounds: Normal breath sounds. No wheezing, rhonchi or rales.   Musculoskeletal:      Right lower leg: No edema.      Left lower leg: No edema.   Skin:     General: Skin is warm and dry.      Coloration: Skin is not jaundiced.      Findings: Rash (stasis dermatitis) present.   Neurological:      General: No focal deficit present.      Mental Status: He is alert and oriented to person, place, and time. Mental status is at baseline.   Psychiatric:         Mood and Affect: Mood normal.         Behavior: Behavior normal.         Thought Content: Thought content normal.         Assessment & Plan  Medicare annual wellness visit, subsequent  89yM doing fairly well.       Coronary artery disease involving native heart without angina pectoris, unspecified vessel or lesion type  Continue statin, secondary prevention. Continue with cardiology.  Orders:    clopidogrel (Plavix) 75 mg tablet; Take 1 tablet (75 mg) by mouth once daily.    Benign essential hypertension  Well controlled.   Orders:    valsartan (Diovan) 80 mg " tablet; Take 1 tablet (80 mg) by mouth once daily.    PAD (peripheral artery disease) (CMS-HCC)  Per vascular surgery.

## 2025-03-31 NOTE — ASSESSMENT & PLAN NOTE
Well controlled.   Orders:    valsartan (Diovan) 80 mg tablet; Take 1 tablet (80 mg) by mouth once daily.

## 2025-03-31 NOTE — ASSESSMENT & PLAN NOTE
Continue statin, secondary prevention. Continue with cardiology.  Orders:    clopidogrel (Plavix) 75 mg tablet; Take 1 tablet (75 mg) by mouth once daily.

## 2025-04-22 ENCOUNTER — PATIENT OUTREACH (OUTPATIENT)
Dept: PRIMARY CARE | Facility: CLINIC | Age: 89
End: 2025-04-22
Payer: MEDICARE

## 2025-04-22 DIAGNOSIS — I10 BENIGN ESSENTIAL HYPERTENSION: ICD-10-CM

## 2025-04-29 ENCOUNTER — PATIENT OUTREACH (OUTPATIENT)
Dept: PRIMARY CARE | Facility: CLINIC | Age: 89
End: 2025-04-29
Payer: MEDICARE

## 2025-04-29 DIAGNOSIS — M19.072 OSTEOARTHRITIS OF BOTH ANKLES, UNSPECIFIED OSTEOARTHRITIS TYPE: ICD-10-CM

## 2025-04-29 DIAGNOSIS — R26.9 GAIT DIFFICULTY: ICD-10-CM

## 2025-04-29 DIAGNOSIS — I48.0 PAROXYSMAL ATRIAL FIBRILLATION (MULTI): ICD-10-CM

## 2025-04-29 DIAGNOSIS — I10 BENIGN ESSENTIAL HYPERTENSION: ICD-10-CM

## 2025-04-29 DIAGNOSIS — M19.071 OSTEOARTHRITIS OF BOTH ANKLES, UNSPECIFIED OSTEOARTHRITIS TYPE: ICD-10-CM

## 2025-04-29 NOTE — PROGRESS NOTES
Care Management Monthly Outreach  Chart review completed  Confirmation of at least 2 patient identifiers  Change in insurance? No    Has patient been to ER/Urgent Care since last outreach? No    Last Office Visit with PCP: 3/31/2025   Next Office Visit with PCP: 10/3/2025   APC Collaboration: n/a    Chronic Conditions and Outreach Summary:   HTN- Patient states he does not check BP daily but states BP is good when checked occasionally.  CM provided education on importance of checking BP daily for prevention of complications from hypertension.      Afib- Patient reports Afib remains controlled since cardioversion in February.      Osteoarthritis- Patient states ongoing b/l knee pain after falling in September, 2024.  Patient reports that he is going to podiatrist on 5/2/25 and plans to discuss knee pain with him as he also sees orthopedic patients.  CM advised patient to reach out if he needs help locating an orthopedist.    Chart reviewed prior to outreach.  Successful outreach to patient and his wife, Hayley.  CM introduced self a new practice RN CCM and provided education on CM role.  CM contact information provided.      Patient continues to exercise and walk daily. Patient denies any immediate needs. He drives his own vehicle to appointments and is knowledgeable of his chronic conditions and providers. Patient verbalized understanding of chronic conditions and current treatment plans.  Patient reports no complications from recent b/l cataract removal.  He and his wife recently registered and plan to begin attending events at the local [facility].      Medications:   Are there medication changes since last visit? No  Refills needed? No    Social Drivers of Health: Complete  Care Gaps Addressed? Needs Complete  Care Plan addressed: Yes    Upcoming Appointments:   Future Appointments       Date / Time Provider Department Dept Phone    5/29/2025 1:30 PM [facility] 2 [facility] [phone]    5/29/2025  2:30 PM Martina Marquez, APRN-CNP [facility] [phone]    8/18/2025 2:00 PM Farrah Moore PA-C [facility] [place] [phone]    8/25/2025 1:00 PM JUANIS RAMIRES 1 [facility] [place] [phone]    10/3/2025 10:30 AM (Arrive by 10:15 AM) Jonah Velez DO [facility] [phone]    1/23/2026 11:30 AM (Arrive by 11:15 AM) Nicole Mar PA-C [facility] [phone]          Blood Pressures Reviewed  BP Readings from Last 3 Encounters:   03/31/25 121/74   02/03/25 128/71   01/24/25 (!) 164/92     Labs Reviewed:  Lab Results   Component Value Date    CREATININE 0.96 01/15/2025    GLUCOSE 103 (H) 01/15/2025    ALKPHOS 49 01/15/2025    K 4.4 01/15/2025    PROT 6.6 01/15/2025    PROT 7.0 01/15/2025     01/15/2025    CALCIUM 9.2 01/15/2025    AST 19 01/15/2025    ALT 11 01/15/2025    BUN 32 (H) 01/15/2025    MG 2.01 02/24/2024    PHOS 2.7 03/24/2020     01/15/2025    GFRMALE 82 12/29/2022     Lab Results   Component Value Date    TRIG 55 05/22/2024    CHOL 106 05/22/2024    LDLCALC 50 05/22/2024    HDL 44.6 05/22/2024     Lab Results   Component Value Date    HGBA1C 5.4 02/12/2024    HGBA1C 5.6 05/01/2018     Lab Results   Component Value Date    WBC 6.1 01/15/2025    RBC 4.49 (L) 01/15/2025    HGB 13.5 01/15/2025     01/15/2025   No other concerns at this time.  Agreeable to continue monthly outreaches.  Encouraged to call if questions or concerns arise.    DELANEY UGALDE

## 2025-05-28 ENCOUNTER — PATIENT OUTREACH (OUTPATIENT)
Dept: PRIMARY CARE | Facility: CLINIC | Age: 89
End: 2025-05-28
Payer: MEDICARE

## 2025-05-28 DIAGNOSIS — H26.9 CATARACT OF BOTH EYES, UNSPECIFIED CATARACT TYPE: ICD-10-CM

## 2025-05-28 DIAGNOSIS — I73.9 PAD (PERIPHERAL ARTERY DISEASE): ICD-10-CM

## 2025-05-28 DIAGNOSIS — I10 BENIGN ESSENTIAL HYPERTENSION: ICD-10-CM

## 2025-05-28 DIAGNOSIS — M17.0 OSTEOARTHRITIS OF BOTH KNEES, UNSPECIFIED OSTEOARTHRITIS TYPE: ICD-10-CM

## 2025-05-28 SDOH — ECONOMIC STABILITY: FOOD INSECURITY: WITHIN THE PAST 12 MONTHS, YOU WORRIED THAT YOUR FOOD WOULD RUN OUT BEFORE YOU GOT MONEY TO BUY MORE.: NEVER TRUE

## 2025-05-28 SDOH — ECONOMIC STABILITY: FOOD INSECURITY: WITHIN THE PAST 12 MONTHS, THE FOOD YOU BOUGHT JUST DIDN'T LAST AND YOU DIDN'T HAVE MONEY TO GET MORE.: NEVER TRUE

## 2025-05-28 NOTE — PROGRESS NOTES
Care Management Monthly Outreach  Chart review completed  Confirmation of at least 2 patient identifiers  Change in insurance? No    Has patient been to ER/Urgent Care since last outreach? No    Last Office Visit with PCP: 3/31/2025   Next Office Visit with PCP: 10/3/2025   APC Collaboration: n/a    Chronic Conditions and Outreach Summary:   Benign essential hypertension    Cataract of both eyes, unspecified cataract type    PAD (peripheral artery disease)    Osteoarthritis of both knees, unspecified osteoarthritis type    Osteoarthritis b/l knees- Patient denies any decline in ability to exercise daily or perform independent ADLs despite increase in knee pain.  Patient was examined by orthopedic provider and pain is determined to be from osteoarthritis.  No new medications were started.  Next appointment with orthopedist is 5/30/25.    HTN- Patient completes and tracks BP every Wednesday at 11:00am.  Wednesday 5/21/25= /70; P 73. Patient states this is typical result. Continues to eat low sodium diet and exercises 30 minutes daily- walking, elliptical, weights min 10lbs.    PAD- Vascular appointment scheduled for 5/29/25.    Vision/cataracts- Patient reports improved vision since cataract removal.  Has prism in eyeglass lenses for double vision. Patient states double vision comes and goes.  Denies impact on quality of life or ADLs. States prism helps and he can make earlier appointment with ophthalmology if there are changes. Next ophthalmology appointment 8/12/25.      Medications:   Are there medication changes since last visit? No  Refills needed? No    Social Drivers of Health: Addressed today   Transportation- Drives own vehicle to appointments.     Care Gaps Addressed? Deferred  Care Plan addressed: Yes    Upcoming Appointments:   Future Appointments       Date / Time Provider Department Dept Phone    5/29/2025 1:30 PM [facility] 2 [facility] [phone]    5/29/2025 2:30 PM Viraj SPAULDING  Shishehmartha, DO [facility] [phone]    8/18/2025 2:00 PM Farrah Moore PA-C [facility] [phone]    8/25/2025 1:00 PM JUANIS RAMIRES 1 [facility] [phone]    10/3/2025 10:30 AM (Arrive by 10:15 AM) Jonah Velez DO [facility] [phone]    1/23/2026 11:30 AM (Arrive by 11:15 AM) Nicole Mar PA-C [facility] [phone]          Blood Pressures Reviewed  BP Readings from Last 3 Encounters:   03/31/25 121/74   02/03/25 128/71   01/24/25 (!) 164/92     Labs Reviewed:  Lab Results   Component Value Date    CREATININE 0.96 01/15/2025    GLUCOSE 103 (H) 01/15/2025    ALKPHOS 49 01/15/2025    K 4.4 01/15/2025    PROT 6.6 01/15/2025    PROT 7.0 01/15/2025     01/15/2025    CALCIUM 9.2 01/15/2025    AST 19 01/15/2025    ALT 11 01/15/2025    BUN 32 (H) 01/15/2025    MG 2.01 02/24/2024    PHOS 2.7 03/24/2020     01/15/2025    GFRMALE 82 12/29/2022     Lab Results   Component Value Date    TRIG 55 05/22/2024    CHOL 106 05/22/2024    LDLCALC 50 05/22/2024    HDL 44.6 05/22/2024     Lab Results   Component Value Date    HGBA1C 5.4 02/12/2024    HGBA1C 5.6 05/01/2018     Lab Results   Component Value Date    WBC 6.1 01/15/2025    RBC 4.49 (L) 01/15/2025    HGB 13.5 01/15/2025     01/15/2025   No other concerns at this time.  Agreeable to continue monthly outreaches.  Encouraged to call if questions or concerns arise.    DELANEY UGALDE

## 2025-05-29 ENCOUNTER — APPOINTMENT (OUTPATIENT)
Dept: VASCULAR MEDICINE | Facility: HOSPITAL | Age: 89
End: 2025-05-29
Payer: MEDICARE

## 2025-05-29 ENCOUNTER — HOSPITAL ENCOUNTER (OUTPATIENT)
Dept: VASCULAR MEDICINE | Facility: HOSPITAL | Age: 89
Discharge: HOME | End: 2025-05-29
Payer: MEDICARE

## 2025-05-29 ENCOUNTER — APPOINTMENT (OUTPATIENT)
Dept: CARDIOLOGY | Facility: HOSPITAL | Age: 89
End: 2025-05-29
Payer: MEDICARE

## 2025-05-29 ENCOUNTER — OFFICE VISIT (OUTPATIENT)
Dept: CARDIOLOGY | Facility: HOSPITAL | Age: 89
End: 2025-05-29
Payer: MEDICARE

## 2025-05-29 VITALS — OXYGEN SATURATION: 98 % | WEIGHT: 188 LBS | HEART RATE: 66 BPM | HEIGHT: 71 IN | BODY MASS INDEX: 26.32 KG/M2

## 2025-05-29 DIAGNOSIS — Z91.89 AT HIGH RISK FOR BLEEDING: ICD-10-CM

## 2025-05-29 DIAGNOSIS — Z87.19 HISTORY OF GI BLEED: ICD-10-CM

## 2025-05-29 DIAGNOSIS — Z95.1 S/P CABG (CORONARY ARTERY BYPASS GRAFT): ICD-10-CM

## 2025-05-29 DIAGNOSIS — I10 HTN (HYPERTENSION): Primary | ICD-10-CM

## 2025-05-29 DIAGNOSIS — I73.9 PERIPHERAL VASCULAR DISEASE, UNSPECIFIED: ICD-10-CM

## 2025-05-29 DIAGNOSIS — Z79.82 ENCOUNTER FOR ASPIRIN THERAPY: ICD-10-CM

## 2025-05-29 DIAGNOSIS — M54.9 BACK PAIN: ICD-10-CM

## 2025-05-29 DIAGNOSIS — L97.509 TOE ULCER (MULTI): ICD-10-CM

## 2025-05-29 DIAGNOSIS — I25.10 CAD (CORONARY ARTERY DISEASE): ICD-10-CM

## 2025-05-29 DIAGNOSIS — I48.91 ATRIAL FIBRILLATION (MULTI): ICD-10-CM

## 2025-05-29 DIAGNOSIS — I70.222 CRITICAL LIMB ISCHEMIA OF LEFT LOWER EXTREMITY: ICD-10-CM

## 2025-05-29 DIAGNOSIS — D64.9 ANEMIA: ICD-10-CM

## 2025-05-29 DIAGNOSIS — Z92.89 HISTORY OF ANGIOGRAPHY: ICD-10-CM

## 2025-05-29 DIAGNOSIS — I48.91 A-FIB (MULTI): ICD-10-CM

## 2025-05-29 DIAGNOSIS — Z95.2 S/P AVR: ICD-10-CM

## 2025-05-29 DIAGNOSIS — Z91.81 HISTORY OF RECENT FALL: ICD-10-CM

## 2025-05-29 DIAGNOSIS — Z78.9 UNDER CARE OF PODIATRY SURGEON: ICD-10-CM

## 2025-05-29 DIAGNOSIS — I77.1 STRICTURE OF ARTERY: ICD-10-CM

## 2025-05-29 DIAGNOSIS — I73.9 PAD (PERIPHERAL ARTERY DISEASE): ICD-10-CM

## 2025-05-29 PROCEDURE — 99215 OFFICE O/P EST HI 40 MIN: CPT | Performed by: INTERNAL MEDICINE

## 2025-05-29 PROCEDURE — 93922 UPR/L XTREMITY ART 2 LEVELS: CPT

## 2025-05-29 PROCEDURE — 93922 UPR/L XTREMITY ART 2 LEVELS: CPT | Performed by: SURGERY

## 2025-05-29 RX ORDER — ASPIRIN 81 MG/1
81 TABLET ORAL DAILY
Qty: 90 TABLET | Refills: 3 | Status: SHIPPED | OUTPATIENT
Start: 2025-05-29 | End: 2026-05-29

## 2025-05-29 RX ORDER — CLOPIDOGREL BISULFATE 75 MG/1
75 TABLET ORAL DAILY
Qty: 100 TABLET | Refills: 3 | Status: SHIPPED | OUTPATIENT
Start: 2025-05-29 | End: 2025-05-29 | Stop reason: SINTOL

## 2025-05-29 NOTE — PATIENT INSTRUCTIONS
It was a pleasure taking care of you today and appreciate your seeing us at our [facility].     Today we discussed:  - You are doing very well, your testing is stable  - Discussed risk of blood thinners compared to risk for falls  - Referral to Cardiology for Watchmann procedure, Dr. Padilla    Today's plan is as follows:  - STOP Plavix  - START Aspirin 81mg daily  - We will refer you to Dr. Padilla for the Watchmann procedure with the goal to remove the Eliquis  - Follow up with Kate Delgado CNP in 1 year with repeat testing  - Dr. Dominga Gandhi is our preferred Podiatrist, call [phone] and tell her you are a patient of Dr. Lopez    Please call the office with any questions at [phone], press option 1  If you need coordinating your appointments and testing you can do these at the  or by calling my office shortly after your visit.

## 2025-05-29 NOTE — PROGRESS NOTES
"Chief Complaint:   1 year follow up      History Of Present Illness:    Alexis Armijo is a 89 y.o. year old male patient with h/o HTN, CAD s/p CABGx3 and AVR 5/18 (pt of Dr Adalberto Beyer), GI bleed, Anemia, back pain, PAD s/p remote right leg bypass surgery by Dr Prince, On 2/11/20, he underwent successful atherectomy and angioplasty of occluded Left popliteal and TP trunk arteries and PTA of L peroneal artery with Dr. Lopez for claudication.    Today pt presents for an annual follow-up with repeat ALEYDA testing. He still trying to stay active, with walking and bicycling every day, with no claudication or rest pain, and no open wounds.      He reports he has his usual arthritic pain in his hips and knees. He has bilateral great toe pain with redness and no wounds. He continues to follow-up with podiatry routinely every 10 weeks for foot and nail care.  He reports some leg swelling but denies shortness of breath, chest pain or palpitation. he is compliant with his medications. Reported fall in which he suffered fractures and extensive soft tissue injury    Last Recorded Vitals:  Vitals:    05/29/25 1343   Pulse: 66   SpO2: 98%   Weight: 85.3 kg (188 lb)   Height: 1.803 m (5' 11\")       Past Medical History:  He has a past medical history of Age-related nuclear cataract, unspecified eye (05/26/2022), Atherosclerosis of native arteries of extremities with intermittent claudication, bilateral legs (01/03/2023), Atherosclerotic heart disease of native coronary artery without angina pectoris (10/04/2022), Cellulitis of lower extremity (04/09/2024), Community acquired pneumonia (04/09/2024), Diplopia (02/18/2022), Essential (primary) hypertension (10/04/2022), Heart disease (2010), Hyperlipidemia, Other abnormal glucose, Other intervertebral disc degeneration, lumbar region, Pain in extremity (04/09/2024), Personal history of colonic polyps, 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    Past Surgical History:  He has a past surgical history that includes Other surgical history (02/10/2015); Other surgical history (07/20/2015); Cardiac pacemaker placement (04/16/2018); Total knee arthroplasty (04/16/2018); Colonoscopy w/ polypectomy (01/25/2017); Knee arthroscopy w/ debridement (01/25/2017); Appendectomy (01/25/2017); Coronary angioplasty with stent (01/25/2017); Esophagogastroduodenoscopy (01/25/2017); Hernia repair (01/25/2017); Other surgical history (01/25/2017); and CT angio aorta and bilateral iliofemoral runoff including without contrast if performed (11/23/2019).      Social History:  He reports that he has never smoked. He has never used smokeless tobacco. He reports that he does not drink alcohol and does not use drugs.    Family History:  Family History[1]     Allergies:  Patient has no known allergies.    Outpatient Medications:  Current Outpatient Medications   Medication Instructions    apixaban (Eliquis) 2.5 mg tablet 1 tablet, 2 times daily    aspirin 81 mg, oral, Daily    b complex vitamins capsule 1 capsule, Nightly    cholecalciferol (VITAMIN D3) 1,000 Units, Daily    collagen, hydrolysate, bovine, (COLLAGEN, HYDR, BOVINE,, BULK, MISC) 1 Scoop, Daily RT    krill-om-3-dha-epa-phospho-ast (Omega-3 Krill Oil) 004-93-37-50 mg capsule 1 capsule, Every morning    lactobacillus combination no.4 (Probiotic) 3 billion cell capsule 1 tablet, Daily    lutein-zeaxanthin 25-5 mg capsule 1 capsule, Every morning    multivitamin with minerals (multivitamin with folic acid) tablet 1 tablet, 2 times daily    prednisoLONE acetate (Pred-Forte) 1 % ophthalmic suspension USE 1 DROP IN THE RIGHT EYE FOUR TIMES DAILY. ONE DROP  IN OPERATIVE EYE FOUR TIMES A DAY    rosuvastatin (Crestor) 5 mg tablet 1 tablet, Daily (0630)    TURMERIC ORAL 500 mg, Daily    valsartan (DIOVAN) 80 mg, oral, Daily       Physical Exam:  Physical Exam       Last Labs:  CBC -  Lab Results   Component Value Date    WBC 6.1 01/15/2025    HGB 13.5 01/15/2025    HCT 42.1 01/15/2025    MCV 94 01/15/2025     01/15/2025       CMP -  Lab Results   Component Value Date    CALCIUM 9.2 01/15/2025    PHOS 2.7 03/24/2020    PROT 6.6 01/15/2025    PROT 7.0 01/15/2025    ALBUMIN 4.7 01/15/2025    AST 19 01/15/2025    ALT 11 01/15/2025    ALKPHOS 49 01/15/2025    BILITOT 0.9 01/15/2025       LIPID PANEL -   Lab Results   Component Value Date    CHOL 106 05/22/2024    TRIG 55 05/22/2024    HDL 44.6 05/22/2024    CHHDL 2.4 05/22/2024    LDLF 62 10/05/2022    VLDL 11 05/22/2024    NHDL 61 05/22/2024       RENAL FUNCTION PANEL -   Lab Results   Component Value Date    GLUCOSE 103 (H) 01/15/2025     01/15/2025    K 4.4 01/15/2025     01/15/2025    CO2 30 01/15/2025    ANIONGAP 12 01/15/2025    BUN 32 (H) 01/15/2025    CREATININE 0.96 01/15/2025    GFRMALE 82 12/29/2022    CALCIUM 9.2 01/15/2025    PHOS 2.7 03/24/2020    ALBUMIN 4.7 01/15/2025        Lab Results   Component Value Date     (H) 02/12/2024    HGBA1C 5.4 02/12/2024       Last Vascular imaging :      Assessment/Plan   Alexis Armijo is a 89 y.o. year old male patient with h/o HTN, CAD s/p CABGx3 and AVR 5/18 (pt of Dr Adalberto Beyer), GI bleed, Anemia, back pain, PAD s/p remote right leg bypass surgery by Dr Prince, On 2/11/20, he underwent successful atherectomy and angioplasty of occluded Left popliteal and TP trunk arteries and PTA of L peroneal artery with Dr. Lopez for claudication. Today presented for the annual follow up with repeat ALEYDA testing. Last follow up in 05/2024 PVR test showed moderate arterial disease bilaterally with noncompressible arteries; RT 1.03/0.38 Lt NC//0.56 right side  digital pressure remained with no significant change.  Left side show decrease in digital pressure from last year 0.94 to 0.56 on 05/2024,.   He still trying to stay active, with walking and bicycling every day, with no claudication or rest pain, and no open wounds.  He reports he has his usual arthritic pain in his hips and knees. He has bilateral great toe pain with redness and no wounds. He continues to follow-up with podiatry routinely every 10 weeks for foot and nail care.  He reports some leg swelling but denies shortness of breath, chest pain or palpitation. he is compliant with his medications     Pt has no significant claudication or rest pain and no wounds at this time.  This patient has no significant symptoms or wounds invasive procedure is not recommended at this time.    PLAN:  - Further risk benefit discussion regarding anticoagulation in setting of recent fall. Eliquis for atrial fibrillation and Plavix for PAD   Discontinue Plavix, commence Aspirin 81mg daily   Refer to Dr. Padilla for LAAO  - PAD stable, no wounds, no leg pain at rest or with exercise  - Provided contact information for Dr. Gandhi as patient expressed interest in changing Podiatrist  - Follow up in 1 year with Kate Delgado CNP and repeat testing    Problem List Items Addressed This Visit           ICD-10-CM    CAD (coronary artery disease) I25.10    PAD (peripheral artery disease) I73.9    Relevant Medications    aspirin 81 mg EC tablet    Other Relevant Orders    Vascular US ankle brachial index (ALEYDA) without exercise    Anemia D64.9     Other Visit Diagnoses         Codes      HTN (hypertension)    -  Primary I10      S/P CABG (coronary artery bypass graft)     Z95.1      S/P AVR     Z95.2      A-fib (Multi)     I48.91      History of GI bleed     Z87.19      Back pain     M54.9      History of recent fall     Z91.81      At high risk for bleeding     Z91.89      History of angiography     Z92.89      Encounter for aspirin therapy      Z79.82      Under care of podiatry surgeon     Z78.9            I personally reviewed all above lab work, imaging results, and pertinent medical history prior to and/or during this visit.     Viraj Lopez DO         [1] No family history on file.

## 2025-06-04 ENCOUNTER — OFFICE VISIT (OUTPATIENT)
Facility: CLINIC | Age: 89
End: 2025-06-04
Payer: MEDICARE

## 2025-06-04 VITALS
WEIGHT: 188 LBS | BODY MASS INDEX: 26.22 KG/M2 | SYSTOLIC BLOOD PRESSURE: 128 MMHG | OXYGEN SATURATION: 99 % | HEART RATE: 77 BPM | DIASTOLIC BLOOD PRESSURE: 92 MMHG

## 2025-06-04 DIAGNOSIS — C85.10 B-CELL LYMPHOMA, UNSPECIFIED B-CELL LYMPHOMA TYPE, UNSPECIFIED BODY REGION (MULTI): ICD-10-CM

## 2025-06-04 DIAGNOSIS — C83.90: ICD-10-CM

## 2025-06-04 DIAGNOSIS — I48.0 PAROXYSMAL ATRIAL FIBRILLATION (MULTI): Primary | ICD-10-CM

## 2025-06-04 DIAGNOSIS — C85.90 NON-HODGKIN'S LYMPHOMA, UNSPECIFIED BODY REGION, UNSPECIFIED NON-HODGKIN LYMPHOMA TYPE: ICD-10-CM

## 2025-06-04 PROCEDURE — 1126F AMNT PAIN NOTED NONE PRSNT: CPT | Performed by: INTERNAL MEDICINE

## 2025-06-04 PROCEDURE — 3080F DIAST BP >= 90 MM HG: CPT | Performed by: INTERNAL MEDICINE

## 2025-06-04 PROCEDURE — 99205 OFFICE O/P NEW HI 60 MIN: CPT | Performed by: INTERNAL MEDICINE

## 2025-06-04 PROCEDURE — G2211 COMPLEX E/M VISIT ADD ON: HCPCS | Performed by: INTERNAL MEDICINE

## 2025-06-04 PROCEDURE — 1159F MED LIST DOCD IN RCRD: CPT | Performed by: INTERNAL MEDICINE

## 2025-06-04 PROCEDURE — 3074F SYST BP LT 130 MM HG: CPT | Performed by: INTERNAL MEDICINE

## 2025-06-04 ASSESSMENT — PATIENT HEALTH QUESTIONNAIRE - PHQ9
1. LITTLE INTEREST OR PLEASURE IN DOING THINGS: NOT AT ALL
SUM OF ALL RESPONSES TO PHQ9 QUESTIONS 1 AND 2: 0
2. FEELING DOWN, DEPRESSED OR HOPELESS: NOT AT ALL

## 2025-06-04 ASSESSMENT — LIFESTYLE VARIABLES: TOTAL SCORE: 0

## 2025-06-04 ASSESSMENT — PAIN SCALES - GENERAL: PAINLEVEL_OUTOF10: 0-NO PAIN

## 2025-06-04 ASSESSMENT — ENCOUNTER SYMPTOMS
OCCASIONAL FEELINGS OF UNSTEADINESS: 1
LOSS OF SENSATION IN FEET: 1
DEPRESSION: 0

## 2025-06-04 NOTE — PROGRESS NOTES
[facility] [place]    Interventional Cardiology    Visit Type: New Patient       Reason for Visit: left atrial apprendage occlusion evaluation             Assessment and Plan  ?       Alexis Armijo is a very pleasant 89 y.o. male who presents for evaluation for left atrial pended closure.    Given his GI bleeding and his falls he does seem to be a reasonable candidate for left atrial appendage occlusion.  He is currently on half dose apixaban.  He is interested in getting a left atrial appendage occluder.  He would like to have this done at [facility].  Will plan a CT prewatchman protocol.    Problem List Items Addressed This Visit          Cardiac and Vasculature    Paroxysmal atrial fibrillation (Multi) - Primary    Relevant Orders    CT pre watchman full contrast    Comprehensive metabolic panel (Completed)    CBC and Auto Differential (Completed)           Follow up: 1 month        It was a great pleasure seeing Alexis Armijo in our office today.  Please contact me with any questions.       Damian Padilla MD MPH  Interventional Cardiologist/[facility] [place]    Subjective  ?       Alexis Armijo is a very pleasant 89 y.o. male with Atrial fibrillation, hypertension, hyperlipidemia, coronary artery disease status post CABG and AVR in 2018 with a bioprosthetic valve, history of GI bleeding, PAD status post right leg bypass 2020 and percutaneous intervention to the left lower extremity who presents for evaluation of left atrial appendage closure.  He was referred by Dr. Lopez.  He does have paroxysmal atrial fibrillation and has a history of GI bleeding as well as recent fall.  He denies any syncope.  He denies any angina or dyspnea.  He does get some claudication but it is fairly stable and mild.           Review of Systems   Constitutional:  Positive for fatigue.  Negative for chills and fever.   HENT:  Negative for nosebleeds.    Eyes:  Negative for visual disturbance.   Respiratory:  Negative for shortness of breath.    Cardiovascular:  Positive for palpitations. Negative for chest pain and leg swelling.   Gastrointestinal:  Negative for blood in stool.   Genitourinary:  Negative for hematuria.   Skin:  Negative for wound.   Neurological:  Negative for dizziness, syncope and light-headedness.          Allergies[1]      Medications Prior to Visit[2]       Medical History[3]       Surgical History[4]      Social History     Tobacco Use    Smoking status: Never    Smokeless tobacco: Never   Substance Use Topics    Alcohol use: Never          Family History[5]        Objective  ?       Vitals:    06/04/25 1034   BP: (!) 128/92   Pulse: 77   SpO2: 99%   Weight: 85.3 kg (188 lb)      Body mass index is 26.22 kg/m².      Physical Exam  Constitutional:       General: He is not in acute distress.     Appearance: Normal appearance.   HENT:      Head: Normocephalic and atraumatic.      Mouth/Throat:      Mouth: Mucous membranes are moist.   Neck:      Vascular: No carotid bruit or JVD.   Cardiovascular:      Rate and Rhythm: Normal rate and regular rhythm.      Pulses:           Dorsalis pedis pulses are 1+ on the right side and 1+ on the left side.        Posterior tibial pulses are 1+ on the right side and 1+ on the left side.      Heart sounds: Murmur heard.      Systolic murmur is present with a grade of 1/6.   Pulmonary:      Effort: Pulmonary effort is normal.      Breath sounds: Normal breath sounds. No wheezing or rales.   Musculoskeletal:      Right lower leg: No edema.      Left lower leg: No edema.   Skin:     General: Skin is warm.   Neurological:      Mental Status: He is alert and oriented to person, place, and time. Mental status is at baseline.   Psychiatric:         Mood and Affect: Mood normal.         Behavior: Behavior normal.         [id number] Stroke Risk  "Points: 7   Values used to calculate this score:    Points  Metrics       1        Has Congestive Heart Failure: Yes       1        Has Hypertension: Yes       2        Age: 89       0        Has Diabetes: No       2        Had Stroke: No                 Had TIA: Yes                 Had Thromboembolism: No       1        Has Vascular Disease: Yes       0        Clinically Relevant Sex: Male      Lip ROX, et al. 2009. © 2010 [employer]       Data Reviewed and Summarized  ?       Labs: personally reviewed   Lab Results   Component Value Date    WBC 6.6 06/04/2025    HGB 13.1 (L) 06/04/2025    HCT 41.8 06/04/2025    MCV 97.2 06/04/2025     06/04/2025      Lab Results   Component Value Date    GLUCOSE 98 06/04/2025    CALCIUM 9.3 06/04/2025     06/04/2025    K 4.1 06/04/2025    CO2 28 06/04/2025     06/04/2025    BUN 34 (H) 06/04/2025    CREATININE 0.98 06/04/2025      [unfilled]   Lab Results   Component Value Date    CHOL 106 05/22/2024    CHOL 132 10/05/2023    CHOL 131 10/05/2022      Lab Results   Component Value Date    TRIG 55 05/22/2024    TRIG 55 10/05/2023    TRIG 76 10/05/2022      Lab Results   Component Value Date    HDL 44.6 05/22/2024    HDL 56.7 10/05/2023    HDL 53.5 10/05/2022      Lab Results   Component Value Date    LDLCALC 50 05/22/2024    LDLCALC 64 (L) 10/05/2023         Imaging/Testing: personally reviewed   No results found for: \"LVEF\"          [1] No Known Allergies  [2]   Outpatient Medications Prior to Visit   Medication Sig Dispense Refill    apixaban (Eliquis) 2.5 mg tablet Take 1 tablet (2.5 mg) by mouth 2 times a day.      aspirin 81 mg EC tablet Take 1 tablet (81 mg) by mouth once daily. 90 tablet 3    b complex vitamins capsule Take 1 capsule by mouth once daily at bedtime.      cholecalciferol (Vitamin D3) 25 MCG (1000 UT) tablet Take 1 tablet (1,000 Units) by mouth once daily.      collagen, hydrolysate, bovine, (COLLAGEN, HYDR, BOVINE,, " BULK, MISC) Take 1 Scoop by mouth once daily.      krill-om-3-dha-epa-phospho-ast (Omega-3 Krill Oil) 118-66-25-50 mg capsule Take 1 capsule by mouth once daily in the morning.      lactobacillus combination no.4 (Probiotic) 3 billion cell capsule Take 1 capsule by mouth once daily.      lutein-zeaxanthin 25-5 mg capsule Take 1 capsule by mouth once daily in the morning.      multivitamin with minerals (multivitamin with folic acid) tablet Take 1 tablet by mouth 2 times a day.      rosuvastatin (Crestor) 5 mg tablet Take 1 tablet (5 mg) by mouth early in the morning..      TURMERIC ORAL Take 500 mg by mouth once daily.      valsartan (Diovan) 80 mg tablet Take 1 tablet (80 mg) by mouth once daily. 100 tablet 3    prednisoLONE acetate (Pred-Forte) 1 % ophthalmic suspension USE 1 DROP IN THE RIGHT EYE FOUR TIMES DAILY. ONE DROP IN OPERATIVE EYE FOUR TIMES A DAY       No facility-administered medications prior to visit.   [3]   Past Medical History:  Diagnosis Date    Age-related nuclear cataract, unspecified eye 05/26/2022    Nuclear sclerosis    Atherosclerosis of native arteries of extremities with intermittent claudication, bilateral legs 01/03/2023    Atherosclerosis of native artery of both lower extremities with intermittent claudication    Atherosclerotic heart disease of native coronary artery without angina pectoris 10/04/2022    CAD (coronary artery disease)    Atrial fibrillation (Multi)     Cellulitis of lower extremity 04/09/2024    Community acquired pneumonia 04/09/2024    Diplopia 02/18/2022    Diplopia    Essential (primary) hypertension 10/04/2022    Benign essential hypertension    Heart disease 2010    Heart valve disease     Hyperlipidemia     Other abnormal glucose     Hemoglobin A1c less than 7.0%    Other intervertebral disc degeneration, lumbar region     Degenerative disc disease, lumbar    Pain in extremity 04/09/2024    Personal history of colonic polyps     History of colonic polyps     Personal history of malignant neoplasm of other organs and systems     History of squamous cell carcinoma    Personal history of other diseases of the circulatory system 04/16/2018    History of hypertension    Personal history of other endocrine, nutritional and metabolic disease 04/16/2018    History of hyperlipidemia    Personal history of other medical treatment     History of stress test    Presence of prosthetic heart valve 01/03/2023    S/P AVR (aortic valve replacement)    Spondylolysis, lumbar region     Lumbar spondylolysis   [4]   Past Surgical History:  Procedure Laterality Date    APPENDECTOMY  01/25/2017    Appendectomy    CARDIAC PACEMAKER PLACEMENT  04/16/2018    Pacemaker Placement    CARDIAC VALVE REPLACEMENT      CATARACT EXTRACTION, BILATERAL      COLONOSCOPY W/ POLYPECTOMY  01/25/2017    Complete Colonoscopy For Polyp Removal    CORONARY ANGIOPLASTY WITH STENT PLACEMENT  01/25/2017    Cath Stent Placement    CT ANGIO AORTA AND BILATERAL ILIOFEMORAL RUN OFF INCLUDING WITHOUT CONTRAST IF PERFORMED  11/23/2019    CT AORTA AND BILATERAL ILIOFEMORAL RUNOFF ANGIOGRAM W AND/OR WO IV CONTRAST 11/23/2019 [facility]    ESOPHAGOGASTRODUODENOSCOPY  01/25/2017    Esophagogastroduodenoscopy With Biopsy    HERNIA REPAIR  01/25/2017    Inguinal Hernia Repair    KNEE ARTHROSCOPY W/ DEBRIDEMENT  01/25/2017    Arthroscopy Knee Left    OTHER SURGICAL HISTORY  02/10/2015    Stent Indications: Acute Myocardial Infarction    OTHER SURGICAL HISTORY  07/20/2015    Blood Vessel Repair Direct Leg    OTHER SURGICAL HISTORY  01/25/2017    Excision Of Lesion Face    TOTAL KNEE ARTHROPLASTY  04/16/2018    Knee Replacement    VEIN SURGERY     [5] No family history on file.

## 2025-06-05 LAB
ALBUMIN SERPL-MCNC: 4.8 G/DL (ref 3.6–5.1)
ALP SERPL-CCNC: 47 U/L (ref 35–144)
ALT SERPL-CCNC: 11 U/L (ref 9–46)
ANION GAP SERPL CALCULATED.4IONS-SCNC: 9 MMOL/L (CALC) (ref 7–17)
AST SERPL-CCNC: 17 U/L (ref 10–35)
BASOPHILS # BLD AUTO: 59 CELLS/UL (ref 0–200)
BASOPHILS NFR BLD AUTO: 0.9 %
BILIRUB SERPL-MCNC: 1 MG/DL (ref 0.2–1.2)
BUN SERPL-MCNC: 34 MG/DL (ref 7–25)
CALCIUM SERPL-MCNC: 9.3 MG/DL (ref 8.6–10.3)
CHLORIDE SERPL-SCNC: 105 MMOL/L (ref 98–110)
CO2 SERPL-SCNC: 28 MMOL/L (ref 20–32)
CREAT SERPL-MCNC: 0.98 MG/DL (ref 0.7–1.22)
EGFRCR SERPLBLD CKD-EPI 2021: 74 ML/MIN/1.73M2
EOSINOPHIL # BLD AUTO: 73 CELLS/UL (ref 15–500)
EOSINOPHIL NFR BLD AUTO: 1.1 %
ERYTHROCYTE [DISTWIDTH] IN BLOOD BY AUTOMATED COUNT: 14 % (ref 11–15)
GLUCOSE SERPL-MCNC: 98 MG/DL (ref 65–99)
HCT VFR BLD AUTO: 41.8 % (ref 38.5–50)
HGB BLD-MCNC: 13.1 G/DL (ref 13.2–17.1)
LYMPHOCYTES # BLD AUTO: 1934 CELLS/UL (ref 850–3900)
LYMPHOCYTES NFR BLD AUTO: 29.3 %
MCH RBC QN AUTO: 30.5 PG (ref 27–33)
MCHC RBC AUTO-ENTMCNC: 31.3 G/DL (ref 32–36)
MCV RBC AUTO: 97.2 FL (ref 80–100)
MONOCYTES # BLD AUTO: 805 CELLS/UL (ref 200–950)
MONOCYTES NFR BLD AUTO: 12.2 %
NEUTROPHILS # BLD AUTO: 3729 CELLS/UL (ref 1500–7800)
NEUTROPHILS NFR BLD AUTO: 56.5 %
PLATELET # BLD AUTO: 176 THOUSAND/UL (ref 140–400)
PMV BLD REES-ECKER: 10.4 FL (ref 7.5–12.5)
POTASSIUM SERPL-SCNC: 4.1 MMOL/L (ref 3.5–5.3)
PROT SERPL-MCNC: 6.6 G/DL (ref 6.1–8.1)
RBC # BLD AUTO: 4.3 MILLION/UL (ref 4.2–5.8)
SODIUM SERPL-SCNC: 142 MMOL/L (ref 135–146)
WBC # BLD AUTO: 6.6 THOUSAND/UL (ref 3.8–10.8)

## 2025-06-06 ASSESSMENT — ENCOUNTER SYMPTOMS
WOUND: 0
BLOOD IN STOOL: 0
HEMATURIA: 0
CHILLS: 0
LIGHT-HEADEDNESS: 0
FATIGUE: 1
PALPITATIONS: 1
FEVER: 0
SHORTNESS OF BREATH: 0
DIZZINESS: 0

## 2025-06-16 ENCOUNTER — HOSPITAL ENCOUNTER (OUTPATIENT)
Dept: RADIOLOGY | Facility: HOSPITAL | Age: 89
Discharge: HOME | End: 2025-06-16
Payer: MEDICARE

## 2025-06-16 DIAGNOSIS — I48.0 PAROXYSMAL ATRIAL FIBRILLATION (MULTI): ICD-10-CM

## 2025-06-16 PROCEDURE — 2550000001 HC RX 255 CONTRASTS: Performed by: INTERNAL MEDICINE

## 2025-06-16 PROCEDURE — 75572 CT HRT W/3D IMAGE: CPT

## 2025-06-16 PROCEDURE — 75572 CT HRT W/3D IMAGE: CPT | Performed by: INTERNAL MEDICINE

## 2025-06-16 RX ADMIN — IOHEXOL 75 ML: 350 INJECTION, SOLUTION INTRAVENOUS at 09:15

## 2025-06-24 ENCOUNTER — PATIENT OUTREACH (OUTPATIENT)
Dept: PRIMARY CARE | Facility: CLINIC | Age: 89
End: 2025-06-24
Payer: MEDICARE

## 2025-06-24 DIAGNOSIS — I48.0 PAROXYSMAL ATRIAL FIBRILLATION (MULTI): ICD-10-CM

## 2025-06-24 DIAGNOSIS — H26.9 CATARACT OF BOTH EYES, UNSPECIFIED CATARACT TYPE: ICD-10-CM

## 2025-06-24 DIAGNOSIS — M17.0 OSTEOARTHRITIS OF BOTH KNEES, UNSPECIFIED OSTEOARTHRITIS TYPE: ICD-10-CM

## 2025-06-24 DIAGNOSIS — I10 BENIGN ESSENTIAL HYPERTENSION: ICD-10-CM

## 2025-06-24 ASSESSMENT — SOCIAL DETERMINANTS OF HEALTH (SDOH): IN THE PAST 12 MONTHS, HAS THE ELECTRIC, GAS, OIL, OR WATER COMPANY THREATENED TO SHUT OFF SERVICE IN YOUR HOME?: NO

## 2025-06-24 NOTE — PROGRESS NOTES
Care Management Monthly Outreach  Chart review completed  Confirmation of at least 2 patient identifiers  Change in insurance? No    Has patient been to ER/Urgent Care since last outreach? No    Last Office Visit with PCP: 3/31/2025   Next Office Visit with PCP: 10/3/2025   APC Collaboration: n/a    Chronic Conditions and Outreach Summary:   Benign essential hypertension    Cataract of both eyes, unspecified cataract type    Osteoarthritis of both knees, unspecified osteoarthritis type    Paroxysmal atrial fibrillation (Multi)    Monthly outreach completed with patient and wife by phone.  Denies any issues or concerns at this time.  Patient has been staying inside air conditioned home with blinds closed during extreme heat.  Denies any vision concerns since cataract removal.  Double vision is correct by prism in glasses.    HTN  Checks BP at home weekly.  Last BP= 135/74 which was taken after her exercised that morning.  Managed by diet and daily exercise with equipment he has available at home.    OA b/l knees  Reports knee pain is ongoing.  Effectively managed with exercises and rest as needed during exacerbations.     Afib  Taking Eliquis as prescribed.  Completed cardiology follow up visit and began pre-watchman evaluation- CT completed 6/16/25.  Patient anticipates results in 2-3 weeks.  Procedure will be scheduled if found appropriate after pre-procudure workup.    Medications:   Are there medication changes since last visit? Yes- Plavix stopped.  Aspirin started.  Refills needed? No    Social Drivers of Health: Addressed today  Utilities- Denies any concerns with housing, maintenance, or utilities.    Care Gaps Addressed? Addressed in the last 6 months  Care Plan addressed: Yes    Upcoming Appointments:   Future Appointments       Date / Time Provider Department Dept Phone    8/18/2025 2:00 PM Farrah Moore PA-C [facility] [phone]    8/25/2025 1:00 PM JUANIS RAMIRES 1 [facility]  [phone]    10/3/2025 10:30 AM (Arrive by 10:15 AM) Jonah Velez DO [facility] [phone]    1/23/2026 11:30 AM (Arrive by 11:15 AM) Nicole Mar PA-C [facility] [phone]          Blood Pressures Reviewed  BP Readings from Last 3 Encounters:   06/04/25 (!) 128/92   03/31/25 121/74   02/03/25 128/71     Labs Reviewed:  Lab Results   Component Value Date    CREATININE 0.98 06/04/2025    GLUCOSE 98 06/04/2025    ALKPHOS 47 06/04/2025    K 4.1 06/04/2025    PROT 6.6 06/04/2025     06/04/2025    CALCIUM 9.3 06/04/2025    AST 17 06/04/2025    ALT 11 06/04/2025    BUN 34 (H) 06/04/2025    MG 2.01 02/24/2024    PHOS 2.7 03/24/2020     01/15/2025    GFRMALE 82 12/29/2022     Lab Results   Component Value Date    TRIG 55 05/22/2024    CHOL 106 05/22/2024    LDLCALC 50 05/22/2024    HDL 44.6 05/22/2024     Lab Results   Component Value Date    HGBA1C 5.4 02/12/2024    HGBA1C 5.6 05/01/2018     Lab Results   Component Value Date    WBC 6.6 06/04/2025    RBC 4.30 06/04/2025    HGB 13.1 (L) 06/04/2025     06/04/2025   No other concerns at this time.  Agreeable to continue monthly outreaches.  Encouraged to call if questions or concerns arise.    DELANEY UGALDE

## 2025-07-16 ENCOUNTER — OFFICE VISIT (OUTPATIENT)
Facility: CLINIC | Age: 89
End: 2025-07-16
Payer: MEDICARE

## 2025-07-16 VITALS
HEART RATE: 72 BPM | OXYGEN SATURATION: 100 % | SYSTOLIC BLOOD PRESSURE: 118 MMHG | BODY MASS INDEX: 27.96 KG/M2 | WEIGHT: 200.5 LBS | DIASTOLIC BLOOD PRESSURE: 66 MMHG

## 2025-07-16 DIAGNOSIS — I48.19 ATRIAL FIBRILLATION, PERSISTENT (MULTI): Primary | ICD-10-CM

## 2025-07-16 DIAGNOSIS — Z00.6 ENCOUNTER FOR EXAMINATION FOR NORMAL COMPARISON AND CONTROL IN CLINICAL RESEARCH PROGRAM: ICD-10-CM

## 2025-07-16 PROCEDURE — 3074F SYST BP LT 130 MM HG: CPT | Performed by: INTERNAL MEDICINE

## 2025-07-16 PROCEDURE — 99211 OFF/OP EST MAY X REQ PHY/QHP: CPT

## 2025-07-16 PROCEDURE — 1126F AMNT PAIN NOTED NONE PRSNT: CPT | Performed by: INTERNAL MEDICINE

## 2025-07-16 PROCEDURE — 3078F DIAST BP <80 MM HG: CPT | Performed by: INTERNAL MEDICINE

## 2025-07-16 PROCEDURE — 99214 OFFICE O/P EST MOD 30 MIN: CPT | Performed by: INTERNAL MEDICINE

## 2025-07-16 PROCEDURE — G2211 COMPLEX E/M VISIT ADD ON: HCPCS | Performed by: INTERNAL MEDICINE

## 2025-07-16 ASSESSMENT — PAIN SCALES - GENERAL: PAINLEVEL_OUTOF10: 0-NO PAIN

## 2025-07-16 ASSESSMENT — PATIENT HEALTH QUESTIONNAIRE - PHQ9
SUM OF ALL RESPONSES TO PHQ9 QUESTIONS 1 AND 2: 0
1. LITTLE INTEREST OR PLEASURE IN DOING THINGS: NOT AT ALL
2. FEELING DOWN, DEPRESSED OR HOPELESS: NOT AT ALL

## 2025-07-16 ASSESSMENT — ENCOUNTER SYMPTOMS
OCCASIONAL FEELINGS OF UNSTEADINESS: 0
LOSS OF SENSATION IN FEET: 0
DEPRESSION: 0

## 2025-07-16 NOTE — PROGRESS NOTES
[facility] [place]    Interventional Cardiology    Visit Type: Established Patient       Reason for Visit: Follow-up (Watchman discuussion)             Assessment and Plan  ?       Alexis Armijo is a very pleasant 89 y.o. male who presents for follow-up.    Given his age and history of GI bleed and his falls we will plan to proceed with left atrial appendage closure.        Problem List Items Addressed This Visit          Cardiac and Vasculature    Atrial fibrillation, persistent (Multi) - Primary    Relevant Orders    Case Request Cath Lab: LAAO (Left Atrial Appendage Occlusion) (Completed)    Comprehensive metabolic panel    CBC and Auto Differential    Follow Up In Cardiology     Other Visit Diagnoses         Encounter for examination for normal comparison and control in clinical research program        Relevant Orders    Case Request Cath Lab: LAAO (Left Atrial Appendage Occlusion) (Completed)               [id number] Stroke Risk Points: 7   Values used to calculate this score:    Points  Metrics       1        Has Congestive Heart Failure: Yes       1        Has Hypertension: Yes       2        Age: 89       0        Has Diabetes: No       2        Had Stroke: No                 Had TIA: Yes                 Had Thromboembolism: No       1        Has Vascular Disease: Yes       0        Clinically Relevant Sex: Male      Lip GH, et al. 2009. © 2010 [employer]icians       Hasbled: 4         Follow up: After LAAC        It was a great pleasure seeing Alexis Armijo in our office today.  Please contact me with any questions.       Damian Padilla MD MPH  Interventional Cardiologist/[facility] [place]    Subjective  ?       Alexis Armijo is a very pleasant 89 y.o. male with Atrial fibrillation, hypertension, hyperlipidemia, coronary artery disease status  post CABG and AVR in 2018 with a bioprosthetic valve, history of GI bleeding, PAD status post right leg bypass 2020 and percutaneous intervention to the left lower extremity who presents for follow up.  He was referred by Dr. Lopez for left atrial appendage closure consideration.  He has some right lower extremity pain and erythema and swelling and he states that his vascular team has seen this and knows about it.  He recently tripped in the hallway at his home and had a minor injury but no major bleeds.  He would like to proceed with left atrial appendage closure.           Review of Systems       Allergies[1]      Medications Prior to Visit[2]       Medical History[3]       Surgical History[4]      Social History     Tobacco Use    Smoking status: Never    Smokeless tobacco: Never   Substance Use Topics    Alcohol use: Never          Family History[5]        Objective  ?       Vitals:    07/16/25 1349   BP: 118/66   Pulse: 72   SpO2: 100%   Weight: 90.9 kg (200 lb 8 oz)      Body mass index is 27.96 kg/m².      Physical Exam  Constitutional:       General: He is not in acute distress.     Appearance: Normal appearance.   HENT:      Head: Normocephalic and atraumatic.      Mouth/Throat:      Mouth: Mucous membranes are moist.   Neck:      Vascular: No carotid bruit or JVD.     Cardiovascular:      Rate and Rhythm: Normal rate and regular rhythm.      Pulses:           Dorsalis pedis pulses are 1+ on the right side and 1+ on the left side.        Posterior tibial pulses are 1+ on the right side and 1+ on the left side.      Heart sounds: Murmur heard.      Systolic murmur is present with a grade of 1/6.   Pulmonary:      Effort: Pulmonary effort is normal.      Breath sounds: Normal breath sounds. No wheezing or rales.     Musculoskeletal:      Right lower leg: Edema present.      Left lower leg: No edema.     Skin:     General: Skin is warm.     Neurological:      Mental Status: He is alert and oriented to  "person, place, and time. Mental status is at baseline.     Psychiatric:         Mood and Affect: Mood normal.         Behavior: Behavior normal.                Data Reviewed and Summarized  ?       Labs: personally reviewed   Lab Results   Component Value Date    WBC 6.6 06/04/2025    HGB 13.1 (L) 06/04/2025    HCT 41.8 06/04/2025    MCV 97.2 06/04/2025     06/04/2025      Lab Results   Component Value Date    GLUCOSE 98 06/04/2025    CALCIUM 9.3 06/04/2025     06/04/2025    K 4.1 06/04/2025    CO2 28 06/04/2025     06/04/2025    BUN 34 (H) 06/04/2025    CREATININE 0.98 06/04/2025      @[facility]@   Lab Results   Component Value Date    CHOL 106 05/22/2024    CHOL 132 10/05/2023    CHOL 131 10/05/2022      Lab Results   Component Value Date    TRIG 55 05/22/2024    TRIG 55 10/05/2023    TRIG 76 10/05/2022      Lab Results   Component Value Date    HDL 44.6 05/22/2024    HDL 56.7 10/05/2023    HDL 53.5 10/05/2022      Lab Results   Component Value Date    LDLCALC 50 05/22/2024    LDLCALC 64 (L) 10/05/2023         Imaging/Testing: personally reviewed   No results found for: \"LVEF\"          [1] No Known Allergies  [2]   Outpatient Medications Prior to Visit   Medication Sig Dispense Refill    apixaban (Eliquis) 2.5 mg tablet Take 1 tablet (2.5 mg) by mouth 2 times a day.      aspirin 81 mg EC tablet Take 1 tablet (81 mg) by mouth once daily. 90 tablet 3    b complex vitamins capsule Take 1 capsule by mouth once daily at bedtime.      cholecalciferol (Vitamin D3) 25 MCG (1000 UT) tablet Take 1 tablet (1,000 Units) by mouth once daily.      collagen, hydrolysate, bovine, (COLLAGEN, HYDR, BOVINE,, BULK, MISC) Take 1 Scoop by mouth once daily.      krill-om-3-dha-epa-phospho-ast (Omega-3 Krill Oil) 027-49-38-50 mg capsule Take 1 capsule by mouth once daily in the morning.      lactobacillus combination no.4 (Probiotic) 3 billion cell capsule Take 1 capsule by mouth once daily.      lutein-zeaxanthin 25-5 " mg capsule Take 1 capsule by mouth once daily in the morning.      multivitamin with minerals (multivitamin with folic acid) tablet Take 1 tablet by mouth 2 times a day.      rosuvastatin (Crestor) 5 mg tablet Take 1 tablet (5 mg) by mouth early in the morning..      TURMERIC ORAL Take 500 mg by mouth once daily.      valsartan (Diovan) 80 mg tablet Take 1 tablet (80 mg) by mouth once daily. 100 tablet 3     No facility-administered medications prior to visit.   [3]   Past Medical History:  Diagnosis Date    Age-related nuclear cataract, unspecified eye 05/26/2022    Nuclear sclerosis    Atherosclerosis of native arteries of extremities with intermittent claudication, bilateral legs 01/03/2023    Atherosclerosis of native artery of both lower extremities with intermittent claudication    Atherosclerotic heart disease of native coronary artery without angina pectoris 10/04/2022    CAD (coronary artery disease)    Atrial fibrillation (Multi)     Cellulitis of lower extremity 04/09/2024    Community acquired pneumonia 04/09/2024    Diplopia 02/18/2022    Diplopia    Essential (primary) hypertension 10/04/2022    Benign essential hypertension    Heart disease 2010    Heart valve disease     Hyperlipidemia     Other abnormal glucose     Hemoglobin A1c less than 7.0%    Other intervertebral disc degeneration, lumbar region     Degenerative disc disease, lumbar    Pain in extremity 04/09/2024    Personal history of colonic polyps     History of colonic polyps    Personal history of malignant neoplasm of other organs and systems     History of squamous cell carcinoma    Personal history of other diseases of the circulatory system 04/16/2018    History of hypertension    Personal history of other endocrine, nutritional and metabolic disease 04/16/2018    History of hyperlipidemia    Personal history of other medical treatment     History of stress test    Presence of prosthetic heart valve 01/03/2023    S/P AVR (aortic valve  replacement)    Spondylolysis, lumbar region     Lumbar spondylolysis   [4]   Past Surgical History:  Procedure Laterality Date    APPENDECTOMY  01/25/2017    Appendectomy    CARDIAC PACEMAKER PLACEMENT  04/16/2018    Pacemaker Placement    CARDIAC VALVE REPLACEMENT      CATARACT EXTRACTION, BILATERAL      COLONOSCOPY W/ POLYPECTOMY  01/25/2017    Complete Colonoscopy For Polyp Removal    CORONARY ANGIOPLASTY WITH STENT PLACEMENT  01/25/2017    Cath Stent Placement    CT ANGIO AORTA AND BILATERAL ILIOFEMORAL RUN OFF INCLUDING WITHOUT CONTRAST IF PERFORMED  11/23/2019    CT AORTA AND BILATERAL ILIOFEMORAL RUNOFF ANGIOGRAM W AND/OR WO IV CONTRAST 11/23/2019 [facility]CI[facility]GAC[facility]    ESOPHAGOGASTRODUODENOSCOPY  01/25/2017    Esophagogastroduodenoscopy With Biopsy    HERNIA REPAIR  01/25/2017    Inguinal Hernia Repair    KNEE ARTHROSCOPY W/ DEBRIDEMENT  01/25/2017    Arthroscopy Knee Left    OTHER SURGICAL HISTORY  02/10/2015    Stent Indications: Acute Myocardial Infarction    OTHER SURGICAL HISTORY  07/20/2015    Blood Vessel Repair Direct Leg    OTHER SURGICAL HISTORY  01/25/2017    Excision Of Lesion Face    TOTAL KNEE ARTHROPLASTY  04/16/2018    Knee Replacement    VEIN SURGERY     [5] No family history on file.

## 2025-07-20 PROBLEM — I48.19 ATRIAL FIBRILLATION, PERSISTENT (MULTI): Status: ACTIVE | Noted: 2025-07-20

## 2025-07-25 ENCOUNTER — PATIENT OUTREACH (OUTPATIENT)
Dept: PRIMARY CARE | Facility: CLINIC | Age: 89
End: 2025-07-25
Payer: MEDICARE

## 2025-07-25 DIAGNOSIS — I48.0 PAROXYSMAL ATRIAL FIBRILLATION (MULTI): ICD-10-CM

## 2025-07-25 DIAGNOSIS — M17.0 OSTEOARTHRITIS OF BOTH KNEES, UNSPECIFIED OSTEOARTHRITIS TYPE: ICD-10-CM

## 2025-07-25 DIAGNOSIS — I10 BENIGN ESSENTIAL HYPERTENSION: ICD-10-CM

## 2025-07-25 NOTE — PROGRESS NOTES
Care Management Monthly Outreach  Chart review completed  Confirmation of at least 2 patient identifiers  Change in insurance? No    Has patient been to ER/Urgent Care since last outreach? No    Last Office Visit with PCP: 3/31/2025   Next Office Visit with PCP: 10/3/2025   APC Collaboration: n/a    Chronic Conditions and Outreach Summary:   Benign essential hypertension    Osteoarthritis of both knees, unspecified osteoarthritis type    Paroxysmal atrial fibrillation (Multi)    Monthly outreach completed with patient by phone.  Patient denies any DME or other resource needs at this time.  Reviewed recent cardiology note with patient and confirmed patient cleared for watchman procedure with goal to eliminate Eliquis 6 weeks after procedure.  Procedure is not yet scheduled.  Patient to schedule follow up with cardiology on or around 8/16/25 per after visit summary from 7/16/25. Patient made aware and will call to schedule.   Patient may need assistance with transportation following procedure depending on who is available to help after it is scheduled.  Patient will outreach CM if assistance is needed with transportation.    Medications:   Are there medication changes since last visit? No  Refills needed? No    Social Drivers of Health: Addressed in the last 6 months  Care Gaps Addressed? Addressed in the last 6 months  Care Plan addressed: Yes    Upcoming Appointments:   Future Appointments       Date / Time Provider Department Dept Phone    8/18/2025 2:00 PM Farrah Moore PA-C [facility] [phone]    8/25/2025 1:00 PM JUANIS RAMIRES 1 [facility] [phone]    10/3/2025 10:30 AM (Arrive by 10:15 AM) Jonah Velez DO [facility] [phone]    1/23/2026 11:30 AM (Arrive by 11:15 AM) Nicole Mar PA-C [facility] [phone]          Blood Pressures Reviewed  BP Readings from Last 3 Encounters:   07/16/25 118/66   06/04/25 (!) 128/92   03/31/25  121/74     Labs Reviewed:  Lab Results   Component Value Date    CREATININE 0.98 06/04/2025    GLUCOSE 98 06/04/2025    ALKPHOS 47 06/04/2025    K 4.1 06/04/2025    PROT 6.6 06/04/2025     06/04/2025    CALCIUM 9.3 06/04/2025    AST 17 06/04/2025    ALT 11 06/04/2025    BUN 34 (H) 06/04/2025    MG 2.01 02/24/2024    PHOS 2.7 03/24/2020     01/15/2025    GFRMALE 82 12/29/2022     Lab Results   Component Value Date    TRIG 55 05/22/2024    CHOL 106 05/22/2024    LDLCALC 50 05/22/2024    HDL 44.6 05/22/2024     Lab Results   Component Value Date    HGBA1C 5.4 02/12/2024    HGBA1C 5.6 05/01/2018     Lab Results   Component Value Date    WBC 6.6 06/04/2025    RBC 4.30 06/04/2025    HGB 13.1 (L) 06/04/2025     06/04/2025   No other concerns at this time.  Agreeable to continue monthly outreaches.  Encouraged to call if questions or concerns arise.    DELANEY UGALDE

## 2025-07-28 ENCOUNTER — HOSPITAL ENCOUNTER (INPATIENT)
Facility: HOSPITAL | Age: 89
LOS: 3 days | Discharge: NURSING, REHAB OR LONG-TERM CARE | End: 2025-08-01
Attending: STUDENT IN AN ORGANIZED HEALTH CARE EDUCATION/TRAINING PROGRAM | Admitting: INTERNAL MEDICINE
Payer: MEDICARE

## 2025-07-28 ENCOUNTER — APPOINTMENT (OUTPATIENT)
Dept: RADIOLOGY | Facility: HOSPITAL | Age: 89
End: 2025-07-28
Payer: MEDICARE

## 2025-07-28 ENCOUNTER — NURSE TRIAGE (OUTPATIENT)
Dept: CARDIOLOGY | Facility: HOSPITAL | Age: 89
End: 2025-07-28

## 2025-07-28 ENCOUNTER — APPOINTMENT (OUTPATIENT)
Dept: CARDIOLOGY | Facility: HOSPITAL | Age: 89
End: 2025-07-28
Payer: MEDICARE

## 2025-07-28 DIAGNOSIS — R47.01 EXPRESSIVE APHASIA: ICD-10-CM

## 2025-07-28 DIAGNOSIS — H54.61 VISION LOSS OF RIGHT EYE: ICD-10-CM

## 2025-07-28 DIAGNOSIS — I63.9 STROKE DETERMINED BY CLINICAL ASSESSMENT (MULTI): Primary | ICD-10-CM

## 2025-07-28 DIAGNOSIS — G45.9 TIA (TRANSIENT ISCHEMIC ATTACK): ICD-10-CM

## 2025-07-28 DIAGNOSIS — I63.412 ACUTE CEREBROVASCULAR ACCIDENT (CVA) DUE TO EMBOLISM OF LEFT MIDDLE CEREBRAL ARTERY (MULTI): ICD-10-CM

## 2025-07-28 DIAGNOSIS — I63.9 ACUTE CVA (CEREBROVASCULAR ACCIDENT) (MULTI): ICD-10-CM

## 2025-07-28 DIAGNOSIS — R41.82 ALTERED MENTAL STATUS, UNSPECIFIED ALTERED MENTAL STATUS TYPE: ICD-10-CM

## 2025-07-28 LAB
ALBUMIN SERPL BCP-MCNC: 3.9 G/DL (ref 3.4–5)
ALP SERPL-CCNC: 45 U/L (ref 33–136)
ALT SERPL W P-5'-P-CCNC: 10 U/L (ref 10–52)
ANION GAP SERPL CALC-SCNC: 10 MMOL/L (ref 10–20)
APPEARANCE UR: CLEAR
APTT PPP: 39 SECONDS (ref 26–36)
AST SERPL W P-5'-P-CCNC: 17 U/L (ref 9–39)
BASOPHILS # BLD AUTO: 0.05 X10*3/UL (ref 0–0.1)
BASOPHILS NFR BLD AUTO: 0.7 %
BILIRUB SERPL-MCNC: 0.9 MG/DL (ref 0–1.2)
BILIRUB UR STRIP.AUTO-MCNC: NEGATIVE MG/DL
BNP SERPL-MCNC: 140 PG/ML (ref 0–99)
BUN SERPL-MCNC: 24 MG/DL (ref 6–23)
CALCIUM SERPL-MCNC: 8.4 MG/DL (ref 8.6–10.3)
CARDIAC TROPONIN I PNL SERPL HS: 9 NG/L (ref 0–20)
CHLORIDE SERPL-SCNC: 102 MMOL/L (ref 98–107)
CO2 SERPL-SCNC: 26 MMOL/L (ref 21–32)
COLOR UR: YELLOW
CREAT SERPL-MCNC: 0.81 MG/DL (ref 0.5–1.3)
EGFRCR SERPLBLD CKD-EPI 2021: 85 ML/MIN/1.73M*2
EOSINOPHIL # BLD AUTO: 0.04 X10*3/UL (ref 0–0.4)
EOSINOPHIL NFR BLD AUTO: 0.6 %
ERYTHROCYTE [DISTWIDTH] IN BLOOD BY AUTOMATED COUNT: 14.5 % (ref 11.5–14.5)
GLUCOSE SERPL-MCNC: 100 MG/DL (ref 74–99)
GLUCOSE UR STRIP.AUTO-MCNC: NORMAL MG/DL
HCT VFR BLD AUTO: 36 % (ref 41–52)
HGB BLD-MCNC: 11.7 G/DL (ref 13.5–17.5)
IMM GRANULOCYTES # BLD AUTO: 0.01 X10*3/UL (ref 0–0.5)
IMM GRANULOCYTES NFR BLD AUTO: 0.1 % (ref 0–0.9)
INR PPP: 1.1 (ref 0.9–1.1)
KETONES UR STRIP.AUTO-MCNC: NEGATIVE MG/DL
LEUKOCYTE ESTERASE UR QL STRIP.AUTO: NEGATIVE
LYMPHOCYTES # BLD AUTO: 1.61 X10*3/UL (ref 0.8–3)
LYMPHOCYTES NFR BLD AUTO: 23.7 %
MAGNESIUM SERPL-MCNC: 2.14 MG/DL (ref 1.6–2.4)
MCH RBC QN AUTO: 31.2 PG (ref 26–34)
MCHC RBC AUTO-ENTMCNC: 32.5 G/DL (ref 32–36)
MCV RBC AUTO: 96 FL (ref 80–100)
MONOCYTES # BLD AUTO: 0.97 X10*3/UL (ref 0.05–0.8)
MONOCYTES NFR BLD AUTO: 14.3 %
MUCOUS THREADS #/AREA URNS AUTO: NORMAL /LPF
NEUTROPHILS # BLD AUTO: 4.1 X10*3/UL (ref 1.6–5.5)
NEUTROPHILS NFR BLD AUTO: 60.6 %
NITRITE UR QL STRIP.AUTO: NEGATIVE
NRBC BLD-RTO: 0 /100 WBCS (ref 0–0)
PH UR STRIP.AUTO: 6 [PH]
PLATELET # BLD AUTO: 141 X10*3/UL (ref 150–450)
POTASSIUM SERPL-SCNC: 3.8 MMOL/L (ref 3.5–5.3)
PROT SERPL-MCNC: 5.8 G/DL (ref 6.4–8.2)
PROT UR STRIP.AUTO-MCNC: ABNORMAL MG/DL
PROTHROMBIN TIME: 12.3 SECONDS (ref 9.8–12.4)
RBC # BLD AUTO: 3.75 X10*6/UL (ref 4.5–5.9)
RBC # UR STRIP.AUTO: NEGATIVE MG/DL
RBC #/AREA URNS AUTO: NORMAL /HPF
SODIUM SERPL-SCNC: 134 MMOL/L (ref 136–145)
SP GR UR STRIP.AUTO: 1.04
UROBILINOGEN UR STRIP.AUTO-MCNC: ABNORMAL MG/DL
WBC # BLD AUTO: 6.8 X10*3/UL (ref 4.4–11.3)
WBC #/AREA URNS AUTO: NORMAL /HPF

## 2025-07-28 PROCEDURE — 70496 CT ANGIOGRAPHY HEAD: CPT | Performed by: RADIOLOGY

## 2025-07-28 PROCEDURE — 36415 COLL VENOUS BLD VENIPUNCTURE: CPT

## 2025-07-28 PROCEDURE — 70450 CT HEAD/BRAIN W/O DYE: CPT | Performed by: RADIOLOGY

## 2025-07-28 PROCEDURE — 70450 CT HEAD/BRAIN W/O DYE: CPT

## 2025-07-28 PROCEDURE — 81001 URINALYSIS AUTO W/SCOPE: CPT

## 2025-07-28 PROCEDURE — 2550000001 HC RX 255 CONTRASTS: Performed by: STUDENT IN AN ORGANIZED HEALTH CARE EDUCATION/TRAINING PROGRAM

## 2025-07-28 PROCEDURE — 85730 THROMBOPLASTIN TIME PARTIAL: CPT

## 2025-07-28 PROCEDURE — 85610 PROTHROMBIN TIME: CPT

## 2025-07-28 PROCEDURE — 70496 CT ANGIOGRAPHY HEAD: CPT

## 2025-07-28 PROCEDURE — 70498 CT ANGIOGRAPHY NECK: CPT

## 2025-07-28 PROCEDURE — 80053 COMPREHEN METABOLIC PANEL: CPT | Performed by: EMERGENCY MEDICINE

## 2025-07-28 PROCEDURE — 83735 ASSAY OF MAGNESIUM: CPT

## 2025-07-28 PROCEDURE — 85025 COMPLETE CBC W/AUTO DIFF WBC: CPT | Performed by: EMERGENCY MEDICINE

## 2025-07-28 PROCEDURE — 36415 COLL VENOUS BLD VENIPUNCTURE: CPT | Performed by: EMERGENCY MEDICINE

## 2025-07-28 PROCEDURE — 93005 ELECTROCARDIOGRAM TRACING: CPT

## 2025-07-28 PROCEDURE — 70498 CT ANGIOGRAPHY NECK: CPT | Performed by: RADIOLOGY

## 2025-07-28 PROCEDURE — 83880 ASSAY OF NATRIURETIC PEPTIDE: CPT

## 2025-07-28 PROCEDURE — 85025 COMPLETE CBC W/AUTO DIFF WBC: CPT | Performed by: STUDENT IN AN ORGANIZED HEALTH CARE EDUCATION/TRAINING PROGRAM

## 2025-07-28 PROCEDURE — 80053 COMPREHEN METABOLIC PANEL: CPT | Performed by: STUDENT IN AN ORGANIZED HEALTH CARE EDUCATION/TRAINING PROGRAM

## 2025-07-28 PROCEDURE — 84484 ASSAY OF TROPONIN QUANT: CPT

## 2025-07-28 RX ADMIN — IOHEXOL 90 ML: 350 INJECTION, SOLUTION INTRAVENOUS at 22:40

## 2025-07-28 ASSESSMENT — LIFESTYLE VARIABLES
HAVE YOU EVER FELT YOU SHOULD CUT DOWN ON YOUR DRINKING: NO
HAVE PEOPLE ANNOYED YOU BY CRITICIZING YOUR DRINKING: NO
EVER HAD A DRINK FIRST THING IN THE MORNING TO STEADY YOUR NERVES TO GET RID OF A HANGOVER: NO
TOTAL SCORE: 0
EVER FELT BAD OR GUILTY ABOUT YOUR DRINKING: NO

## 2025-07-28 ASSESSMENT — PAIN SCALES - GENERAL
PAINLEVEL_OUTOF10: 0 - NO PAIN
PAINLEVEL_OUTOF10: 0 - NO PAIN

## 2025-07-28 ASSESSMENT — PAIN - FUNCTIONAL ASSESSMENT: PAIN_FUNCTIONAL_ASSESSMENT: 0-10

## 2025-07-29 PROBLEM — I63.9 STROKE DETERMINED BY CLINICAL ASSESSMENT (MULTI): Status: ACTIVE | Noted: 2025-07-29

## 2025-07-29 LAB
ATRIAL RATE: 64 BPM
BNP SERPL-MCNC: 176 PG/ML (ref 0–99)
CARDIAC TROPONIN I PNL SERPL HS: 9 NG/L (ref 0–20)
CHOLEST SERPL-MCNC: 108 MG/DL (ref 0–199)
CHOLESTEROL/HDL RATIO: 2.6
GLUCOSE BLD MANUAL STRIP-MCNC: 109 MG/DL (ref 74–99)
GLUCOSE BLD MANUAL STRIP-MCNC: 81 MG/DL (ref 74–99)
GLUCOSE BLD MANUAL STRIP-MCNC: 89 MG/DL (ref 74–99)
HDLC SERPL-MCNC: 41.6 MG/DL
LDLC SERPL CALC-MCNC: 51 MG/DL
NON HDL CHOLESTEROL: 66 MG/DL (ref 0–149)
P AXIS: 85 DEGREES
P OFFSET: 147 MS
P ONSET: 108 MS
PR INTERVAL: 378 MS
Q ONSET: 217 MS
QRS COUNT: 10 BEATS
QRS DURATION: 86 MS
QT INTERVAL: 406 MS
QTC CALCULATION(BAZETT): 418 MS
QTC FREDERICIA: 414 MS
R AXIS: -8 DEGREES
T AXIS: -10 DEGREES
T OFFSET: 420 MS
TRIGL SERPL-MCNC: 75 MG/DL (ref 0–149)
VENTRICULAR RATE: 64 BPM
VLDL: 15 MG/DL (ref 0–40)

## 2025-07-29 PROCEDURE — 82947 ASSAY GLUCOSE BLOOD QUANT: CPT

## 2025-07-29 PROCEDURE — 83880 ASSAY OF NATRIURETIC PEPTIDE: CPT | Performed by: INTERNAL MEDICINE

## 2025-07-29 PROCEDURE — 80061 LIPID PANEL: CPT | Performed by: INTERNAL MEDICINE

## 2025-07-29 PROCEDURE — 99291 CRITICAL CARE FIRST HOUR: CPT | Mod: 25 | Performed by: STUDENT IN AN ORGANIZED HEALTH CARE EDUCATION/TRAINING PROGRAM

## 2025-07-29 PROCEDURE — 1200000002 HC GENERAL ROOM WITH TELEMETRY DAILY

## 2025-07-29 PROCEDURE — 36415 COLL VENOUS BLD VENIPUNCTURE: CPT

## 2025-07-29 PROCEDURE — 92610 EVALUATE SWALLOWING FUNCTION: CPT | Mod: GN

## 2025-07-29 PROCEDURE — 83036 HEMOGLOBIN GLYCOSYLATED A1C: CPT | Mod: GEALAB | Performed by: INTERNAL MEDICINE

## 2025-07-29 PROCEDURE — 84484 ASSAY OF TROPONIN QUANT: CPT

## 2025-07-29 PROCEDURE — 99223 1ST HOSP IP/OBS HIGH 75: CPT | Performed by: INTERNAL MEDICINE

## 2025-07-29 PROCEDURE — 2500000001 HC RX 250 WO HCPCS SELF ADMINISTERED DRUGS (ALT 637 FOR MEDICARE OP): Performed by: INTERNAL MEDICINE

## 2025-07-29 PROCEDURE — 36415 COLL VENOUS BLD VENIPUNCTURE: CPT | Performed by: INTERNAL MEDICINE

## 2025-07-29 RX ORDER — B-COMPLEX WITH VITAMIN C
1 TABLET ORAL DAILY
Status: DISCONTINUED | OUTPATIENT
Start: 2025-07-29 | End: 2025-08-01 | Stop reason: HOSPADM

## 2025-07-29 RX ORDER — ASPIRIN 300 MG/1
300 SUPPOSITORY RECTAL DAILY
Status: DISCONTINUED | OUTPATIENT
Start: 2025-07-29 | End: 2025-07-31

## 2025-07-29 RX ORDER — NAPROXEN SODIUM 220 MG/1
81 TABLET, FILM COATED ORAL DAILY
Status: DISCONTINUED | OUTPATIENT
Start: 2025-07-29 | End: 2025-07-31

## 2025-07-29 RX ORDER — CLOPIDOGREL BISULFATE 75 MG/1
75 TABLET ORAL DAILY
COMMUNITY
End: 2025-08-01 | Stop reason: HOSPADM

## 2025-07-29 RX ORDER — ASPIRIN 81 MG/1
81 TABLET ORAL DAILY
Status: DISCONTINUED | OUTPATIENT
Start: 2025-07-29 | End: 2025-08-01 | Stop reason: HOSPADM

## 2025-07-29 RX ORDER — CHOLECALCIFEROL (VITAMIN D3) 25 MCG
25 TABLET ORAL DAILY
Status: DISCONTINUED | OUTPATIENT
Start: 2025-07-29 | End: 2025-08-01 | Stop reason: HOSPADM

## 2025-07-29 RX ORDER — ATORVASTATIN CALCIUM 80 MG/1
80 TABLET, FILM COATED ORAL NIGHTLY
Status: DISCONTINUED | OUTPATIENT
Start: 2025-07-29 | End: 2025-08-01 | Stop reason: HOSPADM

## 2025-07-29 RX ORDER — LABETALOL HYDROCHLORIDE 5 MG/ML
10 INJECTION, SOLUTION INTRAVENOUS EVERY 10 MIN PRN
Status: ACTIVE | OUTPATIENT
Start: 2025-07-29 | End: 2025-07-31

## 2025-07-29 RX ORDER — ASPIRIN 81 MG/1
81 TABLET ORAL DAILY
Status: DISCONTINUED | OUTPATIENT
Start: 2025-07-29 | End: 2025-07-29 | Stop reason: SDUPTHER

## 2025-07-29 RX ORDER — VALSARTAN 160 MG/1
80 TABLET ORAL DAILY
Status: DISCONTINUED | OUTPATIENT
Start: 2025-07-29 | End: 2025-08-01 | Stop reason: HOSPADM

## 2025-07-29 RX ADMIN — ATORVASTATIN CALCIUM 80 MG: 80 TABLET, FILM COATED ORAL at 20:16

## 2025-07-29 RX ADMIN — APIXABAN 2.5 MG: 5 TABLET, FILM COATED ORAL at 11:12

## 2025-07-29 RX ADMIN — Medication 1 TABLET: at 11:39

## 2025-07-29 RX ADMIN — APIXABAN 2.5 MG: 5 TABLET, FILM COATED ORAL at 20:17

## 2025-07-29 RX ADMIN — ASPIRIN 81 MG: 81 TABLET, DELAYED RELEASE ORAL at 11:12

## 2025-07-29 RX ADMIN — Medication 25 MCG: at 11:39

## 2025-07-29 SDOH — SOCIAL STABILITY: SOCIAL INSECURITY: WITHIN THE LAST YEAR, HAVE YOU BEEN HUMILIATED OR EMOTIONALLY ABUSED IN OTHER WAYS BY YOUR PARTNER OR EX-PARTNER?: NO

## 2025-07-29 SDOH — SOCIAL STABILITY: SOCIAL INSECURITY
WITHIN THE LAST YEAR, HAVE YOU BEEN RAPED OR FORCED TO HAVE ANY KIND OF SEXUAL ACTIVITY BY YOUR PARTNER OR EX-PARTNER?: NO

## 2025-07-29 SDOH — HEALTH STABILITY: MENTAL HEALTH: HOW OFTEN DO YOU HAVE A DRINK CONTAINING ALCOHOL?: NEVER

## 2025-07-29 SDOH — ECONOMIC STABILITY: HOUSING INSECURITY: AT ANY TIME IN THE PAST 12 MONTHS, WERE YOU HOMELESS OR LIVING IN A SHELTER (INCLUDING NOW)?: NO

## 2025-07-29 SDOH — ECONOMIC STABILITY: HOUSING INSECURITY: IN THE PAST 12 MONTHS, HOW MANY TIMES HAVE YOU MOVED WHERE YOU WERE LIVING?: 0

## 2025-07-29 SDOH — SOCIAL STABILITY: SOCIAL INSECURITY
WITHIN THE LAST YEAR, HAVE YOU BEEN KICKED, HIT, SLAPPED, OR OTHERWISE PHYSICALLY HURT BY YOUR PARTNER OR EX-PARTNER?: NO

## 2025-07-29 SDOH — ECONOMIC STABILITY: FOOD INSECURITY: WITHIN THE PAST 12 MONTHS, YOU WORRIED THAT YOUR FOOD WOULD RUN OUT BEFORE YOU GOT THE MONEY TO BUY MORE.: NEVER TRUE

## 2025-07-29 SDOH — ECONOMIC STABILITY: HOUSING INSECURITY: IN THE LAST 12 MONTHS, WAS THERE A TIME WHEN YOU WERE NOT ABLE TO PAY THE MORTGAGE OR RENT ON TIME?: NO

## 2025-07-29 SDOH — SOCIAL STABILITY: SOCIAL INSECURITY: DO YOU FEEL ANYONE HAS EXPLOITED OR TAKEN ADVANTAGE OF YOU FINANCIALLY OR OF YOUR PERSONAL PROPERTY?: NO

## 2025-07-29 SDOH — ECONOMIC STABILITY: FOOD INSECURITY: HOW HARD IS IT FOR YOU TO PAY FOR THE VERY BASICS LIKE FOOD, HOUSING, MEDICAL CARE, AND HEATING?: NOT HARD AT ALL

## 2025-07-29 SDOH — SOCIAL STABILITY: SOCIAL INSECURITY: ABUSE: ADULT

## 2025-07-29 SDOH — SOCIAL STABILITY: SOCIAL INSECURITY: ARE YOU OR HAVE YOU BEEN THREATENED OR ABUSED PHYSICALLY, EMOTIONALLY, OR SEXUALLY BY ANYONE?: NO

## 2025-07-29 SDOH — SOCIAL STABILITY: SOCIAL INSECURITY: HAS ANYONE EVER THREATENED TO HURT YOUR FAMILY OR YOUR PETS?: NO

## 2025-07-29 SDOH — ECONOMIC STABILITY: INCOME INSECURITY: IN THE PAST 12 MONTHS HAS THE ELECTRIC, GAS, OIL, OR WATER COMPANY THREATENED TO SHUT OFF SERVICES IN YOUR HOME?: NO

## 2025-07-29 SDOH — HEALTH STABILITY: MENTAL HEALTH: HOW OFTEN DO YOU HAVE SIX OR MORE DRINKS ON ONE OCCASION?: NEVER

## 2025-07-29 SDOH — ECONOMIC STABILITY: FOOD INSECURITY: WITHIN THE PAST 12 MONTHS, THE FOOD YOU BOUGHT JUST DIDN'T LAST AND YOU DIDN'T HAVE MONEY TO GET MORE.: NEVER TRUE

## 2025-07-29 SDOH — SOCIAL STABILITY: SOCIAL INSECURITY: WITHIN THE LAST YEAR, HAVE YOU BEEN AFRAID OF YOUR PARTNER OR EX-PARTNER?: NO

## 2025-07-29 SDOH — HEALTH STABILITY: MENTAL HEALTH: HOW MANY DRINKS CONTAINING ALCOHOL DO YOU HAVE ON A TYPICAL DAY WHEN YOU ARE DRINKING?: PATIENT DOES NOT DRINK

## 2025-07-29 SDOH — SOCIAL STABILITY: SOCIAL INSECURITY: HAVE YOU HAD ANY THOUGHTS OF HARMING ANYONE ELSE?: NO

## 2025-07-29 SDOH — SOCIAL STABILITY: SOCIAL INSECURITY: DOES ANYONE TRY TO KEEP YOU FROM HAVING/CONTACTING OTHER FRIENDS OR DOING THINGS OUTSIDE YOUR HOME?: NO

## 2025-07-29 SDOH — SOCIAL STABILITY: SOCIAL INSECURITY: DO YOU FEEL UNSAFE GOING BACK TO THE PLACE WHERE YOU ARE LIVING?: NO

## 2025-07-29 SDOH — SOCIAL STABILITY: SOCIAL INSECURITY: WERE YOU ABLE TO COMPLETE ALL THE BEHAVIORAL HEALTH SCREENINGS?: YES

## 2025-07-29 SDOH — ECONOMIC STABILITY: TRANSPORTATION INSECURITY: IN THE PAST 12 MONTHS, HAS LACK OF TRANSPORTATION KEPT YOU FROM MEDICAL APPOINTMENTS OR FROM GETTING MEDICATIONS?: NO

## 2025-07-29 SDOH — SOCIAL STABILITY: SOCIAL INSECURITY: ARE THERE ANY APPARENT SIGNS OF INJURIES/BEHAVIORS THAT COULD BE RELATED TO ABUSE/NEGLECT?: NO

## 2025-07-29 SDOH — SOCIAL STABILITY: SOCIAL INSECURITY: HAVE YOU HAD THOUGHTS OF HARMING ANYONE ELSE?: NO

## 2025-07-29 ASSESSMENT — PAIN - FUNCTIONAL ASSESSMENT
PAIN_FUNCTIONAL_ASSESSMENT: 0-10

## 2025-07-29 ASSESSMENT — COGNITIVE AND FUNCTIONAL STATUS - GENERAL
MOVING FROM LYING ON BACK TO SITTING ON SIDE OF FLAT BED WITH BEDRAILS: A LITTLE
WALKING IN HOSPITAL ROOM: A LITTLE
CLIMB 3 TO 5 STEPS WITH RAILING: A LITTLE
MOVING TO AND FROM BED TO CHAIR: A LITTLE
HELP NEEDED FOR BATHING: A LITTLE
PATIENT BASELINE BEDBOUND: NO
STANDING UP FROM CHAIR USING ARMS: A LITTLE
DRESSING REGULAR UPPER BODY CLOTHING: A LITTLE
MOBILITY SCORE: 18
DAILY ACTIVITIY SCORE: 20
TOILETING: A LITTLE
TURNING FROM BACK TO SIDE WHILE IN FLAT BAD: A LITTLE
DRESSING REGULAR UPPER BODY CLOTHING: A LITTLE
TOILETING: A LITTLE
DRESSING REGULAR LOWER BODY CLOTHING: A LITTLE
HELP NEEDED FOR BATHING: A LITTLE
TURNING FROM BACK TO SIDE WHILE IN FLAT BAD: A LITTLE
WALKING IN HOSPITAL ROOM: A LITTLE
MOVING FROM LYING ON BACK TO SITTING ON SIDE OF FLAT BED WITH BEDRAILS: A LITTLE
CLIMB 3 TO 5 STEPS WITH RAILING: A LITTLE
DAILY ACTIVITIY SCORE: 20
MOVING TO AND FROM BED TO CHAIR: A LITTLE
MOBILITY SCORE: 18
DRESSING REGULAR LOWER BODY CLOTHING: A LITTLE
STANDING UP FROM CHAIR USING ARMS: A LITTLE

## 2025-07-29 ASSESSMENT — ACTIVITIES OF DAILY LIVING (ADL)
ADEQUATE_TO_COMPLETE_ADL: YES
ADEQUATE_TO_COMPLETE_ADL: YES
BATHING: NEEDS ASSISTANCE
WALKS IN HOME: NEEDS ASSISTANCE
FEEDING YOURSELF: INDEPENDENT
GROOMING: NEEDS ASSISTANCE
HEARING - RIGHT EAR: HEARING AID
JUDGMENT_ADEQUATE_SAFELY_COMPLETE_DAILY_ACTIVITIES: YES
TOILETING: NEEDS ASSISTANCE
HEARING - LEFT EAR: HEARING AID
JUDGMENT_ADEQUATE_SAFELY_COMPLETE_DAILY_ACTIVITIES: YES
PATIENT'S MEMORY ADEQUATE TO SAFELY COMPLETE DAILY ACTIVITIES?: NO
PATIENT'S MEMORY ADEQUATE TO SAFELY COMPLETE DAILY ACTIVITIES?: NO
LACK_OF_TRANSPORTATION: NO
WALKS IN HOME: NEEDS ASSISTANCE
FEEDING YOURSELF: INDEPENDENT
GROOMING: NEEDS ASSISTANCE
BATHING: NEEDS ASSISTANCE
TOILETING: NEEDS ASSISTANCE
HEARING - RIGHT EAR: HEARING AID
DRESSING YOURSELF: NEEDS ASSISTANCE
HEARING - LEFT EAR: HEARING AID
DRESSING YOURSELF: NEEDS ASSISTANCE

## 2025-07-29 ASSESSMENT — PAIN SCALES - GENERAL
PAINLEVEL_OUTOF10: 0 - NO PAIN

## 2025-07-29 ASSESSMENT — PATIENT HEALTH QUESTIONNAIRE - PHQ9
1. LITTLE INTEREST OR PLEASURE IN DOING THINGS: NOT AT ALL
SUM OF ALL RESPONSES TO PHQ9 QUESTIONS 1 & 2: 0
2. FEELING DOWN, DEPRESSED OR HOPELESS: NOT AT ALL

## 2025-07-29 ASSESSMENT — LIFESTYLE VARIABLES
HOW OFTEN DO YOU HAVE A DRINK CONTAINING ALCOHOL: NEVER
HOW OFTEN DO YOU HAVE 6 OR MORE DRINKS ON ONE OCCASION: NEVER
AUDIT-C TOTAL SCORE: 0
HOW MANY STANDARD DRINKS CONTAINING ALCOHOL DO YOU HAVE ON A TYPICAL DAY: PATIENT DOES NOT DRINK
AUDIT-C TOTAL SCORE: 0
SKIP TO QUESTIONS 9-10: 1
AUDIT-C TOTAL SCORE: 0
SKIP TO QUESTIONS 9-10: 1

## 2025-07-29 ASSESSMENT — ENCOUNTER SYMPTOMS
SHORTNESS OF BREATH: 0
ABDOMINAL DISTENTION: 0
ACTIVITY CHANGE: 1
CONFUSION: 1

## 2025-07-29 NOTE — ED PROVIDER NOTES
"HPI   Chief Complaint   Patient presents with    Altered Mental Status     Pt brought in by [facility] for c/o AMS. Pt's wife and cousin at bedside. PT is normally A&Ox4, currently A&Ox1, oriented to name and . PT's wife stated he has had difficulty remebering things today and using utensils. LKW is last night, unknown time frame. Pt denied any pain. Wife denied any recent flu-like symptoms but mentioned he had a fall 4 weeks ago. Pt was not medically evaluated after the fall. Pt is on eliquis. No obvious injuries or deformities.        Patient is an 89-year-old male with significant history of coronary artery disease, A-fib on Eliquis presents to the ED for altered mental status.  Per their report to the nurses patient last known well was last night.  The wife is telling me patient's last known well was this afternoon but is unable to give me an exact time.  Patient is normally ANO x 4 but is currently ANO x 1.  Patient was unable to tell me his name continues to say his \"my wife my wife\" he is answering questions with inappropriate words.  Patient's wife states he fell 4 weeks ago denies any other falls or injuries.  Denies any known head injuries.  Patient is denying any pain.  Wife states today he was unable to  the remote and did not know how to work his phone.  Patient normally knows how to do all this.  Patient lives at home with his wife.  Patient does not have any history of dementia.  Patient has not had any vomiting or no diarrhea.  No known fevers.  No tobacco alcohol or street drug abuse.              Patient History   Medical History[1]  Surgical History[2]  Family History[3]  Social History[4]    Physical Exam   ED Triage Vitals [25]   Temp Heart Rate Respirations BP   -- 69 16 143/79      Pulse Ox Temp src Heart Rate Source Patient Position   95 % -- -- Sitting      BP Location FiO2 (%)     Left arm --       Physical Exam  HENT:      Head: Normocephalic.     Eyes:      " Extraocular Movements: Extraocular movements intact.       Cardiovascular:      Rate and Rhythm: Normal rate and regular rhythm.      Heart sounds: Normal heart sounds.   Pulmonary:      Effort: Pulmonary effort is normal.      Breath sounds: No wheezing, rhonchi or rales.   Abdominal:      Palpations: Abdomen is soft.      Tenderness: There is no abdominal tenderness. There is no guarding.     Musculoskeletal:         General: Normal range of motion.      Cervical back: Normal range of motion.     Neurological:      Mental Status: He is confused.      Cranial Nerves: No cranial nerve deficit or dysarthria.      Motor: No weakness.           ED Course & MDM   Diagnoses as of 07/29/25 0036   Altered mental status, unspecified altered mental status type   Expressive aphasia   Vision loss of right eye                 No data recorded     [place] Coma Scale Score: 15 (07/28/25 2055 : Christiana Lim RN)                           Medical Decision Making  Medical Decision Making:  Patient presented as described in HPI. Patient case including ROS, PE, and treatment and plan discussed with ED attending if attached as cosigner. Due to patients presentation orders completed include as documented.  Patient presenting for altered mental status.  Was told in triage to the nurse that patient's last known well sometimes last night.  When I evaluated the patient the wife states the last known well was today she was unsure what time today believes it was more so the afternoon because the patient slept during the morning.  States patient is usually ANO x 4 and is ANO x 1.  Patient is ANO x 0 from he was unable to tell me his name.  Patient is unable to answer questions has expressive aphasia.  Patient is able to perform activities and follow most commands however is unable to tell me how many fingers I am holding up.  Patient is unable to read the words on the stroke handout page and unable to describe the pictures.  Stroke alert  was called.  Was not called earlier because the last known well was told it was sometime yesterday pending results.  I spoke with Dr. Ant Anaya who states patient is not a TNK candidate or embolectomy.  Can stay at [facility] for [facility].  I spoke to the hospitalist who is up the patient.  Patient alton stable pending admission.      Patient care discussed with: N/A  Social Determinants affecting care: N/A    Final diagnosis and disposition as below.  See CI    Hospitalize. I discussed the differential; results and admit plan with the patient and/or family/friend/caregiver if present.  I emphasized the importance of hospitalization need for re-evaluation/continued monitoring/interventions.. They agreed that if they feel their condition is worsening or if they have any other concern they should alert staff immediately for further assistance. I gave the patient an opportunity to ask all questions they had and answered all of them accordingly. The patient and/or family/friend/caregiver expressed understanding verbally and that they would comply.        Disposition:  admit    Hospitalize. Discussed findings and treatment done here in ED with admitting physician. It would be a risk to discharge the patient in their condition due to possibility of deterioration in their condition and the need for urgent interventions.    This note has been transcribed using voice recognition and may contain grammatical errors, misplaced words, incorrect words, incorrect phrases or other errors.        Labs Reviewed   CBC WITH AUTO DIFFERENTIAL - Abnormal       Result Value    WBC 6.8      nRBC 0.0      RBC 3.75 (*)     Hemoglobin 11.7 (*)     Hematocrit 36.0 (*)     MCV 96      MCH 31.2      MCHC 32.5      RDW 14.5      Platelets 141 (*)     Neutrophils % 60.6      Immature Granulocytes %, Automated 0.1      Lymphocytes % 23.7      Monocytes % 14.3      Eosinophils % 0.6      Basophils % 0.7      Neutrophils Absolute 4.10      Immature  Granulocytes Absolute, Automated 0.01      Lymphocytes Absolute 1.61      Monocytes Absolute 0.97 (*)     Eosinophils Absolute 0.04      Basophils Absolute 0.05     COMPREHENSIVE METABOLIC PANEL - Abnormal    Glucose 100 (*)     Sodium 134 (*)     Potassium 3.8      Chloride 102      Bicarbonate 26      Anion Gap 10      Urea Nitrogen 24 (*)     Creatinine 0.81      eGFR 85      Calcium 8.4 (*)     Albumin 3.9      Alkaline Phosphatase 45      Total Protein 5.8 (*)     AST 17      Bilirubin, Total 0.9      ALT 10     B-TYPE NATRIURETIC PEPTIDE - Abnormal     (*)     Narrative:        <100 pg/mL - Heart failure unlikely  100-299 pg/mL - Intermediate probability of acute heart                  failure exacerbation. Correlate with clinical                  context and patient history.    >=300 pg/mL - Heart Failure likely. Correlate with clinical                  context and patient history.    BNP testing is performed using different testing methodology at [facility] [place] [facility] than at other [facility]. Direct result comparisons should only be made within the same method.      URINALYSIS WITH REFLEX CULTURE AND MICROSCOPIC - Abnormal    Color, Urine Yellow      Appearance, Urine Clear      Specific Gravity, Urine 1.036 (*)     pH, Urine 6.0      Protein, Urine 10 (TRACE)      Glucose, Urine Normal      Blood, Urine NEGATIVE      Ketones, Urine NEGATIVE      Bilirubin, Urine NEGATIVE      Urobilinogen, Urine 2 (1+) (*)     Nitrite, Urine NEGATIVE      Leukocyte Esterase, Urine NEGATIVE     APTT - Abnormal    aPTT 39 (*)     Narrative:     The APTT is no longer used for monitoring Unfractionated Heparin Therapy. For monitoring Heparin Therapy, use the Heparin Assay.   MAGNESIUM - Normal    Magnesium 2.14     SERIAL TROPONIN-INITIAL - Normal    Troponin I, High Sensitivity 9      Narrative:     Less than 99th percentile of normal range cutoff-  Female and children under 18 years old <14 ng/L; Male <21  ng/L: Negative  Repeat testing should be performed if clinically indicated.     Female and children under 18 years old 14-50 ng/L; Male 21-50 ng/L:  Consistent with possible cardiac damage and possible increased clinical   risk. Serial measurements may help to assess extent of myocardial damage.     >50 ng/L: Consistent with cardiac damage, increased clinical risk and  myocardial infarction. Serial measurements may help assess extent of   myocardial damage.      NOTE: Children less than 1 year old may have higher baseline troponin   levels and results should be interpreted in conjunction with the overall   clinical context.     NOTE: Troponin I testing is performed using a different   testing methodology at [facility] than at other   [facility]. Direct result comparisons should only   be made within the same method.   PROTIME-INR - Normal    Protime 12.3      INR 1.1     TROPONIN SERIES- (INITIAL, 1 HR)    Narrative:     The following orders were created for panel order Troponin I Series, High Sensitivity (0, 1 HR).  Procedure                               Abnormality         Status                     ---------                               -----------         ------                     Troponin I, High Sensiti...[[id number]]  Normal              Final result               Troponin, High Sensitivi...[[id number]]                      In process                   Please view results for these tests on the individual orders.   URINALYSIS WITH REFLEX CULTURE AND MICROSCOPIC    Narrative:     The following orders were created for panel order Urinalysis with Reflex Culture and Microscopic.  Procedure                               Abnormality         Status                     ---------                               -----------         ------                     Urinalysis with Reflex C...[[id number]]  Abnormal            Final result               Extra Urine Gray Tube[[id number]]                             In process                   Please view results for these tests on the individual orders.   EXTRA URINE GRAY TUBE   SERIAL TROPONIN, 1 HOUR   URINALYSIS MICROSCOPIC WITH REFLEX CULTURE    WBC, Urine NONE      RBC, Urine 1-2      Mucus, Urine FEW        CT brain attack angio head and neck W and WO IV contrast   Final Result   No new large vessel intracranial occlusion. Findings unchanged   compared to 02/12/2024.        MACRO:   None        Signed by: Jonathon Travis 7/29/2025 12:19 AM   Dictation workstation:   [id number]      CT head wo IV contrast   Final Result   No acute intracranial abnormality.             MACRO:   None.        Signed by: Jonathon Travis 7/28/2025 11:32 PM   Dictation workstation:   [id number]           Procedure  Procedures       [1]   Past Medical History:  Diagnosis Date    Age-related nuclear cataract, unspecified eye 05/26/2022    Nuclear sclerosis    Atherosclerosis of native arteries of extremities with intermittent claudication, bilateral legs 01/03/2023    Atherosclerosis of native artery of both lower extremities with intermittent claudication    Atherosclerotic heart disease of native coronary artery without angina pectoris 10/04/2022    CAD (coronary artery disease)    Atrial fibrillation (Multi)     Cellulitis of lower extremity 04/09/2024    Community acquired pneumonia 04/09/2024    Diplopia 02/18/2022    Diplopia    Essential (primary) hypertension 10/04/2022    Benign essential hypertension    Heart disease 2010    Heart valve disease     Hyperlipidemia     Other abnormal glucose     Hemoglobin A1c less than 7.0%    Other intervertebral disc degeneration, lumbar region     Degenerative disc disease, lumbar    Pain in extremity 04/09/2024    Personal history of colonic polyps     History of colonic polyps    Personal history of malignant neoplasm of other organs and systems     History of squamous cell carcinoma    Personal history of other diseases of the circulatory system  04/16/2018    History of hypertension    Personal history of other endocrine, nutritional and metabolic disease 04/16/2018    History of hyperlipidemia    Personal history of other medical treatment     History of stress test    Presence of prosthetic heart valve 01/03/2023    S/P AVR (aortic valve replacement)    Spondylolysis, lumbar region     Lumbar spondylolysis   [2]   Past Surgical History:  Procedure Laterality Date    APPENDECTOMY  01/25/2017    Appendectomy    CARDIAC PACEMAKER PLACEMENT  04/16/2018    Pacemaker Placement    CARDIAC VALVE REPLACEMENT      CATARACT EXTRACTION, BILATERAL      COLONOSCOPY W/ POLYPECTOMY  01/25/2017    Complete Colonoscopy For Polyp Removal    CORONARY ANGIOPLASTY WITH STENT PLACEMENT  01/25/2017    Cath Stent Placement    CT ANGIO AORTA AND BILATERAL ILIOFEMORAL RUN OFF INCLUDING WITHOUT CONTRAST IF PERFORMED  11/23/2019    CT AORTA AND BILATERAL ILIOFEMORAL RUNOFF ANGIOGRAM W AND/OR WO IV CONTRAST 11/23/2019 [facility]    ESOPHAGOGASTRODUODENOSCOPY  01/25/2017    Esophagogastroduodenoscopy With Biopsy    HERNIA REPAIR  01/25/2017    Inguinal Hernia Repair    KNEE ARTHROSCOPY W/ DEBRIDEMENT  01/25/2017    Arthroscopy Knee Left    OTHER SURGICAL HISTORY  02/10/2015    Stent Indications: Acute Myocardial Infarction    OTHER SURGICAL HISTORY  07/20/2015    Blood Vessel Repair Direct Leg    OTHER SURGICAL HISTORY  01/25/2017    Excision Of Lesion Face    TOTAL KNEE ARTHROPLASTY  04/16/2018    Knee Replacement    VEIN SURGERY     [3] No family history on file.  [4]   Social History  Tobacco Use    Smoking status: Never    Smokeless tobacco: Never   Vaping Use    Vaping status: Never Used   Substance Use Topics    Alcohol use: Never    Drug use: Never        Mis Salas PA-C  07/29/25 0044

## 2025-07-29 NOTE — PROGRESS NOTES
"Pharmacy Medication History Review    Alexis Armijo \"Radha" is a 89 y.o. male admitted for Stroke determined by clinical assessment (Multi). Pharmacy reviewed the patient's [id number] medications and allergies for accuracy.    The list below reflectives the updated PTA list. Please review each medication in order reconciliation for additional clarification and justification.  Prior to Admission Medications   Prescriptions Last Dose Informant Patient Reported? Taking?   TURMERIC ORAL 7/28/2025 Morning Self Yes Yes   Sig: Take 500 mg by mouth once daily.   apixaban (Eliquis) 2.5 mg tablet 7/28/2025 Morning Self Yes Yes   Sig: Take 1 tablet (2.5 mg) by mouth 2 times a day.   aspirin 81 mg EC tablet 7/28/2025 Morning Self Yes Yes   Sig: Take 1 tablet (81 mg) by mouth once daily.   b complex vitamins capsule Unknown Self Yes Yes   Sig: Take 1 capsule by mouth once daily at bedtime.   cholecalciferol (Vitamin D3) 25 MCG (1000 UT) tablet 7/28/2025 Morning Self Yes Yes   Sig: Take 1 tablet (1,000 Units) by mouth once daily.   clopidogrel (Plavix) 75 mg tablet 7/28/2025 Morning Self Yes Yes   Sig: Take 1 tablet (75 mg) by mouth once daily.   collagen, hydrolysate, bovine, (COLLAGEN, HYDR, BOVINE,, BULK, MISC) 7/28/2025 Morning Self Yes Yes   Sig: Take 1 Scoop by mouth once daily.   krill-om-3-dha-epa-phospho-ast (Omega-3 Krill Oil) 726-52-39-50 mg capsule 7/28/2025 Morning Self Yes Yes   Sig: Take 1 capsule by mouth once daily in the morning.   lactobacillus combination no.4 (Probiotic) 3 billion cell capsule 7/28/2025 Morning Self Yes Yes   Sig: Take 1 capsule by mouth once daily.   lutein-zeaxanthin 25-5 mg capsule  Self     Sig: Take 1 capsule by mouth once daily in the morning.   multivitamin with minerals (multivitamin with folic acid) tablet 7/28/2025 Morning Self Yes Yes   Sig: Take 1 tablet by mouth 2 times a day.   rosuvastatin (Crestor) 5 mg tablet 7/28/2025 Morning Self Yes Yes   Sig: Take 1 tablet (5 mg) " by mouth early in the morning..   valsartan (Diovan) 80 mg tablet 7/28/2025 Morning Self Yes Yes   Sig: Take 1 tablet (80 mg) by mouth once daily.      Facility-Administered Medications: None          The list below reflectives the updated allergy list. Please review each documented allergy for additional clarification and justification.  Allergies  Reviewed by Morteza Dinero MD on 7/29/2025   No Known Allergies         Below are additional concerns with the patient's PTA list.      SORAYA BERGER

## 2025-07-29 NOTE — ED PROCEDURE NOTE
Procedure  Critical Care    Performed by: Lenard Mckenzie MD  Authorized by: Lenard Mckenzie MD    Critical care provider statement:     Critical care time (minutes):  35    Critical care time was exclusive of:  Separately billable procedures and treating other patients and teaching time    Critical care was necessary to treat or prevent imminent or life-threatening deterioration of the following conditions:  CNS failure or compromise    Critical care was time spent personally by me on the following activities:  Development of treatment plan with patient or surrogate, discussions with consultants, evaluation of patient's response to treatment, examination of patient, obtaining history from patient or surrogate, re-evaluation of patient's condition, pulse oximetry, ordering and review of radiographic studies, ordering and review of laboratory studies and ordering and performing treatments and interventions    Care discussed with: admitting provider                 Lenard Mckenzie MD  07/29/25 0429

## 2025-07-29 NOTE — H&P
"History Of Present Illness  Alexis Armijo \"Ronal\" is an 89 y.o. male with history of CAD, PAF - on long tern anticoagulation with apixaban, permanent pacemaker, HTN, and HLD presenting with an altered mental state. Pt is not able to provide any meaningful history so information was obtained from wife and cousin at the bedside. He apparently was well until early afternoon yesterday when family noticed some changes in his behavior and cognition. For example, he couldn't figure out where his phone was, he couldn't see what was on his right side, he had difficulty handling utensils to eat, he was struggling to find his words and when he did speak he made no sense. In addition, he had difficulty walking. He was brought in by EMS. CT head in ED did not reveal any acute intracranial process. CTA brain attack did not show any evidence of new large vessel intracranial occlusion. NIH in ED was 6.      Past Medical History  Medical History[1]    Past Surgical History  Surgical History[2]     Social History  He reports that he has never smoked. He has never used smokeless tobacco. He reports that he does not drink alcohol and does not use drugs.    Family History  Positive for heart disease     Allergies  Patient has no known allergies.    Medications  Current Outpatient Medications   Medication Instructions    apixaban (Eliquis) 2.5 mg tablet 1 tablet, 2 times daily    aspirin 81 mg, oral, Daily    b complex vitamins capsule 1 capsule, Nightly    cholecalciferol (VITAMIN D3) 1,000 Units, Daily    collagen, hydrolysate, bovine, (COLLAGEN, HYDR, BOVINE,, BULK, MISC) 1 Scoop, Daily RT    krill-om-3-dha-epa-phospho-ast (Omega-3 Krill Oil) 204-43-99-50 mg capsule 1 capsule, Every morning    lactobacillus combination no.4 (Probiotic) 3 billion cell capsule 1 tablet, Daily    lutein-zeaxanthin 25-5 mg capsule 1 capsule, Every morning    multivitamin with minerals (multivitamin with folic acid) tablet 1 tablet, 2 times daily    " rosuvastatin (Crestor) 5 mg tablet 1 tablet, Daily (0630)    TURMERIC ORAL 500 mg, Daily    valsartan (DIOVAN) 80 mg, oral, Daily       Review of Systems   Reason unable to perform ROS: impaired mental state.   Constitutional:  Positive for activity change.   HENT: Negative.     Respiratory:  Negative for shortness of breath.    Cardiovascular:  Negative for chest pain.   Gastrointestinal:  Negative for abdominal distention.   Skin: Negative.    Neurological:         See HPI   Psychiatric/Behavioral:  Positive for confusion.         Physical Exam  Constitutional:       General: He is not in acute distress.     Appearance: He is not ill-appearing, toxic-appearing or diaphoretic.      Comments: Elderly male who is disoriented and confused and not coherent.    HENT:      Head: Normocephalic and atraumatic.      Mouth/Throat:      Mouth: Mucous membranes are moist.      Pharynx: Oropharynx is clear. No oropharyngeal exudate or posterior oropharyngeal erythema.     Eyes:      General: No scleral icterus.        Right eye: No discharge.         Left eye: No discharge.       Cardiovascular:      Rate and Rhythm: Normal rate and regular rhythm.      Heart sounds: Murmur heard.      Comments: Has faint systolic murmur  Pulmonary:      Breath sounds: No wheezing, rhonchi or rales.   Abdominal:      General: There is no distension.      Palpations: There is no mass.      Tenderness: There is no abdominal tenderness. There is no right CVA tenderness, left CVA tenderness, guarding or rebound.      Hernia: No hernia is present.     Musculoskeletal:      Cervical back: Neck supple.      Right lower leg: No edema.      Left lower leg: No edema.   Lymphadenopathy:      Cervical: No cervical adenopathy.     Skin:     General: Skin is warm and dry.     Neurological:      Mental Status: He is alert.      Motor: No weakness.      Comments: Expressive and some receptive aphasia.   No focal weakness but has sensory loss on right  NIHSSS 6  "  Psychiatric:         Mood and Affect: Mood normal.          Last Recorded Vitals  Blood pressure 149/72, pulse 65, resp. rate (!) 22, height 1.803 m (5' 11\"), weight 89.4 kg (197 lb 1.5 oz), SpO2 98%.    Relevant Results   Latest Reference Range & Units 07/28/25 20:47 07/28/25 21:59 07/28/25 22:44 07/29/25 00:14   GLUCOSE 74 - 99 mg/dL 100 (H)      SODIUM 136 - 145 mmol/L 134 (L)      POTASSIUM 3.5 - 5.3 mmol/L 3.8      CHLORIDE 98 - 107 mmol/L 102      Bicarbonate 21 - 32 mmol/L 26      Anion Gap 10 - 20 mmol/L 10      Blood Urea Nitrogen 6 - 23 mg/dL 24 (H)      Creatinine 0.50 - 1.30 mg/dL 0.81      EGFR >60 mL/min/1.73m*2 85      Calcium 8.6 - 10.3 mg/dL 8.4 (L)      Albumin 3.4 - 5.0 g/dL 3.9      Alkaline Phosphatase 33 - 136 U/L 45      ALT 10 - 52 U/L 10      AST 9 - 39 U/L 17      Bilirubin Total 0.0 - 1.2 mg/dL 0.9      Total Protein 6.4 - 8.2 g/dL 5.8 (L)      MAGNESIUM 1.60 - 2.40 mg/dL  2.14     BNP 0 - 99 pg/mL  140 (H)     Troponin I, High Sensitivity 0 - 20 ng/L  9  9   Protime 9.8 - 12.4 seconds  12.3     INR 0.9 - 1.1   1.1     aPTT 26 - 36 seconds  39 (H)     WBC 4.4 - 11.3 x10*3/uL 6.8      nRBC 0.0 - 0.0 /100 WBCs 0.0      RBC 4.50 - 5.90 x10*6/uL 3.75 (L)      HEMOGLOBIN 13.5 - 17.5 g/dL 11.7 (L)      HEMATOCRIT 41.0 - 52.0 % 36.0 (L)      MCV 80 - 100 fL 96      MCH 26.0 - 34.0 pg 31.2      MCHC 32.0 - 36.0 g/dL 32.5      RED CELL DISTRIBUTION WIDTH 11.5 - 14.5 % 14.5      Platelets 150 - 450 x10*3/uL 141 (L)      Neutrophils % 40.0 - 80.0 % 60.6      Immature Granulocytes %, Automated 0.0 - 0.9 % 0.1      Lymphocytes % 13.0 - 44.0 % 23.7      Monocytes % 2.0 - 10.0 % 14.3      Eosinophils % 0.0 - 6.0 % 0.6      Basophils % 0.0 - 2.0 % 0.7      Neutrophils Absolute 1.60 - 5.50 x10*3/uL 4.10      Immature Granulocytes Absolute, Automated 0.00 - 0.50 x10*3/uL 0.01      Lymphocytes Absolute 0.80 - 3.00 x10*3/uL 1.61      Monocytes Absolute 0.05 - 0.80 x10*3/uL 0.97 (H)      Eosinophils Absolute " 0.00 - 0.40 x10*3/uL 0.04      Basophils Absolute 0.00 - 0.10 x10*3/uL 0.05      Color, Urine Light-Yellow, Yellow, Dark-Yellow    Yellow    Appearance, Urine Clear    Clear    Specific Gravity, Urine 1.005 - 1.035    1.036 !    pH, Urine 5.0, 5.5, 6.0, 6.5, 7.0, 7.5, 8.0    6.0    Protein, Urine NEGATIVE, 10 (TRACE), 20 (TRACE) mg/dL   10 (TRACE)    Glucose, Urine Normal mg/dL   Normal    Blood, Urine NEGATIVE mg/dL   NEGATIVE    Ketones, Urine NEGATIVE mg/dL   NEGATIVE    Bilirubin, Urine NEGATIVE mg/dL   NEGATIVE    Urobilinogen, Urine Normal mg/dL   2 (1+) !    Nitrite, Urine NEGATIVE    NEGATIVE    Leukocyte Esterase, Urine NEGATIVE    NEGATIVE    WBC, Urine 1-5, NONE /HPF   NONE    RBC, Urine NONE, 1-2, 3-5 /HPF   1-2    Mucus, Urine Reference range not established. /LPF   FEW    (H): Data is abnormally high  (L): Data is abnormally low  !: Data is abnormal    CT HEAD  IMPRESSION:  No acute intracranial abnormality.      MACRO:  None.      Signed by: Jonathon Travis 7/28/2025 11:32 PM    CTA BRAIN ATTACK  IMPRESSION:  No new large vessel intracranial occlusion. Findings unchanged  compared to 02/12/2024.      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      Morteza Dinero MD         [1]   Past Medical History:  Diagnosis Date    Age-related nuclear cataract, unspecified eye 05/26/2022    Nuclear sclerosis     Atherosclerosis of native arteries of extremities with intermittent claudication, bilateral legs 01/03/2023    Atherosclerosis of native artery of both lower extremities with intermittent claudication    Atherosclerotic heart disease of native coronary artery without angina pectoris 10/04/2022    CAD (coronary artery disease)    Atrial fibrillation (Multi)     Cellulitis of lower extremity 04/09/2024    Community acquired pneumonia 04/09/2024    Diplopia 02/18/2022    Diplopia    Essential (primary) hypertension 10/04/2022    Benign essential hypertension    Heart disease 2010    Heart valve disease     Hyperlipidemia     Other abnormal glucose     Hemoglobin A1c less than 7.0%    Other intervertebral disc degeneration, lumbar region     Degenerative disc disease, lumbar    Pain in extremity 04/09/2024    Personal history of colonic polyps     History of colonic polyps    Personal history of malignant neoplasm of other organs and systems     History of squamous cell carcinoma    Personal history of other diseases of the circulatory system 04/16/2018    History of hypertension    Personal history of other endocrine, nutritional and metabolic disease 04/16/2018    History of hyperlipidemia    Personal history of other medical treatment     History of stress test    Presence of prosthetic heart valve 01/03/2023    S/P AVR (aortic valve replacement)    Spondylolysis, lumbar region     Lumbar spondylolysis   [2]   Past Surgical History:  Procedure Laterality Date    APPENDECTOMY  01/25/2017    Appendectomy    CARDIAC PACEMAKER PLACEMENT  04/16/2018    Pacemaker Placement    CARDIAC VALVE REPLACEMENT      CATARACT EXTRACTION, BILATERAL      COLONOSCOPY W/ POLYPECTOMY  01/25/2017    Complete Colonoscopy For Polyp Removal    CORONARY ANGIOPLASTY WITH STENT PLACEMENT  01/25/2017    Cath Stent Placement    CT ANGIO AORTA AND BILATERAL ILIOFEMORAL RUN OFF INCLUDING WITHOUT CONTRAST IF PERFORMED  11/23/2019    CT AORTA AND  BILATERAL ILIOFEMORAL RUNOFF ANGIOGRAM W AND/OR WO IV CONTRAST 11/23/2019 [facility]    ESOPHAGOGASTRODUODENOSCOPY  01/25/2017    Esophagogastroduodenoscopy With Biopsy    HERNIA REPAIR  01/25/2017    Inguinal Hernia Repair    KNEE ARTHROSCOPY W/ DEBRIDEMENT  01/25/2017    Arthroscopy Knee Left    OTHER SURGICAL HISTORY  02/10/2015    Stent Indications: Acute Myocardial Infarction    OTHER SURGICAL HISTORY  07/20/2015    Blood Vessel Repair Direct Leg    OTHER SURGICAL HISTORY  01/25/2017    Excision Of Lesion Face    TOTAL KNEE ARTHROPLASTY  04/16/2018    Knee Replacement    VEIN SURGERY

## 2025-07-29 NOTE — PROGRESS NOTES
"Speech-Language Pathology    Speech-Language Pathology Clinical Swallow Evaluation    Patient Name: Alexis Armijo \"Radha"  MRN: [id number]  : 1935  Today's Date: 25  Start Time: 1015  Stop Time: 1030  Time Calculation (min): 15 min    ASSESSMENT     Functional oral phase and no suspected pharyngeal phase dysphagia based on clinical swallow evaluation. No further swallowing therapy is indicated.   Prognosis: Good    PLAN    Recommendations:    Is MBSS recommended? No; no pharyngeal dysphagia suspected.  Solid consistency: Regular (IDDSI level 7)  Liquid consistency: Thin (IDDSI 0)  Medication administration: Whole in thin liquid, Whole in puree    Compensatory swallow strategies:  - Upright positioning for all PO intake    Any change to mental/medical/respiratory status please make NPO and alert SLP. MD and RN aware.          SUBJECTIVE  PMHx relevant to rehab:   Change in mental status / confusion.     Alexis Armijo \"Radha" is an 89 y.o. male with history of CAD, PAF - on long tern anticoagulation with apixaban, permanent pacemaker, HTN, and HLD presenting with an altered mental state. Pt is not able to provide any meaningful history so information was obtained from wife and cousin at the bedside. He apparently was well until early afternoon yesterday when family noticed some changes in his behavior and cognition. For example, he couldn't figure out where his phone was, he couldn't see what was on his right side, he had difficulty handling utensils to eat, he was struggling to find his words and when he did speak he made no sense. In addition, he had difficulty walking. He was brought in by EMS. CT head in ED did not reveal any acute intracranial process. CTA brain attack did not show any evidence of new large vessel intracranial occlusion. NIH in ED was 6.   Chief complaint: Pt was admitted on 25 due to   Chief Complaint   Patient presents with    Altered Mental Status     Pt brought in by [place] " EMS for c/o AMS. Pt's wife and cousin at bedside. PT is normally A&Ox4, currently A&Ox1, oriented to name and . PT's wife stated he has had difficulty remebering things today and using utensils. LKW is last night, unknown time frame. Pt denied any pain. Wife denied any recent flu-like symptoms but mentioned he had a fall 4 weeks ago. Pt was not medically evaluated after the fall. Pt is on eliquis. No obvious injuries or deformities.    . He was found to have Stroke determined by clinical assessment (Multi).    Relevant imaging results:  No chest x ray.    General Visit Information:  SLP Received On: 25  Patient Class: Inpatient  Living Environment: Home  Ordering Physician: Dr. Dinero  Reason for Referral: concern for dysphagia with change in mental status  Prior to Session Communication: Bedside nurse    RN cleared pt to participate in session and reported limited concern for stroke at this time.     Pt reported confusion, but cooperation and eagerness for oral intake. Hughes.   [employer] Diet: Regular/thin  Current Diet : NPO    Status at time of evaluation:  Pain Assessment  Pain Assessment: 0-10  0-10 (Numeric) Pain Score: 0 - No pain    Pt was pleasant, cooperative, and attentive for session.  Orientation: Some difficulty reporting Ox3- possibly due to hearing difficulty.   Ability to follow functional commands: Follows complex commands after some simplification/repetition  Nutritional status: Appears well-nourished/no concerns    Respiratory status: Room air  Baseline Vocal Quality: Normal  Volitional Cough: Strong  Volitional Swallow: Within Functional Limits  Patient positioning: Upright in bed     OBJECTIVE  Clinical swallow evaluation completed and consisted of interview, limited oral motor assessment, and PO trials (cracker, apple sauce, water. ).    ORAL PHASE: Natural dentition in good condition. Oral mucosa were pink, moist, and free of obvious lesions. Lingual strength and ROM were wfl. Labial  strength/ROM were wfl. Labial seal was adequate. Mastication of regular solids was wfl. A/P transit and oral clearance were 2fl.    PHARYNGEAL PHASE: Laryngeal elevation was visualized or palpated with all trials, however adequacy of hyolaryngeal elevation/excursion cannot be determined at bedside. No immediate or delayed s/sx aspiration/penetration were observed with any consistencies.    Was 3oz challenge administered: Yes; pt drank 3oz of thin liquid in one attempt, without breaking, with no overt s/sx aspiration/penetration observed.       Inpatient Education:  Extensive education provided to patient regarding current swallow function, recommendations/results, and POC.    Barriers to learning: None   Method of teaching: Verbal   Topic: role of ST and results of assessment   Outcome of teaching: Meets goals/outcomes  Treatment provided: No

## 2025-07-29 NOTE — SIGNIFICANT EVENT
Patient seen and examined at bedside today.  He is very hard of hearing without his hearing aides. He is able to respond appropriately most of time.     Vitals reviewed.  Afebrile. Pulse stable. RR stable for most part. Bps are hypertensive but not terrible highest appears to be in systolic 160s. Room air    Labs reviewed.  CBC: Hb 11.7  INR: 1.1  CMP: Na 134/ BUN 24/ Ca 8.4  Troponin: 9 x 2 / BNP: 140 --> 176  Lipid panel: HDL 42/ LDL 51/ VLDL 15/ Trig 75/ Chol 108  UA: Negative except urobilinogen 1+    Imaging reviewed.  CT Head wo: No acute intracranial abnormality.  CTA Head/Neck: No new large vessel intracranial occlusion. Findings unchanged compared to 02/12/2024.    Echo (2/12/24): Left ventricular systolic function is normal with a 55-60% estimated ejection fraction. Spectral Doppler shows an impaired relaxation pattern of left ventricular diastolic filling. There is mildly reduced right ventricular systolic function. The left atrium is moderately dilated.  The right atrium is mild to moderately dilated.  There is moderate mitral annular calcification. Mild aortic valve stenosis. Normal CVP, PASP. Negative bubble study.    Physical exam:  Constitutional: Pleasant, Awake/Alert. Tells me his name but hard to hear the rest   Head: Atraumatic, Normocephalic  Eyes: EOMI. MIC  Neck: No JVD  Cardiovascular: Regular rate and rhythm, S1, S2. No extra heart sounds or murmurs  Respiratory: Clear to auscultation bilaterally. No wheezing, rales or rhonchi.  Abdomen: Soft, Nontender. Bowel sounds appreciated  Musculoskeletal: ROM intact. Muscle strength grossly intact upper and lower extremities 5/5.   Neurological: His cranial nerve exam seems fine but he is so hard of hearing that he tends to mimic what you are doing as if you are asking him to follow along. He was able to identify stroke card images as well as tell me what images are doing. He was able to read but seemed like he has poor vision as squinting.    Extremities: Warm and dry.   Psychiatric: Appropriate mood and affect    Assessment/Plan:  This is a 89yo male with medical history significant for CAD, HTN, Afib on Eliquis, HLD and pacemaker admitted for stroke like symptoms.     Acute Medical Issues:   # Stroke like symptoms suspected CVA  # Encephalopathy  - Unable to MRI due to patient's pacemaker  - Will defer timing of repeat CT to neurology consultant likely today or tomorrow since cannot get MRI  - OK to continue the ASA and the Eliquis for now (Appears to have been on Plavix before) but may need to reconsider the Eliquis if that is considered a failure   - Continue Atorvastatin 80mg daily  - Neurochecks  - Telemetry can be continued though we already know he has atrial fibrillation  - Repeat echo was ordered  - Holding Valsartan to allow for permissive HTN  - Neurology consult: Appreciate recommendations  - PT/OT  - SLP cleared patient     Chronic Major Conditions:   # History of CAD: ASA  # HTN: Holding Valsartan to allow for permissive HTN  # Afib on Eliquis: Eliquis 2.5mg BID  # HLD: Atorvastatin 80mg daily  # Status post pacemaker:    Disposition: Admitted with concerns for CVA. Plan above. I updated Janie from [facility] office so she is aware. Therapy services need to see to help determine dispo location.

## 2025-07-30 ENCOUNTER — APPOINTMENT (OUTPATIENT)
Dept: RADIOLOGY | Facility: HOSPITAL | Age: 89
End: 2025-07-30
Payer: MEDICARE

## 2025-07-30 ENCOUNTER — APPOINTMENT (OUTPATIENT)
Dept: CARDIOLOGY | Facility: HOSPITAL | Age: 89
End: 2025-07-30
Payer: MEDICARE

## 2025-07-30 LAB
ALBUMIN SERPL BCP-MCNC: 4.1 G/DL (ref 3.4–5)
ANION GAP SERPL CALC-SCNC: 12 MMOL/L (ref 10–20)
AORTIC VALVE MEAN GRADIENT: 13 MMHG
AORTIC VALVE PEAK VELOCITY: 2.35 M/S
AV PEAK GRADIENT: 22 MMHG
AVA (PEAK VEL): 1.57 CM2
AVA (VTI): 1.69 CM2
BUN SERPL-MCNC: 17 MG/DL (ref 6–23)
CALCIUM SERPL-MCNC: 8.8 MG/DL (ref 8.6–10.3)
CHLORIDE SERPL-SCNC: 104 MMOL/L (ref 98–107)
CO2 SERPL-SCNC: 27 MMOL/L (ref 21–32)
CREAT SERPL-MCNC: 0.82 MG/DL (ref 0.5–1.3)
EGFRCR SERPLBLD CKD-EPI 2021: 84 ML/MIN/1.73M*2
EJECTION FRACTION APICAL 4 CHAMBER: 59.4
EJECTION FRACTION: 59 %
ERYTHROCYTE [DISTWIDTH] IN BLOOD BY AUTOMATED COUNT: 14.5 % (ref 11.5–14.5)
EST. AVERAGE GLUCOSE BLD GHB EST-MCNC: 105 MG/DL
GLUCOSE BLD MANUAL STRIP-MCNC: 100 MG/DL (ref 74–99)
GLUCOSE BLD MANUAL STRIP-MCNC: 119 MG/DL (ref 74–99)
GLUCOSE BLD MANUAL STRIP-MCNC: 143 MG/DL (ref 74–99)
GLUCOSE BLD MANUAL STRIP-MCNC: 83 MG/DL (ref 74–99)
GLUCOSE SERPL-MCNC: 100 MG/DL (ref 74–99)
HBA1C MFR BLD: 5.3 % (ref ?–5.7)
HCT VFR BLD AUTO: 42.4 % (ref 41–52)
HGB BLD-MCNC: 13.8 G/DL (ref 13.5–17.5)
HOLD SPECIMEN: NORMAL
LEFT ATRIUM VOLUME AREA LENGTH INDEX BSA: 36.4 ML/M2
LEFT VENTRICLE INTERNAL DIMENSION DIASTOLE: 4.73 CM (ref 3.5–6)
LEFT VENTRICULAR OUTFLOW TRACT DIAMETER: 1.99 CM
MCH RBC QN AUTO: 30.8 PG (ref 26–34)
MCHC RBC AUTO-ENTMCNC: 32.5 G/DL (ref 32–36)
MCV RBC AUTO: 95 FL (ref 80–100)
MITRAL VALVE E/A RATIO: 0.76
NRBC BLD-RTO: 0 /100 WBCS (ref 0–0)
PHOSPHATE SERPL-MCNC: 3.2 MG/DL (ref 2.5–4.9)
PLATELET # BLD AUTO: 165 X10*3/UL (ref 150–450)
POTASSIUM SERPL-SCNC: 4 MMOL/L (ref 3.5–5.3)
RBC # BLD AUTO: 4.48 X10*6/UL (ref 4.5–5.9)
RIGHT VENTRICLE FREE WALL PEAK S': 8 CM/S
RIGHT VENTRICLE PEAK SYSTOLIC PRESSURE: 27 MMHG
SODIUM SERPL-SCNC: 139 MMOL/L (ref 136–145)
TRICUSPID ANNULAR PLANE SYSTOLIC EXCURSION: 1.5 CM
WBC # BLD AUTO: 7.8 X10*3/UL (ref 4.4–11.3)

## 2025-07-30 PROCEDURE — 1200000002 HC GENERAL ROOM WITH TELEMETRY DAILY

## 2025-07-30 PROCEDURE — 2500000004 HC RX 250 GENERAL PHARMACY W/ HCPCS (ALT 636 FOR OP/ED): Performed by: INTERNAL MEDICINE

## 2025-07-30 PROCEDURE — 82947 ASSAY GLUCOSE BLOOD QUANT: CPT

## 2025-07-30 PROCEDURE — 99232 SBSQ HOSP IP/OBS MODERATE 35: CPT | Performed by: INTERNAL MEDICINE

## 2025-07-30 PROCEDURE — C8929 TTE W OR WO FOL WCON,DOPPLER: HCPCS

## 2025-07-30 PROCEDURE — 80069 RENAL FUNCTION PANEL: CPT | Performed by: INTERNAL MEDICINE

## 2025-07-30 PROCEDURE — 97165 OT EVAL LOW COMPLEX 30 MIN: CPT | Mod: GO

## 2025-07-30 PROCEDURE — 70450 CT HEAD/BRAIN W/O DYE: CPT | Performed by: STUDENT IN AN ORGANIZED HEALTH CARE EDUCATION/TRAINING PROGRAM

## 2025-07-30 PROCEDURE — 97162 PT EVAL MOD COMPLEX 30 MIN: CPT | Mod: GP

## 2025-07-30 PROCEDURE — 85027 COMPLETE CBC AUTOMATED: CPT | Performed by: INTERNAL MEDICINE

## 2025-07-30 PROCEDURE — 36415 COLL VENOUS BLD VENIPUNCTURE: CPT | Performed by: INTERNAL MEDICINE

## 2025-07-30 PROCEDURE — 97535 SELF CARE MNGMENT TRAINING: CPT | Mod: GO

## 2025-07-30 PROCEDURE — 93306 TTE W/DOPPLER COMPLETE: CPT | Performed by: STUDENT IN AN ORGANIZED HEALTH CARE EDUCATION/TRAINING PROGRAM

## 2025-07-30 PROCEDURE — 70450 CT HEAD/BRAIN W/O DYE: CPT

## 2025-07-30 PROCEDURE — 2500000001 HC RX 250 WO HCPCS SELF ADMINISTERED DRUGS (ALT 637 FOR MEDICARE OP): Performed by: INTERNAL MEDICINE

## 2025-07-30 RX ORDER — CALCIUM CARBONATE 200(500)MG
500 TABLET,CHEWABLE ORAL 4 TIMES DAILY PRN
Status: DISCONTINUED | OUTPATIENT
Start: 2025-07-30 | End: 2025-08-01 | Stop reason: HOSPADM

## 2025-07-30 RX ADMIN — ATORVASTATIN CALCIUM 80 MG: 80 TABLET, FILM COATED ORAL at 20:06

## 2025-07-30 RX ADMIN — Medication 25 MCG: at 10:24

## 2025-07-30 RX ADMIN — APIXABAN 2.5 MG: 5 TABLET, FILM COATED ORAL at 20:06

## 2025-07-30 RX ADMIN — APIXABAN 2.5 MG: 5 TABLET, FILM COATED ORAL at 10:24

## 2025-07-30 RX ADMIN — ASPIRIN 81 MG: 81 TABLET, CHEWABLE ORAL at 10:23

## 2025-07-30 RX ADMIN — Medication 1 TABLET: at 10:24

## 2025-07-30 RX ADMIN — PSYLLIUM HUSK 1 PACKET: 3.4 POWDER ORAL at 10:24

## 2025-07-30 RX ADMIN — PERFLUTREN 1.5 ML OF DILUTION: 6.52 INJECTION, SUSPENSION INTRAVENOUS at 11:14

## 2025-07-30 ASSESSMENT — ACTIVITIES OF DAILY LIVING (ADL)
BATHING_ASSISTANCE: MODERATE
ADL_ASSISTANCE: INDEPENDENT
LACK_OF_TRANSPORTATION: NO
HOME_MANAGEMENT_TIME_ENTRY: 10

## 2025-07-30 ASSESSMENT — COGNITIVE AND FUNCTIONAL STATUS - GENERAL
DRESSING REGULAR UPPER BODY CLOTHING: A LITTLE
DRESSING REGULAR LOWER BODY CLOTHING: A LOT
DRESSING REGULAR LOWER BODY CLOTHING: A LITTLE
DAILY ACTIVITIY SCORE: 18
PERSONAL GROOMING: A LITTLE
MOVING TO AND FROM BED TO CHAIR: A LITTLE
MOBILITY SCORE: 12
MOVING FROM LYING ON BACK TO SITTING ON SIDE OF FLAT BED WITH BEDRAILS: A LOT
MOVING TO AND FROM BED TO CHAIR: A LOT
CLIMB 3 TO 5 STEPS WITH RAILING: A LOT
TOILETING: A LOT
CLIMB 3 TO 5 STEPS WITH RAILING: A LITTLE
HELP NEEDED FOR BATHING: A LOT
TURNING FROM BACK TO SIDE WHILE IN FLAT BAD: A LOT
HELP NEEDED FOR BATHING: A LITTLE
MOBILITY SCORE: 19
TOILETING: A LITTLE
EATING MEALS: A LITTLE
PERSONAL GROOMING: A LITTLE
EATING MEALS: A LITTLE
WALKING IN HOSPITAL ROOM: A LITTLE
STANDING UP FROM CHAIR USING ARMS: A LOT
STANDING UP FROM CHAIR USING ARMS: A LITTLE
TURNING FROM BACK TO SIDE WHILE IN FLAT BAD: A LITTLE
DAILY ACTIVITIY SCORE: 15
DRESSING REGULAR UPPER BODY CLOTHING: A LITTLE
WALKING IN HOSPITAL ROOM: A LOT

## 2025-07-30 ASSESSMENT — PAIN - FUNCTIONAL ASSESSMENT
PAIN_FUNCTIONAL_ASSESSMENT: 0-10

## 2025-07-30 ASSESSMENT — PAIN SCALES - GENERAL
PAINLEVEL_OUTOF10: 0 - NO PAIN

## 2025-07-30 NOTE — PROGRESS NOTES
07/30/25 1224   Discharge Planning   Living Arrangements Spouse/significant other;Other (Comment)  (Patient and spouse live at [facility] in a duplex.)   Support Systems Spouse/significant other   Assistance Needed Alert and oriented x 4, Normally independent with ADL's, Drives, Cane, Walker, Built in shower seat, Grab bars, Room air at baseline and currenlty, No CPAP/ Bipap, PCP-Dr. Jonah Velez, DO   Type of Residence Private residence  (Duplex at [facility])   Number of Stairs to Enter Residence 0   Number of Stairs Within Residence 0   Do you have animals or pets at home? No   Who is requesting discharge planning? Provider   Home or Post Acute Services Post acute facilities (Rehab/SNF/etc)   Type of Post Acute Facility Services Skilled nursing   Expected Discharge Disposition SNF  (PT recommending moderate intensity rehab, OT evaluation pending at this time, PAQN list provided to patient and spouse to review and preference facilities. Patient will require 3 IP midnights.)   Does the patient need discharge transport arranged? Yes   [facility] coordination needed? Yes   Has discharge transport been arranged? No   Financial Resource Strain   How hard is it for you to pay for the very basics like food, housing, medical care, and heating? Not hard   Housing Stability   In the last 12 months, was there a time when you were not able to pay the mortgage or rent on time? N   In the past 12 months, how many times have you moved where you were living? 0   At any time in the past 12 months, were you homeless or living in a shelter (including now)? N   Transportation Needs   In the past 12 months, has lack of transportation kept you from medical appointments or from getting medications? no   In the past 12 months, has lack of transportation kept you from meetings, work, or from getting things needed for daily living? No   Patient Choice   Provider Choice list and CMS website  (https://medicare.gov/care-compare#search) for post-acute Quality and Resource Measure Data were provided and reviewed with: Family;Patient   Patient / Family choosing to utilize agency / facility established prior to hospitalization No   Stroke Family Assessment   Stroke Family Assessment Needed Yes   Primary Caregiver/Family After Discharge Spouse/Significant Other   Physical Layout of Home One Story   Caregiver/Family can provide assistance with ADL's Yes   Caregiver/Family can provide mobility assistance for transfers in and out of bed, chair or car Yes   Medications: Caregiver/Family can obtain prescriptions Yes   Medications: Caregiver/Family can assist with medication administration Yes   Medications: Caregiver/Family verbalizes understanding of medications Yes   Caregiver/Family agrees with discharge plan to home Yes   Caregiver/Family verbalizes capability of caring for patient at home Yes   Intensity of Service   Intensity of Service 0-30 min

## 2025-07-30 NOTE — CARE PLAN
Problem: General Stroke  Goal: Establish a mutual long term goal with patient by discharge  Outcome: Progressing  Goal: Demonstrate improvement in neurological exam throughout the shift  Outcome: Progressing  Goal: Maintain BP within ordered limits throughout shift  Outcome: Progressing  Goal: Participate in treatment (ie., meds, therapy) throughout shift  Outcome: Progressing  Goal: No symptoms of aspiration throughout shift  Outcome: Progressing  Goal: No symptoms of hemorrhage throughout shift  Outcome: Progressing  Goal: Tolerate enteral feeding throughout shift  Outcome: Progressing  Goal: Decreased nausea/vomiting throughout shift  Outcome: Progressing  Goal: Controlled blood glucose throughout shift  Outcome: Progressing  Goal: Out of bed three times today  Outcome: Progressing     Problem: ICU Stroke  Goal: Maintain ICP within ordered limits throughout shift  Outcome: Progressing  Goal: Tolerate EVD clamping trial throughout shift  Outcome: Progressing  Goal: Tolerate ventilator weaning trial during shift  Outcome: Progressing  Goal: Maintain patent airway throughout shift  Outcome: Progressing  Goal: Achieve/maintain targeted sodium level throughout shift  Outcome: Progressing     Problem: Pain - Adult  Goal: Verbalizes/displays adequate comfort level or baseline comfort level  Outcome: Progressing     Problem: Safety - Adult  Goal: Free from fall injury  Outcome: Progressing     Problem: Discharge Planning  Goal: Discharge to home or other facility with appropriate resources  Outcome: Progressing     Problem: Chronic Conditions and Co-morbidities  Goal: Patient's chronic conditions and co-morbidity symptoms are monitored and maintained or improved  Outcome: Progressing     Problem: Nutrition  Goal: Nutrient intake appropriate for maintaining nutritional needs  Outcome: Progressing        The clinical goals for the shift include no decline in neuro status

## 2025-07-30 NOTE — PROGRESS NOTES
"Physical Therapy    Physical Therapy Evaluation    Patient Name: Alexis Armijo \"Radha"  MRN: [id number]  Department: [address]  Room: [address] [place] [address]  Today's Date: 7/30/2025   Time Calculation  Start Time: 1102  Stop Time: 1128  Time Calculation (min): 26 min    Assessment/Plan   PT Assessment  PT Assessment Results: Decreased mobility (deconditioning)  Rehab Prognosis: Good  Barriers to Discharge Home: Caregiver assistance, Physical needs  Caregiver Assistance: Caregiver assistance needed per identified barriers - however, level of patient's required assistance exceeds assistance available at home  Physical Needs: 24hr mobility assistance needed, High falls risk due to function or environment  Evaluation/Treatment Tolerance: Patient limited by fatigue  Medical Staff Made Aware: Yes  Strengths: Ability to acquire knowledge  Barriers to Participation: Comorbidities  End of Session Communication: Bedside nurse  End of Session Patient Position: Bed, 3 rail up, Alarm on (Pt's spouse is present with him)  IP OR SWING BED PT PLAN  Inpatient or Swing Bed: Inpatient  PT Plan  Treatment/Interventions: Bed mobility, Transfer training, Gait training, Strengthening, Therapeutic exercise  PT Plan: Ongoing PT  PT Frequency: 3 times per week  PT Discharge Recommendations: Moderate intensity level of continued care  Equipment Recommended upon Discharge: Wheeled walker  PT Recommended Transfer Status: Assist x1  PT - OK to Discharge: Yes (Per PT POC)    Subjective     PT Visit Info:  PT Received On: 07/30/25  General Visit Information:  General  Reason for Referral: 89 yo male admitted 2' to altered mental status, difficulty with ambulation  Referred By: Dr. MARGARITA Landaverde  Past Medical History Relevant to Rehab: CAD, PAF (Eliquis), permanent pacemaker, HTN, HLD, aotric valve replacement  Family/Caregiver Present: Yes (spouse)  Caregiver Feedback: able to assist with PLOF history  Prior to Session Communication: Bedside nurse  Patient Position " Received: Bed, 3 rail up, Alarm on  General Comment: Pt is pleasnat and agreeable to work with PT. Pt iss moving slowly but is giving good erffort. Pt is exhibiting some mild confusion. Based on Pt's current status, recommend MODERATE follow up services  Home Living:  Home Living  Type of Home:  (1 level duplex at [facility])  Lives With: Spouse  Home Adaptive Equipment: Walker rolling or standard, Cane  Home Layout: One level  Home Access: Level entry  Bathroom Shower/Tub: Walk-in shower  Bathroom Equipment: Grab bars in shower, Built-in shower seat, Raised toilet seat with rails  Prior Level of Function:  Prior Function Per Pt/Caregiver Report  Level of Boundary: Independent with ADLs and functional transfers, Independent with homemaking with ambulation (cane or wheeled walker)  Receives Help From: Family  Ambulatory Assistance: Independent  Precautions:  Precautions  Medical Precautions: Fall precautions (yamila Devine, telemetry)      Date/Time Vitals Session Patient Position Pulse Resp SpO2 BP MAP (mmHg)    07/30/25 1103 --  --  67  20  96 %  139/79  99            Objective   Pain:  Pain Assessment  Pain Assessment: 0-10  0-10 (Numeric) Pain Score: 0 - No pain  Cognition:  Cognition  Overall Cognitive Status: Within Functional Limits  Orientation Level: Oriented X4    General Assessments:  General Observation  General Observation: Pt is deconditioned and is moving slowly. Pt doesn't think that he needs rehab. Pt will benifit from MODERATE follow up services               Activity Tolerance  Endurance: Tolerates 10 - 20 min exercise with multiple rests    Sensation  Light Touch: No apparent deficits    Strength  Strength Comments: B UE and LE are 4+ of 5  Static Sitting Balance  Static Sitting-Balance Support: Bilateral upper extremity supported, Feet supported  Static Sitting-Level of Assistance: Close supervision    Static Standing Balance  Static Standing-Balance Support: Bilateral upper  extremity supported (FWW)  Static Standing-Level of Assistance: Minimum assistance  Dynamic Standing Balance  Dynamic Standing-Balance Support: Bilateral upper extremity supported (FWW)  Dynamic Standing-Level of Assistance: Minimum assistance, Moderate assistance  Functional Assessments:  Bed Mobility  Bed Mobility: Yes  Bed Mobility 1  Bed Mobility 1: Supine to sitting, Sitting to supine  Level of Assistance 1: Moderate assistance  Bed Mobility Comments 1: assist with B LE and trunk    Transfers  Transfer: Yes  Transfer 1  Transfer From 1: Bed to  Transfer to 1: Stand  Technique 1: Sit to stand, Stand to sit  Transfer Device 1:  (FWW)  Transfer Level of Assistance 1: Minimum assistance, Moderate assistance    Ambulation/Gait Training  Ambulation/Gait Training Performed: Yes  Ambulation/Gait Training 1  Surface 1: Level tile  Device 1: Rolling walker  Assistance 1: Moderate assistance, Maximum assistance  Quality of Gait 1: Decreased step length  Comments/Distance (ft) 1: 5 side steps up to HOB    Stairs  Stairs: No  Extremity/Trunk Assessments:  RUE   RUE : Within Functional Limits  LUE   LUE: Within Functional Limits  RLE   RLE : Within Functional Limits  LLE   LLE : Within Functional Limits  Outcome Measures:  [facility] Basic Mobility  Turning from your back to your side while in a flat bed without using bedrails: A lot  Moving from lying on your back to sitting on the side of a flat bed without using bedrails: A lot  Moving to and from bed to chair (including a wheelchair): A lot  Standing up from a chair using your arms (e.g. wheelchair or bedside chair): A lot  To walk in hospital room: A lot  Climbing 3-5 steps with railing: A lot  Basic Mobility - Total Score: 12    Encounter Problems       Encounter Problems (Active)       Mobility       STG - Patient will ambulate 50 - 80 feet MOD I with FWW (Progressing)       Start:  07/30/25    Expected End:  08/13/25               PT Transfers       STG - Patient to  transfer to and from sit to supine independently (Progressing)       Start:  07/30/25    Expected End:  08/13/25            STG - Patient will transfer sit to and from stand MOD I with FWW (Progressing)       Start:  07/30/25    Expected End:  08/13/25                   Education Documentation  No documentation found.  Education Comments  No comments found.

## 2025-07-30 NOTE — CARE PLAN
The patient's goals for the shift include  sleep.    The clinical goals for the shift include No decline in neuro status this shift.      Problem: General Stroke  Goal: Demonstrate improvement in neurological exam throughout the shift  Outcome: Progressing  Goal: Maintain BP within ordered limits throughout shift  Outcome: Progressing     Problem: Safety - Adult  Goal: Free from fall injury  Outcome: Progressing

## 2025-07-30 NOTE — PROGRESS NOTES
"Occupational Therapy    Evaluation/Treatment    Patient Name: Alexis Armijo \"Radha"  MRN: [id number]  Department: [address]  Room: 229229  Today's Date: 07/30/25  Time Calculation  Start Time: 1350  Stop Time: 1417  Time Calculation (min): 27 min     Assessment:  OT Assessment: Pt is an 89 yo M referred to occupational therapy for impaired self-care and functional mobility 2/2 hospitalization for stroke determined by clinical assessment. Pt demonstrates impaired ADL completion, functional mobility/transfers, and endurance this date. Pt required min-mod A for bed mobility and STS, and required max A for LE dressing and toileting. Pt would continue to benefit from OT services at the MOD intensity level to increase functional independence and safety.  Prognosis: Good  Barriers to Discharge Home: Caregiver assistance, Physical needs  Caregiver Assistance: Caregiver assistance needed per identified barriers - however, level of patient's required assistance exceeds assistance available at home  Physical Needs: Ambulating household distances limited by function/safety, Intermittent mobility assistance needed, Intermittent ADL assistance needed, High falls risk due to function or environment  Evaluation/Treatment Tolerance: Patient tolerated treatment well, Patient limited by fatigue  Medical Staff Made Aware: Yes  End of Session Communication: Bedside nurse  End of Session Patient Position: Bed, 3 rail up, Alarm on  OT Assessment Results: Decreased ADL status, Decreased endurance, Decreased functional mobility, Decreased IADLs, Decreased trunk control for functional activities  Prognosis: Good  Barriers to Discharge: Decreased caregiver support  Evaluation/Treatment Tolerance: Patient tolerated treatment well, Patient limited by fatigue  Medical Staff Made Aware: Yes  Strengths: Ability to acquire knowledge, Housing layout  Barriers to Participation: Comorbidities  Plan:  Treatment Interventions: ADL retraining, Functional " transfer training, Endurance training, Equipment evaluation/education, Patient/family training, Compensatory technique education, UE strengthening/ROM, Cognitive reorientation  OT Frequency: 3 times per week (During this acute inpatient hospitalization.)  OT Discharge Recommendations: Moderate intensity level of continued care (Based on current functional status and rehab potential, patient is anticipated to tolerate and benefit from 5 or more days per week of skilled rehabilitative therapy after discharge from this acute inpatient hospitalization.)  Equipment Recommended upon Discharge: Wheeled walker  OT Recommended Transfer Status: Moderate assist, Assist of 1  OT - OK to Discharge: Yes (per OT POC)  Treatment Interventions: ADL retraining, Functional transfer training, Endurance training, Equipment evaluation/education, Patient/family training, Compensatory technique education, UE strengthening/ROM, Cognitive reorientation    Subjective   Current Problem:  1. Stroke determined by clinical assessment (Multi)        2. Altered mental status, unspecified altered mental status type        3. Expressive aphasia        4. Vision loss of right eye        5. Acute CVA (cerebrovascular accident) (Multi)  Transthoracic Echo Complete    Transthoracic Echo Complete    CANCELED: Transthoracic Echo Complete    CANCELED: Transthoracic Echo Complete      6. 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 MARGARITA Landaverde  Past Medical History Relevant to Rehab: CAD, PAF  (Eliquis), permanent pacemaker, HTN, HLD, aotric valve replacement  Family/Caregiver Present: Yes (Spouse present during session)  Prior to Session Communication: PCT/NA/CTA  Patient Position Received: Bed, 3 rail up, Alarm on  Preferred Learning Style: verbal, visual  General Comment: Pt pleasant and agreeable to therapy session. Pt cleared by RN prior to session   Precautions:  Medical Precautions: Fall precautions  Precautions Comment: masimo, tele, stanford catheter    Vital Signs Comment: Vitals stable throughout session     Pain:  Pain Assessment  Pain Assessment: 0-10  0-10 (Numeric) Pain Score: 0 - No pain    Objective   Cognition:  Overall Cognitive Status: Within Functional Limits  Arousal/Alertness: Delayed responses to stimuli  Orientation Level: Oriented X4 (Unable to recall month)  Following Commands: Follows one step commands with increased time (and repetition)    Home Living:  Type of Home: House (1 level duplex at [facility])  Lives With: Spouse  Home Adaptive Equipment: Walker rolling or standard, Cane  Home Layout: One level  Home Access: Level entry  Bathroom Shower/Tub: Walk-in shower  Bathroom Toilet: Standard  Bathroom Equipment: Grab bars in shower, Built-in shower seat, Raised toilet seat with rails  Prior Function:  Level of Orient: Independent with ADLs and functional transfers, Independent with homemaking with ambulation  Receives Help From: Family (Spouse)  ADL Assistance: Independent (Wife assists with donning/doffing socks)  Homemaking Assistance: Independent (Spouse assists)  Ambulatory Assistance: Independent (w/ cane or walker)    ADL:  Eating Assistance: Independent  Grooming Assistance: Stand by  Bathing Assistance: Moderate  UE Dressing Assistance: Stand by  LE Dressing Assistance: Maximal  LE Dressing Deficit: Don/doff R sock, Don/doff L sock  Toileting Assistance with Device: Maximal  Toileting Deficit: Perineal hygiene  Functional Assistance: Moderate  ADL  Comments: Pt required min-mod A for STS and min A for bed mobility. Pt required amx A for LE dressing and toileting. Other ADLs anticipated.  Activities of Daily Living:    LE Dressing  LE Dressing: Yes  Sock Level of Assistance: Maximum assistance  LE Dressing Where Assessed: Edge of bed  LE Dressing Comments: max A for donning socks, pt unable to achieve figure 4 positioning    Toileting  Toileting Level of Assistance: Maximum assistance  Where Assessed:  (EOB)  Toileting Comments: Max A for perineal hygiene standing at EOB w/ min A for standing balance    Activity Tolerance:  Endurance: Tolerates 10 - 20 min exercise with multiple rests  Functional Standing Tolerance:  Time: Approx 2 minutes  Activity: Standing ADLs  Functional Standing Tolerance Comments: Good balance, CGA-min A  Bed Mobility/Transfers:   Bed Mobility  Bed Mobility: Yes  Bed Mobility 1  Bed Mobility 1: Supine to sitting  Level of Assistance 1: Minimum assistance  Bed Mobility Comments 1: Increased time required, assist to bring hips to EOB  Bed Mobility 2  Bed Mobility  2: Sitting to supine  Level of Assistance 2: Moderate assistance, +2  Bed Mobility Comments 2: Assist to bring BLEs into bed    Transfers  Transfer: Yes  Transfer 1  Transfer From 1: Bed to  Transfer to 1: Stand  Technique 1: Sit to stand, Stand to sit  Transfer Device 1: Walker  Transfer Level of Assistance 1: Moderate assistance  Transfers 2  Transfer From 2: Bed to  Transfer to 2: Stand  Technique 2: Sit to stand, Stand to sit  Transfer Device 2: Walker  Transfer Level of Assistance 2: Minimum assistance, +2    Sitting Balance:  Static Sitting Balance  Static Sitting-Balance Support: Feet supported  Static Sitting-Level of Assistance: Contact guard  Dynamic Sitting Balance  Dynamic Sitting-Balance Support: Feet supported  Dynamic Sitting-Level of Assistance: Contact guard  Dynamic Sitting-Balance: Lateral lean  Dynamic Sitting-Comments: Retrolean with fatigue  Standing  Balance:  Static Standing Balance  Static Standing-Balance Support: Bilateral upper extremity supported  Static Standing-Level of Assistance: Minimum assistance  Dynamic Standing Balance  Dynamic Standing-Balance Support: Bilateral upper extremity supported  Dynamic Standing-Level of Assistance: Minimum assistance  Dynamic Standing-Balance: Lateral lean    Vision:  Vision - Basic Assessment  Patient Visual Report:  (Double vision at baseline, peripheral vision intact)  Sensation:  Sensation Comment: Pt reports occasional numbness in hands at baseline  Strength:  Strength Comments: BUE 4+/5  Coordination:  Movements are Fluid and Coordinated: Yes  Finger to Nose: Intact  Rapid Alternating Movements: Bradykinesia   Hand Function:  Hand Function  Gross Grasp: Functional  Coordination: Functional  Extremities:   RUE: Within Functional Limits   LUE: Within Functional Limits    Outcome Measures:   [facility] Daily Activity  Putting on and taking off regular lower body clothing: A lot  Bathing (including washing, rinsing, drying): A lot  Putting on and taking off regular upper body clothing: A little  Toileting, which includes using toilet, bedpan or urinal: A lot  Taking care of personal grooming such as brushing teeth: A little  Eating Meals: A little  Daily Activity - Total Score: 15    Education Documentation  Body Mechanics, taught by Litzy Wallis OT at 7/30/2025  2:33 PM.  Learner: Patient  Readiness: Acceptance  Method: Explanation  Response: Verbalizes Understanding  Comment: Pt educated on POC, DC recs, safety with transfers and ADLs, call bell usage    Precautions, taught by Litzy Wallis OT at 7/30/2025  2:33 PM.  Learner: Patient  Readiness: Acceptance  Method: Explanation  Response: Verbalizes Understanding  Comment: Pt educated on POC, DC recs, safety with transfers and ADLs, call bell usage    ADL Training, taught by Litzy Wallis OT at 7/30/2025  2:33 PM.  Learner: Patient  Readiness:  Acceptance  Method: Explanation  Response: Verbalizes Understanding  Comment: Pt educated on POC, DC recs, safety with transfers and ADLs, call bell usage    Goals:  Encounter Problems       Encounter Problems (Active)       ADLs       Patient will perform UB and LB bathing with minimal assist  level of assistance and PRN bathroom equipment.       Start:  07/30/25    Expected End:  08/13/25            Patient with complete lower body dressing with moderate assist level of assistance donning and doffing all LE clothes  with PRN adaptive equipment while edge of bed  and standing       Start:  07/30/25    Expected End:  08/13/25            Patient will complete daily grooming tasks with CGA level of assistance and PRN adaptive equipment while edge of bed .       Start:  07/30/25    Expected End:  08/13/25            Patient will complete toileting including hygiene clothing management/hygiene with moderate assist level of assistance and PRN bathroom equipment.       Start:  07/30/25    Expected End:  08/13/25               BALANCE       Pt will maintain dynamic seated balance during ADL task with supervision level of assistance in order to demonstrate decreased risk of falling and improved postural control.       Start:  07/30/25    Expected End:  08/13/25               TRANSFERS       Patient will complete functional transfer to chair/BSC with least restrictive device with minimal assist level of assistance.       Start:  07/30/25    Expected End:  08/13/25

## 2025-07-30 NOTE — PROGRESS NOTES
"Alexis Armijo \"Radha" is a 89 y.o. male on day 1 of admission presenting with Stroke determined by clinical assessment (Multi).      Subjective   Patient seen and examined at bedside.  Doing well. Feeling a little weak but otherwise back to normal. NO issues. He says he was taking ASA, Plavix and Eliquis.    I talked to both he and wife again this afternoon. They are agreeable with SNF. Aware we dont have neurology to evaluate. Aware of repeat CT results. They are okay with discharge to SNF and followup. They see Dr. Sheth. They reports discrepencies with his meds specifically regarding the ASA and Plavix.     Objective     Last Recorded Vitals  /79   Pulse 67   Temp 36.5 °C (97.7 °F) (Temporal)   Resp 20   Wt 86.6 kg (191 lb)   SpO2 96%   Intake/Output last 3 Shifts:    Intake/Output Summary (Last 24 hours) at 7/30/2025 1347  Last data filed at 7/30/2025 0855  Gross per 24 hour   Intake 780 ml   Output 1150 ml   Net -370 ml       Admission Weight  Weight: 89.4 kg (197 lb 1.5 oz) (07/28/25 2050)    Daily Weight  07/29/25 : 86.6 kg (191 lb)    Image Results  CT head wo IV contrast  Narrative: Interpreted By:  Bambi Puentes,   STUDY:  CT HEAD WO IV CONTRAST;  7/30/2025 10:10 am      INDICATION:  Signs/Symptoms:Stroke like symptoms on presentation. Unable to get  MRI so repeat CT to evaluate.          COMPARISON:  CT head dated 07/20/2025.      ACCESSION NUMBER(S):  [id number]      ORDERING CLINICIAN:  MIRZA ELIZABETH      TECHNIQUE:  Noncontrast axial CT scan of head was performed. Angled reformats in  brain and bone windows were generated. The images were reviewed in  bone, brain, blood and soft tissue windows.      FINDINGS:  No hyperdense intracranial hemorrhage is present. There is no new  mass effect or midline shift.      Moderate volume of the attenuation changes are present in the  periventricular and subcortical white matter of bilateral cerebral  hemispheres, likely representing " sequela of microvascular disease  without evidence of acute CT apparent transcortical infarct.  Gray-white differentiation is intact.      No abnormal ventricular dilatation is present. Basal cisterns are  patent. No extra-axial collections are identified.      Scalp soft tissues do not demonstrate any acute abnormality. No acute  calvarial abnormality is evident. Mastoid air cells and middle ear  cavities are clear.      There is some subcutaneous emphysema, appearing to be localized  within the intravascular space of the soft tissues in the left   compartment and left pterygopalatine fossa (series 206,  image 82).      Impression: 1.  No evidence of new hemorrhage, CT apparent transcortical infarct,  or other acute intracranial abnormality.  2. Moderate volume of the attenuation changes are present in the  periventricular and subcortical white matter of bilateral cerebral  hemispheres, likely representing sequela of microvascular disease.  3. Small amount of the new air is present in the soft tissues of the  left  space and left pterygopalatine fossa at the skull  base, likely within the intravascular compartment, without any other  reactive soft tissue changes, possibly from recent IV medication  administration.      MACRO:  None      Signed by: Bambi Puentes 7/30/2025 12:13 PM  Dictation workstation:   [id number]      Physical Exam  Constitutional: Pleasant, Awake/Alert/Oriented to person place and time. No distress  Ear: Hearing aids in  Cardiovascular: Feels irregular rhythm but rate controlled, S1, S2.   Respiratory: Clear to auscultation bilaterally. No wheezing, rales or rhonchi. Good chest wall expansion  Abdomen: Soft, Nontender, Bowel sounds appreciated  Musculoskeletal: Muscle strength grossly intact upper and lower extremities 5/5.   Extremities: Warm and dry. No acute rashes and lesions  Psychiatric: Appropriate mood and affect      Relevant Results  Scheduled  medications  Scheduled Medications[1]  Continuous medications  Continuous Medications[2]  PRN medications  PRN Medications[3]  Results for orders placed or performed during the hospital encounter of 07/28/25 (from the past 24 hours)   POCT GLUCOSE   Result Value Ref Range    POCT Glucose 89 74 - 99 mg/dL   POCT GLUCOSE   Result Value Ref Range    POCT Glucose 109 (H) 74 - 99 mg/dL   CBC   Result Value Ref Range    WBC 7.8 4.4 - 11.3 x10*3/uL    nRBC 0.0 0.0 - 0.0 /100 WBCs    RBC 4.48 (L) 4.50 - 5.90 x10*6/uL    Hemoglobin 13.8 13.5 - 17.5 g/dL    Hematocrit 42.4 41.0 - 52.0 %    MCV 95 80 - 100 fL    MCH 30.8 26.0 - 34.0 pg    MCHC 32.5 32.0 - 36.0 g/dL    RDW 14.5 11.5 - 14.5 %    Platelets 165 150 - 450 x10*3/uL   Renal Function Panel   Result Value Ref Range    Glucose 100 (H) 74 - 99 mg/dL    Sodium 139 136 - 145 mmol/L    Potassium 4.0 3.5 - 5.3 mmol/L    Chloride 104 98 - 107 mmol/L    Bicarbonate 27 21 - 32 mmol/L    Anion Gap 12 10 - 20 mmol/L    Urea Nitrogen 17 6 - 23 mg/dL    Creatinine 0.82 0.50 - 1.30 mg/dL    eGFR 84 >60 mL/min/1.73m*2    Calcium 8.8 8.6 - 10.3 mg/dL    Phosphorus 3.2 2.5 - 4.9 mg/dL    Albumin 4.1 3.4 - 5.0 g/dL   POCT GLUCOSE   Result Value Ref Range    POCT Glucose 83 74 - 99 mg/dL   Transthoracic Echo Complete   Result Value Ref Range    BSA 2.1 m2   POCT GLUCOSE   Result Value Ref Range    POCT Glucose 100 (H) 74 - 99 mg/dL     *Note: Due to a large number of results and/or encounters for the requested time period, some results have not been displayed. A complete set of results can be found in Results Review.       This is a 89yo male with medical history significant for CAD, HTN, Afib on Eliquis, HLD and pacemaker admitted for stroke like symptoms.      Acute Medical Issues:   # Transient Ischemic Attack vs CVA  # Encephalopathy - Resolved  - Unable to MRI due to patient's pacemaker  - Repeat CT: No evidence of new hemorrhage, CT apparent transcortical infarct, or other acute  intracranial abnormality. Moderate volume of the attenuation changes are present in the periventricular and subcortical white matter of bilateral cerebral hemispheres, likely representing sequela of microvascular disease.  Small amount of the new air is present in the soft tissues of the left  space and left pterygopalatine fossa at the skull base, likely within the intravascular compartment, without any other reactive soft tissue changes, possibly from recent IV medication administration.  - Neurology apparently is unavailable to see patient here this week as no consultants.  - Spoke to patient and wife at bedside. They are aware cannot get MRI and no neurology available to see. They are okay treating as TIA and with medications recommended and placement to SNF.  - Continue ASA 81mg daily, Atorvastatin 80mg daily, Eliquis 2.5mg BID  - Neurochecks  - Telemetry can be continued though we already know he has atrial fibrillation  - Repeat echo pending read  - PT/OT: Moderate  - SLP cleared patient   - Cardiology consult to evaluate regarding patient's medication regimen.     # Poorly controlled HTN - Was holding the med for permissive HTN.  Resume the Valsartan. Can adjust regimen as needed     Chronic Major Conditions:   # History of CAD: ASA  # Afib on Eliquis: Eliquis 2.5mg BID  # HLD: Atorvastatin 80mg daily  # Status post pacemaker:    Updated patient's nurse and care coordinator     Disposition: Cardiology will evaluate patient today vs tomorrow for medication regimen adjustment. Pending placement to SNF otherwise.    Indio Landaverde DO           [1] apixaban, 2.5 mg, oral, BID  aspirin, 81 mg, oral, Daily   Or  aspirin, 81 mg, oral, Daily   Or  aspirin, 81 mg, nasogastric tube, Daily   Or  aspirin, 300 mg, rectal, Daily  atorvastatin, 80 mg, oral, Nightly  B complex-vitamin C, 1 tablet, oral, Daily  cholecalciferol, 25 mcg, oral, Daily  perflutren protein A microsphere, 0.5 mL, intravenous, Once in  imaging  psyllium, 1 packet, oral, Daily  sulfur hexafluoride microsphr, 2 mL, intravenous, Once in imaging  [Held by provider] valsartan, 80 mg, oral, Daily  [2]    [3] PRN medications: labetaloL, oxygen

## 2025-07-31 LAB
GLUCOSE BLD MANUAL STRIP-MCNC: 116 MG/DL (ref 74–99)
GLUCOSE BLD MANUAL STRIP-MCNC: 118 MG/DL (ref 74–99)
GLUCOSE BLD MANUAL STRIP-MCNC: 97 MG/DL (ref 74–99)
GLUCOSE BLD MANUAL STRIP-MCNC: 99 MG/DL (ref 74–99)

## 2025-07-31 PROCEDURE — 97110 THERAPEUTIC EXERCISES: CPT | Mod: GP,CQ

## 2025-07-31 PROCEDURE — 4B02XSZ MEASUREMENT OF CARDIAC PACEMAKER, EXTERNAL APPROACH: ICD-10-PCS | Performed by: INTERNAL MEDICINE

## 2025-07-31 PROCEDURE — 2500000001 HC RX 250 WO HCPCS SELF ADMINISTERED DRUGS (ALT 637 FOR MEDICARE OP): Performed by: INTERNAL MEDICINE

## 2025-07-31 PROCEDURE — 99232 SBSQ HOSP IP/OBS MODERATE 35: CPT | Performed by: INTERNAL MEDICINE

## 2025-07-31 PROCEDURE — 97116 GAIT TRAINING THERAPY: CPT | Mod: GP,CQ

## 2025-07-31 PROCEDURE — 1200000002 HC GENERAL ROOM WITH TELEMETRY DAILY

## 2025-07-31 PROCEDURE — 2500000002 HC RX 250 W HCPCS SELF ADMINISTERED DRUGS (ALT 637 FOR MEDICARE OP, ALT 636 FOR OP/ED): Performed by: INTERNAL MEDICINE

## 2025-07-31 PROCEDURE — 82947 ASSAY GLUCOSE BLOOD QUANT: CPT

## 2025-07-31 RX ADMIN — VALSARTAN 80 MG: 160 TABLET, FILM COATED ORAL at 09:00

## 2025-07-31 RX ADMIN — APIXABAN 2.5 MG: 5 TABLET, FILM COATED ORAL at 08:59

## 2025-07-31 RX ADMIN — PSYLLIUM HUSK 1 PACKET: 3.4 POWDER ORAL at 09:00

## 2025-07-31 RX ADMIN — APIXABAN 2.5 MG: 5 TABLET, FILM COATED ORAL at 20:12

## 2025-07-31 RX ADMIN — Medication 1 TABLET: at 08:59

## 2025-07-31 RX ADMIN — ASPIRIN 81 MG: 81 TABLET, CHEWABLE ORAL at 08:59

## 2025-07-31 RX ADMIN — ATORVASTATIN CALCIUM 80 MG: 80 TABLET, FILM COATED ORAL at 20:12

## 2025-07-31 RX ADMIN — Medication 25 MCG: at 08:59

## 2025-07-31 ASSESSMENT — COGNITIVE AND FUNCTIONAL STATUS - GENERAL
DAILY ACTIVITIY SCORE: 18
TOILETING: A LITTLE
MOBILITY SCORE: 14
TURNING FROM BACK TO SIDE WHILE IN FLAT BAD: A LOT
MOBILITY SCORE: 19
DRESSING REGULAR UPPER BODY CLOTHING: A LITTLE
STANDING UP FROM CHAIR USING ARMS: A LOT
DRESSING REGULAR LOWER BODY CLOTHING: A LITTLE
WALKING IN HOSPITAL ROOM: A LITTLE
CLIMB 3 TO 5 STEPS WITH RAILING: A LITTLE
EATING MEALS: A LITTLE
HELP NEEDED FOR BATHING: A LITTLE
MOVING TO AND FROM BED TO CHAIR: A LITTLE
MOVING FROM LYING ON BACK TO SITTING ON SIDE OF FLAT BED WITH BEDRAILS: A LOT
WALKING IN HOSPITAL ROOM: A LITTLE
MOVING TO AND FROM BED TO CHAIR: A LITTLE
STANDING UP FROM CHAIR USING ARMS: A LITTLE
PERSONAL GROOMING: A LITTLE
TURNING FROM BACK TO SIDE WHILE IN FLAT BAD: A LITTLE
CLIMB 3 TO 5 STEPS WITH RAILING: A LOT

## 2025-07-31 ASSESSMENT — ENCOUNTER SYMPTOMS
FEVER: 0
CHILLS: 0
WEAKNESS: 1
SHORTNESS OF BREATH: 0
ABDOMINAL DISTENTION: 1
FATIGUE: 1
DIZZINESS: 0
CONFUSION: 1
PALPITATIONS: 0
ABDOMINAL PAIN: 0
COUGH: 0

## 2025-07-31 ASSESSMENT — PAIN - FUNCTIONAL ASSESSMENT
PAIN_FUNCTIONAL_ASSESSMENT: 0-10

## 2025-07-31 ASSESSMENT — PAIN SCALES - GENERAL
PAINLEVEL_OUTOF10: 0 - NO PAIN

## 2025-07-31 NOTE — CARE PLAN
Problem: General Stroke  Goal: Maintain BP within ordered limits throughout shift  Outcome: Progressing  Goal: No symptoms of aspiration throughout shift  Outcome: Progressing     Problem: Pain - Adult  Goal: Verbalizes/displays adequate comfort level or baseline comfort level  Outcome: Progressing     Problem: Safety - Adult  Goal: Free from fall injury  Outcome: Progressing     Problem: Discharge Planning  Goal: Discharge to home or other facility with appropriate resources  Outcome: Progressing     Problem: Chronic Conditions and Co-morbidities  Goal: Patient's chronic conditions and co-morbidity symptoms are monitored and maintained or improved  Outcome: Progressing     Problem: Nutrition  Goal: Nutrient intake appropriate for maintaining nutritional needs  Outcome: Progressing   The patient's goals for the shift include  to discharge to rehab facility    The clinical goals for the shift include no decline in neuro status

## 2025-07-31 NOTE — CARE PLAN
Problem: General Stroke  Goal: Participate in treatment (ie., meds, therapy) throughout shift  Outcome: Progressing     Problem: Pain - Adult  Goal: Verbalizes/displays adequate comfort level or baseline comfort level  Outcome: Progressing     Problem: Nutrition  Goal: Nutrient intake appropriate for maintaining nutritional needs  Outcome: Progressing    The clinical goals for the shift include pt will have no neurological deficits during shift

## 2025-07-31 NOTE — CONSULTS
"Inpatient consult to Cardiology  Consult performed by: Tereza Ritchie, APRN-CNP  Consult ordered by: Indio Landaverde DO  Reason for consult: Medication recommendations          History Of Present Illness  Alexis Armijo \"Don\" is a 89 y.o. male presenting with confusion and \"not acting right\" according to his wife. She states he was picking at things that weren't there and saying things that didn't make sense. No focal deficits or weakness noted. He denies any chest pain, shortness of breath, or dizziness.     Lab work in the ER showed glucose 100, sodium 134, potassium 3.8, BUN/creatinine 24/0.81, magnesium 2.1, , troponin negative, INR 1.1, WBC 6.8, H&H 11.7/36, UA negative, CT of the head negative, CTA of the head and neck showed no new large vessel intracranial occlusions.    EKG showed atrial paced.     Past Medical History  Medical History[1]    Surgical History  Surgical History[2]     Social History  He reports that he has never smoked. He has never used smokeless tobacco. He reports that he does not drink alcohol and does not use drugs.    Family History  Family History[3]     Allergies  Patient has no known allergies.    Review of Systems  Review of Systems   Constitutional:  Positive for fatigue. Negative for chills and fever.   Respiratory:  Negative for cough and shortness of breath.    Cardiovascular:  Negative for chest pain, palpitations and leg swelling.   Gastrointestinal:  Positive for abdominal distention. Negative for abdominal pain.   Musculoskeletal:  Negative for gait problem.   Neurological:  Positive for weakness. Negative for dizziness.   Psychiatric/Behavioral:  Positive for confusion.    All other systems reviewed and are negative.         Physical Exam  Constitutional: Well developed, awake/alert/oriented x3, no distress, alert and cooperative  Eyes: PERRL, EOMI, clear sclera  ENMT: mucous membranes moist, no apparent injury, no lesions seen  Head/Neck: Neck supple, no " apparent injury, thyroid without mass or tenderness, No JVD, trachea midline, no bruits  Respiratory/Thorax: Patent airways, CTAB, normal breath sounds with good chest expansion, thorax symmetric  Cardiovascular: irregular rhythm, no murmurs, 2+ equal pulses of the extremities, normal S 1and S 2  Gastrointestinal: Nondistended, soft, non-tender, no rebound tenderness or guarding, no masses palpable, no organomegaly, +BS, no bruits  Musculoskeletal: ROM intact, no joint swelling, normal strength  Extremities: normal extremities, no cyanosis edema, contusions or wounds, no clubbing  Neurological: alert and oriented x3, intact senses, motor, response and reflexes, normal strength  Lymphatic: No significant lymphadenopathy  Psychological: Appropriate mood and behavior  Skin: Warm and dry, no lesions, no rashes       Last Recorded Vitals  Blood pressure 121/81, pulse 63, temperature 37 °C (98.6 °F), temperature source Temporal, resp. rate 18, height 1.829 m (6'), weight 86.6 kg (191 lb), SpO2 94%.    Medications  Scheduled medications  Scheduled Medications[4]  Continuous medications  Continuous Medications[5]  PRN medications  PRN Medications[6]    Relevant Results  Results for orders placed or performed during the hospital encounter of 07/28/25 (from the past 24 hours)   POCT GLUCOSE   Result Value Ref Range    POCT Glucose 119 (H) 74 - 99 mg/dL   POCT GLUCOSE   Result Value Ref Range    POCT Glucose 143 (H) 74 - 99 mg/dL   POCT GLUCOSE   Result Value Ref Range    POCT Glucose 97 74 - 99 mg/dL   POCT GLUCOSE   Result Value Ref Range    POCT Glucose 116 (H) 74 - 99 mg/dL     *Note: Due to a large number of results and/or encounters for the requested time period, some results have not been displayed. A complete set of results can be found in Results Review.       CT head wo IV contrast   Final Result   1.  No evidence of new hemorrhage, CT apparent transcortical infarct,   or other acute intracranial abnormality.   2.  Moderate volume of the attenuation changes are present in the   periventricular and subcortical white matter of bilateral cerebral   hemispheres, likely representing sequela of microvascular disease.   3. Small amount of the new air is present in the soft tissues of the   left  space and left pterygopalatine fossa at the skull   base, likely within the intravascular compartment, without any other   reactive soft tissue changes, possibly from recent IV medication   administration.        MACRO:   None        Signed by: Bambi Puentes 7/30/2025 12:13 PM   Dictation workstation:   [id number]      Transthoracic Echo Complete   Final Result      CT brain attack angio head and neck W and WO IV contrast   Final Result   No new large vessel intracranial occlusion. Findings unchanged   compared to 02/12/2024.        MACRO:   None        Signed by: Jonathon Travis 7/29/2025 12:19 AM   Dictation workstation:   [id number]      CT head wo IV contrast   Final Result   No acute intracranial abnormality.             MACRO:   None.        Signed by: Jonathon Travis 7/28/2025 11:32 PM   Dictation workstation:   [id number]          Transthoracic Echo Complete  Result Date: 7/30/2025   [facility], [address] [place] [address]               Tel [phone] and Fax [phone] TRANSTHORACIC ECHOCARDIOGRAM REPORT  Patient Name:       CECIL Del Rio Physician:    [id number] Johnie Rowe MD Study Date:         7/30/2025           Ordering Provider:    [id number] MIRZA ELIZABETH MRN/PID:            [id number]            Fellow: Accession#:         [id number]        Nurse:                Natalie Golden RN Date of Birth/Age:  1935 / 89      Sonographer:          Christine morales                                      RDCS Gender assigned at  M                   Additional Staff: Birth: Height:             180.34 cm           Admit Date:           7/28/2025 Weight:             89.36 kg            Admission Status:     Inpatient -                                                               Routine BSA / BMI:          2.10 m2 / 27.48     Encounter#:           [id number]                     kg/m2 Blood Pressure:     155/81 mmHg         Department Location:  [facility] Non                                                               Invasive Study Type:    TRANSTHORACIC ECHO (TTE) COMPLETE Diagnosis/ICD: Cerebral Infarction, unspecified-I63.9 Indication:    Cerebrovascular Accident CPT Code:      Echo Complete w Full Doppler-31102 Patient History: Pertinent History: HTN, CAD, Hyperlipidemia and CHF. H/O CABG, PCI, AVR #25mm                    Hoffmann, PAD. Study Detail: The following Echo studies were performed: 2D, M-Mode, Doppler and               color flow. Image quality for this study is suboptimal.               Technically challenging study due to patient lying in supine               position and poor acoustic windows. Definity used as a contrast               agent for endocardial border definition and agitated saline used               as a contrast agent for intraseptal flow evaluation. Total               contrast used for this procedure was 1.5 mL via IV push. The               patient was asleep.  PHYSICIAN INTERPRETATION: Left Ventricle: Left ventricular ejection fraction is normal calculated by Gallo's biplane at 59%. There are no regional left ventricular wall motion abnormalities. The left ventricular cavity size is upper limits of normal. There is normal septal and mildly increased posterior left ventricular wall thickness. There is left ventricular concentric remodeling. Abnormal (paradoxical) septal motion consistent with post-operative status. Spectral Doppler shows a Grade I  (impaired relaxation pattern) of left ventricular diastolic filling with normal left atrial filling pressure. There is no definite left ventricular thrombus visualized. Left Atrium: The left atrium is mildly dilated. There is a mobile interatrial septum. A bubble study using agitated saline was performed. Bubble study is negative. Right Ventricle: The right ventricle is mildly enlarged. There is mildly reduced right ventricular systolic function. A device is visualized in the right ventricle. Right Atrium: The right atrium is moderate to severely dilated. There is a device visualized in the right atrium. Aortic Valve: There is a prosthetic aortic valve present. The aortic valve area by VTI is 1.69 cmï¿½ with a peak velocity of 2.35 m/s. The peak and mean gradients are 22 mmHg and 13 mmHg, respectively with a dimensionless index of 0.54. 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 Echo findings are consistent with normal aortic valve prosthesis structure and function. There is trace aortic valve regurgitation. Valve is not well visualized. Mitral Valve: The mitral valve is mild to moderately thickened. The doppler estimated peak and mean diastolic pressure gradients are 6.5 mmHg and 3 mmHg, respectively. There is evidence of mild mitral valve stenosis. There is moderate mitral annular calcification. The mean gradient of the mitral valve is 3 mmHg. There is trace mitral valve regurgitation. The E Vmax is 0.98 m/s. Tricuspid Valve: The tricuspid valve is structurally normal. There is trace to mild tricuspid regurgitation. Pulmonic Valve: The pulmonic valve is not well visualized. There is physiologic pulmonic valve regurgitation. Pericardium: There is no pericardial effusion noted. Aorta: The aortic root is normal. The aortic root is at the upper limits of normal size. Systemic Veins: The inferior vena cava appears normal in size, IVC inspiratory collapse was not  assessed. In comparison to the previous echocardiogram(s): Compared with study dated 2/13/2024,.  CONCLUSIONS:  1. Left ventricular ejection fraction is normal calculated by Gallo's biplane at 59%.  2. Spectral Doppler shows a Grade I (impaired relaxation pattern) of left ventricular diastolic filling with normal left atrial filling pressure.  3. Abnormal septal motion consistent with post-operative status.  4. No left ventricular thrombus visualized.  5. There is mildly reduced right ventricular systolic function.  6. Mildly enlarged right ventricle.  7. The left atrium is mildly dilated.  8. The right atrium is moderate to severely dilated.  9. There is mild mitral valve stenosis with a doppler estimated mean diastolic gradient of 3 mmHg. 10. There is moderate mitral annular calcification. 11. 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 The peak and mean gradients are 22 mmHg and 13 mmHg respectively. 12. Echo findings are consistent with normal aortic valve prosthesis structure and function. QUANTITATIVE DATA SUMMARY:  2D MEASUREMENTS:          Normal Ranges: IVSd:            0.98 cm  (0.6-1.1cm) LVPWd:           1.07 cm  (0.6-1.1cm) LVIDd:           4.73 cm  (3.9-5.9cm) LVIDs:           3.30 cm LV Mass Index:   82 g/m2 LVEDV Index:     77 ml/m2 LV % FS          30.3 %  LEFT ATRIUM:                  Normal Ranges: LA Vol A4C:        74.3 ml    (22+/-6mL/m2) LA Vol A2C:        76.0 ml LA Vol BP:         76.3 ml LA Vol Index A4C:  35.5ml/m2 LA Vol Index A2C:  36.2 ml/m2 LA Vol Index BP:   36.4 ml/m2 LA Area A4C:       24.2 cm2 LA Area A2C:       24.1 cm2 LA Major Axis A4C: 6.7 cm LA Major Axis A2C: 6.5 cm LA Volume Index:   37.6 ml/m2 LA Vol A4C:        70.6 ml LA Vol A2C:        78.0 ml LA Vol Index BSA:  35.5 ml/m2  RIGHT ATRIUM:          Normal Ranges: RA Area A4C:  29.4 cm2  M-MODE MEASUREMENTS:         Normal Ranges: Ao Root:             3.80 cm (2.0-3.7cm)  LAs:                 4.04 cm (2.7-4.0cm)  AORTA MEASUREMENTS:         Normal Ranges: Ao Sinus, d:        3.90 cm (2.1-3.5cm)  LV SYSTOLIC FUNCTION:                      Normal Ranges: EF-A4C View:    59 % (>=55%) EF-A2C View:    58 % EF-Biplane:     59 % LV EF Reported: 59 %  LV DIASTOLIC FUNCTION:             Normal Ranges: MV Peak E:             0.98 m/s    (0.7-1.2 m/s) MV Peak A:             1.28 m/s    (0.42-0.7 m/s) E/A Ratio:             0.76        (1.0-2.2) MV e'                  0.085 m/s   (>8.0) MV lateral e'          0.10 m/s MV medial e'           0.07 m/s MV A Dur:              193.77 msec E/e' Ratio:            11.47       (<8.0) PulmV Sys Jamie:         33.61 cm/s PulmV Boone Jamie:        25.01 cm/s PulmV S/D Jamie:         1.34 PulmV A Revs Jamie:      14.86 cm/s PulmV A Revs Dur:      117.65 msec  MITRAL VALVE:          Normal Ranges: MV Vmax:      1.27 m/s (<=1.3m/s) MV peak P.5 mmHg (<5mmHg) MV mean P.8 mmHg (<2mmHg) MV VTI:       52.27 cm (10-13cm) MV DT:        328 msec (150-240msec)  AORTIC VALVE:                      Normal Ranges: AoV Vmax:                2.35 m/s  (<=1.7m/s) AoV Peak P.2 mmHg (<20mmHg) AoV Mean P.6 mmHg (1.7-11.5mmHg) LVOT Max Jamie:            1.19 m/s  (<=1.1m/s) AoV VTI:                 48.18 cm  (18-25cm) LVOT VTI:                26.14 cm LVOT Diameter:           1.99 cm   (1.8-2.4cm) AoV Area, VTI:           1.69 cm2  (2.5-5.5cm2) AoV Area,Vmax:           1.57 cm2  (2.5-4.5cm2) AoV Dimensionless Index: 0.54  RIGHT VENTRICLE: RV Basal 4.50 cm RV Mid   2.80 cm RV Major 8.2 cm TAPSE:   15.0 mm RV s'    0.08 m/s  TRICUSPID VALVE/RVSP:          Normal Ranges: Peak TR Velocity:     2.44 m/s  PULMONIC VALVE:          Normal Ranges: PV Max Jamie:     1.0 m/s  (0.6-0.9m/s) PV Max P.2 mmHg  PULMONARY VEINS: PulmV A Revs Dur: 117.65 msec PulmV A Revs Jamie: 14.86 cm/s PulmV Boone Jamie:   25.01 cm/s PulmV S/D Jamie:    1.34 PulmV Sys Jamie:     33.61 cm/s  [id number] Johnie Rowe MD Electronically signed on 7/30/2025 at 2:29:48 PM  ** Final **          Assessment/Plan   TIA vs CVA  -CT head negative  -Unable to do MRI due to PCM  -neurology unavailable  -Echo as above  -PT/OT  -Cont aspirin and eliquis  -Cont statin    2. Paroxysmal atrial fibrillation  -Currently atrial paced with intermittent atrial fibrillation  -Cont eliquis  -Planning for outpt watchman  -PCM interrogated, AF burdern 0.1%    3. Hypertension  -stable  -2gm na diet  -cont meds  -monitor    4. Hx of CAD   -s/p PCI/JENELLE to D1 and mRCA in 2006  -s/p CABG (LIMA-LAD, [id number]) in 2018  -troponin negative  -I reviewed the EKG, no acute changes, ST elevation, or depression  -Cont asa, statin    5. PVD  -s/p bilateral GSV EVLA, s/p RLE revascularization in 2013, atherectomy/PTA/DCB of the left popliteal and peroneal arteries in 2020    6. [employer] PCM in situ  -Personally interrogated, normal functioning, battery life 22 months, PAF noted burden 0.1%    7. S/p AVR  -normal functioning per echo      Tereza Ritchie, APRN-CNP         [1]   Past Medical History:  Diagnosis Date    Age-related nuclear cataract, unspecified eye 05/26/2022    Nuclear sclerosis    Atherosclerosis of native arteries of extremities with intermittent claudication, bilateral legs 01/03/2023    Atherosclerosis of native artery of both lower extremities with intermittent claudication    Atherosclerotic heart disease of native coronary artery without angina pectoris 10/04/2022    CAD (coronary artery disease)    Atrial fibrillation (Multi)     Cellulitis of lower extremity 04/09/2024    Community acquired pneumonia 04/09/2024    Diplopia 02/18/2022    Diplopia    Essential (primary) hypertension 10/04/2022    Benign essential hypertension    Heart disease 2010    Heart valve disease     Hyperlipidemia     Other abnormal glucose     Hemoglobin A1c less than 7.0%    Other intervertebral disc degeneration, lumbar region      Degenerative disc disease, lumbar    Pain in extremity 04/09/2024    Personal history of colonic polyps     History of colonic polyps    Personal history of malignant neoplasm of other organs and systems     History of squamous cell carcinoma    Personal history of other diseases of the circulatory system 04/16/2018    History of hypertension    Personal history of other endocrine, nutritional and metabolic disease 04/16/2018    History of hyperlipidemia    Personal history of other medical treatment     History of stress test    Presence of prosthetic heart valve 01/03/2023    S/P AVR (aortic valve replacement)    Spondylolysis, lumbar region     Lumbar spondylolysis   [2]   Past Surgical History:  Procedure Laterality Date    APPENDECTOMY  01/25/2017    Appendectomy    CARDIAC PACEMAKER PLACEMENT  04/16/2018    Pacemaker Placement    CARDIAC VALVE REPLACEMENT      CATARACT EXTRACTION, BILATERAL      COLONOSCOPY W/ POLYPECTOMY  01/25/2017    Complete Colonoscopy For Polyp Removal    CORONARY ANGIOPLASTY WITH STENT PLACEMENT  01/25/2017    Cath Stent Placement    CT ANGIO AORTA AND BILATERAL ILIOFEMORAL RUN OFF INCLUDING WITHOUT CONTRAST IF PERFORMED  11/23/2019    CT AORTA AND BILATERAL ILIOFEMORAL RUNOFF ANGIOGRAM W AND/OR WO IV CONTRAST 11/23/2019 [facility]    ESOPHAGOGASTRODUODENOSCOPY  01/25/2017    Esophagogastroduodenoscopy With Biopsy    HERNIA REPAIR  01/25/2017    Inguinal Hernia Repair    KNEE ARTHROSCOPY W/ DEBRIDEMENT  01/25/2017    Arthroscopy Knee Left    OTHER SURGICAL HISTORY  02/10/2015    Stent Indications: Acute Myocardial Infarction    OTHER SURGICAL HISTORY  07/20/2015    Blood Vessel Repair Direct Leg    OTHER SURGICAL HISTORY  01/25/2017    Excision Of Lesion Face    TOTAL KNEE ARTHROPLASTY  04/16/2018    Knee Replacement    VEIN SURGERY     [3] No family history on file.  [4] apixaban, 2.5 mg, oral, BID  aspirin, 81 mg, oral, Daily  atorvastatin, 80 mg, oral, Nightly  B  complex-vitamin C, 1 tablet, oral, Daily  cholecalciferol, 25 mcg, oral, Daily  perflutren protein A microsphere, 0.5 mL, intravenous, Once in imaging  psyllium, 1 packet, oral, Daily  sulfur hexafluoride microsphr, 2 mL, intravenous, Once in imaging  valsartan, 80 mg, oral, Daily  [5]    [6] PRN medications: calcium carbonate, oxygen

## 2025-07-31 NOTE — PROGRESS NOTES
"Cecil Armijo \"Ronal\" is a 89 y.o. male on day 2 of admission presenting with Stroke determined by clinical assessment (Multi).      Subjective   Patient seen and examined at bedside today.  Doing well. No issues. Aware of plan. He asked if he could go home then to SNF because he needs to shave. Informed him we have a shaving device here (maybe not as good) and if wanted, he could also ask his wife to bring his in to hospital to use here. He understands. Asked about his clothing for SNF as well.     Objective     Last Recorded Vitals  /86 (BP Location: Right arm, Patient Position: Sitting)   Pulse 63   Temp 37 °C (98.6 °F) (Temporal)   Resp 18   Wt 86.6 kg (191 lb)   SpO2 98%   Intake/Output last 3 Shifts:    Intake/Output Summary (Last 24 hours) at 7/31/2025 1111  Last data filed at 7/31/2025 0700  Gross per 24 hour   Intake 660 ml   Output 1200 ml   Net -540 ml       Admission Weight  Weight: 89.4 kg (197 lb 1.5 oz) (07/28/25 2050)    Daily Weight  07/29/25 : 86.6 kg (191 lb)    Image Results  Transthoracic Echo Complete     [facility], [address] [place] [address]                Tel [phone] and Fax [phone]    TRANSTHORACIC ECHOCARDIOGRAM REPORT       Patient Name:       CECIL ARMIJO     Eliane Physician:    [id number] Johnie Rowe MD  Study Date:         7/30/2025           Ordering Provider:    [id number] MIRZA ELIZABETH  MRN/PID:            [id number]            Fellow:  Accession#:         [id number]        Nurse:                Natalie Golden RN  Date of Birth/Age:  1935 / 89      Sonographer:          Christine morales RDCS  Gender assigned at                     Additional Staff:  Birth:  Height:             180.34 " cm           Admit Date:           7/28/2025  Weight:             89.36 kg            Admission Status:     Inpatient -                                                                Routine  BSA / BMI:          2.10 m2 / 27.48     Encounter#:           [id number]                      kg/m2  Blood Pressure:     155/81 mmHg         Department Location:  [facility]                                                                Invasive    Study Type:    TRANSTHORACIC ECHO (TTE) COMPLETE  Diagnosis/ICD: Cerebral Infarction, unspecified-I63.9  Indication:    Cerebrovascular Accident  CPT Code:      Echo Complete w Full Doppler-[id number]    Patient History:  Pertinent History: HTN, CAD, Hyperlipidemia and CHF. H/O CABG, PCI, AVR #25mm                     Hoffmann, PAD.    Study Detail: The following Echo studies were performed: 2D, M-Mode, Doppler and                color flow. Image quality for this study is suboptimal.                Technically challenging study due to patient lying in supine                position and poor acoustic windows. Definity used as a contrast                agent for endocardial border definition and agitated saline used                as a contrast agent for intraseptal flow evaluation. Total                contrast used for this procedure was 1.5 mL via IV push. The                patient was asleep.       PHYSICIAN INTERPRETATION:  Left Ventricle: Left ventricular ejection fraction is normal calculated by Gallo's biplane at 59%. There are no regional left ventricular wall motion abnormalities. The left ventricular cavity size is upper limits of normal. There is normal septal and mildly increased posterior left ventricular wall thickness. There is left ventricular concentric remodeling. Abnormal (paradoxical) septal motion consistent with post-operative status. Spectral Doppler shows a Grade I (impaired relaxation pattern) of left ventricular diastolic filling with normal left atrial  filling pressure. There is no definite left ventricular thrombus visualized.  Left Atrium: The left atrium is mildly dilated. There is a mobile interatrial septum. A bubble study using agitated saline was performed. Bubble study is negative.  Right Ventricle: The right ventricle is mildly enlarged. There is mildly reduced right ventricular systolic function. A device is visualized in the right ventricle.  Right Atrium: The right atrium is moderate to severely dilated. There is a device visualized in the right atrium.  Aortic Valve: There is a prosthetic aortic valve present. The aortic valve area by VTI is 1.69 cmï¿½ with a peak velocity of 2.35 m/s. The peak and mean gradients are 22 mmHg and 13 mmHg, respectively with a dimensionless index of 0.54. 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 Echo findings are consistent with normal aortic valve prosthesis structure and function. There is trace aortic valve regurgitation. Valve is not well visualized.  Mitral Valve: The mitral valve is mild to moderately thickened. The doppler estimated peak and mean diastolic pressure gradients are 6.5 mmHg and 3 mmHg, respectively. There is evidence of mild mitral valve stenosis. There is moderate mitral annular calcification. The mean gradient of the mitral valve is 3 mmHg. There is trace mitral valve regurgitation. The E Vmax is 0.98 m/s.  Tricuspid Valve: The tricuspid valve is structurally normal. There is trace to mild tricuspid regurgitation.  Pulmonic Valve: The pulmonic valve is not well visualized. There is physiologic pulmonic valve regurgitation.  Pericardium: There is no pericardial effusion noted.  Aorta: The aortic root is normal. The aortic root is at the upper limits of normal size.  Systemic Veins: The inferior vena cava appears normal in size, IVC inspiratory collapse was not assessed.  In comparison to the previous echocardiogram(s): Compared with study dated  2/13/2024,.       CONCLUSIONS:   1. Left ventricular ejection fraction is normal calculated by Gallo's biplane at 59%.   2. Spectral Doppler shows a Grade I (impaired relaxation pattern) of left ventricular diastolic filling with normal left atrial filling pressure.   3. Abnormal septal motion consistent with post-operative status.   4. No left ventricular thrombus visualized.   5. There is mildly reduced right ventricular systolic function.   6. Mildly enlarged right ventricle.   7. The left atrium is mildly dilated.   8. The right atrium is moderate to severely dilated.   9. There is mild mitral valve stenosis with a doppler estimated mean diastolic gradient of 3 mmHg.  10. There is moderate mitral annular calcification.  11. 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 The peak and mean gradients are 22 mmHg and 13 mmHg respectively.  12. Echo findings are consistent with normal aortic valve prosthesis structure and function.    QUANTITATIVE DATA SUMMARY:     2D MEASUREMENTS:          Normal Ranges:  IVSd:            0.98 cm  (0.6-1.1cm)  LVPWd:           1.07 cm  (0.6-1.1cm)  LVIDd:           4.73 cm  (3.9-5.9cm)  LVIDs:           3.30 cm  LV Mass Index:   82 g/m2  LVEDV Index:     77 ml/m2  LV % FS          30.3 %       LEFT ATRIUM:                  Normal Ranges:  LA Vol A4C:        74.3 ml    (22+/-6mL/m2)  LA Vol A2C:        76.0 ml  LA Vol BP:         76.3 ml  LA Vol Index A4C:  35.5ml/m2  LA Vol Index A2C:  36.2 ml/m2  LA Vol Index BP:   36.4 ml/m2  LA Area A4C:       24.2 cm2  LA Area A2C:       24.1 cm2  LA Major Axis A4C: 6.7 cm  LA Major Axis A2C: 6.5 cm  LA Volume Index:   37.6 ml/m2  LA Vol A4C:        70.6 ml  LA Vol A2C:        78.0 ml  LA Vol Index BSA:  35.5 ml/m2       RIGHT ATRIUM:          Normal Ranges:  RA Area A4C:  29.4 cm2       M-MODE MEASUREMENTS:         Normal Ranges:  Ao Root:             3.80 cm (2.0-3.7cm)  LAs:                  4.04 cm (2.7-4.0cm)       AORTA MEASUREMENTS:         Normal Ranges:  Ao Sinus, d:        3.90 cm (2.1-3.5cm)       LV SYSTOLIC FUNCTION:                       Normal Ranges:  EF-A4C View:    59 % (>=55%)  EF-A2C View:    58 %  EF-Biplane:     59 %  LV EF Reported: 59 %       LV DIASTOLIC FUNCTION:             Normal Ranges:  MV Peak E:             0.98 m/s    (0.7-1.2 m/s)  MV Peak A:             1.28 m/s    (0.42-0.7 m/s)  E/A Ratio:             0.76        (1.0-2.2)  MV e'                  0.085 m/s   (>8.0)  MV lateral e'          0.10 m/s  MV medial e'           0.07 m/s  MV A Dur:              193.77 msec  E/e' Ratio:            11.47       (<8.0)  PulmV Sys Jamie:         33.61 cm/s  PulmV Boone Jamie:        25.01 cm/s  PulmV S/D Jamie:         1.34  PulmV A Revs Jamie:      14.86 cm/s  PulmV A Revs Dur:      117.65 msec       MITRAL VALVE:          Normal Ranges:  MV Vmax:      1.27 m/s (<=1.3m/s)  MV peak P.5 mmHg (<5mmHg)  MV mean P.8 mmHg (<2mmHg)  MV VTI:       52.27 cm (10-13cm)  MV DT:        328 msec (150-240msec)       AORTIC VALVE:                      Normal Ranges:  AoV Vmax:                2.35 m/s  (<=1.7m/s)  AoV Peak P.2 mmHg (<20mmHg)  AoV Mean P.6 mmHg (1.7-11.5mmHg)  LVOT Max Jamie:            1.19 m/s  (<=1.1m/s)  AoV VTI:                 48.18 cm  (18-25cm)  LVOT VTI:                26.14 cm  LVOT Diameter:           1.99 cm   (1.8-2.4cm)  AoV Area, VTI:           1.69 cm2  (2.5-5.5cm2)  AoV Area,Vmax:           1.57 cm2  (2.5-4.5cm2)  AoV Dimensionless Index: 0.54       RIGHT VENTRICLE:  RV Basal 4.50 cm  RV Mid   2.80 cm  RV Major 8.2 cm  TAPSE:   15.0 mm  RV s'    0.08 m/s       TRICUSPID VALVE/RVSP:          Normal Ranges:  Peak TR Velocity:     2.44 m/s       PULMONIC VALVE:          Normal Ranges:  PV Max Jamie:     1.0 m/s  (0.6-0.9m/s)  PV Max P.2 mmHg       PULMONARY VEINS:  PulmV A Revs Dur: 117.65 msec  PulmV A Revs Jamie: 14.86  cm/s  PulmV Boone Jamie:   25.01 cm/s  PulmV S/D Jamie:    1.34  PulmV Sys Jamie:    33.61 cm/s       [id number] Johnie Rowe MD  Electronically signed on 7/30/2025 at 2:29:48 PM       ** Final **  CT head wo IV contrast  Narrative: Interpreted By:  Bambi Puentes,   STUDY:  CT HEAD WO IV CONTRAST;  7/30/2025 10:10 am      INDICATION:  Signs/Symptoms:Stroke like symptoms on presentation. Unable to get  MRI so repeat CT to evaluate.          COMPARISON:  CT head dated 07/20/2025.      ACCESSION NUMBER(S):  [id number]      ORDERING CLINICIAN:  MIRZA ELIZABETH      TECHNIQUE:  Noncontrast axial CT scan of head was performed. Angled reformats in  brain and bone windows were generated. The images were reviewed in  bone, brain, blood and soft tissue windows.      FINDINGS:  No hyperdense intracranial hemorrhage is present. There is no new  mass effect or midline shift.      Moderate volume of the attenuation changes are present in the  periventricular and subcortical white matter of bilateral cerebral  hemispheres, likely representing sequela of microvascular disease  without evidence of acute CT apparent transcortical infarct.  Gray-white differentiation is intact.      No abnormal ventricular dilatation is present. Basal cisterns are  patent. No extra-axial collections are identified.      Scalp soft tissues do not demonstrate any acute abnormality. No acute  calvarial abnormality is evident. Mastoid air cells and middle ear  cavities are clear.      There is some subcutaneous emphysema, appearing to be localized  within the intravascular space of the soft tissues in the left   compartment and left pterygopalatine fossa (series 206,  image 82).      Impression: 1.  No evidence of new hemorrhage, CT apparent transcortical infarct,  or other acute intracranial abnormality.  2. Moderate volume of the attenuation changes are present in the  periventricular and subcortical white matter of bilateral  cerebral  hemispheres, likely representing sequela of microvascular disease.  3. Small amount of the new air is present in the soft tissues of the  left  space and left pterygopalatine fossa at the skull  base, likely within the intravascular compartment, without any other  reactive soft tissue changes, possibly from recent IV medication  administration.      MACRO:  None      Signed by: Bambi Puentes 7/30/2025 12:13 PM  Dictation workstation:   [id number]      Physical Exam  Constitutional: Pleasant, Awake/Alert/Oriented to person place and time. No distress  Ear: Hearing aids in  Cardiovascular: Irregular rhythm but rate controlled, S1, S2.   Respiratory: Clear to auscultation bilaterally. No wheezing, rales or rhonchi. Good chest wall expansion  Abdomen: Soft, Nontender, Bowel sounds appreciated  Musculoskeletal: Muscle strength grossly intact upper and lower extremities 5/5.   Extremities: Warm and dry. No acute rashes and lesions  Psychiatric: Appropriate mood and affect       Relevant Results  Scheduled medications  Scheduled Medications[1]  Continuous medications  Continuous Medications[2]  PRN medications  PRN Medications[3]  Results for orders placed or performed during the hospital encounter of 07/28/25 (from the past 24 hours)   POCT GLUCOSE   Result Value Ref Range    POCT Glucose 119 (H) 74 - 99 mg/dL   POCT GLUCOSE   Result Value Ref Range    POCT Glucose 143 (H) 74 - 99 mg/dL   POCT GLUCOSE   Result Value Ref Range    POCT Glucose 97 74 - 99 mg/dL     *Note: Due to a large number of results and/or encounters for the requested time period, some results have not been displayed. A complete set of results can be found in Results Review.     Transthoracic Echo Complete  Result Date: 7/30/2025   [facility], [address] [place] [address]               Tel [phone] and Fax [phone] TRANSTHORACIC ECHOCARDIOGRAM REPORT  Patient Name:       CECIL NOEL DALTON      Reading Physician:    [id number] Johnie Rowe MD Study Date:         7/30/2025           Ordering Provider:    [id number] MIRZA ELIZABETH MRN/PID:            [id number]            Fellow: Accession#:         [id number]        Nurse:                Natalie Golden RN Date of Birth/Age:  1935 / 89      Sonographer:          Christine morales RDCS Gender assigned at  M                   Additional Staff: Birth: Height:             180.34 cm           Admit Date:           7/28/2025 Weight:             89.36 kg            Admission Status:     Inpatient -                                                               Routine BSA / BMI:          2.10 m2 / 27.48     Encounter#:           [id number]                     kg/m2 Blood Pressure:     155/81 mmHg         Department Location:  [facility] Non                                                               Invasive Study Type:    TRANSTHORACIC ECHO (TTE) COMPLETE Diagnosis/ICD: Cerebral Infarction, unspecified-I63.9 Indication:    Cerebrovascular Accident CPT Code:      Echo Complete w Full Doppler-29609 Patient History: Pertinent History: HTN, CAD, Hyperlipidemia and CHF. H/O CABG, PCI, AVR #25mm                    Hoffmann, PAD. Study Detail: The following Echo studies were performed: 2D, M-Mode, Doppler and               color flow. Image quality for this study is suboptimal.               Technically challenging study due to patient lying in supine               position and poor acoustic windows. Definity used as a contrast               agent for endocardial border definition and agitated saline used               as a contrast agent for intraseptal flow evaluation. Total               contrast used for this procedure was 1.5  mL via IV push. The               patient was asleep.  PHYSICIAN INTERPRETATION: Left Ventricle: Left ventricular ejection fraction is normal calculated by Gallo's biplane at 59%. There are no regional left ventricular wall motion abnormalities. The left ventricular cavity size is upper limits of normal. There is normal septal and mildly increased posterior left ventricular wall thickness. There is left ventricular concentric remodeling. Abnormal (paradoxical) septal motion consistent with post-operative status. Spectral Doppler shows a Grade I (impaired relaxation pattern) of left ventricular diastolic filling with normal left atrial filling pressure. There is no definite left ventricular thrombus visualized. Left Atrium: The left atrium is mildly dilated. There is a mobile interatrial septum. A bubble study using agitated saline was performed. Bubble study is negative. Right Ventricle: The right ventricle is mildly enlarged. There is mildly reduced right ventricular systolic function. A device is visualized in the right ventricle. Right Atrium: The right atrium is moderate to severely dilated. There is a device visualized in the right atrium. Aortic Valve: There is a prosthetic aortic valve present. The aortic valve area by VTI is 1.69 cmï¿½ with a peak velocity of 2.35 m/s. The peak and mean gradients are 22 mmHg and 13 mmHg, respectively with a dimensionless index of 0.54. 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 Echo findings are consistent with normal aortic valve prosthesis structure and function. There is trace aortic valve regurgitation. Valve is not well visualized. Mitral Valve: The mitral valve is mild to moderately thickened. The doppler estimated peak and mean diastolic pressure gradients are 6.5 mmHg and 3 mmHg, respectively. There is evidence of mild mitral valve stenosis. There is moderate mitral annular calcification. The mean gradient of the  mitral valve is 3 mmHg. There is trace mitral valve regurgitation. The E Vmax is 0.98 m/s. Tricuspid Valve: The tricuspid valve is structurally normal. There is trace to mild tricuspid regurgitation. Pulmonic Valve: The pulmonic valve is not well visualized. There is physiologic pulmonic valve regurgitation. Pericardium: There is no pericardial effusion noted. Aorta: The aortic root is normal. The aortic root is at the upper limits of normal size. Systemic Veins: The inferior vena cava appears normal in size, IVC inspiratory collapse was not assessed. In comparison to the previous echocardiogram(s): Compared with study dated 2/13/2024,.  CONCLUSIONS:  1. Left ventricular ejection fraction is normal calculated by Gallo's biplane at 59%.  2. Spectral Doppler shows a Grade I (impaired relaxation pattern) of left ventricular diastolic filling with normal left atrial filling pressure.  3. Abnormal septal motion consistent with post-operative status.  4. No left ventricular thrombus visualized.  5. There is mildly reduced right ventricular systolic function.  6. Mildly enlarged right ventricle.  7. The left atrium is mildly dilated.  8. The right atrium is moderate to severely dilated.  9. There is mild mitral valve stenosis with a doppler estimated mean diastolic gradient of 3 mmHg. 10. There is moderate mitral annular calcification. 11. 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 The peak and mean gradients are 22 mmHg and 13 mmHg respectively. 12. Echo findings are consistent with normal aortic valve prosthesis structure and function. QUANTITATIVE DATA SUMMARY:  2D MEASUREMENTS:          Normal Ranges: IVSd:            0.98 cm  (0.6-1.1cm) LVPWd:           1.07 cm  (0.6-1.1cm) LVIDd:           4.73 cm  (3.9-5.9cm) LVIDs:           3.30 cm LV Mass Index:   82 g/m2 LVEDV Index:     77 ml/m2 LV % FS          30.3 %  LEFT ATRIUM:                  Normal Ranges: LA Vol  A4C:        74.3 ml    (22+/-6mL/m2) LA Vol A2C:        76.0 ml LA Vol BP:         76.3 ml LA Vol Index A4C:  35.5ml/m2 LA Vol Index A2C:  36.2 ml/m2 LA Vol Index BP:   36.4 ml/m2 LA Area A4C:       24.2 cm2 LA Area A2C:       24.1 cm2 LA Major Axis A4C: 6.7 cm LA Major Axis A2C: 6.5 cm LA Volume Index:   37.6 ml/m2 LA Vol A4C:        70.6 ml LA Vol A2C:        78.0 ml LA Vol Index BSA:  35.5 ml/m2  RIGHT ATRIUM:          Normal Ranges: RA Area A4C:  29.4 cm2  M-MODE MEASUREMENTS:         Normal Ranges: Ao Root:             3.80 cm (2.0-3.7cm) LAs:                 4.04 cm (2.7-4.0cm)  AORTA MEASUREMENTS:         Normal Ranges: Ao Sinus, d:        3.90 cm (2.1-3.5cm)  LV SYSTOLIC FUNCTION:                      Normal Ranges: EF-A4C View:    59 % (>=55%) EF-A2C View:    58 % EF-Biplane:     59 % LV EF Reported: 59 %  LV DIASTOLIC FUNCTION:             Normal Ranges: MV Peak E:             0.98 m/s    (0.7-1.2 m/s) MV Peak A:             1.28 m/s    (0.42-0.7 m/s) E/A Ratio:             0.76        (1.0-2.2) MV e'                  0.085 m/s   (>8.0) MV lateral e'          0.10 m/s MV medial e'           0.07 m/s MV A Dur:              193.77 msec E/e' Ratio:            11.47       (<8.0) PulmV Sys Jamie:         33.61 cm/s PulmV Boone Jamie:        25.01 cm/s PulmV S/D Jamie:         1.34 PulmV A Revs Jamie:      14.86 cm/s PulmV A Revs Dur:      117.65 msec  MITRAL VALVE:          Normal Ranges: MV Vmax:      1.27 m/s (<=1.3m/s) MV peak P.5 mmHg (<5mmHg) MV mean P.8 mmHg (<2mmHg) MV VTI:       52.27 cm (10-13cm) MV DT:        328 msec (150-240msec)  AORTIC VALVE:                      Normal Ranges: AoV Vmax:                2.35 m/s  (<=1.7m/s) AoV Peak P.2 mmHg (<20mmHg) AoV Mean P.6 mmHg (1.7-11.5mmHg) LVOT Max Jamie:            1.19 m/s  (<=1.1m/s) AoV VTI:                 48.18 cm  (18-25cm) LVOT VTI:                26.14 cm LVOT Diameter:           1.99 cm   (1.8-2.4cm) AoV Area,  VTI:           1.69 cm2  (2.5-5.5cm2) AoV Area,Vmax:           1.57 cm2  (2.5-4.5cm2) AoV Dimensionless Index: 0.54  RIGHT VENTRICLE: RV Basal 4.50 cm RV Mid   2.80 cm RV Major 8.2 cm TAPSE:   15.0 mm RV s'    0.08 m/s  TRICUSPID VALVE/RVSP:          Normal Ranges: Peak TR Velocity:     2.44 m/s  PULMONIC VALVE:          Normal Ranges: PV Max Jamie:     1.0 m/s  (0.6-0.9m/s) PV Max P.2 mmHg  PULMONARY VEINS: PulmV A Revs Dur: 117.65 msec PulmV A Revs Jamie: 14.86 cm/s PulmV Boone Jamie:   25.01 cm/s PulmV S/D Jamie:    1.34 PulmV Sys Jamie:    33.61 cm/s  [id number] Johnie Rowe MD Electronically signed on 2025 at 2:29:48 PM  ** Final **     CT head wo IV contrast  Result Date: 2025  Interpreted By:  Bambi Puentes, STUDY: CT HEAD WO IV CONTRAST;  2025 10:10 am   INDICATION: Signs/Symptoms:Stroke like symptoms on presentation. Unable to get MRI so repeat CT to evaluate.     COMPARISON: CT head dated 2025.   ACCESSION NUMBER(S): [id number]   ORDERING CLINICIAN: MIRZA ELIZABETH   TECHNIQUE: Noncontrast axial CT scan of head was performed. Angled reformats in brain and bone windows were generated. The images were reviewed in bone, brain, blood and soft tissue windows.   FINDINGS: No hyperdense intracranial hemorrhage is present. There is no new mass effect or midline shift.   Moderate volume of the attenuation changes are present in the periventricular and subcortical white matter of bilateral cerebral hemispheres, likely representing sequela of microvascular disease without evidence of acute CT apparent transcortical infarct. Gray-white differentiation is intact.   No abnormal ventricular dilatation is present. Basal cisterns are patent. No extra-axial collections are identified.   Scalp soft tissues do not demonstrate any acute abnormality. No acute calvarial abnormality is evident. Mastoid air cells and middle ear cavities are clear.   There is some subcutaneous emphysema, appearing to be  localized within the intravascular space of the soft tissues in the left  compartment and left pterygopalatine fossa (series 206, image 82).       1.  No evidence of new hemorrhage, CT apparent transcortical infarct, or other acute intracranial abnormality. 2. Moderate volume of the attenuation changes are present in the periventricular and subcortical white matter of bilateral cerebral hemispheres, likely representing sequela of microvascular disease. 3. Small amount of the new air is present in the soft tissues of the left  space and left pterygopalatine fossa at the skull base, likely within the intravascular compartment, without any other reactive soft tissue changes, possibly from recent IV medication administration.   MACRO: None   Signed by: Bambi Puentes 7/30/2025 12:13 PM Dictation workstation:   [id number]    ECG 12 lead  Result Date: 7/29/2025  Atrial-paced rhythm with prolonged AV conduction Minimal voltage criteria for LVH, may be normal variant ( R in aVL ) Abnormal ECG When compared with ECG of 03-FEB-2025 07:36, (unconfirmed) Electronic atrial pacemaker has replaced Electronic ventricular pacemaker    CT brain attack angio head and neck W and WO IV contrast  Result Date: 7/29/2025  Interpreted By:  Jonathon Travis, STUDY: CT BRAIN ATTACK ANGIO HEAD AND NECK W AND WO IV CONTRAST;  7/28/2025 11:58 pm   INDICATION: Signs/Symptoms:aphasia vision changes ams.     COMPARISON: 02/12/2024.   ACCESSION NUMBER(S): [id number]   ORDERING CLINICIAN: FIONA JEFFERS   TECHNIQUE: Unenhanced CT images of the head were obtained. Subsequently, 90 ML of Omnipaque 350 was administered intravenously and axial images of the head and neck were acquired.  Coronal, sagittal, and 3-D reconstructions were provided for review.   FINDINGS:     CTA HEAD FINDINGS:   Anterior circulation: The bilateral intracranial internal carotid arteries, bilateral carotid terminals, bilateral proximal anterior  and middle cerebral arteries are normal.   Posterior circulation: Redemonstrated is unchanged asymmetric dominance of the left vertebral artery. The distal right vertebral artery is thin/thread-like in appearance, unchanged. Bilateral intracranial vertebral arteries, vertebrobasilar junction, basilar artery and proximal posterior cerebral arteries are normal.   CTA NECK FINDINGS:   Right carotid vessels: The common carotid artery is normal. The carotid bifurcation is normal. The internal carotid artery in the neck is normal.   Left carotid vessels: The common carotid artery is normal. Moderate atherosclerotic calcification of the carotid bifurcation. The internal carotid artery in the neck is normal. There is less than 50% stenosis at the origin of the left internal carotid artery   Vertebral vessels:  The visualized segments of the cervical vertebral arteries are normal in caliber.         No new large vessel intracranial occlusion. Findings unchanged compared to 02/12/2024.   MACRO: None   Signed by: Jonathon Travis 7/29/2025 12:19 AM Dictation workstation:   [id number]    CT head wo IV contrast  Result Date: 7/28/2025  Interpreted By:  Jonathon Travis, STUDY: CT HEAD WO IV CONTRAST;  7/28/2025 10:26 pm   INDICATION: Signs/Symptoms:vision changes expressive aphasia.   COMPARISON: None.   ACCESSION NUMBER(S): [id number]   ORDERING CLINICIAN: FIONA JEFFERS   TECHNIQUE: Noncontrast axial CT images of head were obtained with coronal and sagittal reconstructed images.   FINDINGS: BRAIN PARENCHYMA: There is patchy hypoattenuation of the white matter consistent with chronic small-vessel ischemia. No acute intraparenchymal hemorrhage or parenchymal evidence of acute large territory ischemic infarct. No mass-effect. Gray-white matter distinction is preserved.   VENTRICLES and EXTRA-AXIAL SPACES:  No acute extra-axial or intraventricular hemorrhage. No effacement of cerebral sulci. Ventricles and sulci are  age-concordant.   PARANASAL SINUSES/MASTOIDS:  No hemorrhage or air-fluid levels within the visualized paranasal sinuses. The mastoids are well aerated.   CALVARIUM/ORBITS:  No skull fracture. The orbits and globes are intact to the extent visualized.   EXTRACRANIAL SOFT TISSUES: No discernible abnormality.       No acute intracranial abnormality.     MACRO: None.   Signed by: Jonathon Travis 7/28/2025 11:32 PM Dictation workstation:   [id number]          This is a 89yo male with medical history significant for CAD, HTN, Afib on Eliquis, HLD and pacemaker admitted for stroke like symptoms.      Acute Medical Issues:   # Transient Ischemic Attack vs CVA  # Encephalopathy - Resolved  - Unable to MRI due to patient's pacemaker  - Repeat CT: No evidence of new hemorrhage, CT apparent transcortical infarct, or other acute intracranial abnormality. Moderate volume of the attenuation changes are present in the periventricular and subcortical white matter of bilateral cerebral hemispheres, likely representing sequela of microvascular disease.  Small amount of the new air is present in the soft tissues of the left  space and left pterygopalatine fossa at the skull base, likely within the intravascular compartment, without any other reactive soft tissue changes, possibly from recent IV medication administration.  - Neurology apparently is unavailable to see patient here this week as no consultants.  - Spoke to patient and wife at bedside. They are aware cannot get MRI and no neurology available to see. They are okay treating as TIA and with medications recommended and placement to SNF.  - Continue ASA 81mg daily, Atorvastatin 80mg daily, Eliquis 2.5mg BID  - Telemetry can be continued though we already know he has atrial fibrillation  - Echo: LVEF 59%. Impaired relaxation Grade 1 diastolic filling. Abnormal septal motion consistent with post-op status. No LV thrombus. Mildly reduced RV systolic function. Mildly  enlarged RV. LA mildly dilated. RA moderate to severely dilated. Mild MV stenosis. Moderate mitral annular calcification. Hoffmann bioprosthestic type AV bioprosthesis.   - PT/OT: Moderate  - SLP cleared patient   - Cardiology consult to evaluate regarding patient's medication regimen. (ASA or Plavix recommended)     # Poorly controlled HTN - Was holding the med for permissive HTN initially. Continue Valsartan at this time. Can adjust regimen as needed (increase Valsartan) pending repeat vitals today as should get it this morning.      Chronic Major Conditions:   # History of CAD: ASA  # Afib on Eliquis: Eliquis 2.5mg BID  # HLD: Atorvastatin 80mg daily  # Status post pacemaker:     Updated patient's nurse and care coordinator      Disposition: Cardiology to evaluate patient for med regimen but otherwise stable for discharge to SNF. Care coordinator working on this. Voiding trial today.       Indio Landaverde DO           [1] apixaban, 2.5 mg, oral, BID  aspirin, 81 mg, oral, Daily   Or  aspirin, 81 mg, oral, Daily   Or  aspirin, 81 mg, nasogastric tube, Daily   Or  aspirin, 300 mg, rectal, Daily  atorvastatin, 80 mg, oral, Nightly  B complex-vitamin C, 1 tablet, oral, Daily  cholecalciferol, 25 mcg, oral, Daily  perflutren protein A microsphere, 0.5 mL, intravenous, Once in imaging  psyllium, 1 packet, oral, Daily  sulfur hexafluoride microsphr, 2 mL, intravenous, Once in imaging  valsartan, 80 mg, oral, Daily  [2]    [3] PRN medications: calcium carbonate, oxygen

## 2025-07-31 NOTE — PROGRESS NOTES
"Physical Therapy    Physical Therapy Treatment    Patient Name: Alexis Armijo \"Ronal\"  MRN: [id number]  Department: [address]  Room: [address] [place] [address]  Today's Date: 7/31/2025  Time Calculation  Start Time: 0930  Stop Time: 0954  Time Calculation (min): 24 min         Assessment/Plan   PT Assessment  PT Assessment Results: Decreased mobility (deconditioning)  Rehab Prognosis: Good  Barriers to Discharge Home: Caregiver assistance, Physical needs  Caregiver Assistance: Caregiver assistance needed per identified barriers - however, level of patient's required assistance exceeds assistance available at home  Physical Needs: 24hr mobility assistance needed, High falls risk due to function or environment  End of Session Communication: Bedside nurse  Assessment Comment: Continue to recommend Mod intensity skilled PT at [facility] for return to PLOF and safety. Currently a high risk of falls.  End of Session Patient Position: Up in bathroom (spouse outside door, patient requesting \"please let me sit for awhile\" notified ASHLYN Washington out in [place] and jay Nye assistant and both ok with patient up in bathrrom with call light and spouse in room.)     PT Plan  Treatment/Interventions: Bed mobility, Transfer training, Gait training, Strengthening, Therapeutic exercise  PT Plan: Ongoing PT  PT Frequency: 3 times per week (During this acute inpatient hospitalization)  PT Discharge Recommendations: Moderate intensity level of continued care (Based on current functional status and regab potential, patient is anticipaed to tolerate and benefit from 5 or more days per week of skilled rehabilitataive therapy after discharge from this acute inpatient hospitalization.)  Equipment Recommended upon Discharge: Wheeled walker  PT Recommended Transfer Status: Assist x1, Assistive device  PT - OK to Discharge: Yes    PT Visit Info:  PT Received On: 07/31/25     General Visit Information:   General  Reason for Referral: 89 yo male admitted 2' to altered " mental status, difficulty with ambulation  Referred By: Dr. MARGARITA Landaverde  Past Medical History Relevant to Rehab: CAD, PAF (Eliquis), permanent pacemaker, HTN, HLD, aotric valve replacement  Family/Caregiver Present: Yes (spouse)  Caregiver Feedback: able to assist with PLOF history  Prior to Session Communication: Bedside nurse  Patient Position Received: Bed, 3 rail up, Alarm on  General Comment: AXOX3, [place] (bilat hearing aides in), spouse at bedside, Eager to get up.    Subjective   Precautions:  Precautions  Medical Precautions: Fall precautions (yamila Devine, telemetry)     Date/Time Vitals Session Patient Position Pulse Resp SpO2 BP MAP (mmHg)    07/31/25 1116 --  --  --  18  94 %  121/81  94            Objective   Pain:  Pain Assessment  Pain Assessment: 0-10  0-10 (Numeric) Pain Score: 0 - No pain  Cognition:  Cognition  Orientation Level: Oriented X4  Coordination:     Postural Control:  Static Sitting Balance  Static Sitting-Balance Support: Bilateral upper extremity supported, Feet supported  Static Sitting-Level of Assistance: Close supervision  Static Sitting-Comment/Number of Minutes: 12 minutes  Dynamic Sitting Balance  Dynamic Sitting-Balance Support: Feet supported, No upper extremity supported  Dynamic Sitting-Level of Assistance: Close supervision  Dynamic Sitting-Balance: Trunk control activities (bilat LE therex)  Dynamic Sitting-Comments: slight retro leaning, stiffness  Static Standing Balance  Static Standing-Balance Support: Bilateral upper extremity supported (FWW)  Static Standing-Level of Assistance: Minimum assistance, Contact guard  Dynamic Standing Balance  Dynamic Standing-Balance Support: Bilateral upper extremity supported (FWW)  Dynamic Standing-Level of Assistance: Minimum assistance  Dynamic Standing-Balance:  (stance, posture, wt shifting)  Dynamic Standing-Comments: alittle unsteady  Extremity/Trunk Assessments:    Activity Tolerance:  Activity Tolerance  Endurance: Tolerates  10 - 20 min exercise with multiple rests  Treatments:  Therapeutic Exercise  Therapeutic Exercise Performed: Yes  Therapeutic Exercise Activity 1: supine: ankle pumps quad sets, heel slides, sitting: hel/toe raises, long arc quads, marching all x10 reps each for motor control and strengthening                             Bed Mobility  Bed Mobility: Yes  Bed Mobility 1  Bed Mobility 1: Supine to sitting, Scooting  Level of Assistance 1: Moderate assistance, Moderate tactile cues, Minimal verbal cues    Ambulation/Gait Training  Ambulation/Gait Training Performed: Yes  Ambulation/Gait Training 1  Surface 1: Level tile  Device 1: Rolling walker  Assistance 1: Minimum assistance, Contact guard  Quality of Gait 1: Decreased step length, Shuffling gait, Inconsistent stride length, Forward flexed posture (flexed knees)  Comments/Distance (ft) 1: 18ftx1  Transfers  Transfer: Yes  Transfer 1  Transfer From 1: Sit to  Transfer to 1: Stand  Technique 1: Sit to stand, Stand to sit  Transfer Device 1:  (FWW)  Transfer Level of Assistance 1: Moderate assistance, Minimum assistance, Moderate tactile cues, Moderate verbal cues  Trials/Comments 1: x2    Stairs  Stairs: No                     Outcome Measures:  [facility] Basic Mobility  Turning from your back to your side while in a flat bed without using bedrails: A lot  Moving from lying on your back to sitting on the side of a flat bed without using bedrails: A lot  Moving to and from bed to chair (including a wheelchair): A little  Standing up from a chair using your arms (e.g. wheelchair or bedside chair): A lot  To walk in hospital room: A little  Climbing 3-5 steps with railing: A lot  Basic Mobility - Total Score: 14    Education Documentation  Handouts, taught by Nicole Valentino PTA at 7/31/2025 12:09 PM.  Learner: Significant Other, Patient  Readiness: Acceptance  Method: Explanation  Response: Verbalizes Understanding    Body Mechanics, taught by Nicole Valentino PTA at 7/31/2025  12:09 PM.  Learner: Significant Other, Patient  Readiness: Acceptance  Method: Explanation  Response: Verbalizes Understanding    Precautions, taught by Nicole Valentino PTA at 7/31/2025 12:09 PM.  Learner: Significant Other, Patient  Readiness: Acceptance  Method: Explanation  Response: Verbalizes Understanding    Home Exercise Program, taught by Nicole Valentino PTA at 7/31/2025 12:09 PM.  Learner: Significant Other, Patient  Readiness: Acceptance  Method: Explanation  Response: Verbalizes Understanding    ADL Training, taught by Nicole Valentino PTA at 7/31/2025 12:09 PM.  Learner: Significant Other, Patient  Readiness: Acceptance  Method: Explanation  Response: Verbalizes Understanding    Education Comments  No comments found.        OP EDUCATION:       Encounter Problems       Encounter Problems (Active)       Mobility       STG - Patient will ambulate 50 - 80 feet MOD I with FWW (Progressing)       Start:  07/30/25    Expected End:  08/13/25               PT Transfers       STG - Patient to transfer to and from sit to supine independently (Progressing)       Start:  07/30/25    Expected End:  08/13/25            STG - Patient will transfer sit to and from stand MOD I with FWW (Progressing)       Start:  07/30/25    Expected End:  08/13/25

## 2025-08-01 VITALS
HEIGHT: 72 IN | TEMPERATURE: 99 F | BODY MASS INDEX: 25.87 KG/M2 | HEART RATE: 90 BPM | WEIGHT: 191 LBS | OXYGEN SATURATION: 98 % | RESPIRATION RATE: 16 BRPM | SYSTOLIC BLOOD PRESSURE: 141 MMHG | DIASTOLIC BLOOD PRESSURE: 75 MMHG

## 2025-08-01 LAB
GLUCOSE BLD MANUAL STRIP-MCNC: 108 MG/DL (ref 74–99)
GLUCOSE BLD MANUAL STRIP-MCNC: 154 MG/DL (ref 74–99)
GLUCOSE BLD MANUAL STRIP-MCNC: 94 MG/DL (ref 74–99)

## 2025-08-01 PROCEDURE — 99239 HOSP IP/OBS DSCHRG MGMT >30: CPT | Performed by: INTERNAL MEDICINE

## 2025-08-01 PROCEDURE — 2500000001 HC RX 250 WO HCPCS SELF ADMINISTERED DRUGS (ALT 637 FOR MEDICARE OP): Performed by: INTERNAL MEDICINE

## 2025-08-01 PROCEDURE — 2500000002 HC RX 250 W HCPCS SELF ADMINISTERED DRUGS (ALT 637 FOR MEDICARE OP, ALT 636 FOR OP/ED): Performed by: INTERNAL MEDICINE

## 2025-08-01 PROCEDURE — 82947 ASSAY GLUCOSE BLOOD QUANT: CPT

## 2025-08-01 RX ORDER — ATORVASTATIN CALCIUM 80 MG/1
80 TABLET, FILM COATED ORAL NIGHTLY
Qty: 30 TABLET | Refills: 0 | Status: ON HOLD | OUTPATIENT
Start: 2025-08-01

## 2025-08-01 RX ADMIN — Medication 25 MCG: at 08:33

## 2025-08-01 RX ADMIN — Medication 1 TABLET: at 08:33

## 2025-08-01 RX ADMIN — ASPIRIN 81 MG: 81 TABLET, DELAYED RELEASE ORAL at 08:33

## 2025-08-01 RX ADMIN — VALSARTAN 80 MG: 160 TABLET, FILM COATED ORAL at 08:33

## 2025-08-01 RX ADMIN — APIXABAN 2.5 MG: 5 TABLET, FILM COATED ORAL at 08:33

## 2025-08-01 ASSESSMENT — COGNITIVE AND FUNCTIONAL STATUS - GENERAL
EATING MEALS: A LITTLE
CLIMB 3 TO 5 STEPS WITH RAILING: A LITTLE
WALKING IN HOSPITAL ROOM: A LITTLE
STANDING UP FROM CHAIR USING ARMS: A LITTLE
DAILY ACTIVITIY SCORE: 18
TURNING FROM BACK TO SIDE WHILE IN FLAT BAD: A LITTLE
DRESSING REGULAR LOWER BODY CLOTHING: A LITTLE
PERSONAL GROOMING: A LITTLE
MOVING TO AND FROM BED TO CHAIR: A LITTLE
MOBILITY SCORE: 19
TOILETING: A LITTLE
DRESSING REGULAR UPPER BODY CLOTHING: A LITTLE
HELP NEEDED FOR BATHING: A LITTLE

## 2025-08-01 ASSESSMENT — PAIN SCALES - GENERAL: PAINLEVEL_OUTOF10: 0 - NO PAIN

## 2025-08-01 NOTE — DISCHARGE SUMMARY
Discharge Diagnosis  Transient Ischemic Attack  Encephalopathy - Resolved  Poorly controlled Hypertension    Issues Requiring Follow-Up  Referral for neurology placed for followup evaluation from hospital. Please attend once appointment made.  Recommend patient seeing his PCP within 2 weeks from hospital discharge. Please call to schedule this.  If BP still elevated at facility can consider increasing the Valsartan dose.     Discharge Meds     Medication List      START taking these medications     atorvastatin 80 mg tablet; Commonly known as: Lipitor; Take 1 tablet (80   mg) by mouth once daily at bedtime.     CONTINUE taking these medications     apixaban 2.5 mg tablet; Commonly known as: Eliquis   aspirin 81 mg EC tablet; Take 1 tablet (81 mg) by mouth once daily.   b complex vitamins capsule   COLLAGEN, HYDR (BOVINE) (BULK) MISC   lutein-zeaxanthin 25-5 mg capsule   multivitamin tablet   Omega-3 Krill Oil 677-28-98-50 mg capsule; Generic drug:   krill-om-3-dha-epa-phospho-ast   Probiotic 3 billion cell capsule; Generic drug: lactobacillus   combination no.4   TURMERIC ORAL   valsartan 80 mg tablet; Commonly known as: Diovan; Take 1 tablet (80 mg)   by mouth once daily.   Vitamin D3 25 mcg (1,000 units) tablet; Generic drug: cholecalciferol     STOP taking these medications     clopidogrel 75 mg tablet; Commonly known as: Plavix   rosuvastatin 5 mg tablet; Commonly known as: Crestor       Test Results Pending At Discharge  Pending Labs       No current pending labs.            Hospital Course  This is a 89yo male with medical history significant for CAD, HTN, Afib on Eliquis, HLD and pacemaker admitted for stroke like symptoms.      Imaging completed here:  # CT Head wo: No acute intracranial abnormality.  # CTA Head/Neck: No new large vessel intracranial occlusion. Findings unchanged compared to 02/12/2024.  # Repeat CT: No evidence of new hemorrhage, CT apparent transcortical infarct, or other acute  intracranial abnormality. Moderate volume of the attenuation changes are present in the periventricular and subcortical white matter of bilateral cerebral hemispheres, likely representing sequela of microvascular disease.  Small amount of the new air is present in the soft tissues of the left  space and left pterygopalatine fossa at the skull base, likely within the intravascular compartment, without any other reactive soft tissue changes, possibly from recent IV medication administration.  # Echo: LVEF 59%. Impaired relaxation Grade 1 diastolic filling. Abnormal septal motion consistent with post-op status. No LV thrombus. Mildly reduced RV systolic function. Mildly enlarged RV. LA mildly dilated. RA moderate to severely dilated. Mild MV stenosis. Moderate mitral annular calcification. Hoffmann bioprosthestic type AV bioprosthesis.     Patient admitted to hospitalist service.  Symptoms all resolved within 1-2 days from hospital presentation back to his baseline per family.  Unfortunately MRI could not be completed due to pacemaker. Repeat CT was done as above didn't show CVA or hemorrhage.  Neurology consultant was not available as well to evaluate patient in person.  Discussed with family the MRI and lack of neurology consultant and they were agreeable with outpatient followup as symptoms resolved vs trying to transfer patient for neurology.  Changed his statin to Atorvastatin given event.  Cardiology was consulted to assist with medication regimen as there were discrepencies in his chart from his outpatient providers whether he should be on ASA or Plavix given he also is on Eliquis for his known Afib. He apparently told me he was taking all 3 as outpatient. Cardiology evaluated patient and recommended Eliquis and Aspirin (NO PLAVIX).   PT evaluated patient and recommended SNF placement which care transitions helped with.  On 8/1/25, patient was approved for SNF placement and given he is medically clear  being hemodynamically stable, patient will be discharged today.  Pt discharged to SNF today (8/1/25).    Time spent with hospital discharge planning approximately 55 minutes.     Pertinent Physical Exam At Time of Discharge  Physical Exam  Constitutional: Pleasant, Awake/Alert/Oriented to person place and time. No distress  Ear: Hearing aids in  Cardiovascular: Irregular rhythm but rate controlled, S1, S2.   Respiratory: Clear to auscultation bilaterally. No wheezing, rales or rhonchi. Good chest wall expansion  Abdomen: Soft, Nontender, Bowel sounds appreciated  Musculoskeletal: Muscle strength grossly intact upper and lower extremities 5/5.   Psychiatric: Appropriate mood and affect    Outpatient Follow-Up  Future Appointments   Date Time Provider Department Center   8/18/2025  2:00 PM Farrah Moore PA-C [facility]   8/25/2025  1:00 PM JUANIS RAMIRES 1 [facility]   10/3/2025 10:30 AM Jonah Velez DO [id number] [facility]   1/23/2026 11:30 AM Nicole Mar PA-C [id number] [facility]   Referral for neurology placed for followup evaluation from hospital. Please attend once appointment made.  Recommend patient seeing his PCP within 2 weeks from hospital discharge. Please call to schedule this.  If BP still elevated at facility can consider increasing the Valsartan dose.       Indio Landaverde DO

## 2025-08-01 NOTE — PROGRESS NOTES
"Cecil B Aleksander \"Ronal\" is a 89 y.o. male on day 3 of admission presenting with Stroke determined by clinical assessment (Multi).      Subjective   Patient seen and examined at bedside today.  Patient doing well. No complaints or issues.  Aware of plan with myself and nurse at bedside.     Objective     Last Recorded Vitals  /80   Pulse 81   Temp 36.5 °C (97.7 °F)   Resp 16   Wt 86.6 kg (191 lb)   SpO2 96%   Intake/Output last 3 Shifts:    Intake/Output Summary (Last 24 hours) at 8/1/2025 0919  Last data filed at 8/1/2025 0824  Gross per 24 hour   Intake 960 ml   Output 100 ml   Net 860 ml       Admission Weight  Weight: 89.4 kg (197 lb 1.5 oz) (07/28/25 2050)    Daily Weight  07/29/25 : 86.6 kg (191 lb)    Image Results  Transthoracic Echo Complete     [facility], [address] [place] [address]                Tel [phone] and Fax [phone]    TRANSTHORACIC ECHOCARDIOGRAM REPORT       Patient Name:       CECIL NOEL ALEKSANDER     Reading Physician:    [id number] Johnie Rowe MD  Study Date:         7/30/2025           Ordering Provider:    [id number] MIRZA ELIZABETH  MRN/PID:            [id number]            Fellow:  Accession#:         [id number]        Nurse:                Natalie Golden RN  Date of Birth/Age:  1935 / 89      Sonographer:          Christine morales RDCS  Gender assigned at  M                   Additional Staff:  Birth:  Height:             180.34 cm           Admit Date:           7/28/2025  Weight:             89.36 kg            Admission Status:     Inpatient -                                                                Routine  BSA / BMI:          2.10 m2 / 27.48     Encounter#:           [id number]                  "     kg/m2  Blood Pressure:     155/81 mmHg         Department Location:  [facility] Non                                                                Invasive    Study Type:    TRANSTHORACIC ECHO (TTE) COMPLETE  Diagnosis/ICD: Cerebral Infarction, unspecified-I63.9  Indication:    Cerebrovascular Accident  CPT Code:      Echo Complete w Full Doppler-[id number]    Patient History:  Pertinent History: HTN, CAD, Hyperlipidemia and CHF. H/O CABG, PCI, AVR #25mm                     Hoffmann, PAD.    Study Detail: The following Echo studies were performed: 2D, M-Mode, Doppler and                color flow. Image quality for this study is suboptimal.                Technically challenging study due to patient lying in supine                position and poor acoustic windows. Definity used as a contrast                agent for endocardial border definition and agitated saline used                as a contrast agent for intraseptal flow evaluation. Total                contrast used for this procedure was 1.5 mL via IV push. The                patient was asleep.       PHYSICIAN INTERPRETATION:  Left Ventricle: Left ventricular ejection fraction is normal calculated by Gallo's biplane at 59%. There are no regional left ventricular wall motion abnormalities. The left ventricular cavity size is upper limits of normal. There is normal septal and mildly increased posterior left ventricular wall thickness. There is left ventricular concentric remodeling. Abnormal (paradoxical) septal motion consistent with post-operative status. Spectral Doppler shows a Grade I (impaired relaxation pattern) of left ventricular diastolic filling with normal left atrial filling pressure. There is no definite left ventricular thrombus visualized.  Left Atrium: The left atrium is mildly dilated. There is a mobile interatrial septum. A bubble study using agitated saline was performed. Bubble study is negative.  Right Ventricle: The right ventricle is  mildly enlarged. There is mildly reduced right ventricular systolic function. A device is visualized in the right ventricle.  Right Atrium: The right atrium is moderate to severely dilated. There is a device visualized in the right atrium.  Aortic Valve: There is a prosthetic aortic valve present. The aortic valve area by VTI is 1.69 cmï¿½ with a peak velocity of 2.35 m/s. The peak and mean gradients are 22 mmHg and 13 mmHg, respectively with a dimensionless index of 0.54. 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 Echo findings are consistent with normal aortic valve prosthesis structure and function. There is trace aortic valve regurgitation. Valve is not well visualized.  Mitral Valve: The mitral valve is mild to moderately thickened. The doppler estimated peak and mean diastolic pressure gradients are 6.5 mmHg and 3 mmHg, respectively. There is evidence of mild mitral valve stenosis. There is moderate mitral annular calcification. The mean gradient of the mitral valve is 3 mmHg. There is trace mitral valve regurgitation. The E Vmax is 0.98 m/s.  Tricuspid Valve: The tricuspid valve is structurally normal. There is trace to mild tricuspid regurgitation.  Pulmonic Valve: The pulmonic valve is not well visualized. There is physiologic pulmonic valve regurgitation.  Pericardium: There is no pericardial effusion noted.  Aorta: The aortic root is normal. The aortic root is at the upper limits of normal size.  Systemic Veins: The inferior vena cava appears normal in size, IVC inspiratory collapse was not assessed.  In comparison to the previous echocardiogram(s): Compared with study dated 2/13/2024,.       CONCLUSIONS:   1. Left ventricular ejection fraction is normal calculated by Gallo's biplane at 59%.   2. Spectral Doppler shows a Grade I (impaired relaxation pattern) of left ventricular diastolic filling with normal left atrial filling pressure.   3. Abnormal  septal motion consistent with post-operative status.   4. No left ventricular thrombus visualized.   5. There is mildly reduced right ventricular systolic function.   6. Mildly enlarged right ventricle.   7. The left atrium is mildly dilated.   8. The right atrium is moderate to severely dilated.   9. There is mild mitral valve stenosis with a doppler estimated mean diastolic gradient of 3 mmHg.  10. There is moderate mitral annular calcification.  11. 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 The peak and mean gradients are 22 mmHg and 13 mmHg respectively.  12. Echo findings are consistent with normal aortic valve prosthesis structure and function.    QUANTITATIVE DATA SUMMARY:     2D MEASUREMENTS:          Normal Ranges:  IVSd:            0.98 cm  (0.6-1.1cm)  LVPWd:           1.07 cm  (0.6-1.1cm)  LVIDd:           4.73 cm  (3.9-5.9cm)  LVIDs:           3.30 cm  LV Mass Index:   82 g/m2  LVEDV Index:     77 ml/m2  LV % FS          30.3 %       LEFT ATRIUM:                  Normal Ranges:  LA Vol A4C:        74.3 ml    (22+/-6mL/m2)  LA Vol A2C:        76.0 ml  LA Vol BP:         76.3 ml  LA Vol Index A4C:  35.5ml/m2  LA Vol Index A2C:  36.2 ml/m2  LA Vol Index BP:   36.4 ml/m2  LA Area A4C:       24.2 cm2  LA Area A2C:       24.1 cm2  LA Major Axis A4C: 6.7 cm  LA Major Axis A2C: 6.5 cm  LA Volume Index:   37.6 ml/m2  LA Vol A4C:        70.6 ml  LA Vol A2C:        78.0 ml  LA Vol Index BSA:  35.5 ml/m2       RIGHT ATRIUM:          Normal Ranges:  RA Area A4C:  29.4 cm2       M-MODE MEASUREMENTS:         Normal Ranges:  Ao Root:             3.80 cm (2.0-3.7cm)  LAs:                 4.04 cm (2.7-4.0cm)       AORTA MEASUREMENTS:         Normal Ranges:  Ao Sinus, d:        3.90 cm (2.1-3.5cm)       LV SYSTOLIC FUNCTION:                       Normal Ranges:  EF-A4C View:    59 % (>=55%)  EF-A2C View:    58 %  EF-Biplane:     59 %  LV EF Reported: 59 %       LV  DIASTOLIC FUNCTION:             Normal Ranges:  MV Peak E:             0.98 m/s    (0.7-1.2 m/s)  MV Peak A:             1.28 m/s    (0.42-0.7 m/s)  E/A Ratio:             0.76        (1.0-2.2)  MV e'                  0.085 m/s   (>8.0)  MV lateral e'          0.10 m/s  MV medial e'           0.07 m/s  MV A Dur:              193.77 msec  E/e' Ratio:            11.47       (<8.0)  PulmV Sys Jamie:         33.61 cm/s  PulmV Boone Jamie:        25.01 cm/s  PulmV S/D Jamie:         1.34  PulmV A Revs Jamie:      14.86 cm/s  PulmV A Revs Dur:      117.65 msec       MITRAL VALVE:          Normal Ranges:  MV Vmax:      1.27 m/s (<=1.3m/s)  MV peak P.5 mmHg (<5mmHg)  MV mean P.8 mmHg (<2mmHg)  MV VTI:       52.27 cm (10-13cm)  MV DT:        328 msec (150-240msec)       AORTIC VALVE:                      Normal Ranges:  AoV Vmax:                2.35 m/s  (<=1.7m/s)  AoV Peak P.2 mmHg (<20mmHg)  AoV Mean P.6 mmHg (1.7-11.5mmHg)  LVOT Max Jamie:            1.19 m/s  (<=1.1m/s)  AoV VTI:                 48.18 cm  (18-25cm)  LVOT VTI:                26.14 cm  LVOT Diameter:           1.99 cm   (1.8-2.4cm)  AoV Area, VTI:           1.69 cm2  (2.5-5.5cm2)  AoV Area,Vmax:           1.57 cm2  (2.5-4.5cm2)  AoV Dimensionless Index: 0.54       RIGHT VENTRICLE:  RV Basal 4.50 cm  RV Mid   2.80 cm  RV Major 8.2 cm  TAPSE:   15.0 mm  RV s'    0.08 m/s       TRICUSPID VALVE/RVSP:          Normal Ranges:  Peak TR Velocity:     2.44 m/s       PULMONIC VALVE:          Normal Ranges:  PV Max Jamie:     1.0 m/s  (0.6-0.9m/s)  PV Max P.2 mmHg       PULMONARY VEINS:  PulmV A Revs Dur: 117.65 msec  PulmV A Revs Jamie: 14.86 cm/s  PulmV Boone Jamie:   25.01 cm/s  PulmV S/D Jamie:    1.34  PulmV Sys Jamie:    33.61 cm/s       [id number] Johnie Rowe MD  Electronically signed on 2025 at 2:29:48 PM       ** Final **  CT head wo IV contrast  Narrative: Interpreted By:  Bambi Puentes,   STUDY:  CT HEAD WO IV  CONTRAST;  7/30/2025 10:10 am      INDICATION:  Signs/Symptoms:Stroke like symptoms on presentation. Unable to get  MRI so repeat CT to evaluate.          COMPARISON:  CT head dated 07/20/2025.      ACCESSION NUMBER(S):  [id number]      ORDERING CLINICIAN:  MIRZA ELIZABETH      TECHNIQUE:  Noncontrast axial CT scan of head was performed. Angled reformats in  brain and bone windows were generated. The images were reviewed in  bone, brain, blood and soft tissue windows.      FINDINGS:  No hyperdense intracranial hemorrhage is present. There is no new  mass effect or midline shift.      Moderate volume of the attenuation changes are present in the  periventricular and subcortical white matter of bilateral cerebral  hemispheres, likely representing sequela of microvascular disease  without evidence of acute CT apparent transcortical infarct.  Gray-white differentiation is intact.      No abnormal ventricular dilatation is present. Basal cisterns are  patent. No extra-axial collections are identified.      Scalp soft tissues do not demonstrate any acute abnormality. No acute  calvarial abnormality is evident. Mastoid air cells and middle ear  cavities are clear.      There is some subcutaneous emphysema, appearing to be localized  within the intravascular space of the soft tissues in the left   compartment and left pterygopalatine fossa (series 206,  image 82).      Impression: 1.  No evidence of new hemorrhage, CT apparent transcortical infarct,  or other acute intracranial abnormality.  2. Moderate volume of the attenuation changes are present in the  periventricular and subcortical white matter of bilateral cerebral  hemispheres, likely representing sequela of microvascular disease.  3. Small amount of the new air is present in the soft tissues of the  left  space and left pterygopalatine fossa at the skull  base, likely within the intravascular compartment, without any other  reactive soft tissue  changes, possibly from recent IV medication  administration.      MACRO:  None      Signed by: Bambi Puentes 7/30/2025 12:13 PM  Dictation workstation:   [id number]      Physical Exam (no change from yesterday)   Constitutional: Pleasant, Awake/Alert/Oriented to person place and time. No distress  Ear: Hearing aids in  Cardiovascular: Irregular rhythm but rate controlled, S1, S2.   Respiratory: Clear to auscultation bilaterally. No wheezing, rales or rhonchi. Good chest wall expansion  Abdomen: Soft, Nontender, Bowel sounds appreciated  Musculoskeletal: Muscle strength grossly intact upper and lower extremities 5/5.   Psychiatric: Appropriate mood and affect      Relevant Results  Scheduled medications  Scheduled Medications[1]  Continuous medications  Continuous Medications[2]  PRN medications  PRN Medications[3]    Results for orders placed or performed during the hospital encounter of 07/28/25 (from the past 24 hours)   POCT GLUCOSE   Result Value Ref Range    POCT Glucose 116 (H) 74 - 99 mg/dL   POCT GLUCOSE   Result Value Ref Range    POCT Glucose 99 74 - 99 mg/dL   POCT GLUCOSE   Result Value Ref Range    POCT Glucose 118 (H) 74 - 99 mg/dL   POCT GLUCOSE   Result Value Ref Range    POCT Glucose 94 74 - 99 mg/dL     *Note: Due to a large number of results and/or encounters for the requested time period, some results have not been displayed. A complete set of results can be found in Results Review.       Assessment/Plan:   This is a 89yo male with medical history significant for CAD, HTN, Afib on Eliquis, HLD and pacemaker admitted for stroke like symptoms.      Acute Medical Issues:   # Transient Ischemic Attack vs CVA  # Encephalopathy - Resolved  - Unable to MRI due to patient's pacemaker  - Repeat CT: No evidence of new hemorrhage, CT apparent transcortical infarct, or other acute intracranial abnormality. Moderate volume of the attenuation changes are present in the periventricular and subcortical  white matter of bilateral cerebral hemispheres, likely representing sequela of microvascular disease.  Small amount of the new air is present in the soft tissues of the left  space and left pterygopalatine fossa at the skull base, likely within the intravascular compartment, without any other reactive soft tissue changes, possibly from recent IV medication administration.  - Neurology apparently is unavailable to see patient here this week as no consultants.  - Spoke to patient and wife at bedside during admission. They are aware cannot get MRI and no neurology available to see. They are okay treating as TIA and with medications recommended and placement to SNF.  - Continue ASA 81mg daily, Atorvastatin 80mg daily, Eliquis 2.5mg BID  - Telemetry can be continued though we already know he has atrial fibrillation  - Echo: LVEF 59%. Impaired relaxation Grade 1 diastolic filling. Abnormal septal motion consistent with post-op status. No LV thrombus. Mildly reduced RV systolic function. Mildly enlarged RV. LA mildly dilated. RA moderate to severely dilated. Mild MV stenosis. Moderate mitral annular calcification. Hoffmann bioprosthestic type AV bioprosthesis.   - PT/OT: Moderate  - SLP cleared patient   - Cardiology consult: ASA and Eliquis     Resolved/Addressed Issues During Hospitalization:   # Poorly controlled HTN - Was holding the med for permissive HTN initially. Continue Valsartan at this time. Can adjust regimen as needed (increase Valsartan) if Bps elevated though appear reasonable to date     Chronic Major Conditions:   # History of CAD: ASA  # HTN: Valsartan 80mg PO daily  # Afib on Eliquis: Eliquis 2.5mg BID  # HLD: Atorvastatin 80mg daily  # Status post pacemaker:     Updated patient's nurse and care coordinator      Disposition:   Patient doing well. Medically cleared for placement to SNF. Care coordinator aware and working on this. Discharge once placement confirmed.     Indio Landaverde DO            [1] apixaban, 2.5 mg, oral, BID  aspirin, 81 mg, oral, Daily  atorvastatin, 80 mg, oral, Nightly  B complex-vitamin C, 1 tablet, oral, Daily  cholecalciferol, 25 mcg, oral, Daily  perflutren protein A microsphere, 0.5 mL, intravenous, Once in imaging  psyllium, 1 packet, oral, Daily  sulfur hexafluoride microsphr, 2 mL, intravenous, Once in imaging  valsartan, 80 mg, oral, Daily  [2]    [3] PRN medications: calcium carbonate, oxygen

## 2025-08-01 NOTE — CARE PLAN
The patient's goals for the shift include  remain comfortable    The clinical goals for the shift include no new neuro symptoms        Problem: General Stroke  Goal: Demonstrate improvement in neurological exam throughout the shift  Outcome: Progressing     Problem: General Stroke  Goal: Maintain BP within ordered limits throughout shift  Outcome: Progressing     Problem: Pain - Adult  Goal: Verbalizes/displays adequate comfort level or baseline comfort level  Outcome: Progressing

## 2025-08-01 NOTE — PROGRESS NOTES
08/01/25 1237   Discharge Planning   Living Arrangements Spouse/significant other;Other (Comment)  ((Patient and spouse live at [facility] in a duplex.))   Support Systems Spouse/significant other   Assistance Needed Alert and oriented x 4, Normally independent with ADL's, Drives, Cane, Walker, Built in shower seat, Grab bars, Room air at baseline and currenlty, No CPAP/ Bipap, PCP-Dr. Jonah Velez, DO   Type of Residence Private residence   Number of Stairs to Enter Residence 0   Number of Stairs Within Residence 0   Do you have animals or pets at home? No   Who is requesting discharge planning? Provider   Home or Post Acute Services Post acute facilities (Rehab/SNF/etc)   Type of Post Acute Facility Services Skilled nursing   Expected Discharge Disposition SNF  (Plan is for the patient to discharge today to [place] for skilled rehab, transport confirmed for 4:15pm, patient and spouse are aware and agreeable.)   Does the patient need discharge transport arranged? Yes   [facility] coordination needed? Yes   Has discharge transport been arranged? No   Patient Choice   Provider Choice list and CMS website (https://medicare.gov/care-compare#search) for post-acute Quality and Resource Measure Data were provided and reviewed with: Patient;Family   Patient / Family choosing to utilize agency / facility established prior to hospitalization No   Intensity of Service   Intensity of Service 0-30 min

## 2025-08-04 ENCOUNTER — TELEPHONE (OUTPATIENT)
Dept: CARDIOLOGY | Facility: HOSPITAL | Age: 89
End: 2025-08-04
Payer: MEDICARE

## 2025-08-04 ENCOUNTER — NURSING HOME VISIT (OUTPATIENT)
Dept: POST ACUTE CARE | Facility: EXTERNAL LOCATION | Age: 89
End: 2025-08-04
Payer: MEDICARE

## 2025-08-04 ENCOUNTER — DOCUMENTATION (OUTPATIENT)
Dept: PRIMARY CARE | Facility: CLINIC | Age: 89
End: 2025-08-04
Payer: MEDICARE

## 2025-08-04 DIAGNOSIS — G45.9 TIA (TRANSIENT ISCHEMIC ATTACK): ICD-10-CM

## 2025-08-04 DIAGNOSIS — I48.19 ATRIAL FIBRILLATION, PERSISTENT (MULTI): ICD-10-CM

## 2025-08-04 DIAGNOSIS — I10 BENIGN ESSENTIAL HYPERTENSION: Primary | ICD-10-CM

## 2025-08-04 DIAGNOSIS — I25.10 CORONARY ARTERY DISEASE INVOLVING NATIVE HEART WITHOUT ANGINA PECTORIS, UNSPECIFIED VESSEL OR LESION TYPE: ICD-10-CM

## 2025-08-04 PROCEDURE — 99305 1ST NF CARE MODERATE MDM 35: CPT | Performed by: FAMILY MEDICINE

## 2025-08-04 NOTE — PROGRESS NOTES
Covering CCM today  Pt dc to [facility] [place] per the notes.   Sent message to CCM to follow for dc.

## 2025-08-04 NOTE — TELEPHONE ENCOUNTER
Patient called, regarding follow up from Dr Johnston office consult, pt also had hospitalization. Rn coordinator regarding plan of care for [employer] implant, left ,message to call back

## 2025-08-05 ENCOUNTER — ANESTHESIA (OUTPATIENT)
Dept: OPERATING ROOM | Facility: HOSPITAL | Age: 89
End: 2025-08-05
Payer: MEDICARE

## 2025-08-05 ENCOUNTER — ANESTHESIA EVENT (OUTPATIENT)
Dept: OPERATING ROOM | Facility: HOSPITAL | Age: 89
End: 2025-08-05
Payer: MEDICARE

## 2025-08-05 ENCOUNTER — APPOINTMENT (OUTPATIENT)
Dept: RADIOLOGY | Facility: HOSPITAL | Age: 89
DRG: 335 | End: 2025-08-05
Payer: MEDICARE

## 2025-08-05 ENCOUNTER — APPOINTMENT (OUTPATIENT)
Dept: CARDIOLOGY | Facility: HOSPITAL | Age: 89
DRG: 335 | End: 2025-08-05
Payer: MEDICARE

## 2025-08-05 ENCOUNTER — TELEPHONE (OUTPATIENT)
Dept: CARDIOLOGY | Facility: HOSPITAL | Age: 89
End: 2025-08-05
Payer: MEDICARE

## 2025-08-05 ENCOUNTER — HOSPITAL ENCOUNTER (INPATIENT)
Facility: HOSPITAL | Age: 89
End: 2025-08-05
Attending: EMERGENCY MEDICINE | Admitting: SURGERY
Payer: MEDICARE

## 2025-08-05 DIAGNOSIS — I50.30 DIASTOLIC CONGESTIVE HEART FAILURE, UNSPECIFIED HF CHRONICITY: ICD-10-CM

## 2025-08-05 DIAGNOSIS — R11.2 NAUSEA AND VOMITING, UNSPECIFIED VOMITING TYPE: ICD-10-CM

## 2025-08-05 DIAGNOSIS — K56.609 SMALL BOWEL OBSTRUCTION (MULTI): Primary | ICD-10-CM

## 2025-08-05 DIAGNOSIS — R19.7 DIARRHEA, UNSPECIFIED TYPE: ICD-10-CM

## 2025-08-05 DIAGNOSIS — R10.84 GENERALIZED ABDOMINAL PAIN: ICD-10-CM

## 2025-08-05 PROBLEM — N18.9 CHRONIC RENAL INSUFFICIENCY: Status: ACTIVE | Noted: 2025-08-05

## 2025-08-05 PROBLEM — Z79.01 ANTICOAGULANT LONG-TERM USE: Status: ACTIVE | Noted: 2025-08-05

## 2025-08-05 PROBLEM — Z95.5 STENTED CORONARY ARTERY: Status: ACTIVE | Noted: 2025-08-05

## 2025-08-05 LAB
ABO GROUP (TYPE) IN BLOOD: NORMAL
ABO GROUP (TYPE) IN BLOOD: NORMAL
ALBUMIN SERPL BCP-MCNC: 3.7 G/DL (ref 3.4–5)
ALP SERPL-CCNC: 50 U/L (ref 33–136)
ALT SERPL W P-5'-P-CCNC: 52 U/L (ref 10–52)
ANION GAP SERPL CALC-SCNC: 13 MMOL/L (ref 10–20)
ANTIBODY SCREEN: NORMAL
AST SERPL W P-5'-P-CCNC: 33 U/L (ref 9–39)
BASOPHILS # BLD MANUAL: 0 X10*3/UL (ref 0–0.1)
BASOPHILS NFR BLD MANUAL: 0 %
BILIRUB SERPL-MCNC: 1.4 MG/DL (ref 0–1.2)
BUN SERPL-MCNC: 49 MG/DL (ref 6–23)
CALCIUM SERPL-MCNC: 8.7 MG/DL (ref 8.6–10.3)
CARDIAC TROPONIN I PNL SERPL HS: 53 NG/L (ref 0–20)
CARDIAC TROPONIN I PNL SERPL HS: 57 NG/L (ref 0–20)
CHLORIDE SERPL-SCNC: 99 MMOL/L (ref 98–107)
CO2 SERPL-SCNC: 24 MMOL/L (ref 21–32)
CREAT SERPL-MCNC: 1.3 MG/DL (ref 0.5–1.3)
EGFRCR SERPLBLD CKD-EPI 2021: 53 ML/MIN/1.73M*2
EOSINOPHIL # BLD MANUAL: 0 X10*3/UL (ref 0–0.4)
EOSINOPHIL NFR BLD MANUAL: 0 %
ERYTHROCYTE [DISTWIDTH] IN BLOOD BY AUTOMATED COUNT: 14 % (ref 11.5–14.5)
GLUCOSE SERPL-MCNC: 171 MG/DL (ref 74–99)
HCT VFR BLD AUTO: 39.6 % (ref 41–52)
HGB BLD-MCNC: 13.7 G/DL (ref 13.5–17.5)
IMM GRANULOCYTES # BLD AUTO: 0.04 X10*3/UL (ref 0–0.5)
IMM GRANULOCYTES NFR BLD AUTO: 0.6 % (ref 0–0.9)
INR PPP: 1.4 (ref 0.9–1.1)
LACTATE SERPL-SCNC: 1.9 MMOL/L (ref 0.4–2)
LIPASE SERPL-CCNC: 11 U/L (ref 9–82)
LYMPHOCYTES # BLD MANUAL: 1.47 X10*3/UL (ref 0.8–3)
LYMPHOCYTES NFR BLD MANUAL: 22 %
MAGNESIUM SERPL-MCNC: 1.71 MG/DL (ref 1.6–2.4)
MCH RBC QN AUTO: 31.3 PG (ref 26–34)
MCHC RBC AUTO-ENTMCNC: 34.6 G/DL (ref 32–36)
MCV RBC AUTO: 90 FL (ref 80–100)
MONOCYTES # BLD MANUAL: 0.94 X10*3/UL (ref 0.05–0.8)
MONOCYTES NFR BLD MANUAL: 14 %
NEUTROPHILS # BLD MANUAL: 4.29 X10*3/UL (ref 1.6–5.5)
NEUTS BAND # BLD MANUAL: 0.87 X10*3/UL (ref 0–0.5)
NEUTS BAND NFR BLD MANUAL: 13 %
NEUTS SEG # BLD MANUAL: 3.42 X10*3/UL (ref 1.6–5)
NEUTS SEG NFR BLD MANUAL: 51 %
NRBC BLD-RTO: 0 /100 WBCS (ref 0–0)
PHOSPHATE SERPL-MCNC: 2.2 MG/DL (ref 2.5–4.9)
PLATELET # BLD AUTO: 177 X10*3/UL (ref 150–450)
POTASSIUM SERPL-SCNC: 2.8 MMOL/L (ref 3.5–5.3)
PROT SERPL-MCNC: 6.2 G/DL (ref 6.4–8.2)
PROTHROMBIN TIME: 15.4 SECONDS (ref 9.8–12.4)
RBC # BLD AUTO: 4.38 X10*6/UL (ref 4.5–5.9)
RBC MORPH BLD: ABNORMAL
RH FACTOR (ANTIGEN D): NORMAL
RH FACTOR (ANTIGEN D): NORMAL
SODIUM SERPL-SCNC: 133 MMOL/L (ref 136–145)
TOTAL CELLS COUNTED BLD: 100
WBC # BLD AUTO: 6.7 X10*3/UL (ref 4.4–11.3)

## 2025-08-05 PROCEDURE — 74177 CT ABD & PELVIS W/CONTRAST: CPT | Performed by: STUDENT IN AN ORGANIZED HEALTH CARE EDUCATION/TRAINING PROGRAM

## 2025-08-05 PROCEDURE — 83690 ASSAY OF LIPASE: CPT

## 2025-08-05 PROCEDURE — 99140 ANES COMP EMERGENCY COND: CPT | Performed by: STUDENT IN AN ORGANIZED HEALTH CARE EDUCATION/TRAINING PROGRAM

## 2025-08-05 PROCEDURE — 96366 THER/PROPH/DIAG IV INF ADDON: CPT

## 2025-08-05 PROCEDURE — 85027 COMPLETE CBC AUTOMATED: CPT

## 2025-08-05 PROCEDURE — 99223 1ST HOSP IP/OBS HIGH 75: CPT | Performed by: SURGERY

## 2025-08-05 PROCEDURE — 2500000004 HC RX 250 GENERAL PHARMACY W/ HCPCS (ALT 636 FOR OP/ED): Performed by: NURSE ANESTHETIST, CERTIFIED REGISTERED

## 2025-08-05 PROCEDURE — 96375 TX/PRO/DX INJ NEW DRUG ADDON: CPT

## 2025-08-05 PROCEDURE — 3700000001 HC GENERAL ANESTHESIA TIME - INITIAL BASE CHARGE: Performed by: SURGERY

## 2025-08-05 PROCEDURE — 7100000001 HC RECOVERY ROOM TIME - INITIAL BASE CHARGE: Performed by: SURGERY

## 2025-08-05 PROCEDURE — 3600000003 HC OR TIME - INITIAL BASE CHARGE - PROCEDURE LEVEL THREE: Performed by: SURGERY

## 2025-08-05 PROCEDURE — A44005 PR FREEING BOWEL ADHESION,ENTEROLYSIS: Performed by: STUDENT IN AN ORGANIZED HEALTH CARE EDUCATION/TRAINING PROGRAM

## 2025-08-05 PROCEDURE — 83605 ASSAY OF LACTIC ACID: CPT | Performed by: EMERGENCY MEDICINE

## 2025-08-05 PROCEDURE — 93005 ELECTROCARDIOGRAM TRACING: CPT

## 2025-08-05 PROCEDURE — 36415 COLL VENOUS BLD VENIPUNCTURE: CPT

## 2025-08-05 PROCEDURE — 6360000002 HC RX 636 FACTOR: Mod: JZ | Performed by: EMERGENCY MEDICINE

## 2025-08-05 PROCEDURE — 5A0935A ASSISTANCE WITH RESPIRATORY VENTILATION, LESS THAN 24 CONSECUTIVE HOURS, HIGH NASAL FLOW/VELOCITY: ICD-10-PCS | Performed by: INTERNAL MEDICINE

## 2025-08-05 PROCEDURE — 84484 ASSAY OF TROPONIN QUANT: CPT

## 2025-08-05 PROCEDURE — 2550000001 HC RX 255 CONTRASTS: Performed by: EMERGENCY MEDICINE

## 2025-08-05 PROCEDURE — 84100 ASSAY OF PHOSPHORUS: CPT

## 2025-08-05 PROCEDURE — 2500000004 HC RX 250 GENERAL PHARMACY W/ HCPCS (ALT 636 FOR OP/ED): Performed by: SURGERY

## 2025-08-05 PROCEDURE — 80053 COMPREHEN METABOLIC PANEL: CPT

## 2025-08-05 PROCEDURE — 71045 X-RAY EXAM CHEST 1 VIEW: CPT | Performed by: RADIOLOGY

## 2025-08-05 PROCEDURE — 74177 CT ABD & PELVIS W/CONTRAST: CPT

## 2025-08-05 PROCEDURE — 3700000002 HC GENERAL ANESTHESIA TIME - EACH INCREMENTAL 1 MINUTE: Performed by: SURGERY

## 2025-08-05 PROCEDURE — 0DNB0ZZ RELEASE ILEUM, OPEN APPROACH: ICD-10-PCS | Performed by: SURGERY

## 2025-08-05 PROCEDURE — 7100000002 HC RECOVERY ROOM TIME - EACH INCREMENTAL 1 MINUTE: Performed by: SURGERY

## 2025-08-05 PROCEDURE — 85007 BL SMEAR W/DIFF WBC COUNT: CPT

## 2025-08-05 PROCEDURE — 2500000004 HC RX 250 GENERAL PHARMACY W/ HCPCS (ALT 636 FOR OP/ED)

## 2025-08-05 PROCEDURE — 99100 ANES PT EXTEME AGE<1 YR&>70: CPT | Performed by: STUDENT IN AN ORGANIZED HEALTH CARE EDUCATION/TRAINING PROGRAM

## 2025-08-05 PROCEDURE — 85610 PROTHROMBIN TIME: CPT | Performed by: EMERGENCY MEDICINE

## 2025-08-05 PROCEDURE — 99291 CRITICAL CARE FIRST HOUR: CPT | Mod: 25 | Performed by: EMERGENCY MEDICINE

## 2025-08-05 PROCEDURE — 0DNU0ZZ RELEASE OMENTUM, OPEN APPROACH: ICD-10-PCS | Performed by: SURGERY

## 2025-08-05 PROCEDURE — 71045 X-RAY EXAM CHEST 1 VIEW: CPT

## 2025-08-05 PROCEDURE — 86901 BLOOD TYPING SEROLOGIC RH(D): CPT | Performed by: EMERGENCY MEDICINE

## 2025-08-05 PROCEDURE — 2500000005 HC RX 250 GENERAL PHARMACY W/O HCPCS: Performed by: EMERGENCY MEDICINE

## 2025-08-05 PROCEDURE — 83735 ASSAY OF MAGNESIUM: CPT

## 2025-08-05 PROCEDURE — A44005 PR FREEING BOWEL ADHESION,ENTEROLYSIS: Performed by: NURSE ANESTHETIST, CERTIFIED REGISTERED

## 2025-08-05 PROCEDURE — 96365 THER/PROPH/DIAG IV INF INIT: CPT

## 2025-08-05 PROCEDURE — 3600000008 HC OR TIME - EACH INCREMENTAL 1 MINUTE - PROCEDURE LEVEL THREE: Performed by: SURGERY

## 2025-08-05 PROCEDURE — 2720000007 HC OR 272 NO HCPCS: Performed by: SURGERY

## 2025-08-05 PROCEDURE — 44005 FREEING OF BOWEL ADHESION: CPT | Performed by: SURGERY

## 2025-08-05 RX ORDER — SUCCINYLCHOLINE CHLORIDE 20 MG/ML
INJECTION INTRAMUSCULAR; INTRAVENOUS AS NEEDED
Status: DISCONTINUED | OUTPATIENT
Start: 2025-08-05 | End: 2025-08-05

## 2025-08-05 RX ORDER — DEXMEDETOMIDINE IN 0.9 % NACL 20 MCG/5ML
SYRINGE (ML) INTRAVENOUS AS NEEDED
Status: DISCONTINUED | OUTPATIENT
Start: 2025-08-05 | End: 2025-08-05

## 2025-08-05 RX ORDER — FENTANYL CITRATE 50 UG/ML
INJECTION, SOLUTION INTRAMUSCULAR; INTRAVENOUS AS NEEDED
Status: DISCONTINUED | OUTPATIENT
Start: 2025-08-05 | End: 2025-08-05

## 2025-08-05 RX ORDER — PHENYLEPHRINE HCL IN 0.9% NACL 0.4MG/10ML
SYRINGE (ML) INTRAVENOUS AS NEEDED
Status: DISCONTINUED | OUTPATIENT
Start: 2025-08-05 | End: 2025-08-05

## 2025-08-05 RX ORDER — ONDANSETRON HYDROCHLORIDE 2 MG/ML
INJECTION, SOLUTION INTRAVENOUS AS NEEDED
Status: DISCONTINUED | OUTPATIENT
Start: 2025-08-05 | End: 2025-08-05

## 2025-08-05 RX ORDER — SODIUM CHLORIDE, SODIUM LACTATE, POTASSIUM CHLORIDE, CALCIUM CHLORIDE 600; 310; 30; 20 MG/100ML; MG/100ML; MG/100ML; MG/100ML
100 INJECTION, SOLUTION INTRAVENOUS CONTINUOUS
Status: DISCONTINUED | OUTPATIENT
Start: 2025-08-05 | End: 2025-08-06 | Stop reason: HOSPADM

## 2025-08-05 RX ORDER — POTASSIUM CHLORIDE 14.9 MG/ML
20 INJECTION INTRAVENOUS
Status: COMPLETED | OUTPATIENT
Start: 2025-08-05 | End: 2025-08-05

## 2025-08-05 RX ORDER — CEFAZOLIN 1 G/1
INJECTION, POWDER, FOR SOLUTION INTRAVENOUS AS NEEDED
Status: DISCONTINUED | OUTPATIENT
Start: 2025-08-05 | End: 2025-08-05

## 2025-08-05 RX ORDER — ONDANSETRON HYDROCHLORIDE 2 MG/ML
4 INJECTION, SOLUTION INTRAVENOUS EVERY 8 HOURS PRN
Status: DISPENSED | OUTPATIENT
Start: 2025-08-05

## 2025-08-05 RX ORDER — BISACODYL 10 MG/1
10 SUPPOSITORY RECTAL DAILY
Status: DISCONTINUED | OUTPATIENT
Start: 2025-08-06 | End: 2025-08-08

## 2025-08-05 RX ORDER — ONDANSETRON 4 MG/1
4 TABLET, FILM COATED ORAL EVERY 6 HOURS PRN
Status: ON HOLD | COMMUNITY

## 2025-08-05 RX ORDER — POLYETHYLENE GLYCOL 3350 17 G/17G
17 POWDER, FOR SOLUTION ORAL DAILY
Status: DISCONTINUED | OUTPATIENT
Start: 2025-08-06 | End: 2025-08-07

## 2025-08-05 RX ORDER — ONDANSETRON HYDROCHLORIDE 2 MG/ML
4 INJECTION, SOLUTION INTRAVENOUS ONCE AS NEEDED
Status: DISCONTINUED | OUTPATIENT
Start: 2025-08-05 | End: 2025-08-06 | Stop reason: HOSPADM

## 2025-08-05 RX ORDER — ACETAMINOPHEN 325 MG/1
650 TABLET ORAL EVERY 4 HOURS PRN
Status: ON HOLD | COMMUNITY

## 2025-08-05 RX ORDER — METOCLOPRAMIDE HYDROCHLORIDE 5 MG/ML
10 INJECTION INTRAMUSCULAR; INTRAVENOUS ONCE
Status: COMPLETED | OUTPATIENT
Start: 2025-08-05 | End: 2025-08-05

## 2025-08-05 RX ORDER — METRONIDAZOLE 500 MG/100ML
500 INJECTION, SOLUTION INTRAVENOUS ONCE
Status: COMPLETED | OUTPATIENT
Start: 2025-08-05 | End: 2025-08-05

## 2025-08-05 RX ORDER — ROCURONIUM BROMIDE 10 MG/ML
INJECTION, SOLUTION INTRAVENOUS AS NEEDED
Status: DISCONTINUED | OUTPATIENT
Start: 2025-08-05 | End: 2025-08-05

## 2025-08-05 RX ORDER — SODIUM CHLORIDE, SODIUM LACTATE, POTASSIUM CHLORIDE, CALCIUM CHLORIDE 600; 310; 30; 20 MG/100ML; MG/100ML; MG/100ML; MG/100ML
100 INJECTION, SOLUTION INTRAVENOUS CONTINUOUS
Status: DISCONTINUED | OUTPATIENT
Start: 2025-08-05 | End: 2025-08-07

## 2025-08-05 RX ORDER — ONDANSETRON HYDROCHLORIDE 2 MG/ML
4 INJECTION, SOLUTION INTRAVENOUS ONCE
Status: COMPLETED | OUTPATIENT
Start: 2025-08-05 | End: 2025-08-05

## 2025-08-05 RX ORDER — PANTOPRAZOLE SODIUM 40 MG/10ML
40 INJECTION, POWDER, LYOPHILIZED, FOR SOLUTION INTRAVENOUS DAILY
Status: DISPENSED | OUTPATIENT
Start: 2025-08-06

## 2025-08-05 RX ORDER — SODIUM CHLORIDE 9 MG/ML
INJECTION, SOLUTION INTRAVENOUS CONTINUOUS PRN
Status: DISCONTINUED | OUTPATIENT
Start: 2025-08-05 | End: 2025-08-05

## 2025-08-05 RX ORDER — PROPOFOL 10 MG/ML
INJECTION, EMULSION INTRAVENOUS AS NEEDED
Status: DISCONTINUED | OUTPATIENT
Start: 2025-08-05 | End: 2025-08-05

## 2025-08-05 RX ORDER — CEFAZOLIN SODIUM 2 G/100ML
2 INJECTION, SOLUTION INTRAVENOUS ONCE
Status: DISCONTINUED | OUTPATIENT
Start: 2025-08-05 | End: 2025-08-05 | Stop reason: HOSPADM

## 2025-08-05 RX ORDER — POLYETHYLENE GLYCOL 3350 17 G/17G
17 POWDER, FOR SOLUTION ORAL DAILY
Status: ON HOLD | COMMUNITY

## 2025-08-05 RX ORDER — SODIUM CHLORIDE, SODIUM LACTATE, POTASSIUM CHLORIDE, CALCIUM CHLORIDE 600; 310; 30; 20 MG/100ML; MG/100ML; MG/100ML; MG/100ML
INJECTION, SOLUTION INTRAVENOUS CONTINUOUS PRN
Status: DISCONTINUED | OUTPATIENT
Start: 2025-08-05 | End: 2025-08-05

## 2025-08-05 RX ORDER — ONDANSETRON 4 MG/1
4 TABLET, FILM COATED ORAL EVERY 8 HOURS PRN
Status: DISPENSED | OUTPATIENT
Start: 2025-08-05

## 2025-08-05 RX ORDER — POTASSIUM CHLORIDE 20 MEQ/1
40 TABLET, EXTENDED RELEASE ORAL 2 TIMES DAILY
Status: ON HOLD | COMMUNITY
End: 2025-08-06

## 2025-08-05 RX ORDER — ACETAMINOPHEN 325 MG/1
650 TABLET ORAL EVERY 4 HOURS PRN
Status: DISCONTINUED | OUTPATIENT
Start: 2025-08-05 | End: 2025-08-06 | Stop reason: HOSPADM

## 2025-08-05 RX ORDER — LIDOCAINE HYDROCHLORIDE 20 MG/ML
INJECTION, SOLUTION INFILTRATION; PERINEURAL AS NEEDED
Status: DISCONTINUED | OUTPATIENT
Start: 2025-08-05 | End: 2025-08-05

## 2025-08-05 RX ORDER — OXYCODONE HYDROCHLORIDE 5 MG/1
5 TABLET ORAL EVERY 4 HOURS PRN
Status: DISCONTINUED | OUTPATIENT
Start: 2025-08-05 | End: 2025-08-06 | Stop reason: HOSPADM

## 2025-08-05 RX ORDER — NALOXONE HYDROCHLORIDE 0.4 MG/ML
0.2 INJECTION, SOLUTION INTRAMUSCULAR; INTRAVENOUS; SUBCUTANEOUS EVERY 5 MIN PRN
Status: DISCONTINUED | OUTPATIENT
Start: 2025-08-05 | End: 2025-08-09

## 2025-08-05 RX ORDER — ALBUTEROL SULFATE 0.83 MG/ML
2.5 SOLUTION RESPIRATORY (INHALATION) ONCE AS NEEDED
Status: DISCONTINUED | OUTPATIENT
Start: 2025-08-05 | End: 2025-08-06 | Stop reason: HOSPADM

## 2025-08-05 RX ADMIN — Medication 4447 UNITS: at 19:42

## 2025-08-05 RX ADMIN — CEFAZOLIN 2 G: 1 INJECTION, POWDER, FOR SOLUTION INTRAMUSCULAR; INTRAVENOUS at 21:02

## 2025-08-05 RX ADMIN — Medication 40 MCG: at 22:21

## 2025-08-05 RX ADMIN — ONDANSETRON 4 MG: 2 INJECTION, SOLUTION INTRAMUSCULAR; INTRAVENOUS at 16:19

## 2025-08-05 RX ADMIN — FENTANYL CITRATE 100 MCG: 50 INJECTION, SOLUTION INTRAMUSCULAR; INTRAVENOUS at 20:53

## 2025-08-05 RX ADMIN — SUGAMMADEX 200 MG: 100 INJECTION, SOLUTION INTRAVENOUS at 22:52

## 2025-08-05 RX ADMIN — SUCCINYLCHOLINE CHLORIDE 140 MG: 20 INJECTION, SOLUTION INTRAMUSCULAR; INTRAVENOUS at 20:53

## 2025-08-05 RX ADMIN — POTASSIUM CHLORIDE 20 MEQ: 14.9 INJECTION, SOLUTION INTRAVENOUS at 17:30

## 2025-08-05 RX ADMIN — HYDROMORPHONE HYDROCHLORIDE 0.4 MG: 2 INJECTION, SOLUTION INTRAMUSCULAR; INTRAVENOUS; SUBCUTANEOUS at 21:56

## 2025-08-05 RX ADMIN — Medication 40 MCG: at 22:06

## 2025-08-05 RX ADMIN — ROCURONIUM BROMIDE 40 MG: 10 INJECTION, SOLUTION INTRAVENOUS at 20:57

## 2025-08-05 RX ADMIN — ONDANSETRON 4 MG: 2 INJECTION INTRAMUSCULAR; INTRAVENOUS at 21:58

## 2025-08-05 RX ADMIN — SODIUM CHLORIDE: 9 INJECTION, SOLUTION INTRAVENOUS at 20:53

## 2025-08-05 RX ADMIN — METRONIDAZOLE 500 MG: 500 SOLUTION INTRAVENOUS at 21:04

## 2025-08-05 RX ADMIN — DEXAMETHASONE SODIUM PHOSPHATE 4 MG: 4 INJECTION INTRA-ARTICULAR; INTRALESIONAL; INTRAMUSCULAR; INTRAVENOUS; SOFT TISSUE at 21:10

## 2025-08-05 RX ADMIN — SODIUM CHLORIDE, POTASSIUM CHLORIDE, SODIUM LACTATE AND CALCIUM CHLORIDE: 600; 310; 30; 20 INJECTION, SOLUTION INTRAVENOUS at 20:53

## 2025-08-05 RX ADMIN — IOHEXOL 75 ML: 350 INJECTION, SOLUTION INTRAVENOUS at 17:20

## 2025-08-05 RX ADMIN — Medication 8 MCG: at 22:01

## 2025-08-05 RX ADMIN — PROPOFOL 50 MG: 10 INJECTION, EMULSION INTRAVENOUS at 20:53

## 2025-08-05 RX ADMIN — HYDROMORPHONE HYDROCHLORIDE 0.2 MG: 2 INJECTION, SOLUTION INTRAMUSCULAR; INTRAVENOUS; SUBCUTANEOUS at 22:53

## 2025-08-05 RX ADMIN — HYDROMORPHONE HYDROCHLORIDE 0.2 MG: 2 INJECTION, SOLUTION INTRAMUSCULAR; INTRAVENOUS; SUBCUTANEOUS at 23:07

## 2025-08-05 RX ADMIN — POTASSIUM CHLORIDE 20 MEQ: 14.9 INJECTION, SOLUTION INTRAVENOUS at 19:23

## 2025-08-05 RX ADMIN — ROCURONIUM BROMIDE 20 MG: 10 INJECTION, SOLUTION INTRAVENOUS at 21:50

## 2025-08-05 RX ADMIN — LIDOCAINE HYDROCHLORIDE 70 MG: 20 INJECTION, SOLUTION INFILTRATION; PERINEURAL at 20:53

## 2025-08-05 RX ADMIN — ROCURONIUM BROMIDE 10 MG: 10 INJECTION, SOLUTION INTRAVENOUS at 21:56

## 2025-08-05 RX ADMIN — METOCLOPRAMIDE 10 MG: 5 INJECTION, SOLUTION INTRAMUSCULAR; INTRAVENOUS at 16:45

## 2025-08-05 ASSESSMENT — PAIN SCALES - GENERAL
PAINLEVEL_OUTOF10: 0 - NO PAIN
PAINLEVEL_OUTOF10: 6

## 2025-08-05 ASSESSMENT — PAIN SCALES - PAIN ASSESSMENT IN ADVANCED DEMENTIA (PAINAD)
BREATHING: NORMAL
TOTALSCORE: 1
FACIALEXPRESSION: SMILING OR INEXPRESSIVE
FACIALEXPRESSION: SAD, FRIGHTENED, FROWN
CONSOLABILITY: NO NEED TO CONSOLE
TOTALSCORE: 0
BODYLANGUAGE: RELAXED
BREATHING: NORMAL
FACIALEXPRESSION: SMILING OR INEXPRESSIVE
BODYLANGUAGE: RELAXED
CONSOLABILITY: NO NEED TO CONSOLE
BREATHING: NORMAL
BODYLANGUAGE: RELAXED
CONSOLABILITY: NO NEED TO CONSOLE
TOTALSCORE: 0

## 2025-08-05 ASSESSMENT — LIFESTYLE VARIABLES
TOTAL SCORE: 0
HAVE PEOPLE ANNOYED YOU BY CRITICIZING YOUR DRINKING: NO
EVER HAD A DRINK FIRST THING IN THE MORNING TO STEADY YOUR NERVES TO GET RID OF A HANGOVER: NO
HAVE YOU EVER FELT YOU SHOULD CUT DOWN ON YOUR DRINKING: NO
EVER FELT BAD OR GUILTY ABOUT YOUR DRINKING: NO

## 2025-08-05 ASSESSMENT — PAIN - FUNCTIONAL ASSESSMENT
PAIN_FUNCTIONAL_ASSESSMENT: 0-10
PAIN_FUNCTIONAL_ASSESSMENT: PAINAD (PAIN ASSESSMENT IN ADVANCED DEMENTIA SCALE)

## 2025-08-05 NOTE — TELEPHONE ENCOUNTER
Rn coordinator recived returning call. Per spouse pt having some medical issues and remains in a SNF, but they are still interested at some point. Will call back in approx 2 weeks. Spouse verbalized understanding of plan

## 2025-08-05 NOTE — H&P
"General Surgery History and Physical    Referring Provider:  Jonah Velez DO  No ref. provider found     Chief Complaint:  Chief Complaint   Patient presents with    Abdominal Pain     Pt presents to ED via EMS from [facility] for abdominal pain, diarrhea, nausea and vomiting since Friday.  Pt denies shortness of breath or chest pain.        History of Present Illness:  Alexis Armijo \"Ronal\" is a 89 y.o. male who   Presented to ED with worsening nausea/vomiting/diarrhea/general abdominal pain since Friday  Abdomen very bloated  CT showed:   High-grade small bowel obstruction with transition point in the  central mesentery image axial image 91). Proximal to this the bowel  is markedly dilated with perienteric fluid measuring up to 5 cm in  diameter. There is pneumatosis versus pseudo pneumatosis involving  several of the jejunal loops, the former favored. Distal to the  transition point in the small bowel is relatively decompressed with  fluid-filled there is a 2nd transition point in near anatomic  proximity to the 1st transition point and there swirling of the  mesentery in this location. Therefore there is concern for a volvulus  as cause of the bowel obstruction with a close loop mechanism not  excluded.    NGT was placed with almost 1L of feculent fluids     Had history of inguinal hernia repair. But no other abdominal surgery.           Past Medical History:  Medical History[1]     Past Surgical History:  Surgical History[2]     Medications:  Current Outpatient Medications   Medication Instructions    acetaminophen (TYLENOL) 650 mg, oral, Every 4 hours PRN    apixaban (Eliquis) 2.5 mg tablet 1 tablet, 2 times daily    aspirin 81 mg, oral, Daily    atorvastatin (LIPITOR) 80 mg, oral, Nightly    b complex vitamins capsule 1 capsule, oral, Nightly    cholecalciferol (VITAMIN D3) 1,000 Units, oral, Daily    collagen, hydrolysate, bovine, (COLLAGEN, HYDR, BOVINE,, BULK, MISC) 1 Scoop, oral, Daily RT "    krill-om-3-dha-epa-phospho-ast (Omega-3 Krill Oil) 832-85-03-50 mg capsule 1 capsule, oral, Every morning    lactobacillus combination no.4 (Probiotic) 3 billion cell capsule 1 tablet, oral, Daily    lutein-zeaxanthin 25-5 mg capsule 1 capsule, oral, Every morning    multivitamin with minerals (multivitamin with folic acid) tablet 1 tablet, oral, 2 times daily    ondansetron (ZOFRAN) 4 mg, oral, Every 6 hours PRN    polyethylene glycol (GLYCOLAX, MIRALAX) 17 g, oral, Daily    potassium chloride CR 20 mEq ER tablet 40 mEq, oral, 2 times daily, Do not crush or chew.    TURMERIC ORAL 500 mg, oral, Daily    valsartan (DIOVAN) 80 mg, oral, Daily        Allergies:  RX Allergies[3]     Family History:  Family History[4]     Social History:  Social History[5]     Review of Systems:  A complete 12 point review of systems was performed and is negative except as noted in the history of present illness.      Vital Signs:  Vitals:    08/05/25 1827   BP: 113/72   Pulse: 88   Resp: 18   Temp: 36.4 °C (97.5 °F)   SpO2: (!) 92%      Body mass index is 26.18 kg/m².    Physical Exam:  General: No acute distress.  Neuro: Alert and oriented ×3. Follows commands.  Head: Atraumatic  Eyes: Pupils equal reactive to light. Extraocular motions intact.  Ears: Hears normal speaking voice.  Mouth, Nose, Throat: Mucous membranes moist.     Neck: Supple. No visible masses.  Breast: not examined.   Lung: Patent airway. Breathing not labored.   Vascular: Palpable radial pulses bilaterally.  Abdomen: firm very distended. Diffusely tender   Rectal: Not examined.   Genitourinary: Not examined.   Musculoskeletal: Moves all extremities.    Extremities: No cyanosis.   Lymphatic: No palpable lymph nodes.  Skin: No rashes or lesions.  Psychological: Normal affect      Laboratory Values:  CBC:   Lab Results   Component Value Date    WBC 6.7 08/05/2025    RBC 4.38 (L) 08/05/2025    HGB 13.7 08/05/2025    HCT 39.6 (L) 08/05/2025     08/05/2025        RFP:   Lab Results   Component Value Date     (L) 08/05/2025    K 2.8 (LL) 08/05/2025    CL 99 08/05/2025    CO2 24 08/05/2025    BUN 49 (H) 08/05/2025    CREATININE 1.30 08/05/2025    CALCIUM 8.7 08/05/2025    MG 1.71 08/05/2025    PHOS 2.2 (L) 08/05/2025        LFTs:   Lab Results   Component Value Date    PROT 6.2 (L) 08/05/2025    ALBUMIN 3.7 08/05/2025    BILITOT 1.4 (H) 08/05/2025    ALKPHOS 50 08/05/2025    AST 33 08/05/2025    ALT 52 08/05/2025            Imaging:  I have personally reviewed the images and the radiologist's report.  Imaging  XR chest 1 view  Result Date: 8/5/2025  Support hardware as described above.   Cardiomegaly with mild interstitial prominence suggestive of developing interstitial edema/CHF.   Subtle bibasilar and retrocardiac airspace opacities concerning for developing airspace disease. Clinical correlation and attention on continued short-term follow-up is advised.   MACRO: None   Signed by: Corbin Mohr 8/5/2025 6:49 PM Dictation workstation:   [id number]    CT abdomen pelvis w IV contrast  Result Date: 8/5/2025  High-grade small bowel obstruction with transition point in the central mesentery image axial image 91). Proximal to this the bowel is markedly dilated with perienteric fluid measuring up to 5 cm in diameter. There is pneumatosis versus pseudo pneumatosis involving several of the jejunal loops, the former favored. Distal to the transition point in the small bowel is relatively decompressed with fluid-filled there is a 2nd transition point in near anatomic proximity to the 1st transition point and there swirling of the mesentery in this location. Therefore there is concern for a volvulus as cause of the bowel obstruction with a close loop mechanism not excluded. Surgical consultation is recommended.   Actively inflamed urinary bladder wall suspicious for cystitis.     MACRO: Lenard Seo discussed the significance and urgency of this critical finding via  EPIC secure chat with confirmation of receipt with DR SHERLEY ARMSTRONG on 8/5/2025 at 5:56 pm.  (**-RCF-**) Findings:  See findings.   Signed by: Lenard Seo 8/5/2025 5:56 PM Dictation workstation:   [id number]    CT head wo IV contrast  Result Date: 7/30/2025  1.  No evidence of new hemorrhage, CT apparent transcortical infarct, or other acute intracranial abnormality. 2. Moderate volume of the attenuation changes are present in the periventricular and subcortical white matter of bilateral cerebral hemispheres, likely representing sequela of microvascular disease. 3. Small amount of the new air is present in the soft tissues of the left  space and left pterygopalatine fossa at the skull base, likely within the intravascular compartment, without any other reactive soft tissue changes, possibly from recent IV medication administration.   MACRO: None   Signed by: Bambi Puentes 7/30/2025 12:13 PM Dictation workstation:   [id number]      Cardiology, Vascular, and Other Imaging  Transthoracic Echo Complete  Result Date: 7/30/2025   [facility], [address] [place] [address]               Tel [phone] and Fax [phone] TRANSTHORACIC ECHOCARDIOGRAM REPORT  Patient Name:       CECIL Del Rio Physician:    [id number] Johnie Rowe MD Study Date:         7/30/2025           Ordering Provider:    [id number] MIRZA ELIZABETH MRN/PID:            [id number]            Fellow: Accession#:         [id number]        Nurse:                Natalie Golden RN Date of Birth/Age:  1935 / 89      Sonographer:          Christine morales RDCS Gender assigned at  M                   Additional Staff: Birth: Height:             180.34  cm           Admit Date:           7/28/2025 Weight:             89.36 kg            Admission Status:     Inpatient -                                                               Routine BSA / BMI:          2.10 m2 / 27.48     Encounter#:           [id number]                     kg/m2 Blood Pressure:     155/81 mmHg         Department Location:  [facility] Non                                                               Invasive Study Type:    TRANSTHORACIC ECHO (TTE) COMPLETE Diagnosis/ICD: Cerebral Infarction, unspecified-I63.9 Indication:    Cerebrovascular Accident CPT Code:      Echo Complete w Full Doppler-[id number] Patient History: Pertinent History: HTN, CAD, Hyperlipidemia and CHF. H/O CABG, PCI, AVR #25mm                    Hoffmann, PAD. Study Detail: The following Echo studies were performed: 2D, M-Mode, Doppler and               color flow. Image quality for this study is suboptimal.               Technically challenging study due to patient lying in supine               position and poor acoustic windows. Definity used as a contrast               agent for endocardial border definition and agitated saline used               as a contrast agent for intraseptal flow evaluation. Total               contrast used for this procedure was 1.5 mL via IV push. The               patient was asleep.  PHYSICIAN INTERPRETATION: Left Ventricle: Left ventricular ejection fraction is normal calculated by Gallo's biplane at 59%. There are no regional left ventricular wall motion abnormalities. The left ventricular cavity size is upper limits of normal. There is normal septal and mildly increased posterior left ventricular wall thickness. There is left ventricular concentric remodeling. Abnormal (paradoxical) septal motion consistent with post-operative status. Spectral Doppler shows a Grade I (impaired relaxation pattern) of left ventricular diastolic filling with normal left atrial filling pressure. There is no  definite left ventricular thrombus visualized. Left Atrium: The left atrium is mildly dilated. There is a mobile interatrial septum. A bubble study using agitated saline was performed. Bubble study is negative. Right Ventricle: The right ventricle is mildly enlarged. There is mildly reduced right ventricular systolic function. A device is visualized in the right ventricle. Right Atrium: The right atrium is moderate to severely dilated. There is a device visualized in the right atrium. Aortic Valve: There is a prosthetic aortic valve present. The aortic valve area by VTI is 1.69 cmï¿½ with a peak velocity of 2.35 m/s. The peak and mean gradients are 22 mmHg and 13 mmHg, respectively with a dimensionless index of 0.54. 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 Echo findings are consistent with normal aortic valve prosthesis structure and function. There is trace aortic valve regurgitation. Valve is not well visualized. Mitral Valve: The mitral valve is mild to moderately thickened. The doppler estimated peak and mean diastolic pressure gradients are 6.5 mmHg and 3 mmHg, respectively. There is evidence of mild mitral valve stenosis. There is moderate mitral annular calcification. The mean gradient of the mitral valve is 3 mmHg. There is trace mitral valve regurgitation. The E Vmax is 0.98 m/s. Tricuspid Valve: The tricuspid valve is structurally normal. There is trace to mild tricuspid regurgitation. Pulmonic Valve: The pulmonic valve is not well visualized. There is physiologic pulmonic valve regurgitation. Pericardium: There is no pericardial effusion noted. Aorta: The aortic root is normal. The aortic root is at the upper limits of normal size. Systemic Veins: The inferior vena cava appears normal in size, IVC inspiratory collapse was not assessed. In comparison to the previous echocardiogram(s): Compared with study dated 2/13/2024,.  CONCLUSIONS:  1. Left  ventricular ejection fraction is normal calculated by Gallo's biplane at 59%.  2. Spectral Doppler shows a Grade I (impaired relaxation pattern) of left ventricular diastolic filling with normal left atrial filling pressure.  3. Abnormal septal motion consistent with post-operative status.  4. No left ventricular thrombus visualized.  5. There is mildly reduced right ventricular systolic function.  6. Mildly enlarged right ventricle.  7. The left atrium is mildly dilated.  8. The right atrium is moderate to severely dilated.  9. There is mild mitral valve stenosis with a doppler estimated mean diastolic gradient of 3 mmHg. 10. There is moderate mitral annular calcification. 11. 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 The peak and mean gradients are 22 mmHg and 13 mmHg respectively. 12. Echo findings are consistent with normal aortic valve prosthesis structure and function. QUANTITATIVE DATA SUMMARY:  2D MEASUREMENTS:          Normal Ranges: IVSd:            0.98 cm  (0.6-1.1cm) LVPWd:           1.07 cm  (0.6-1.1cm) LVIDd:           4.73 cm  (3.9-5.9cm) LVIDs:           3.30 cm LV Mass Index:   82 g/m2 LVEDV Index:     77 ml/m2 LV % FS          30.3 %  LEFT ATRIUM:                  Normal Ranges: LA Vol A4C:        74.3 ml    (22+/-6mL/m2) LA Vol A2C:        76.0 ml LA Vol BP:         76.3 ml LA Vol Index A4C:  35.5ml/m2 LA Vol Index A2C:  36.2 ml/m2 LA Vol Index BP:   36.4 ml/m2 LA Area A4C:       24.2 cm2 LA Area A2C:       24.1 cm2 LA Major Axis A4C: 6.7 cm LA Major Axis A2C: 6.5 cm LA Volume Index:   37.6 ml/m2 LA Vol A4C:        70.6 ml LA Vol A2C:        78.0 ml LA Vol Index BSA:  35.5 ml/m2  RIGHT ATRIUM:          Normal Ranges: RA Area A4C:  29.4 cm2  M-MODE MEASUREMENTS:         Normal Ranges: Ao Root:             3.80 cm (2.0-3.7cm) LAs:                 4.04 cm (2.7-4.0cm)  AORTA MEASUREMENTS:         Normal Ranges: Ao Sinus, d:        3.90 cm  (2.1-3.5cm)  LV SYSTOLIC FUNCTION:                      Normal Ranges: EF-A4C View:    59 % (>=55%) EF-A2C View:    58 % EF-Biplane:     59 % LV EF Reported: 59 %  LV DIASTOLIC FUNCTION:             Normal Ranges: MV Peak E:             0.98 m/s    (0.7-1.2 m/s) MV Peak A:             1.28 m/s    (0.42-0.7 m/s) E/A Ratio:             0.76        (1.0-2.2) MV e'                  0.085 m/s   (>8.0) MV lateral e'          0.10 m/s MV medial e'           0.07 m/s MV A Dur:              193.77 msec E/e' Ratio:            11.47       (<8.0) PulmV Sys Jamie:         33.61 cm/s PulmV Boone Jamie:        25.01 cm/s PulmV S/D Jamie:         1.34 PulmV A Revs Jamie:      14.86 cm/s PulmV A Revs Dur:      117.65 msec  MITRAL VALVE:          Normal Ranges: MV Vmax:      1.27 m/s (<=1.3m/s) MV peak P.5 mmHg (<5mmHg) MV mean P.8 mmHg (<2mmHg) MV VTI:       52.27 cm (10-13cm) MV DT:        328 msec (150-240msec)  AORTIC VALVE:                      Normal Ranges: AoV Vmax:                2.35 m/s  (<=1.7m/s) AoV Peak P.2 mmHg (<20mmHg) AoV Mean P.6 mmHg (1.7-11.5mmHg) LVOT Max Jamie:            1.19 m/s  (<=1.1m/s) AoV VTI:                 48.18 cm  (18-25cm) LVOT VTI:                26.14 cm LVOT Diameter:           1.99 cm   (1.8-2.4cm) AoV Area, VTI:           1.69 cm2  (2.5-5.5cm2) AoV Area,Vmax:           1.57 cm2  (2.5-4.5cm2) AoV Dimensionless Index: 0.54  RIGHT VENTRICLE: RV Basal 4.50 cm RV Mid   2.80 cm RV Major 8.2 cm TAPSE:   15.0 mm RV s'    0.08 m/s  TRICUSPID VALVE/RVSP:          Normal Ranges: Peak TR Velocity:     2.44 m/s  PULMONIC VALVE:          Normal Ranges: PV Max Jamie:     1.0 m/s  (0.6-0.9m/s) PV Max P.2 mmHg  PULMONARY VEINS: PulmV A Revs Dur: 117.65 msec PulmV A Revs Jamie: 14.86 cm/s PulmV Boone Jamie:   25.01 cm/s PulmV S/D Jamie:    1.34 PulmV Sys Jamie:    33.61 cm/s  [id number] Johnie Rowe MD Electronically signed on 2025 at 2:29:48 PM  ** Final **            Assessment:  This is a 89 y.o. male who has below conditions:  Small bowel obstruction: likely due to volvulus, ? Pneumatosis   Multiple comorbidities Including recent history of TIA, afib on elliquis   Hypokalemia       Plan:  Discussed treatment options and explained patient needs emergent surgery: exploratory laparotomy, possible bowel resection.     Due to his elderly age and comorbidities, patient and family understood he is high risk for surgical, perioperative complications. The risks of surgery include but are not limited to infection, bleeding, intraabdominal injury, possible anastomotic leak which will need major surgery to fix, possible ostomy, possible second look surgery, cardiopulmonary complications, PE/DVT, risks of anesthesia including mortality. He wished to proceed with surgery.     ED to replete TIARRA Corrales is given in ED as well.     Discussed the case with anesthesia.           Wing Lynch MD [facility]  General Surgery  Office: [phone]  Fax:     [phone]  7:31 PM   08/05/25        [1]   Past Medical History:  Diagnosis Date    Age-related nuclear cataract, unspecified eye 05/26/2022    Nuclear sclerosis    Atherosclerosis of native arteries of extremities with intermittent claudication, bilateral legs 01/03/2023    Atherosclerosis of native artery of both lower extremities with intermittent claudication    Atherosclerotic heart disease of native coronary artery without angina pectoris 10/04/2022    CAD (coronary artery disease)    Atrial fibrillation (Multi)     Cellulitis of lower extremity 04/09/2024    Community acquired pneumonia 04/09/2024    Diplopia 02/18/2022    Diplopia    Essential (primary) hypertension 10/04/2022    Benign essential hypertension    Heart disease 2010    Heart valve disease     Hyperlipidemia     Other abnormal glucose     Hemoglobin A1c less than 7.0%    Other intervertebral disc degeneration, lumbar region     Degenerative disc disease, lumbar    Pain  in extremity 04/09/2024    Personal history of colonic polyps     History of colonic polyps    Personal history of malignant neoplasm of other organs and systems     History of squamous cell carcinoma    Personal history of other diseases of the circulatory system 04/16/2018    History of hypertension    Personal history of other endocrine, nutritional and metabolic disease 04/16/2018    History of hyperlipidemia    Personal history of other medical treatment     History of stress test    Presence of prosthetic heart valve 01/03/2023    S/P AVR (aortic valve replacement)    Spondylolysis, lumbar region     Lumbar spondylolysis   [2]   Past Surgical History:  Procedure Laterality Date    APPENDECTOMY  01/25/2017    Appendectomy    CARDIAC PACEMAKER PLACEMENT  04/16/2018    Pacemaker Placement    CARDIAC VALVE REPLACEMENT      CATARACT EXTRACTION, BILATERAL      COLONOSCOPY W/ POLYPECTOMY  01/25/2017    Complete Colonoscopy For Polyp Removal    CORONARY ANGIOPLASTY WITH STENT PLACEMENT  01/25/2017    Cath Stent Placement    CT ANGIO AORTA AND BILATERAL ILIOFEMORAL RUN OFF INCLUDING WITHOUT CONTRAST IF PERFORMED  11/23/2019    CT AORTA AND BILATERAL ILIOFEMORAL RUNOFF ANGIOGRAM W AND/OR WO IV CONTRAST 11/23/2019 [facility]    ESOPHAGOGASTRODUODENOSCOPY  01/25/2017    Esophagogastroduodenoscopy With Biopsy    HERNIA REPAIR  01/25/2017    Inguinal Hernia Repair    KNEE ARTHROSCOPY W/ DEBRIDEMENT  01/25/2017    Arthroscopy Knee Left    OTHER SURGICAL HISTORY  02/10/2015    Stent Indications: Acute Myocardial Infarction    OTHER SURGICAL HISTORY  07/20/2015    Blood Vessel Repair Direct Leg    OTHER SURGICAL HISTORY  01/25/2017    Excision Of Lesion Face    TOTAL KNEE ARTHROPLASTY  04/16/2018    Knee Replacement    VEIN SURGERY     [3]   Allergies  Allergen Reactions    Formaldehyde Unknown    Terbinafine Hcl Unknown   [4] No family history on file.  [5]   Social History  Socioeconomic History    Marital status:     Tobacco Use    Smoking status: Never    Smokeless tobacco: Never   Vaping Use    Vaping status: Never Used   Substance and Sexual Activity    Alcohol use: Never    Drug use: Never    Sexual activity: Not Currently     Partners: Female     Social Drivers of Health     Financial Resource Strain: Low Risk  (7/30/2025)    Overall Financial Resource Strain (CARDIA)     Difficulty of Paying Living Expenses: Not hard at all   Food Insecurity: No Food Insecurity (7/29/2025)    Hunger Vital Sign     Worried About Running Out of Food in the Last Year: Never true     Ran Out of Food in the Last Year: Never true   Transportation Needs: No Transportation Needs (7/30/2025)    PRAPARE - Transportation     Lack of Transportation (Medical): No     Lack of Transportation (Non-Medical): No   Physical Activity: Low Risk  (10/16/2023)    [facility]    PCARE Exercise SDOH     Exercise: Physical Therapy   Intimate Partner Violence: Not At Risk (7/29/2025)    Humiliation, Afraid, Rape, and Kick questionnaire     Fear of Current or Ex-Partner: No     Emotionally Abused: No     Physically Abused: No     Sexually Abused: No   Housing Stability: Low Risk  (7/30/2025)    Housing Stability Vital Sign     Unable to Pay for Housing in the Last Year: No     Number of Times Moved in the Last Year: 0     Homeless in the Last Year: No

## 2025-08-05 NOTE — ED PROCEDURE NOTE
Procedure  Critical Care    Performed by: Jose Alberto Martinez MD  Authorized by: Jose Alberto Martinez MD    Critical care provider statement:     Critical care time (minutes):  35    Critical care time was exclusive of:  Separately billable procedures and treating other patients and teaching time    Critical care was necessary to treat or prevent imminent or life-threatening deterioration of the following conditions:  Other (use comment box to enter another option)    Critical care was time spent personally by me on the following activities:  Development of treatment plan with patient or surrogate, discussions with consultants, evaluation of patient's response to treatment, examination of patient, interpretation of cardiac output measurements, obtaining history from patient or surrogate, ordering and performing treatments and interventions, ordering and review of radiographic studies, ordering and review of laboratory studies, pulse oximetry, re-evaluation of patient's condition and review of old charts  Comments:      Life-threatening surgical pathology               Jose Alberto Martinez MD  08/05/25 1662

## 2025-08-05 NOTE — PROGRESS NOTES
"Pharmacy Medication History Review    Alexis Armijo \"Radha" is a 89 y.o. male admitted for No Principal Problem: There is no principal problem currently on the Problem List. Please update the Problem List and refresh.. Pharmacy reviewed the patient's nyljf-sd-kruyvktha medications and allergies for accuracy.    The list below reflectives the updated PTA list. Please review each medication in order reconciliation for additional clarification and justification.  Prior to Admission Medications   Prescriptions Last Dose Informant Patient Reported? Taking?   TURMERIC ORAL Unknown Other Yes Yes   Sig: Take 500 mg by mouth once daily.   acetaminophen (Tylenol) 325 mg tablet Unknown Other Yes Yes   Sig: Take 2 tablets (650 mg) by mouth every 4 hours if needed for mild pain (1 - 3).   apixaban (Eliquis) 2.5 mg tablet Unknown Other Yes Yes   Sig: Take 1 tablet (2.5 mg) by mouth 2 times a day.   aspirin 81 mg EC tablet Unknown Other Yes Yes   Sig: Take 1 tablet (81 mg) by mouth once daily.   atorvastatin (Lipitor) 80 mg tablet Unknown Other Yes Yes   Sig: Take 1 tablet (80 mg) by mouth once daily at bedtime.   b complex vitamins capsule Unknown Other Yes Yes   Sig: Take 1 capsule by mouth once daily at bedtime.   cholecalciferol (Vitamin D3) 25 MCG (1000 UT) tablet Unknown Other Yes Yes   Sig: Take 1 tablet (1,000 Units) by mouth once daily.   collagen, hydrolysate, bovine, (COLLAGEN, HYDR, BOVINE,, BULK, MISC) Unknown Other Yes Yes   Sig: Take 1 Scoop by mouth once daily.   krill-om-3-dha-epa-phospho-ast (Omega-3 Krill Oil) 261-43-33-50 mg capsule Unknown Other Yes Yes   Sig: Take 1 capsule by mouth once daily in the morning.   lactobacillus combination no.4 (Probiotic) 3 billion cell capsule Unknown Other Yes Yes   Sig: Take 1 capsule by mouth once daily.   lutein-zeaxanthin 25-5 mg capsule Unknown Other Yes Yes   Sig: Take 1 capsule by mouth once daily in the morning.   multivitamin with minerals (multivitamin with folic " acid) tablet Unknown Other Yes Yes   Sig: Take 1 tablet by mouth 2 times a day.   ondansetron (Zofran) 4 mg tablet Unknown Other Yes Yes   Sig: Take 1 tablet (4 mg) by mouth every 6 hours if needed for nausea or vomiting.   polyethylene glycol (Glycolax, Miralax) 17 gram packet Unknown Other Yes Yes   Sig: Take 17 g by mouth once daily.   potassium chloride CR 20 mEq ER tablet Unknown Other Yes Yes   Sig: Take 2 tablets (40 mEq) by mouth 2 times a day. Do not crush or chew.   valsartan (Diovan) 80 mg tablet Unknown Other Yes Yes   Sig: Take 1 tablet (80 mg) by mouth once daily.      Facility-Administered Medications: None          The list below reflectives the updated allergy list. Please review each documented allergy for additional clarification and justification.  Allergies  Reviewed by Maddi Pinedo RN on 8/5/2025   No Known Allergies         Below are additional concerns with the patient's PTA list.      SORAYA BERGER

## 2025-08-05 NOTE — ED PROVIDER NOTES
Emergency Department Provider Note       History of Present Illness     History provided by: Patient  Limitations to History: None  External Records Reviewed with Brief Summary: None    HPI:  Alexis Armijo is a 89 y.o. male presenting with abdominal pain, nausea, vomiting, diarrhea. He states this has been ongoing since Friday, when he was placed in a nursing home for rehabilitation. He endorses these symptoms have been worsening over this time, and feels it is exacerbated by the food he is eating in the nursing home. He states that his stools are loose and watery and that he has approximately 4 BM's per day. He denies blood in the stool or dark/black stools. He states he has avoided eating too much due to vomiting being exacerbated by food. He endorses generalized abdominal pain, and states that his abdomen appears to me larger or more swollen than his baseline. Review of symptoms positive for dizziness where he feels that he is off balance, and mild shortness of breath that he states has been present since Friday. He denies any new or worsening headache or vision changes, fever/sweats/chills, trouble swallowing, chest pain, changes to urinary habits. He endorses continued use of his Eliquis.     He was seen in the ED approximately 1 week ago for AMS, CT's were negative involving the head and neck.  Patient could not have MRI due to pacemaker. Symptoms remitted in approximately 1-2 days, per discharge report, and patient was stable and at baseline when discharged.    Physical Exam   Triage vitals:  T 36.3 °C (97.3 °F)  HR 99  /73  RR 18  O2 94 % None (Room air)    General: Awake, alert, in no acute distress  Eyes: Gaze conjugate.  No scleral icterus or injection  HENT: Normo-cephalic, atraumatic. No stridor  CV: Regular rate, regular rhythm. Radial pulses 2+ bilaterally. Systolic murmur present at right 2nd intercostal space 3/6. History of aortic valve replacement.  Resp: Breathing non-labored,  speaking in full sentences.  Clear to auscultation bilaterally. Poor ventilation.   GI: Soft, diffusely non-tender. No rebound or guarding. Abdomen appears to be distended. Normal bowel sounds in all quadrants x4, no masses or tenderness with palpation in all quadrants x4.  MSK/Extremities: No gross bony deformities. Moving all extremities  Skin: Warm. Appropriate color  Neuro: Alert. Oriented. Face symmetric. Speech is fluent.  Gross strength and sensation intact in b/l UE and LEs  Psych: Appropriate mood and affect      Medical Decision Making & ED Course   Medical Decision Makin y.o. male presenting with nausea, vomiting, abdominal distension, non-reproducible generalized abdominal pain. Will treat with IV zofran for nausea vomiting. ACS and electrolyte dysfunction will be evaluated for using EKG, troponin, CMP, Mg, and Phosphorus. Patient has significant cardiac history and systolic murmur on exam. Concern for pancreatitis due to nausea vomiting and abdominal distention. Concern for AAA due to nonspecific abdominal symptoms in elderly male with significant history involving the heart and HTN. Will further evaluate intra-abdominal processes with CT A/P with contrast and lipase. Abdominal distention concerning for ascites and may suggest liver pathology. PE less likely as patient is on anticoagulative therapy. Patient continued to vomit with IV zofran, will treat with IV reglan. Initial troponin elevated at 57, unlikely this is due to ACS given normal EKG and relatively low troponin. Hypokalemia at 2.8, will begin IV potassium chloride. Phosphate is low, but has not reached the threshold of 2.0 to warrant replacement. Patient has continued to vomit despite reglan and zofran. On repeat examination patient endorses increased shortness of breath, auscultation of the lungs reveals new crackles, concerning for aspiration, will evaluate further with chest X ray. CT report shows high grade bowel obstruction.  Contacted Dr. Wing Lynch for further recommendation of management. Dr. Lynch recommends ex lap with bowel resection tonight, he and Dr. Rico (anesthesiologist) were informed that the patient has agreed with having this procedure done. Patient last took Eliquis and aspirin at 9am, last meal was at approximately 12pm per patient's wife. KCENTRA ordered for Eliquis reversal. Patient will undergo ex lap with bowel resection by Dr. Lynch.  ----    Differential diagnoses considered include but are not limited to: ACS, pancreatitis, AAA, viral gastroenteritis, SBO, cirrhosis    Social Determinants of Health which Significantly Impact Care: Social Determinants of Health which Significantly Impact Care: None identified     EKG Independent Interpretation: EKG interpreted by myself. Please see ED Course for full interpretation.    Independent Result Review and Interpretation: Relevant laboratory and radiographic results were reviewed and independently interpreted by myself.  As necessary, they are commented on in the ED Course.    Chronic conditions affecting the patient's care: As documented above in [facility]    The patient was discussed with the following consultants/services: None    Care Considerations: As documented above in [facility]    ED Course:  ED Course as of 08/05/25 1818   Tue Aug 05, 2025   164 Sinus rhythm rate 92.  No ST segment elevations or depressions.  Nonspecific T wave abnormalities.  Otherwise normal intervals normal axis no prior EKG compare. [SM]   1657 Comprehensive metabolic panel(!!)  All values not listed are wnl [BF]   1657 GLUCOSE(!): 171 [BF]   1657 SODIUM(!): 133 [BF]   1657 POTASSIUM(!!): 2.8 [BF]   1657 Blood Urea Nitrogen(!): 49 [BF]   1657 EGFR(!): 53 [BF]   1657 Total Protein(!): 6.2 [BF]   1657 Bilirubin, Total (!): 1.4 [BF]   1657 MAGNESIUM: 1.71 [BF]   1657 LIPASE: 11 [BF]   1657 PHOSPHORUS(!): 2.2 [BF]   1657 Troponin I, High Sensitivity(!!): 57 [BF]   1738 CBC and Auto Differential(!)  All  other values not listed are wnl [BF]   1738 RBC(!): 4.38 [BF]   1738 HEMATOCRIT(!): 39.6 [BF]   1739 Manual Differential(!)  All values not listed are wnl [BF]   1739 Bands Absolute, Manual(!): 0.87 [BF]   1739 Monocytes Absolute, Manual(!): 0.94 [BF]   1814 CT abdomen pelvis w IV contrast  IMPRESSION:  High-grade small bowel obstruction with transition point in the  central mesentery image axial image 91). Proximal to this the bowel  is markedly dilated with perienteric fluid measuring up to 5 cm in  diameter. There is pneumatosis versus pseudo pneumatosis involving  several of the jejunal loops, the former favored. Distal to the  transition point in the small bowel is relatively decompressed with  fluid-filled there is a 2nd transition point in near anatomic  proximity to the 1st transition point and there swirling of the  mesentery in this location. Therefore there is concern for a volvulus  as cause of the bowel obstruction with a close loop mechanism not  excluded. Surgical consultation is recommended.      Actively inflamed urinary bladder wall suspicious for cystitis.   [BF]      ED Course User Index  [BF] Edgar Young DO  [SM] Jose Alberto Martinez MD         Diagnoses as of 08/05/25 1818   Nausea and vomiting, unspecified vomiting type   Generalized abdominal pain   Diarrhea, unspecified type   Small bowel obstruction (Multi)       Disposition   As a result of their workup, the patient will require admission to the hospital.  The patient was informed of his diagnosis.  The patient was given the opportunity to ask questions and I answered them. The patient agreed to be admitted to the hospital.    Procedures   Procedures    Patient seen and discussed with ED attending physician.    DO Edgar Hope DO  08/05/25 2045

## 2025-08-05 NOTE — ED TRIAGE NOTES
Pt presents to ED via EMS from [facility] for abdominal pain, diarrhea, nausea and vomiting since Friday.  Pt denies shortness of breath or chest pain.

## 2025-08-06 ENCOUNTER — APPOINTMENT (OUTPATIENT)
Dept: RADIOLOGY | Facility: HOSPITAL | Age: 89
DRG: 335 | End: 2025-08-06
Payer: MEDICARE

## 2025-08-06 LAB
ANION GAP SERPL CALC-SCNC: 14 MMOL/L (ref 10–20)
ATRIAL RATE: 97 BPM
BUN SERPL-MCNC: 51 MG/DL (ref 6–23)
CALCIUM SERPL-MCNC: 8.3 MG/DL (ref 8.6–10.3)
CHLORIDE SERPL-SCNC: 100 MMOL/L (ref 98–107)
CO2 SERPL-SCNC: 25 MMOL/L (ref 21–32)
CREAT SERPL-MCNC: 1.34 MG/DL (ref 0.5–1.3)
EGFRCR SERPLBLD CKD-EPI 2021: 51 ML/MIN/1.73M*2
ERYTHROCYTE [DISTWIDTH] IN BLOOD BY AUTOMATED COUNT: 14.6 % (ref 11.5–14.5)
GLUCOSE SERPL-MCNC: 171 MG/DL (ref 74–99)
HCT VFR BLD AUTO: 38.5 % (ref 41–52)
HGB BLD-MCNC: 13.2 G/DL (ref 13.5–17.5)
MAGNESIUM SERPL-MCNC: 1.71 MG/DL (ref 1.6–2.4)
MCH RBC QN AUTO: 31.2 PG (ref 26–34)
MCHC RBC AUTO-ENTMCNC: 34.3 G/DL (ref 32–36)
MCV RBC AUTO: 91 FL (ref 80–100)
NRBC BLD-RTO: 0 /100 WBCS (ref 0–0)
P OFFSET: 185 MS
P ONSET: 133 MS
PLATELET # BLD AUTO: 187 X10*3/UL (ref 150–450)
POTASSIUM SERPL-SCNC: 3.1 MMOL/L (ref 3.5–5.3)
PR INTERVAL: 168 MS
Q ONSET: 217 MS
QRS COUNT: 15 BEATS
QRS DURATION: 100 MS
QT INTERVAL: 314 MS
QTC CALCULATION(BAZETT): 388 MS
QTC FREDERICIA: 362 MS
R AXIS: 0 DEGREES
RBC # BLD AUTO: 4.23 X10*6/UL (ref 4.5–5.9)
SODIUM SERPL-SCNC: 136 MMOL/L (ref 136–145)
T AXIS: 201 DEGREES
T OFFSET: 374 MS
VENTRICULAR RATE: 92 BPM
WBC # BLD AUTO: 10.1 X10*3/UL (ref 4.4–11.3)

## 2025-08-06 PROCEDURE — 2500000005 HC RX 250 GENERAL PHARMACY W/O HCPCS: Performed by: SURGERY

## 2025-08-06 PROCEDURE — 2500000004 HC RX 250 GENERAL PHARMACY W/ HCPCS (ALT 636 FOR OP/ED): Performed by: SURGERY

## 2025-08-06 PROCEDURE — 99223 1ST HOSP IP/OBS HIGH 75: CPT | Performed by: INTERNAL MEDICINE

## 2025-08-06 PROCEDURE — 1200000002 HC GENERAL ROOM WITH TELEMETRY DAILY

## 2025-08-06 PROCEDURE — 36415 COLL VENOUS BLD VENIPUNCTURE: CPT | Performed by: SURGERY

## 2025-08-06 PROCEDURE — 2500000004 HC RX 250 GENERAL PHARMACY W/ HCPCS (ALT 636 FOR OP/ED): Performed by: PHYSICIAN ASSISTANT

## 2025-08-06 PROCEDURE — 2500000001 HC RX 250 WO HCPCS SELF ADMINISTERED DRUGS (ALT 637 FOR MEDICARE OP): Performed by: PHYSICIAN ASSISTANT

## 2025-08-06 PROCEDURE — 2500000001 HC RX 250 WO HCPCS SELF ADMINISTERED DRUGS (ALT 637 FOR MEDICARE OP): Performed by: SURGERY

## 2025-08-06 PROCEDURE — 85027 COMPLETE CBC AUTOMATED: CPT | Performed by: SURGERY

## 2025-08-06 PROCEDURE — 74018 RADEX ABDOMEN 1 VIEW: CPT

## 2025-08-06 PROCEDURE — 80048 BASIC METABOLIC PNL TOTAL CA: CPT | Performed by: SURGERY

## 2025-08-06 PROCEDURE — 83735 ASSAY OF MAGNESIUM: CPT | Performed by: SURGERY

## 2025-08-06 PROCEDURE — 94760 N-INVAS EAR/PLS OXIMETRY 1: CPT

## 2025-08-06 RX ORDER — ORPHENADRINE CITRATE 30 MG/ML
60 INJECTION INTRAMUSCULAR; INTRAVENOUS EVERY 12 HOURS SCHEDULED
Status: DISCONTINUED | OUTPATIENT
Start: 2025-08-06 | End: 2025-08-07

## 2025-08-06 RX ORDER — ALBUTEROL SULFATE 0.83 MG/ML
2.5 SOLUTION RESPIRATORY (INHALATION) EVERY 2 HOUR PRN
Status: ACTIVE | OUTPATIENT
Start: 2025-08-06

## 2025-08-06 RX ORDER — SODIUM CHLORIDE, SODIUM LACTATE, POTASSIUM CHLORIDE, CALCIUM CHLORIDE 600; 310; 30; 20 MG/100ML; MG/100ML; MG/100ML; MG/100ML
100 INJECTION, SOLUTION INTRAVENOUS CONTINUOUS
Status: DISCONTINUED | OUTPATIENT
Start: 2025-08-07 | End: 2025-08-07

## 2025-08-06 RX ORDER — ALBUTEROL SULFATE 0.83 MG/ML
2.5 SOLUTION RESPIRATORY (INHALATION) 3 TIMES DAILY
Status: DISCONTINUED | OUTPATIENT
Start: 2025-08-06 | End: 2025-08-06

## 2025-08-06 RX ORDER — ASPIRIN 81 MG/1
81 TABLET ORAL DAILY
Status: DISPENSED | OUTPATIENT
Start: 2025-08-06

## 2025-08-06 RX ORDER — POTASSIUM CHLORIDE 14.9 MG/ML
20 INJECTION INTRAVENOUS
Status: COMPLETED | OUTPATIENT
Start: 2025-08-06 | End: 2025-08-06

## 2025-08-06 RX ORDER — ACETAMINOPHEN 10 MG/ML
1000 INJECTION, SOLUTION INTRAVENOUS EVERY 6 HOURS
Status: COMPLETED | OUTPATIENT
Start: 2025-08-06 | End: 2025-08-07

## 2025-08-06 RX ORDER — ALBUTEROL SULFATE 0.83 MG/ML
2.5 SOLUTION RESPIRATORY (INHALATION) 4 TIMES DAILY
Status: DISCONTINUED | OUTPATIENT
Start: 2025-08-06 | End: 2025-08-06

## 2025-08-06 RX ORDER — MAGNESIUM SULFATE HEPTAHYDRATE 40 MG/ML
2 INJECTION, SOLUTION INTRAVENOUS ONCE
Status: COMPLETED | OUTPATIENT
Start: 2025-08-06 | End: 2025-08-06

## 2025-08-06 RX ADMIN — HYDROMORPHONE HYDROCHLORIDE 0.2 MG: 1 INJECTION, SOLUTION INTRAMUSCULAR; INTRAVENOUS; SUBCUTANEOUS at 10:57

## 2025-08-06 RX ADMIN — POTASSIUM CHLORIDE 20 MEQ: 14.9 INJECTION, SOLUTION INTRAVENOUS at 10:15

## 2025-08-06 RX ADMIN — ACETAMINOPHEN 1000 MG: 10 INJECTION INTRAVENOUS at 13:34

## 2025-08-06 RX ADMIN — SODIUM CHLORIDE, SODIUM LACTATE, POTASSIUM CHLORIDE, AND CALCIUM CHLORIDE 100 ML/HR: .6; .31; .03; .02 INJECTION, SOLUTION INTRAVENOUS at 02:12

## 2025-08-06 RX ADMIN — SODIUM CHLORIDE, SODIUM LACTATE, POTASSIUM CHLORIDE, AND CALCIUM CHLORIDE 100 ML/HR: .6; .31; .03; .02 INJECTION, SOLUTION INTRAVENOUS at 12:25

## 2025-08-06 RX ADMIN — ORPHENADRINE CITRATE 60 MG: 60 INJECTION INTRAMUSCULAR; INTRAVENOUS at 12:26

## 2025-08-06 RX ADMIN — ORPHENADRINE CITRATE 60 MG: 60 INJECTION INTRAMUSCULAR; INTRAVENOUS at 20:06

## 2025-08-06 RX ADMIN — ASPIRIN 81 MG: 81 TABLET, DELAYED RELEASE ORAL at 14:31

## 2025-08-06 RX ADMIN — ACETAMINOPHEN 1000 MG: 10 INJECTION INTRAVENOUS at 20:06

## 2025-08-06 RX ADMIN — BISACODYL 10 MG: 10 SUPPOSITORY RECTAL at 10:17

## 2025-08-06 RX ADMIN — Medication: at 20:55

## 2025-08-06 RX ADMIN — Medication: at 11:30

## 2025-08-06 RX ADMIN — PANTOPRAZOLE SODIUM 40 MG: 40 INJECTION, POWDER, FOR SOLUTION INTRAVENOUS at 10:15

## 2025-08-06 RX ADMIN — SODIUM CHLORIDE, SODIUM LACTATE, POTASSIUM CHLORIDE, AND CALCIUM CHLORIDE 100 ML/HR: .6; .31; .03; .02 INJECTION, SOLUTION INTRAVENOUS at 22:49

## 2025-08-06 RX ADMIN — POTASSIUM CHLORIDE 20 MEQ: 14.9 INJECTION, SOLUTION INTRAVENOUS at 14:31

## 2025-08-06 RX ADMIN — MAGNESIUM SULFATE HEPTAHYDRATE 2 G: 40 INJECTION, SOLUTION INTRAVENOUS at 10:16

## 2025-08-06 RX ADMIN — POTASSIUM CHLORIDE 20 MEQ: 14.9 INJECTION, SOLUTION INTRAVENOUS at 12:27

## 2025-08-06 RX ADMIN — Medication 1 DOSE: at 05:09

## 2025-08-06 RX ADMIN — POLYETHYLENE GLYCOL 3350 17 G: 17 POWDER, FOR SOLUTION ORAL at 10:16

## 2025-08-06 SDOH — HEALTH STABILITY: MENTAL HEALTH: HOW OFTEN DO YOU HAVE SIX OR MORE DRINKS ON ONE OCCASION?: NEVER

## 2025-08-06 SDOH — SOCIAL STABILITY: SOCIAL INSECURITY: WITHIN THE LAST YEAR, HAVE YOU BEEN HUMILIATED OR EMOTIONALLY ABUSED IN OTHER WAYS BY YOUR PARTNER OR EX-PARTNER?: NO

## 2025-08-06 SDOH — SOCIAL STABILITY: SOCIAL INSECURITY: ARE THERE ANY APPARENT SIGNS OF INJURIES/BEHAVIORS THAT COULD BE RELATED TO ABUSE/NEGLECT?: NO

## 2025-08-06 SDOH — HEALTH STABILITY: MENTAL HEALTH: HOW MANY DRINKS CONTAINING ALCOHOL DO YOU HAVE ON A TYPICAL DAY WHEN YOU ARE DRINKING?: PATIENT DOES NOT DRINK

## 2025-08-06 SDOH — HEALTH STABILITY: MENTAL HEALTH: HOW OFTEN DO YOU HAVE A DRINK CONTAINING ALCOHOL?: NEVER

## 2025-08-06 SDOH — SOCIAL STABILITY: SOCIAL INSECURITY: WITHIN THE LAST YEAR, HAVE YOU BEEN AFRAID OF YOUR PARTNER OR EX-PARTNER?: NO

## 2025-08-06 SDOH — ECONOMIC STABILITY: HOUSING INSECURITY: IN THE PAST 12 MONTHS, HOW MANY TIMES HAVE YOU MOVED WHERE YOU WERE LIVING?: 0

## 2025-08-06 SDOH — ECONOMIC STABILITY: HOUSING INSECURITY: AT ANY TIME IN THE PAST 12 MONTHS, WERE YOU HOMELESS OR LIVING IN A SHELTER (INCLUDING NOW)?: NO

## 2025-08-06 SDOH — SOCIAL STABILITY: SOCIAL INSECURITY: ARE YOU OR HAVE YOU BEEN THREATENED OR ABUSED PHYSICALLY, EMOTIONALLY, OR SEXUALLY BY ANYONE?: NO

## 2025-08-06 SDOH — ECONOMIC STABILITY: TRANSPORTATION INSECURITY: IN THE PAST 12 MONTHS, HAS LACK OF TRANSPORTATION KEPT YOU FROM MEDICAL APPOINTMENTS OR FROM GETTING MEDICATIONS?: NO

## 2025-08-06 SDOH — ECONOMIC STABILITY: HOUSING INSECURITY: IN THE LAST 12 MONTHS, WAS THERE A TIME WHEN YOU WERE NOT ABLE TO PAY THE MORTGAGE OR RENT ON TIME?: NO

## 2025-08-06 SDOH — ECONOMIC STABILITY: INCOME INSECURITY: IN THE PAST 12 MONTHS HAS THE ELECTRIC, GAS, OIL, OR WATER COMPANY THREATENED TO SHUT OFF SERVICES IN YOUR HOME?: NO

## 2025-08-06 SDOH — ECONOMIC STABILITY: FOOD INSECURITY: WITHIN THE PAST 12 MONTHS, YOU WORRIED THAT YOUR FOOD WOULD RUN OUT BEFORE YOU GOT THE MONEY TO BUY MORE.: NEVER TRUE

## 2025-08-06 SDOH — ECONOMIC STABILITY: FOOD INSECURITY: HOW HARD IS IT FOR YOU TO PAY FOR THE VERY BASICS LIKE FOOD, HOUSING, MEDICAL CARE, AND HEATING?: NOT HARD AT ALL

## 2025-08-06 SDOH — SOCIAL STABILITY: SOCIAL INSECURITY: DO YOU FEEL UNSAFE GOING BACK TO THE PLACE WHERE YOU ARE LIVING?: NO

## 2025-08-06 SDOH — SOCIAL STABILITY: SOCIAL INSECURITY: HAVE YOU HAD THOUGHTS OF HARMING ANYONE ELSE?: NO

## 2025-08-06 SDOH — SOCIAL STABILITY: SOCIAL INSECURITY: ABUSE: ADULT

## 2025-08-06 SDOH — SOCIAL STABILITY: SOCIAL INSECURITY: HAS ANYONE EVER THREATENED TO HURT YOUR FAMILY OR YOUR PETS?: NO

## 2025-08-06 SDOH — SOCIAL STABILITY: SOCIAL INSECURITY: DO YOU FEEL ANYONE HAS EXPLOITED OR TAKEN ADVANTAGE OF YOU FINANCIALLY OR OF YOUR PERSONAL PROPERTY?: NO

## 2025-08-06 SDOH — ECONOMIC STABILITY: FOOD INSECURITY: WITHIN THE PAST 12 MONTHS, THE FOOD YOU BOUGHT JUST DIDN'T LAST AND YOU DIDN'T HAVE MONEY TO GET MORE.: NEVER TRUE

## 2025-08-06 SDOH — SOCIAL STABILITY: SOCIAL INSECURITY: HAVE YOU HAD ANY THOUGHTS OF HARMING ANYONE ELSE?: NO

## 2025-08-06 SDOH — SOCIAL STABILITY: SOCIAL INSECURITY: DOES ANYONE TRY TO KEEP YOU FROM HAVING/CONTACTING OTHER FRIENDS OR DOING THINGS OUTSIDE YOUR HOME?: NO

## 2025-08-06 SDOH — SOCIAL STABILITY: SOCIAL INSECURITY: WERE YOU ABLE TO COMPLETE ALL THE BEHAVIORAL HEALTH SCREENINGS?: YES

## 2025-08-06 ASSESSMENT — COGNITIVE AND FUNCTIONAL STATUS - GENERAL
DAILY ACTIVITIY SCORE: 19
EATING MEALS: A LITTLE
HELP NEEDED FOR BATHING: A LITTLE
CLIMB 3 TO 5 STEPS WITH RAILING: A LOT
DRESSING REGULAR LOWER BODY CLOTHING: A LITTLE
HELP NEEDED FOR BATHING: A LITTLE
STANDING UP FROM CHAIR USING ARMS: A LITTLE
MOBILITY SCORE: 16
WALKING IN HOSPITAL ROOM: A LOT
TOILETING: A LITTLE
PERSONAL GROOMING: A LITTLE
DRESSING REGULAR UPPER BODY CLOTHING: A LITTLE
MOVING FROM LYING ON BACK TO SITTING ON SIDE OF FLAT BED WITH BEDRAILS: A LITTLE
TOILETING: A LITTLE
MOBILITY SCORE: 18
TOILETING: A LITTLE
EATING MEALS: A LITTLE
STANDING UP FROM CHAIR USING ARMS: A LITTLE
DAILY ACTIVITIY SCORE: 18
MOVING TO AND FROM BED TO CHAIR: A LITTLE
DRESSING REGULAR UPPER BODY CLOTHING: A LITTLE
HELP NEEDED FOR BATHING: A LITTLE
TURNING FROM BACK TO SIDE WHILE IN FLAT BAD: A LITTLE
MOVING FROM LYING ON BACK TO SITTING ON SIDE OF FLAT BED WITH BEDRAILS: A LITTLE
DAILY ACTIVITIY SCORE: 20
CLIMB 3 TO 5 STEPS WITH RAILING: A LITTLE
PATIENT BASELINE BEDBOUND: NO
WALKING IN HOSPITAL ROOM: A LITTLE
MOVING TO AND FROM BED TO CHAIR: A LITTLE
TURNING FROM BACK TO SIDE WHILE IN FLAT BAD: A LITTLE
DRESSING REGULAR LOWER BODY CLOTHING: A LITTLE
STANDING UP FROM CHAIR USING ARMS: A LITTLE
DRESSING REGULAR UPPER BODY CLOTHING: A LITTLE
DRESSING REGULAR LOWER BODY CLOTHING: A LITTLE
MOBILITY SCORE: 18
MOVING FROM LYING ON BACK TO SITTING ON SIDE OF FLAT BED WITH BEDRAILS: A LITTLE
MOVING TO AND FROM BED TO CHAIR: A LITTLE
TURNING FROM BACK TO SIDE WHILE IN FLAT BAD: A LITTLE
WALKING IN HOSPITAL ROOM: A LITTLE
CLIMB 3 TO 5 STEPS WITH RAILING: A LITTLE

## 2025-08-06 ASSESSMENT — ENCOUNTER SYMPTOMS
DIZZINESS: 0
FEVER: 0
CHILLS: 0
WEAKNESS: 1
PALPITATIONS: 0
ABDOMINAL PAIN: 1
COUGH: 0
SHORTNESS OF BREATH: 0

## 2025-08-06 ASSESSMENT — ACTIVITIES OF DAILY LIVING (ADL)
PATIENT'S MEMORY ADEQUATE TO SAFELY COMPLETE DAILY ACTIVITIES?: NO
WALKS IN HOME: NEEDS ASSISTANCE
DRESSING YOURSELF: NEEDS ASSISTANCE
LACK_OF_TRANSPORTATION: NO
TOILETING: NEEDS ASSISTANCE
HEARING - RIGHT EAR: HEARING AID
ADEQUATE_TO_COMPLETE_ADL: YES
LACK_OF_TRANSPORTATION: NO
GROOMING: NEEDS ASSISTANCE
FEEDING YOURSELF: INDEPENDENT
JUDGMENT_ADEQUATE_SAFELY_COMPLETE_DAILY_ACTIVITIES: YES
BATHING: NEEDS ASSISTANCE
HEARING - LEFT EAR: HEARING AID

## 2025-08-06 ASSESSMENT — LIFESTYLE VARIABLES
HOW MANY STANDARD DRINKS CONTAINING ALCOHOL DO YOU HAVE ON A TYPICAL DAY: PATIENT DOES NOT DRINK
AUDIT-C TOTAL SCORE: 0
SKIP TO QUESTIONS 9-10: 1
HOW OFTEN DO YOU HAVE 6 OR MORE DRINKS ON ONE OCCASION: NEVER
AUDIT-C TOTAL SCORE: 0
SKIP TO QUESTIONS 9-10: 1
HOW OFTEN DO YOU HAVE A DRINK CONTAINING ALCOHOL: NEVER
AUDIT-C TOTAL SCORE: 0

## 2025-08-06 ASSESSMENT — PAIN SCALES - GENERAL
PAINLEVEL_OUTOF10: 4
PAINLEVEL_OUTOF10: 3
PAINLEVEL_OUTOF10: 9

## 2025-08-06 ASSESSMENT — PATIENT HEALTH QUESTIONNAIRE - PHQ9
2. FEELING DOWN, DEPRESSED OR HOPELESS: NOT AT ALL
SUM OF ALL RESPONSES TO PHQ9 QUESTIONS 1 & 2: 0
1. LITTLE INTEREST OR PLEASURE IN DOING THINGS: NOT AT ALL

## 2025-08-06 ASSESSMENT — PAIN - FUNCTIONAL ASSESSMENT
PAIN_FUNCTIONAL_ASSESSMENT: 0-10
PAIN_FUNCTIONAL_ASSESSMENT: 0-10

## 2025-08-06 NOTE — DOCUMENTATION CLARIFICATION NOTE
"    PATIENT:               CECIL HOFFMAN  ACCT #:                  [id number]  MRN:                       [id number]  :                       1935  ADMIT DATE:       2025 8:30 PM  DISCH DATE:        2025 5:15 PM  RESPONDING PROVIDER #:        [id number]          PROVIDER RESPONSE TEXT:    Metabolic Encephalopathy 2/2 TIA    CDI QUERY TEXT:    Clarification        Instruction:    Based on your assessment of the patient and the clinical information, please provide the requested documentation by clicking on the appropriate radio button and enter any additional information if prompted.    Question: Please further clarify the type of Encephalopathy as    When answering this query, please exercise your independent professional judgment. The fact that a question is being asked, does not imply that any particular answer is desired or expected.    The patient's clinical indicators include:  Clinical Information:  89 year old admitted with TIA, unable to do MRI due to pacemaker.    Clinical Indicator:    -Per ER, Medicine -  \"Encephalopathy\"    Treatment:  CT head, Neuro consult, neuro unable to see, neuro checks    Risk Factors:  Elderly, HTN, CHF, use of anticoagulants.  Options provided:  -- Metabolic Encephalopathy 2/2 TIA  -- Other - I will add my own diagnosis  -- Refer to Clinical Documentation Reviewer    Query created by: Shonna Kessler on 2025 3:26 PM      Electronically signed by:  MIRZA ELIZABETH DO 2025 3:34 PM          "

## 2025-08-06 NOTE — CONSULTS
Consults    Reason For Consult  Multiple medical issues, post op state    History Of Present Illness  Ronal Armijo is a 89 y.o. male presenting with severe abdominal pain yesterday to ER. Scans found him to have a possible volvulus and he was taken to OR by Dr Lynch. There , he underwent ex lap with JOHN, surgery went well. EBL was 25 ml. Anesthesia lasted approx 3 hours. Urine output 300, . He is currently on 6 liters. He is mostly complaining of the ng tube and pain with swallowing and his dry mouth. Denies sob, is on 6 liters. Tele is NSR at this time. He did receive Kcentra  prior to surgery.      Past Medical History  PAF--currently had been pursuing watchman procedure  CAD  TIA  Squamous cell skin cancer  Hypertension  Colon polyps  PVD  Hyperlipidemia    Surgical History  LHC/stent placement  AVR  Appendectomy  Colonoscopy/EGD  Left knee arthroscopy  Skin cancer removal, face  Pacemaker  Hernia repair  TKR     Social History  He reports that he has never smoked. He has never used smokeless tobacco. He reports that he does not drink alcohol and does not use drugs.    Family History  Not applicable     Allergies  Terbinafine hcl    Review of Systems  Positive for sore throat, difficulty swallowing, dry mouth. Abdomen hurts when he coughs, has hiccups etc     Physical Exam  He is groggy, but alert and able to express himself. Head is atraumatic. Ana Cristina, eomi. Mouth is hyperemic, very dry. NG tube in place. Devine in place, small amount of dark urine. Heart is regular, 3/6 blowing systolic murmur. Jvd. Lungs with rhonchi, expiratory bilaterally. Abdomen, binder in p[ace. Quiet. Pulses equal. No edema distally.     Last Recorded Vitals  /69 (BP Location: Left arm, Patient Position: Lying)   Pulse 101   Temp 36.7 °C (98.1 °F) (Temporal)   Resp 18   Wt 87.5 kg (193 lb)   SpO2 95%     Relevant Results  Results for orders placed or performed during the hospital encounter of 08/05/25 (from the past 24 hours)    CBC and Auto Differential   Result Value Ref Range    WBC 6.7 4.4 - 11.3 x10*3/uL    nRBC 0.0 0.0 - 0.0 /100 WBCs    RBC 4.38 (L) 4.50 - 5.90 x10*6/uL    Hemoglobin 13.7 13.5 - 17.5 g/dL    Hematocrit 39.6 (L) 41.0 - 52.0 %    MCV 90 80 - 100 fL    MCH 31.3 26.0 - 34.0 pg    MCHC 34.6 32.0 - 36.0 g/dL    RDW 14.0 11.5 - 14.5 %    Platelets 177 150 - 450 x10*3/uL    Immature Granulocytes %, Automated 0.6 0.0 - 0.9 %    Immature Granulocytes Absolute, Automated 0.04 0.00 - 0.50 x10*3/uL   Comprehensive metabolic panel   Result Value Ref Range    Glucose 171 (H) 74 - 99 mg/dL    Sodium 133 (L) 136 - 145 mmol/L    Potassium 2.8 (LL) 3.5 - 5.3 mmol/L    Chloride 99 98 - 107 mmol/L    Bicarbonate 24 21 - 32 mmol/L    Anion Gap 13 10 - 20 mmol/L    Urea Nitrogen 49 (H) 6 - 23 mg/dL    Creatinine 1.30 0.50 - 1.30 mg/dL    eGFR 53 (L) >60 mL/min/1.73m*2    Calcium 8.7 8.6 - 10.3 mg/dL    Albumin 3.7 3.4 - 5.0 g/dL    Alkaline Phosphatase 50 33 - 136 U/L    Total Protein 6.2 (L) 6.4 - 8.2 g/dL    AST 33 9 - 39 U/L    Bilirubin, Total 1.4 (H) 0.0 - 1.2 mg/dL    ALT 52 10 - 52 U/L   Magnesium   Result Value Ref Range    Magnesium 1.71 1.60 - 2.40 mg/dL   Phosphorus   Result Value Ref Range    Phosphorus 2.2 (L) 2.5 - 4.9 mg/dL   Lipase   Result Value Ref Range    Lipase 11 9 - 82 U/L   Troponin I, High Sensitivity, Initial   Result Value Ref Range    Troponin I, High Sensitivity 57 (HH) 0 - 20 ng/L   Manual Differential   Result Value Ref Range    Neutrophils %, Manual 51.0 40.0 - 80.0 %    Bands %, Manual 13.0 0.0 - 5.0 %    Lymphocytes %, Manual 22.0 13.0 - 44.0 %    Monocytes %, Manual 14.0 2.0 - 10.0 %    Eosinophils %, Manual 0.0 0.0 - 6.0 %    Basophils %, Manual 0.0 0.0 - 2.0 %    Seg Neutrophils Absolute, Manual 3.42 1.60 - 5.00 x10*3/uL    Bands Absolute, Manual 0.87 (H) 0.00 - 0.50 x10*3/uL    Lymphocytes Absolute, Manual 1.47 0.80 - 3.00 x10*3/uL    Monocytes Absolute, Manual 0.94 (H) 0.05 - 0.80 x10*3/uL     Eosinophils Absolute, Manual 0.00 0.00 - 0.40 x10*3/uL    Basophils Absolute, Manual 0.00 0.00 - 0.10 x10*3/uL    Total Cells Counted 100     Neutrophils Absolute, Manual 4.29 1.60 - 5.50 x10*3/uL    RBC Morphology No significant RBC morphology present    ECG 12 lead   Result Value Ref Range    Ventricular Rate 92 BPM    Atrial Rate 97 BPM    MT Interval 168 ms    QRS Duration 100 ms    QT Interval 314 ms    QTC Calculation(Bazett) 388 ms    R Axis 0 degrees    T Axis 201 degrees    QRS Count 15 beats    Q Onset 217 ms    P Onset 133 ms    P Offset 185 ms    T Offset 374 ms    QTC Fredericia 362 ms   Troponin, High Sensitivity, 1 Hour   Result Value Ref Range    Troponin I, High Sensitivity 53 (HH) 0 - 20 ng/L   Lactate   Result Value Ref Range    Lactate 1.9 0.4 - 2.0 mmol/L   Type and Screen   Result Value Ref Range    ABO TYPE A     Rh TYPE POS     ANTIBODY SCREEN NEG    Protime-INR   Result Value Ref Range    Protime 15.4 (H) 9.8 - 12.4 seconds    INR 1.4 (H) 0.9 - 1.1   Abo/Rh Group Test - STAT (VERAB)   Result Value Ref Range    ABO TYPE A     Rh TYPE POS    CBC   Result Value Ref Range    WBC 10.1 4.4 - 11.3 x10*3/uL    nRBC 0.0 0.0 - 0.0 /100 WBCs    RBC 4.23 (L) 4.50 - 5.90 x10*6/uL    Hemoglobin 13.2 (L) 13.5 - 17.5 g/dL    Hematocrit 38.5 (L) 41.0 - 52.0 %    MCV 91 80 - 100 fL    MCH 31.2 26.0 - 34.0 pg    MCHC 34.3 32.0 - 36.0 g/dL    RDW 14.6 (H) 11.5 - 14.5 %    Platelets 187 150 - 450 x10*3/uL   Basic Metabolic Panel   Result Value Ref Range    Glucose 171 (H) 74 - 99 mg/dL    Sodium 136 136 - 145 mmol/L    Potassium 3.1 (L) 3.5 - 5.3 mmol/L    Chloride 100 98 - 107 mmol/L    Bicarbonate 25 21 - 32 mmol/L    Anion Gap 14 10 - 20 mmol/L    Urea Nitrogen 51 (H) 6 - 23 mg/dL    Creatinine 1.34 (H) 0.50 - 1.30 mg/dL    eGFR 51 (L) >60 mL/min/1.73m*2    Calcium 8.3 (L) 8.6 - 10.3 mg/dL   Magnesium   Result Value Ref Range    Magnesium 1.71 1.60 - 2.40 mg/dL     *Note: Due to a large number of results  and/or encounters for the requested time period, some results have not been displayed. A complete set of results can be found in Results Review.          Assessment/Plan   POD 1, Ex Lap with JOHN. Post op care per Dr Lynch, pain control etc. Will order chloraseptic spray, mouth swabs for comfort. Urine output on lower side, fluid management is going to be challenging, with 3rd spacing etc.   Hypokalemia. Supplement , consider changing fluids to saline with K  PAF/CAD. Have asked cards to see to assist in med management post op. Echo in July with good LV function, DD, AVR intact  Hx hypertension. Pressures stable, off meds for now. Monitor closely.   Hyperlipidemia, hold statin  Recent TIA. At risk for event post op as well. Monitor closely.   Squamous cell skin cancer  CKD, stage 3. Watch closely for any decompensation, avoid nephrotoxics  Acute hypoxic respiratory failure, post op. Currently on 6 liters. Will obtain cxr. He has the iS. Add nebs. Check bnp, may have some fluid overload,, wean oxygen as able.   DVT prophylaxis  Thank you for the consult, will follow closely while here. Continues to be at risk for complications, development of pneumonia etc, post op.    Yoly Last MD

## 2025-08-06 NOTE — CONSULTS
"Inpatient consult to Cardiology  Consult performed by: Tereza Ritchie, APRN-CNP  Consult ordered by: Yoly Last MD  Reason for consult: post op, hx of AF on AC          History Of Present Illness  Alexis Armijo \"Radha" is a 89 y.o. male presenting with complaints of abd pain, vomiting, and abvd distention. He was just discharged last week and went to a SNF. He was seen at that time with confusion and concern for a TIA vs CVA.     Lab work in the ER showed glucose 171, sodium 133, potassium 2.8, BUN/creatinine 49/1.3, magnesium 1.71, troponin 57, WBC 6.7, H&H 13.7/39.6, CT of the abdomen and pelvis showed high-grade small bowel obstruction.  There is also concern for a volvulus as cause of the bowel obstruction within a closed-loop mechanism not excluded.    EKG showed SR, no acute ischemic changes.    He was taken to surgery and is now s/p exploratory lap celiotomy and lysis of adhesions. He denies any chest pain, shortness of breath or palpitations.       Past Medical History  Medical History[1]    Surgical History  Surgical History[2]     Social History  He reports that he has never smoked. He has never used smokeless tobacco. He reports that he does not drink alcohol and does not use drugs.    Family History  Family History[3]     Allergies  Terbinafine hcl    Review of Systems  Review of Systems   Constitutional:  Negative for chills and fever.   Respiratory:  Negative for cough and shortness of breath.    Cardiovascular:  Negative for chest pain, palpitations and leg swelling.   Gastrointestinal:  Positive for abdominal pain.   Musculoskeletal:  Negative for gait problem.   Neurological:  Positive for weakness. Negative for dizziness.   All other systems reviewed and are negative.         Physical Exam  Constitutional: Well developed, awake/alert/oriented x3, no distress, alert and cooperative  Eyes: PERRL, EOMI, clear sclera  ENMT: mucous membranes moist, no apparent injury, no lesions " seen  Head/Neck: Neck supple, no apparent injury, thyroid without mass or tenderness, No JVD, trachea midline, no bruits  Respiratory/Thorax: Patent airways, CTAB, normal breath sounds with good chest expansion, thorax symmetric  Cardiovascular: Regular, rate and rhythm, no murmurs, 2+ equal pulses of the extremities, normal S 1and S 2  Gastrointestinal: abd binder in place, BS hypoactive, NG tube to suction  Musculoskeletal: ROM intact, no joint swelling, normal strength  Extremities: normal extremities, no cyanosis edema, contusions or wounds, no clubbing  Neurological: alert and oriented x3, intact senses, motor, response and reflexes, normal strength  Lymphatic: No significant lymphadenopathy  Psychological: Appropriate mood and behavior  Skin: Warm and dry, no lesions, no rashes       Last Recorded Vitals  Blood pressure 113/68, pulse 88, temperature 36.5 °C (97.7 °F), temperature source Temporal, resp. rate 18, height 1.829 m (6'), weight 87.5 kg (193 lb), SpO2 94%.    Medications  Scheduled medications  Scheduled Medications[4]  Continuous medications  Continuous Medications[5]  PRN medications  PRN Medications[6]    Relevant Results  Results for orders placed or performed during the hospital encounter of 08/05/25 (from the past 24 hours)   CBC and Auto Differential   Result Value Ref Range    WBC 6.7 4.4 - 11.3 x10*3/uL    nRBC 0.0 0.0 - 0.0 /100 WBCs    RBC 4.38 (L) 4.50 - 5.90 x10*6/uL    Hemoglobin 13.7 13.5 - 17.5 g/dL    Hematocrit 39.6 (L) 41.0 - 52.0 %    MCV 90 80 - 100 fL    MCH 31.3 26.0 - 34.0 pg    MCHC 34.6 32.0 - 36.0 g/dL    RDW 14.0 11.5 - 14.5 %    Platelets 177 150 - 450 x10*3/uL    Immature Granulocytes %, Automated 0.6 0.0 - 0.9 %    Immature Granulocytes Absolute, Automated 0.04 0.00 - 0.50 x10*3/uL   Comprehensive metabolic panel   Result Value Ref Range    Glucose 171 (H) 74 - 99 mg/dL    Sodium 133 (L) 136 - 145 mmol/L    Potassium 2.8 (LL) 3.5 - 5.3 mmol/L    Chloride 99 98 - 107  mmol/L    Bicarbonate 24 21 - 32 mmol/L    Anion Gap 13 10 - 20 mmol/L    Urea Nitrogen 49 (H) 6 - 23 mg/dL    Creatinine 1.30 0.50 - 1.30 mg/dL    eGFR 53 (L) >60 mL/min/1.73m*2    Calcium 8.7 8.6 - 10.3 mg/dL    Albumin 3.7 3.4 - 5.0 g/dL    Alkaline Phosphatase 50 33 - 136 U/L    Total Protein 6.2 (L) 6.4 - 8.2 g/dL    AST 33 9 - 39 U/L    Bilirubin, Total 1.4 (H) 0.0 - 1.2 mg/dL    ALT 52 10 - 52 U/L   Magnesium   Result Value Ref Range    Magnesium 1.71 1.60 - 2.40 mg/dL   Phosphorus   Result Value Ref Range    Phosphorus 2.2 (L) 2.5 - 4.9 mg/dL   Lipase   Result Value Ref Range    Lipase 11 9 - 82 U/L   Troponin I, High Sensitivity, Initial   Result Value Ref Range    Troponin I, High Sensitivity 57 (HH) 0 - 20 ng/L   Manual Differential   Result Value Ref Range    Neutrophils %, Manual 51.0 40.0 - 80.0 %    Bands %, Manual 13.0 0.0 - 5.0 %    Lymphocytes %, Manual 22.0 13.0 - 44.0 %    Monocytes %, Manual 14.0 2.0 - 10.0 %    Eosinophils %, Manual 0.0 0.0 - 6.0 %    Basophils %, Manual 0.0 0.0 - 2.0 %    Seg Neutrophils Absolute, Manual 3.42 1.60 - 5.00 x10*3/uL    Bands Absolute, Manual 0.87 (H) 0.00 - 0.50 x10*3/uL    Lymphocytes Absolute, Manual 1.47 0.80 - 3.00 x10*3/uL    Monocytes Absolute, Manual 0.94 (H) 0.05 - 0.80 x10*3/uL    Eosinophils Absolute, Manual 0.00 0.00 - 0.40 x10*3/uL    Basophils Absolute, Manual 0.00 0.00 - 0.10 x10*3/uL    Total Cells Counted 100     Neutrophils Absolute, Manual 4.29 1.60 - 5.50 x10*3/uL    RBC Morphology No significant RBC morphology present    ECG 12 lead   Result Value Ref Range    Ventricular Rate 92 BPM    Atrial Rate 97 BPM    MS Interval 168 ms    QRS Duration 100 ms    QT Interval 314 ms    QTC Calculation(Bazett) 388 ms    R Axis 0 degrees    T Axis 201 degrees    QRS Count 15 beats    Q Onset 217 ms    P Onset 133 ms    P Offset 185 ms    T Offset 374 ms    QTC Fredericia 362 ms   Troponin, High Sensitivity, 1 Hour   Result Value Ref Range    Troponin I,  High Sensitivity 53 (HH) 0 - 20 ng/L   Lactate   Result Value Ref Range    Lactate 1.9 0.4 - 2.0 mmol/L   Type and Screen   Result Value Ref Range    ABO TYPE A     Rh TYPE POS     ANTIBODY SCREEN NEG    Protime-INR   Result Value Ref Range    Protime 15.4 (H) 9.8 - 12.4 seconds    INR 1.4 (H) 0.9 - 1.1   Abo/Rh Group Test - STAT (VERAB)   Result Value Ref Range    ABO TYPE A     Rh TYPE POS    CBC   Result Value Ref Range    WBC 10.1 4.4 - 11.3 x10*3/uL    nRBC 0.0 0.0 - 0.0 /100 WBCs    RBC 4.23 (L) 4.50 - 5.90 x10*6/uL    Hemoglobin 13.2 (L) 13.5 - 17.5 g/dL    Hematocrit 38.5 (L) 41.0 - 52.0 %    MCV 91 80 - 100 fL    MCH 31.2 26.0 - 34.0 pg    MCHC 34.3 32.0 - 36.0 g/dL    RDW 14.6 (H) 11.5 - 14.5 %    Platelets 187 150 - 450 x10*3/uL   Basic Metabolic Panel   Result Value Ref Range    Glucose 171 (H) 74 - 99 mg/dL    Sodium 136 136 - 145 mmol/L    Potassium 3.1 (L) 3.5 - 5.3 mmol/L    Chloride 100 98 - 107 mmol/L    Bicarbonate 25 21 - 32 mmol/L    Anion Gap 14 10 - 20 mmol/L    Urea Nitrogen 51 (H) 6 - 23 mg/dL    Creatinine 1.34 (H) 0.50 - 1.30 mg/dL    eGFR 51 (L) >60 mL/min/1.73m*2    Calcium 8.3 (L) 8.6 - 10.3 mg/dL   Magnesium   Result Value Ref Range    Magnesium 1.71 1.60 - 2.40 mg/dL     *Note: Due to a large number of results and/or encounters for the requested time period, some results have not been displayed. A complete set of results can be found in Results Review.       XR chest abdomen for OG NG placement   Final Result   1. Nasogastric tube with its tip projecting over the expected   location of the stomach in the left upper quadrant.   2. Mild diffuse gaseous distention of stomach and bowel loops as   described above. This is most likely favored to represent   postsurgical ileus, especially given history of recent surgery.   However recommend clinical correlation for bowel obstruction.   3. Airspace opacity in the left lung base, likely favored to   represent atelectasis. However developing  pneumonitis can also be   considered in the differential in appropriate clinical setting.   Recommend clinical and laboratory correlation.                  MACRO:   None        Signed by: Mel Serrano 8/6/2025 10:32 AM   Dictation workstation:   [id number]      XR chest 1 view   Final Result   Support hardware as described above.        Cardiomegaly with mild interstitial prominence suggestive of   developing interstitial edema/CHF.        Subtle bibasilar and retrocardiac airspace opacities concerning for   developing airspace disease. Clinical correlation and attention on   continued short-term follow-up is advised.        MACRO:   None        Signed by: Corbin Mohr 8/5/2025 6:49 PM   Dictation workstation:   [id number]      CT abdomen pelvis w IV contrast   Final Result   High-grade small bowel obstruction with transition point in the   central mesentery image axial image 91). Proximal to this the bowel   is markedly dilated with perienteric fluid measuring up to 5 cm in   diameter. There is pneumatosis versus pseudo pneumatosis involving   several of the jejunal loops, the former favored. Distal to the   transition point in the small bowel is relatively decompressed with   fluid-filled there is a 2nd transition point in near anatomic   proximity to the 1st transition point and there swirling of the   mesentery in this location. Therefore there is concern for a volvulus   as cause of the bowel obstruction with a close loop mechanism not   excluded. Surgical consultation is recommended.        Actively inflamed urinary bladder wall suspicious for cystitis.             MACRO:   Lenard Seo discussed the significance and urgency of this   critical finding via EPIC secure chat with confirmation of receipt   with DR SHERLEY ARMSTRONG on 8/5/2025 at 5:56 pm.  (**-RCF-**)   Findings:  See findings.        Signed by: Lenard Seo 8/5/2025 5:56 PM   Dictation workstation:   [id number]           Transthoracic Echo Complete  Result Date: 7/30/2025   [facility], [address] [address]               Tel [phone] and Fax [phone] TRANSTHORACIC ECHOCARDIOGRAM REPORT  Patient Name:       CECIL SADIE Del Rio Physician:    [id number] Johnie Rowe MD Study Date:         7/30/2025           Ordering Provider:    [id number] MIRZA ELIZABETH MRN/PID:            [id number]            Fellow: Accession#:         [id number]        Nurse:                Natalie Golden RN Date of Birth/Age:  1935 / 89      Sonographer:          Christine morales RDCS Gender assigned at  M                   Additional Staff: Birth: Height:             180.34 cm           Admit Date:           7/28/2025 Weight:             89.36 kg            Admission Status:     Inpatient -                                                               Routine BSA / BMI:          2.10 m2 / 27.48     Encounter#:           [id number]                     kg/m2 Blood Pressure:     155/81 mmHg         Department Location:  [facility] Non                                                               Invasive Study Type:    TRANSTHORACIC ECHO (TTE) COMPLETE Diagnosis/ICD: Cerebral Infarction, unspecified-I63.9 Indication:    Cerebrovascular Accident CPT Code:      Echo Complete w Full Doppler-62001 Patient History: Pertinent History: HTN, CAD, Hyperlipidemia and CHF. H/O CABG, PCI, AVR #25mm                    Hoffmann, PAD. Study Detail: The following Echo studies were performed: 2D, M-Mode, Doppler and               color flow. Image quality for this study is suboptimal.               Technically challenging study due to patient lying in supine               position and  poor acoustic windows. Definity used as a contrast               agent for endocardial border definition and agitated saline used               as a contrast agent for intraseptal flow evaluation. Total               contrast used for this procedure was 1.5 mL via IV push. The               patient was asleep.  PHYSICIAN INTERPRETATION: Left Ventricle: Left ventricular ejection fraction is normal calculated by Gallo's biplane at 59%. There are no regional left ventricular wall motion abnormalities. The left ventricular cavity size is upper limits of normal. There is normal septal and mildly increased posterior left ventricular wall thickness. There is left ventricular concentric remodeling. Abnormal (paradoxical) septal motion consistent with post-operative status. Spectral Doppler shows a Grade I (impaired relaxation pattern) of left ventricular diastolic filling with normal left atrial filling pressure. There is no definite left ventricular thrombus visualized. Left Atrium: The left atrium is mildly dilated. There is a mobile interatrial septum. A bubble study using agitated saline was performed. Bubble study is negative. Right Ventricle: The right ventricle is mildly enlarged. There is mildly reduced right ventricular systolic function. A device is visualized in the right ventricle. Right Atrium: The right atrium is moderate to severely dilated. There is a device visualized in the right atrium. Aortic Valve: There is a prosthetic aortic valve present. The aortic valve area by VTI is 1.69 cmï¿½ with a peak velocity of 2.35 m/s. The peak and mean gradients are 22 mmHg and 13 mmHg, respectively with a dimensionless index of 0.54. 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 Echo findings are consistent with normal aortic valve prosthesis structure and function. There is trace aortic valve regurgitation. Valve is not well visualized. Mitral Valve: The mitral valve is  mild to moderately thickened. The doppler estimated peak and mean diastolic pressure gradients are 6.5 mmHg and 3 mmHg, respectively. There is evidence of mild mitral valve stenosis. There is moderate mitral annular calcification. The mean gradient of the mitral valve is 3 mmHg. There is trace mitral valve regurgitation. The E Vmax is 0.98 m/s. Tricuspid Valve: The tricuspid valve is structurally normal. There is trace to mild tricuspid regurgitation. Pulmonic Valve: The pulmonic valve is not well visualized. There is physiologic pulmonic valve regurgitation. Pericardium: There is no pericardial effusion noted. Aorta: The aortic root is normal. The aortic root is at the upper limits of normal size. Systemic Veins: The inferior vena cava appears normal in size, IVC inspiratory collapse was not assessed. In comparison to the previous echocardiogram(s): Compared with study dated 2/13/2024,.  CONCLUSIONS:  1. Left ventricular ejection fraction is normal calculated by Gallo's biplane at 59%.  2. Spectral Doppler shows a Grade I (impaired relaxation pattern) of left ventricular diastolic filling with normal left atrial filling pressure.  3. Abnormal septal motion consistent with post-operative status.  4. No left ventricular thrombus visualized.  5. There is mildly reduced right ventricular systolic function.  6. Mildly enlarged right ventricle.  7. The left atrium is mildly dilated.  8. The right atrium is moderate to severely dilated.  9. There is mild mitral valve stenosis with a doppler estimated mean diastolic gradient of 3 mmHg. 10. There is moderate mitral annular calcification. 11. 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 The peak and mean gradients are 22 mmHg and 13 mmHg respectively. 12. Echo findings are consistent with normal aortic valve prosthesis structure and function. QUANTITATIVE DATA SUMMARY:  2D MEASUREMENTS:          Normal Ranges: IVSd:             0.98 cm  (0.6-1.1cm) LVPWd:           1.07 cm  (0.6-1.1cm) LVIDd:           4.73 cm  (3.9-5.9cm) LVIDs:           3.30 cm LV Mass Index:   82 g/m2 LVEDV Index:     77 ml/m2 LV % FS          30.3 %  LEFT ATRIUM:                  Normal Ranges: LA Vol A4C:        74.3 ml    (22+/-6mL/m2) LA Vol A2C:        76.0 ml LA Vol BP:         76.3 ml LA Vol Index A4C:  35.5ml/m2 LA Vol Index A2C:  36.2 ml/m2 LA Vol Index BP:   36.4 ml/m2 LA Area A4C:       24.2 cm2 LA Area A2C:       24.1 cm2 LA Major Axis A4C: 6.7 cm LA Major Axis A2C: 6.5 cm LA Volume Index:   37.6 ml/m2 LA Vol A4C:        70.6 ml LA Vol A2C:        78.0 ml LA Vol Index BSA:  35.5 ml/m2  RIGHT ATRIUM:          Normal Ranges: RA Area A4C:  29.4 cm2  M-MODE MEASUREMENTS:         Normal Ranges: Ao Root:             3.80 cm (2.0-3.7cm) LAs:                 4.04 cm (2.7-4.0cm)  AORTA MEASUREMENTS:         Normal Ranges: Ao Sinus, d:        3.90 cm (2.1-3.5cm)  LV SYSTOLIC FUNCTION:                      Normal Ranges: EF-A4C View:    59 % (>=55%) EF-A2C View:    58 % EF-Biplane:     59 % LV EF Reported: 59 %  LV DIASTOLIC FUNCTION:             Normal Ranges: MV Peak E:             0.98 m/s    (0.7-1.2 m/s) MV Peak A:             1.28 m/s    (0.42-0.7 m/s) E/A Ratio:             0.76        (1.0-2.2) MV e'                  0.085 m/s   (>8.0) MV lateral e'          0.10 m/s MV medial e'           0.07 m/s MV A Dur:              193.77 msec E/e' Ratio:            11.47       (<8.0) PulmV Sys Jamie:         33.61 cm/s PulmV Boone Jamie:        25.01 cm/s PulmV S/D Jamie:         1.34 PulmV A Revs Jamie:      14.86 cm/s PulmV A Revs Dur:      117.65 msec  MITRAL VALVE:          Normal Ranges: MV Vmax:      1.27 m/s (<=1.3m/s) MV peak P.5 mmHg (<5mmHg) MV mean P.8 mmHg (<2mmHg) MV VTI:       52.27 cm (10-13cm) MV DT:        328 msec (150-240msec)  AORTIC VALVE:                      Normal Ranges: AoV Vmax:                2.35 m/s  (<=1.7m/s) AoV Peak PG:              22.2 mmHg (<20mmHg) AoV Mean P.6 mmHg (1.7-11.5mmHg) LVOT Max Jamie:            1.19 m/s  (<=1.1m/s) AoV VTI:                 48.18 cm  (18-25cm) LVOT VTI:                26.14 cm LVOT Diameter:           1.99 cm   (1.8-2.4cm) AoV Area, VTI:           1.69 cm2  (2.5-5.5cm2) AoV Area,Vmax:           1.57 cm2  (2.5-4.5cm2) AoV Dimensionless Index: 0.54  RIGHT VENTRICLE: RV Basal 4.50 cm RV Mid   2.80 cm RV Major 8.2 cm TAPSE:   15.0 mm RV s'    0.08 m/s  TRICUSPID VALVE/RVSP:          Normal Ranges: Peak TR Velocity:     2.44 m/s  PULMONIC VALVE:          Normal Ranges: PV Max Jamie:     1.0 m/s  (0.6-0.9m/s) PV Max P.2 mmHg  PULMONARY VEINS: PulmV A Revs Dur: 117.65 msec PulmV A Revs Jamie: 14.86 cm/s PulmV Boone Jamie:   25.01 cm/s PulmV S/D Jamie:    1.34 PulmV Sys Jamie:    33.61 cm/s  [id number] Johnie Rowe MD Electronically signed on 2025 at 2:29:48 PM  ** Final **          Assessment/Plan   Small bowel obstruction  -s/p ex lap with lysis of adhesions  -Surgery following  -AC on hold  -NG tube to suction  -NPO  -Pain meds    2. Paroxysmal atrial fibrillation  -Currently SR  -Eliquis on hold for post op  -If hgb stable, suggest starting heparin gtt tomorrow until able to restart Eliquis  -Planning for outpt watchman    3. Hypertension  -stable  -monitor     4. Elevated troponins-Non MI elevation  -Hx of CAD   -s/p PCI/JENELLE to D1 and mRCA in   -s/p CABG (LIMA-LAD, SUYE-SIS-JM0-PDA) in 2018  -troponin 57, 53  -I reviewed the EKG, no acute changes, ST elevation, or depression  -Cont asa     5. PVD  -s/p bilateral GSV EVLA, s/p RLE revascularization in , atherectomy/PTA/DCB of the left popliteal and peroneal arteries in   -Cont asa     6. Medtronic PCM in situ  -Personally interrogated last week, normal functioning, battery life 22 months, PAF noted burden 0.1%     7. S/p AVR  -normal functioning per echo        RITU Rocha-CNP         [1]   Past Medical History:  Diagnosis Date     Age-related nuclear cataract, unspecified eye 05/26/2022    Nuclear sclerosis    Atherosclerosis of native arteries of extremities with intermittent claudication, bilateral legs 01/03/2023    Atherosclerosis of native artery of both lower extremities with intermittent claudication    Atherosclerotic heart disease of native coronary artery without angina pectoris 10/04/2022    CAD (coronary artery disease)    Atrial fibrillation (Multi)     Cellulitis of lower extremity 04/09/2024    Community acquired pneumonia 04/09/2024    Diplopia 02/18/2022    Diplopia    Essential (primary) hypertension 10/04/2022    Benign essential hypertension    Heart disease 2010    Heart valve disease     Hyperlipidemia     Other abnormal glucose     Hemoglobin A1c less than 7.0%    Other intervertebral disc degeneration, lumbar region     Degenerative disc disease, lumbar    Pain in extremity 04/09/2024    Personal history of colonic polyps     History of colonic polyps    Personal history of malignant neoplasm of other organs and systems     History of squamous cell carcinoma    Personal history of other diseases of the circulatory system 04/16/2018    History of hypertension    Personal history of other endocrine, nutritional and metabolic disease 04/16/2018    History of hyperlipidemia    Personal history of other medical treatment     History of stress test    Presence of prosthetic heart valve 01/03/2023    S/P AVR (aortic valve replacement)    Spondylolysis, lumbar region     Lumbar spondylolysis   [2]   Past Surgical History:  Procedure Laterality Date    APPENDECTOMY  01/25/2017    Appendectomy    CARDIAC PACEMAKER PLACEMENT  04/16/2018    Pacemaker Placement    CARDIAC VALVE REPLACEMENT      CATARACT EXTRACTION, BILATERAL      COLONOSCOPY W/ POLYPECTOMY  01/25/2017    Complete Colonoscopy For Polyp Removal    CORONARY ANGIOPLASTY WITH STENT PLACEMENT  01/25/2017    Cath Stent Placement    CT ANGIO AORTA AND BILATERAL  ILIOFEMORAL RUN OFF INCLUDING WITHOUT CONTRAST IF PERFORMED  11/23/2019    CT AORTA AND BILATERAL ILIOFEMORAL RUNOFF ANGIOGRAM W AND/OR WO IV CONTRAST 11/23/2019 [facility]    ESOPHAGOGASTRODUODENOSCOPY  01/25/2017    Esophagogastroduodenoscopy With Biopsy    HERNIA REPAIR  01/25/2017    Inguinal Hernia Repair    KNEE ARTHROSCOPY W/ DEBRIDEMENT  01/25/2017    Arthroscopy Knee Left    OTHER SURGICAL HISTORY  02/10/2015    Stent Indications: Acute Myocardial Infarction    OTHER SURGICAL HISTORY  07/20/2015    Blood Vessel Repair Direct Leg    OTHER SURGICAL HISTORY  01/25/2017    Excision Of Lesion Face    TOTAL KNEE ARTHROPLASTY  04/16/2018    Knee Replacement    VEIN SURGERY     [3] No family history on file.  [4] acetaminophen, 1,000 mg, intravenous, q6h  albuterol, 2.5 mg, nebulization, 4x daily  [Held by provider] apixaban, 2.5 mg, oral, BID  aspirin, 81 mg, oral, Daily  bisacodyl, 10 mg, rectal, Daily  orphenadrine, 60 mg, intramuscular, q12h AGUEDA  pantoprazole, 40 mg, intravenous, Daily  polyethylene glycol, 17 g, oral, Daily  potassium chloride, 20 mEq, intravenous, q2h  [5] lactated Ringer's, 100 mL/hr, Last Rate: 100 mL/hr (08/06/25 1225)  [6] PRN medications: HYDROmorphone, HYDROmorphone, naloxone, ondansetron **OR** ondansetron, oxygen, phenoL

## 2025-08-06 NOTE — PROGRESS NOTES
08/06/25 1543   Discharge Planning   Living Arrangements Other (Comment)  ([facility])   Support Systems Spouse/significant other   Assistance Needed Per facility, at baseline patient is A&Ox4, on RA at baseline, ambulating with RW and CGA.   Type of Residence Skilled nursing facility   Who is requesting discharge planning? Provider   Home or Post Acute Services Post acute facilities (Rehab/SNF/etc)   Type of Post Acute Facility Services Skilled nursing   Expected Discharge Disposition SNF  (Spoke with patient's spouse at bedside. If SNF is recommended at discharge, preference is for patient to return to [facility]. PT/OT evals pending, NG remains in place.)   Does the patient need discharge transport arranged? Yes   [facility] coordination needed? Yes   Has discharge transport been arranged? No   Patient Choice   Provider Choice list and CMS website (https://medicare.gov/care-compare#search) for post-acute Quality and Resource Measure Data were provided and reviewed with: Family   Patient / Family choosing to utilize agency / facility established prior to hospitalization Yes   Stroke Family Assessment   Stroke Family Assessment Needed No   Intensity of Service   Intensity of Service 0-30 min

## 2025-08-06 NOTE — PROGRESS NOTES
General/Trauma Surgery Daily Progress Note    Subjective   POD 1 ex lap, JOHN. Pain is present throughout abdomen this morning. NG with thick bilious output. Hiccuping at times. No flatus or BM.        Objective   Last Recorded Vitals:  Blood pressure 114/69, pulse 93, temperature 36.7 °C (98.1 °F), temperature source Temporal, resp. rate 18, height 1.829 m (6'), weight 87.5 kg (193 lb), SpO2 91%.    Intake/Output last 3 Shifts:  I/O last 3 completed shifts:  In: 1145 (13.1 mL/kg) [I.V.:1045 (11.9 mL/kg); NG/GT:100]  Out: 1175 (13.4 mL/kg) [Urine:300 (0.1 mL/kg/hr); Emesis/NG output:850; Blood:25]  Weight: 87.5 kg     Pain Score:  0-10 (Numeric) Pain Score: 0 - No pain  PAINAD Score:  [0-1]      Physical Exam:  Constitutional: No acute distress, laying in bed. Will occasionally moan in pain. Hiccuping spells.  Neuro: Alert but groggy. Oriented. Follows commands.   Eyes: EOMI. No scleral icterus. Conjunctiva pink.  ENT: Dry. NG with large amount of thick bilious fluid.   Heart: Regular rate.  Respiratory: No increased work of breathing or audible wheeze.  Abdomen: Soft, distended. Tender throughout. Incisions clean, dry, intact.   MSK: Moves all extremities.  Vascular: Palpable pulses throughout, no pitting edema.  Skin: No rashes.   Psychological: Appropriate mood and behavior.            Relevant Results  Laboratory Results:  CBC:   Lab Results   Component Value Date    WBC 10.1 08/06/2025    RBC 4.23 (L) 08/06/2025    HGB 13.2 (L) 08/06/2025    HCT 38.5 (L) 08/06/2025     08/06/2025       RFP:   Lab Results   Component Value Date     08/06/2025    K 3.1 (L) 08/06/2025     08/06/2025    CO2 25 08/06/2025    BUN 51 (H) 08/06/2025    CREATININE 1.34 (H) 08/06/2025    CALCIUM 8.3 (L) 08/06/2025    MG 1.71 08/06/2025    PHOS 2.2 (L) 08/05/2025        LFTs:   Lab Results   Component Value Date    PROT 6.2 (L) 08/05/2025    ALBUMIN 3.7 08/05/2025    BILITOT 1.4 (H) 08/05/2025    ALKPHOS 50 08/05/2025     "AST 33 08/05/2025    ALT 52 08/05/2025         No results found for the last 90 days.    No results found for: \"URINECULTURE\", \"BLOODCULT\", \"CSFCULTSMEAR\"                 No lab exists for component: \"AGALPCRNB\"         Imaging  XR chest abdomen for OG NG placement  Result Date: 8/6/2025  1. Nasogastric tube with its tip projecting over the expected location of the stomach in the left upper quadrant. 2. Mild diffuse gaseous distention of stomach and bowel loops as described above. This is most likely favored to represent postsurgical ileus, especially given history of recent surgery. However recommend clinical correlation for bowel obstruction. 3. Airspace opacity in the left lung base, likely favored to represent atelectasis. However developing pneumonitis can also be considered in the differential in appropriate clinical setting. Recommend clinical and laboratory correlation.       MACRO: None   Signed by: Mel Serrano 8/6/2025 10:32 AM Dictation workstation:   [id number]    XR chest 1 view  Result Date: 8/5/2025  Support hardware as described above.   Cardiomegaly with mild interstitial prominence suggestive of developing interstitial edema/CHF.   Subtle bibasilar and retrocardiac airspace opacities concerning for developing airspace disease. Clinical correlation and attention on continued short-term follow-up is advised.   MACRO: None   Signed by: Corbin Mohr 8/5/2025 6:49 PM Dictation workstation:   [id number]    CT abdomen pelvis w IV contrast  Result Date: 8/5/2025  High-grade small bowel obstruction with transition point in the central mesentery image axial image 91). Proximal to this the bowel is markedly dilated with perienteric fluid measuring up to 5 cm in diameter. There is pneumatosis versus pseudo pneumatosis involving several of the jejunal loops, the former favored. Distal to the transition point in the small bowel is relatively decompressed with fluid-filled there is a 2nd transition point " in near anatomic proximity to the 1st transition point and there swirling of the mesentery in this location. Therefore there is concern for a volvulus as cause of the bowel obstruction with a close loop mechanism not excluded. Surgical consultation is recommended.   Actively inflamed urinary bladder wall suspicious for cystitis.     MACRO: Lenard Seo discussed the significance and urgency of this critical finding via EPIC secure chat with confirmation of receipt with DR SHERLEY ARMSTRONG on 8/5/2025 at 5:56 pm.  (**-RCF-**) Findings:  See findings.   Signed by: Lenard Seo 8/5/2025 5:56 PM Dictation workstation:   [id number]      Cardiology, Vascular, and Other Imaging  ECG 12 lead  Result Date: 8/6/2025  Sinus rhythm with Premature supraventricular complexes Nonspecific T wave abnormality Abnormal ECG When compared with ECG of 28-JUL-2025 20:37, (unconfirmed) Sinus rhythm has replaced Electronic atrial pacemaker Nonspecific T wave abnormality, worse in Anterolateral leads             Assessment/Plan   This is a 89 y.o. male POD 1 from exploratory laparotomy with lysis of adhesions for high grade bowel obstruction. Anticipate post operative ileus over the next several days. NG with thick bile. Medicine and cardiology are consulted to help with patient management.       Plan:   -- NG to LIWS, flush multiple times per day. Order in place  -- NPO, limit PO meds until NG output improves. OK for sips with meds and clamp NG for 30 minutes for meds that are needed  -- IV Ofirmev, prn dilaudid   -- Bowel regimen with suppository and miralax daily  -- Daily CBC, BMP, Mg, Phos. Will replete as needed  -- PT/OT ordered  -- Respiratory consulted  -- Bedside chair and activity would benefit greatly  -- Medicine and cardiology is consulted, appreciate any recommendations  -- Resumed home ASA, will await cardiology recommendations for further AC. Would be ok with Eliquis resuming 8/7 PM if H&H remains stable              Seen and discussed with Dr. Lynch who is in agreement with plan. Please secure chat with questions.    Rosa Field PA-C

## 2025-08-06 NOTE — BRIEF OP NOTE
"Date: 2025  OR Location: [facility] OR    Name: Alexis Armijo \"Radha", : 1935, Age: 89 y.o., MRN: [id number], Sex: male    Diagnosis  Pre-op Diagnosis      * Small bowel obstruction (Multi) [K56.609] Post-op Diagnosis     * Small bowel obstruction (Multi) [K56.609]     Procedures  LAPAROTOMY, EXPLORATORY  67138 - CT EXPLORATORY LAPAROTOMY CELIOTOMY W/WO BIOPSY SPX  Lysis of adhesions       Surgeons      * Wing Lynch - Primary    Resident/Fellow/Other Assistant:  Surgeons and Role:  * No surgeons found with a matching role *    Staff:   Circulator: Svetlana  Surgical Assistant: Abdullahi Kraus Person: Ligia    Anesthesia Staff: Anesthesiologist: Kylie Rico MD MPH  CRNA: RITU Vences-CRNA    Procedure Summary  Anesthesia: General  ASA: III  Estimated Blood Loss: 25mL  Intra-op Medications:   Administrations occurring from 25 to 25 0025:   Medication Name Total Dose   BUPivacaine HCl (Marcaine) 0.5 % (5 mg/mL) 30 mL, lidocaine PF (Xylocaine) 10 mg/mL (1 %) 30 mL syringe Cannot be calculated   ceFAZolin (Ancef) vial 1 g 2 g   dexAMETHasone (Decadron) 4 mg/mL IV Syringe 2 mL 4 mg   dexMEDETOMidine 4 mcg/mL in NS syringe 8 mcg   fentaNYL (Sublimaze) injection 50 mcg/mL 100 mcg   HYDROmorphone (Dilaudid) injection 2 mg/mL 0.6 mg   LR infusion Cannot be calculated   lidocaine (Xylocaine) injection 2 % 70 mg   ondansetron (Zofran) 2 mg/mL injection 4 mg   phenylephrine 40 mcg/mL syringe 10 mL 80 mcg   propofol (Diprivan) injection 10 mg/mL 50 mg   rocuronium (ZeMuron) 50 mg/5 mL injection 70 mg   NaCl 0.9 % infusion 441.67 mL   succinylcholine (Anectine) 20 mg/mL injection 140 mg   sugammadex (Bridion) 200 mg/2 mL injection 200 mg   four factor human prothrombin complex concentrate (Kcentra) 4,447 Units in 170 mL infusion Cannot be calculated   metroNIDAZOLE (Flagyl) 500 mg in sodium chloride (iso)  mL 500 mg              Anesthesia Record               Intraprocedure I/O Totals       "    Output    Est. Blood Loss 25 mL    Total Output 25 mL          Specimen: No specimens collected       Findings: see below     Complications:  None; patient tolerated the procedure well.     Disposition: PACU - hemodynamically stable.  Condition: stable  Specimens Collected: No specimens collected  Attending Attestation: I was present and scrubbed for the entire procedure.    Wing Lynch  Phone Number: [phone]

## 2025-08-06 NOTE — CARE PLAN
The patient's goals for the shift include  rest    The clinical goals for the shift include Pt will main tolerable pain level.

## 2025-08-06 NOTE — ANESTHESIA PREPROCEDURE EVALUATION
"Patient: Alexis Armijo \"Don\"    Procedure Information       Date/Time: 08/05/25 2000    Procedure: LAPAROTOMY, EXPLORATORY    Location: [facility] OR 08 / [facility]    Surgeons: Wing Lynch MD        This is a 89 y.o. male who has below conditions:  Small bowel obstruction: likely due to volvulus, ? Pneumatosis   Multiple comorbidities Including recent history of TIA, afib on elliquis   Hypokalemia     Eliquis reversed in ED with Kcentra  Potassium repleted in ED  T&S sent.    To OR for urgent laparotomy     Relevant Problems   Anesthesia (within normal limits)      Cardiac  ECHO 7/30/25  CONCLUSIONS:   1. Left ventricular ejection fraction is normal calculated by Gallo's biplane at 59%.   2. Spectral Doppler shows a Grade I (impaired relaxation pattern) of left ventricular diastolic filling with normal left atrial filling pressure.   3. Abnormal septal motion consistent with post-operative status.   4. No left ventricular thrombus visualized.   5. There is mildly reduced right ventricular systolic function.   6. Mildly enlarged right ventricle.   7. The left atrium is mildly dilated.   8. The right atrium is moderate to severely dilated.   9. There is mild mitral valve stenosis with a doppler estimated mean diastolic gradient of 3 mmHg.  10. There is moderate mitral annular calcification.  11. 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 The peak and mean gradients are 22 mmHg and 13 mmHg respectively.  12. Echo findings are consistent with normal aortic valve prosthesis structure and function.     (+) Aortic valve stenosis (S/p AVR approx 9 years ago)   (+) Atrial fibrillation, persistent (Multi)   (+) Benign essential hypertension   (+) CAD (coronary artery disease)   (+) Diastolic congestive heart failure   (+) PAD (peripheral artery disease)   (+) Paroxysmal atrial fibrillation (Multi)   (+) Presence of cardiac pacemaker   (+) Secondary hypertension   (+) " Stented coronary artery      Pulmonary  Acute SOB with this SBO.  Likely 2/2 aspiration      Neuro  TIA approx 1 week ago.  AMS resolved in 1-2 days   (+) Bilateral carotid artery stenosis   (+) Carotid stenosis   (+) Hemicrania   (+) Myasthenia gravis   (+) Stroke determined by clinical assessment (Multi)   (+) TIA (transient ischemic attack)      GI  Acute SBO.   IMPRESSION: High-grade small bowel obstruction with transition point in the central mesentery image axial image 91). Proximal to this the bowel is markedly dilated with perienteric fluid measuring up to 5 cm in diameter. There is pneumatosis versus pseudo pneumatosis involving several of the jejunal loops, the former favored. Distal to the transition point in the small bowel is relatively decompressed with fluid-filled there is a 2nd transition point in near anatomic proximity to the 1st transition point and there swirling of the mesentery in this location. Therefore there is concern for a volvulus as cause of the bowel obstruction with a close loop mechanism not excluded. Surgical consultation is recommended.   Actively inflamed urinary bladder wall suspicious for cystitis.      (+) Chronic diarrhea   (+) Duodenal hemorrhage      /Renal  Cystitis   (+) Chronic renal insufficiency      Hematology   (+) Anemia   (+) Anticoagulant long-term use (Eliquis last taken at 9am)   (+) Iron deficiency anemia   (+) Non-Hodgkin's lymphoma   (+) Non-follicular lymphoma      Musculoskeletal   (+) Degeneration of intervertebral disc of lumbar region   (+) Lumbar canal stenosis   (+) Osteoarthritis of both ankles      HEENT   (+) [place] (hard of hearing)       Clinical information reviewed:   Tobacco  Allergies  Meds   Med Hx  Surg Hx   Fam Hx  Soc Hx        NPO Detail:  Patient ate lunch today around noon and had emesis afterwards.     Vitals:    08/05/25 2000   BP: 113/72   Pulse: 82   Resp: 17   Temp:    SpO2: (!) 92%       Physical Exam    Airway  Mallampati:  IV     Cardiovascular   Rhythm: regular  Comments: Currently not paced . HR in 80s-90s.   Dental    Pulmonary Comments: SpO2 91% on RA.     Abdominal   Comments: Distended           Anesthesia Plan    History of general anesthesia?: yes  History of complications of general anesthesia?: no    ASA 3 - emergent     general   (RSI)  intravenous induction   Anesthetic plan and risks discussed with patient and spouse.  Use of blood products discussed with spouse and patient who consented to blood products.    Plan discussed with CRNA and attending.

## 2025-08-06 NOTE — ANESTHESIA PROCEDURE NOTES
Airway  Date/Time: 8/5/2025 8:55 PM  Reason: emergent    Airway not difficult    Staffing  Performed: CRNA   Authorized by: Kylie Rico MD MPH    Performed by: RITU Vences-ODETTE  Patient location during procedure: OR    Consent for Airway (if performed for an anesthetic, see related documentation for consents)   Patient identity confirmed: verbally with patient  Consent: Verbal consent obtained  Risks and benefits: risks, benefits and alternatives were discussed  Consent given by: patient    Patient Condition  Indications for airway management: anesthesia  Patient position: sniffing  Sedation level: deep     Final Airway Details   Preoxygenated: yes  Final airway type: endotracheal airway  Successful airway: ETT  Cuffed: yes   Successful intubation technique: video laryngoscopy  Adjuncts used in placement: intubating stylet  Endotracheal tube insertion site: oral  Blade: [place]  Blade size: #3  ETT size (mm): 7.5  Cormack-Lehane Classification: grade I - full view of glottis  Placement verified by: capnometry   Measured from: teeth  ETT to teeth (cm): 24  Ventilation between attempts: none  Number of attempts at approach: 1  Number of other approaches attempted: 0    Additional Comments  1 attempt, easy intubation by CRNA

## 2025-08-06 NOTE — ANESTHESIA POSTPROCEDURE EVALUATION
"Patient: Alexis Armijo \"Don\"    Procedure Summary       Date: 08/05/25 Room / Location: [facility] OR 08 / [facility]    Anesthesia Start: 2045 Anesthesia Stop: 2328    Procedure: LAPAROTOMY, EXPLORATORY Diagnosis:       Small bowel obstruction (Multi)      (Small bowel obstruction (Multi) [K56.609])    Surgeons: Wing Lynch MD Responsible Provider: Kylie Rico MD MPH    Anesthesia Type: general ASA Status: 3 - Emergent            Anesthesia Type: general    Vitals Value Taken Time   /53 08/05/25 23:13   Temp 36.4 °C (97.5 °F) 08/05/25 23:13   Pulse 73 08/05/25 23:13   Resp 23 08/05/25 23:13   SpO2 96 % 08/05/25 23:13       Anesthesia Post Evaluation    Patient location during evaluation: PACU  Patient participation: complete - patient participated  Level of consciousness: sleepy but conscious  Pain management: adequate  Airway patency: patent  Cardiovascular status: acceptable  Respiratory status: acceptable  Hydration status: acceptable  Postoperative Nausea and Vomiting: none  Comments: To stepdown        There were no known notable events for this encounter.    "

## 2025-08-06 NOTE — OP NOTE
"LAPAROTOMY, EXPLORATORY Operative Note     Date: 2025  OR Location: [facility] OR    Name: Alexis Armijo \"Ronal\", : 1935, Age: 89 y.o., MRN: [id number], Sex: male    Diagnosis  Pre-op Diagnosis      * Small bowel obstruction (Multi) [K56.609] Post-op Diagnosis     * Small bowel obstruction (Multi) [K56.609]     Procedures  LAPAROTOMY, EXPLORATORY  40807 - [place] EXPLORATORY LAPAROTOMY CELIOTOMY W/WO BIOPSY SPX  Lysis of adhesions     Surgeons      * Wing Lynch - Primary    Resident/Fellow/Other Assistant:  Surgeons and Role:  * No surgeons found with a matching role *    Staff:   Circulator: Svetlaan  Surgical Assistant: Abdullahi Kraus Person: Ligia    Anesthesia Staff: Anesthesiologist: Kylie Rico MD MPH  CRNA: RITU Vences-CRNA    Procedure Summary  Anesthesia: General  ASA: III  Estimated Blood Loss: 25mL  Intra-op Medications:   Administrations occurring from 25 to 25 0025:   Medication Name Total Dose   BUPivacaine HCl (Marcaine) 0.5 % (5 mg/mL) 30 mL, lidocaine PF (Xylocaine) 10 mg/mL (1 %) 30 mL syringe 35 mL   four factor human prothrombin complex concentrate (Kcentra) 4,447 Units in 170 mL infusion Cannot be calculated   metroNIDAZOLE (Flagyl) 500 mg in sodium chloride (iso)  mL 500 mg   potassium chloride 20 mEq in sterile water for injection 100 mL Cannot be calculated   ceFAZolin (Ancef) vial 1 g 2 g   dexAMETHasone (Decadron) 4 mg/mL IV Syringe 2 mL 4 mg   dexMEDETOMidine 4 mcg/mL in NS syringe 8 mcg   fentaNYL (Sublimaze) injection 50 mcg/mL 100 mcg   HYDROmorphone (Dilaudid) injection 2 mg/mL 0.8 mg   LR infusion Cannot be calculated   lidocaine (Xylocaine) injection 2 % 70 mg   ondansetron (Zofran) 2 mg/mL injection 4 mg   phenylephrine 40 mcg/mL syringe 10 mL 80 mcg   propofol (Diprivan) injection 10 mg/mL 50 mg   rocuronium (ZeMuron) 50 mg/5 mL injection 70 mg   NaCl 0.9 % infusion 342.64 mL   succinylcholine (Anectine) 20 mg/mL injection 140 mg   sugammadex " "(Bridion) 200 mg/2 mL injection 200 mg              Anesthesia Record               Intraprocedure I/O Totals          Intake    LR infusion 700.00 mL    Total Intake 700 mL       Output    Urine 50 mL    Est. Blood Loss 25 mL    Total Output 75 mL       Net    Net Volume 625 mL          Specimen: No specimens collected              Drains and/or Catheters:   NG/OG/Feeding Tube Nasogastric Left nostril (Active)   Tube Status Low intermittent suction 08/05/25 2313   Placement Verification X-ray 08/05/25 1825   Distal Tube Measurement (cm) 65 cm 08/05/25 1825   Site Assessment Clean;Dry 08/06/25 1030   Drainage Appearance Brown 08/05/25 2313   Irrigant Sterile water 08/06/25 0534   Tube Securement Taped to nostril center 08/06/25 0534   Intake - Flush (mL) 30 mL 08/06/25 1030   Output (mL) 50 mL 08/05/25 2349       Urethral Catheter Latex 19 Fr. (Active)   Site Assessment Skin intact;Bleeding 08/06/25 0632   Collection Container Standard drainage bag 08/05/25 2313   Securement Method Tape 08/05/25 2313   Output (mL) 200 mL 08/06/25 0534       Tourniquet Times:         Implants:     Findings: see below     Indications: Alexis Armijo \"Radha" is an 89 y.o. male who is having surgery for Small bowel obstruction (Multi) [K56.609].      The patient was seen in the preoperative area. The risks, benefits, complications, treatment options, non-operative alternatives, expected recovery and outcomes were discussed with the patient. The possibilities of reaction to medication, pulmonary aspiration, injury to surrounding structures, bleeding, recurrent infection, the need for additional procedures, failure to diagnose a condition, and creating a complication requiring transfusion or operation were discussed with the patient. The patient concurred with the proposed plan, giving informed consent.  The site of surgery was properly noted/marked if necessary per policy. The patient has been actively warmed in preoperative area. " Preoperative antibiotics have been ordered and given within 1 hours of incision. Venous thrombosis prophylaxis have been ordered including bilateral sequential compression devices and chemical prophylaxis    Procedure Details:   After general anesthesia, his abdomen was prepped and draped.  A midline incision was made. It was carried down through the subcutaneous tissue and fascia. The abdomen was entered carefully. The small bowel loops appeared distended and congested. But the bowel is viable and no ischemic change. The obstruction appeared to be in distal bowel. Due to diffuse distention of bowel, we had to make a generous midline incision for exposure so exploration could be safely performed to identify the problem. Bookwalter was placed to help with retraction and exposure. We first eviscerated majority of the small bowel loops. We traced distally and noted the terminal ileum was tightly adhered to the pelvic rim and retroperitoneum which caused kinking and a stricture at the terminal ileum about 20cm from the cecum. We freed the adhesions and straightened out the bowel. We also carefully incised the scar tissue around the narrowing site. After that, the bowel content could be easily milked down distally to cecum. We then run the small bowel proximally. Again the bowel appeared viable. However, we encountered some dense adhesions in the LUQ. The omentum was tightly stuck to the small bowel and anterior abdominal wall. Additional adhesiolysis was performed to free the omentum off the abdominal wall and small bowel. During the process, serosa tear was made in the proximal jejunum close to ligament of Treitz, which was unavoidable due to dense adhesions. The tear was repaired with Lembert stitches of 3-0 Vicryl. We then ran the small bowel again to make sure no other injury occurred. The stomach was very distended. We confirmed the position of NGT and decompressed the stomach. Hemostasis was confirmed. The fascia  was then closed with looped #0 PDS. The subcutaneous tissue was irrigated and closed with 3-0 Vicryl. The skin was closed with staples. Dressing and ostomy appliance were then applied.             Evidence of Infection: No   Complications:  None; patient tolerated the procedure well.    Disposition: PACU - hemodynamically stable.  Condition: stable         Task Performed by RNFA or Surgical Assistant:  Please note that we will be billing for my physician's assistant who was critical and necessary for successful completion of this case including positioning, prepping and draping patient, helping with retraction and exposure, suture management, and wound closure. There were no qualified residents available to assist.        Attending Attestation: I was present and scrubbed for the entire procedure.    Wing Lynch  Phone Number: [phone]

## 2025-08-07 ENCOUNTER — APPOINTMENT (OUTPATIENT)
Dept: RADIOLOGY | Facility: HOSPITAL | Age: 89
DRG: 335 | End: 2025-08-07
Payer: MEDICARE

## 2025-08-07 LAB
ANION GAP SERPL CALC-SCNC: 14 MMOL/L (ref 10–20)
BNP SERPL-MCNC: 146 PG/ML (ref 0–99)
BUN SERPL-MCNC: 66 MG/DL (ref 6–23)
CALCIUM SERPL-MCNC: 8.1 MG/DL (ref 8.6–10.3)
CHLORIDE SERPL-SCNC: 100 MMOL/L (ref 98–107)
CO2 SERPL-SCNC: 25 MMOL/L (ref 21–32)
CREAT SERPL-MCNC: 1.56 MG/DL (ref 0.5–1.3)
EGFRCR SERPLBLD CKD-EPI 2021: 42 ML/MIN/1.73M*2
ERYTHROCYTE [DISTWIDTH] IN BLOOD BY AUTOMATED COUNT: 14.6 % (ref 11.5–14.5)
GLUCOSE SERPL-MCNC: 130 MG/DL (ref 74–99)
HCT VFR BLD AUTO: 37.5 % (ref 41–52)
HGB BLD-MCNC: 12.7 G/DL (ref 13.5–17.5)
MAGNESIUM SERPL-MCNC: 2.36 MG/DL (ref 1.6–2.4)
MCH RBC QN AUTO: 30.6 PG (ref 26–34)
MCHC RBC AUTO-ENTMCNC: 33.9 G/DL (ref 32–36)
MCV RBC AUTO: 90 FL (ref 80–100)
NRBC BLD-RTO: 0 /100 WBCS (ref 0–0)
PHOSPHATE SERPL-MCNC: 2.9 MG/DL (ref 2.5–4.9)
PLATELET # BLD AUTO: 189 X10*3/UL (ref 150–450)
POTASSIUM SERPL-SCNC: 3.4 MMOL/L (ref 3.5–5.3)
RBC # BLD AUTO: 4.15 X10*6/UL (ref 4.5–5.9)
SODIUM SERPL-SCNC: 136 MMOL/L (ref 136–145)
UFH PPP CHRO-ACNC: 0.4 IU/ML (ref ?–1.1)
WBC # BLD AUTO: 11.5 X10*3/UL (ref 4.4–11.3)

## 2025-08-07 PROCEDURE — 74018 RADEX ABDOMEN 1 VIEW: CPT

## 2025-08-07 PROCEDURE — 36415 COLL VENOUS BLD VENIPUNCTURE: CPT | Performed by: INTERNAL MEDICINE

## 2025-08-07 PROCEDURE — 2500000001 HC RX 250 WO HCPCS SELF ADMINISTERED DRUGS (ALT 637 FOR MEDICARE OP): Performed by: SURGERY

## 2025-08-07 PROCEDURE — 82374 ASSAY BLOOD CARBON DIOXIDE: CPT | Performed by: PHYSICIAN ASSISTANT

## 2025-08-07 PROCEDURE — 71045 X-RAY EXAM CHEST 1 VIEW: CPT | Performed by: RADIOLOGY

## 2025-08-07 PROCEDURE — 2500000001 HC RX 250 WO HCPCS SELF ADMINISTERED DRUGS (ALT 637 FOR MEDICARE OP): Performed by: INTERNAL MEDICINE

## 2025-08-07 PROCEDURE — 85027 COMPLETE CBC AUTOMATED: CPT | Performed by: PHYSICIAN ASSISTANT

## 2025-08-07 PROCEDURE — 84100 ASSAY OF PHOSPHORUS: CPT | Performed by: PHYSICIAN ASSISTANT

## 2025-08-07 PROCEDURE — 85520 HEPARIN ASSAY: CPT | Performed by: INTERNAL MEDICINE

## 2025-08-07 PROCEDURE — 83735 ASSAY OF MAGNESIUM: CPT | Performed by: PHYSICIAN ASSISTANT

## 2025-08-07 PROCEDURE — 83880 ASSAY OF NATRIURETIC PEPTIDE: CPT | Performed by: INTERNAL MEDICINE

## 2025-08-07 PROCEDURE — 2500000004 HC RX 250 GENERAL PHARMACY W/ HCPCS (ALT 636 FOR OP/ED): Performed by: INTERNAL MEDICINE

## 2025-08-07 PROCEDURE — 99232 SBSQ HOSP IP/OBS MODERATE 35: CPT | Performed by: INTERNAL MEDICINE

## 2025-08-07 PROCEDURE — 1200000002 HC GENERAL ROOM WITH TELEMETRY DAILY

## 2025-08-07 PROCEDURE — 2500000004 HC RX 250 GENERAL PHARMACY W/ HCPCS (ALT 636 FOR OP/ED): Performed by: SURGERY

## 2025-08-07 PROCEDURE — 80048 BASIC METABOLIC PNL TOTAL CA: CPT | Performed by: PHYSICIAN ASSISTANT

## 2025-08-07 PROCEDURE — 2500000004 HC RX 250 GENERAL PHARMACY W/ HCPCS (ALT 636 FOR OP/ED): Performed by: PHYSICIAN ASSISTANT

## 2025-08-07 PROCEDURE — 74018 RADEX ABDOMEN 1 VIEW: CPT | Performed by: RADIOLOGY

## 2025-08-07 PROCEDURE — 2500000001 HC RX 250 WO HCPCS SELF ADMINISTERED DRUGS (ALT 637 FOR MEDICARE OP): Performed by: PHYSICIAN ASSISTANT

## 2025-08-07 RX ORDER — SODIUM CHLORIDE, SODIUM LACTATE, POTASSIUM CHLORIDE, CALCIUM CHLORIDE 600; 310; 30; 20 MG/100ML; MG/100ML; MG/100ML; MG/100ML
100 INJECTION, SOLUTION INTRAVENOUS CONTINUOUS
Status: DISCONTINUED | OUTPATIENT
Start: 2025-08-07 | End: 2025-08-07

## 2025-08-07 RX ORDER — SODIUM CHLORIDE, SODIUM LACTATE, POTASSIUM CHLORIDE, CALCIUM CHLORIDE 600; 310; 30; 20 MG/100ML; MG/100ML; MG/100ML; MG/100ML
100 INJECTION, SOLUTION INTRAVENOUS CONTINUOUS
Status: ACTIVE | OUTPATIENT
Start: 2025-08-07 | End: 2025-08-08

## 2025-08-07 RX ORDER — ACETAMINOPHEN 10 MG/ML
1000 INJECTION, SOLUTION INTRAVENOUS EVERY 6 HOURS
Status: COMPLETED | OUTPATIENT
Start: 2025-08-07 | End: 2025-08-08

## 2025-08-07 RX ORDER — POTASSIUM CHLORIDE 14.9 MG/ML
20 INJECTION INTRAVENOUS
Status: COMPLETED | OUTPATIENT
Start: 2025-08-07 | End: 2025-08-07

## 2025-08-07 RX ORDER — HEPARIN SODIUM 10000 [USP'U]/100ML
0-4000 INJECTION, SOLUTION INTRAVENOUS CONTINUOUS
Status: DISCONTINUED | OUTPATIENT
Start: 2025-08-07 | End: 2025-08-08

## 2025-08-07 RX ADMIN — ONDANSETRON 4 MG: 2 INJECTION, SOLUTION INTRAMUSCULAR; INTRAVENOUS at 06:17

## 2025-08-07 RX ADMIN — ACETAMINOPHEN 1000 MG: 10 INJECTION INTRAVENOUS at 11:16

## 2025-08-07 RX ADMIN — PHENOL 1 SPRAY: 1.5 LIQUID ORAL at 11:30

## 2025-08-07 RX ADMIN — Medication: at 21:00

## 2025-08-07 RX ADMIN — POTASSIUM CHLORIDE 20 MEQ: 14.9 INJECTION, SOLUTION INTRAVENOUS at 13:33

## 2025-08-07 RX ADMIN — POTASSIUM CHLORIDE 20 MEQ: 14.9 INJECTION, SOLUTION INTRAVENOUS at 15:54

## 2025-08-07 RX ADMIN — ACETAMINOPHEN 1000 MG: 10 INJECTION INTRAVENOUS at 18:19

## 2025-08-07 RX ADMIN — SODIUM CHLORIDE, POTASSIUM CHLORIDE, SODIUM LACTATE AND CALCIUM CHLORIDE 100 ML/HR: 600; 310; 30; 20 INJECTION, SOLUTION INTRAVENOUS at 09:38

## 2025-08-07 RX ADMIN — ACETAMINOPHEN 1000 MG: 10 INJECTION INTRAVENOUS at 01:02

## 2025-08-07 RX ADMIN — SODIUM CHLORIDE, POTASSIUM CHLORIDE, SODIUM LACTATE AND CALCIUM CHLORIDE 100 ML/HR: 600; 310; 30; 20 INJECTION, SOLUTION INTRAVENOUS at 19:43

## 2025-08-07 RX ADMIN — ACETAMINOPHEN 1000 MG: 10 INJECTION INTRAVENOUS at 22:45

## 2025-08-07 RX ADMIN — BISACODYL 10 MG: 10 SUPPOSITORY RECTAL at 11:16

## 2025-08-07 RX ADMIN — PHENOL 1 SPRAY: 1.5 LIQUID ORAL at 09:30

## 2025-08-07 RX ADMIN — HYDROMORPHONE HYDROCHLORIDE 0.2 MG: 1 INJECTION, SOLUTION INTRAMUSCULAR; INTRAVENOUS; SUBCUTANEOUS at 11:49

## 2025-08-07 RX ADMIN — PANTOPRAZOLE SODIUM 40 MG: 40 INJECTION, POWDER, FOR SOLUTION INTRAVENOUS at 11:16

## 2025-08-07 RX ADMIN — Medication: at 07:43

## 2025-08-07 RX ADMIN — ASPIRIN 81 MG: 81 TABLET, DELAYED RELEASE ORAL at 11:16

## 2025-08-07 RX ADMIN — HEPARIN SODIUM 1000 UNITS/HR: 10000 INJECTION, SOLUTION INTRAVENOUS at 14:12

## 2025-08-07 RX ADMIN — SODIUM CHLORIDE, POTASSIUM CHLORIDE, SODIUM LACTATE AND CALCIUM CHLORIDE 100 ML/HR: 600; 310; 30; 20 INJECTION, SOLUTION INTRAVENOUS at 21:58

## 2025-08-07 RX ADMIN — POLYETHYLENE GLYCOL 3350 17 G: 17 POWDER, FOR SOLUTION ORAL at 11:16

## 2025-08-07 RX ADMIN — ACETAMINOPHEN 1000 MG: 10 INJECTION INTRAVENOUS at 05:03

## 2025-08-07 RX ADMIN — SODIUM CHLORIDE, POTASSIUM CHLORIDE, SODIUM LACTATE AND CALCIUM CHLORIDE 100 ML/HR: 600; 310; 30; 20 INJECTION, SOLUTION INTRAVENOUS at 00:11

## 2025-08-07 ASSESSMENT — COGNITIVE AND FUNCTIONAL STATUS - GENERAL
EATING MEALS: A LITTLE
MOVING FROM LYING ON BACK TO SITTING ON SIDE OF FLAT BED WITH BEDRAILS: A LITTLE
DRESSING REGULAR UPPER BODY CLOTHING: A LITTLE
TOILETING: A LITTLE
TOILETING: A LITTLE
HELP NEEDED FOR BATHING: A LITTLE
PERSONAL GROOMING: A LITTLE
DAILY ACTIVITIY SCORE: 18
STANDING UP FROM CHAIR USING ARMS: A LITTLE
DRESSING REGULAR LOWER BODY CLOTHING: A LITTLE
TURNING FROM BACK TO SIDE WHILE IN FLAT BAD: A LITTLE
WALKING IN HOSPITAL ROOM: A LOT
HELP NEEDED FOR BATHING: A LITTLE
STANDING UP FROM CHAIR USING ARMS: A LITTLE
EATING MEALS: A LITTLE
DRESSING REGULAR UPPER BODY CLOTHING: A LITTLE
TURNING FROM BACK TO SIDE WHILE IN FLAT BAD: A LITTLE
CLIMB 3 TO 5 STEPS WITH RAILING: A LOT
MOVING FROM LYING ON BACK TO SITTING ON SIDE OF FLAT BED WITH BEDRAILS: A LITTLE
MOBILITY SCORE: 16
MOVING TO AND FROM BED TO CHAIR: A LITTLE
MOBILITY SCORE: 16
MOVING TO AND FROM BED TO CHAIR: A LITTLE
CLIMB 3 TO 5 STEPS WITH RAILING: A LOT
DAILY ACTIVITIY SCORE: 18
WALKING IN HOSPITAL ROOM: A LOT
DRESSING REGULAR LOWER BODY CLOTHING: A LITTLE
PERSONAL GROOMING: A LITTLE

## 2025-08-07 ASSESSMENT — PAIN - FUNCTIONAL ASSESSMENT: PAIN_FUNCTIONAL_ASSESSMENT: 0-10

## 2025-08-07 ASSESSMENT — PAIN SCALES - GENERAL
PAINLEVEL_OUTOF10: 6
PAINLEVEL_OUTOF10: 0 - NO PAIN

## 2025-08-07 NOTE — PROGRESS NOTES
Patient: Alexis Armijo  Room/bed: 252/252-B  Admitted on: 8/5/2025    Age: 89 y.o.   Gender: male  Code Status:  Full Code   Admitting Dx: Small bowel obstruction (Multi) [K56.609]  Generalized abdominal pain [R10.84]  Diarrhea, unspecified type [R19.7]  Nausea and vomiting, unspecified vomiting type [R11.2]    MRN: [id number]  PCP: Jonah Velez,        Subjective   Says he is miserable. Tube is bothering throat, he is dehydrated and he hasn't eaten for days. Says abdomen discomfort is negligible    Objective    Physical Exam  Constitutional:       Appearance: He is ill-appearing.   HENT:      Head: Normocephalic.      Ears:      Comments: [place]     Nose:      Comments: NG tube in place     Mouth/Throat:      Mouth: Mucous membranes are dry.     Eyes:      Extraocular Movements: Extraocular movements intact.      Pupils: Pupils are equal, round, and reactive to light.     Neck:      Comments: No jvd  Cardiovascular:      Pulses: Normal pulses.      Comments: Irregular, rate adequate  3-4/6 slightly harsh systolic murmur  Pulmonary:      Comments: Shallow respirations.,  Decreased breath sounds in bases. No overt rhonchi  Abdominal:      Comments: Binder in place   Genitourinary:     Comments: Devine in place.     Musculoskeletal:      Comments: No edema. Faint distal pulses  Severe degenerative changes of dip and pip joints of hands     Skin:     General: Skin is warm and dry.     Neurological:      General: No focal deficit present.      Mental Status: He is alert.     Psychiatric:         Mood and Affect: Mood normal.        Temp:  [36.3 °C (97.3 °F)-36.6 °C (97.9 °F)] 36.3 °C (97.3 °F)  Heart Rate:  [65-99] 65  Resp:  [16-18] 17  BP: (113-159)/(68-86) 156/86    Vitals:    08/05/25 1608   Weight: 87.5 kg (193 lb)           I/Os    Intake/Output Summary (Last 24 hours) at 8/7/2025 1048  Last data filed at 8/7/2025 0705  Gross per 24 hour   Intake 2373.34 ml   Output 1400 ml   Net 973.34 ml       Labs:   Results  "from last 72 hours   Lab Units 08/07/25  0526 08/06/25  0543 08/05/25  1618   SODIUM mmol/L 136 136 133*   POTASSIUM mmol/L 3.4* 3.1* 2.8*   CHLORIDE mmol/L 100 100 99   CO2 mmol/L 25 25 24   BUN mg/dL 66* 51* 49*   CREATININE mg/dL 1.56* 1.34* 1.30   GLUCOSE mg/dL 130* 171* 171*   CALCIUM mg/dL 8.1* 8.3* 8.7   ANION GAP mmol/L 14 14 13   EGFR mL/min/1.73m*2 42* 51* 53*   PHOSPHORUS mg/dL 2.9  --  2.2*      Results from last 72 hours   Lab Units 08/07/25 0526 08/06/25  0543 08/05/25  1618   WBC AUTO x10*3/uL 11.5* 10.1 6.7   HEMOGLOBIN g/dL 12.7* 13.2* 13.7   HEMATOCRIT % 37.5* 38.5* 39.6*   PLATELETS AUTO x10*3/uL 189 187 177   LYMPHO PCT MAN %  --   --  22.0   MONO PCT MAN %  --   --  14.0   EOSINO PCT MAN %  --   --  0.0      Lab Results   Component Value Date    CALCIUM 8.1 (L) 08/07/2025    PHOS 2.9 08/07/2025      No results found for: \"CRP\"   [unfilled]     Micro/ID:   No results found for the last 90 days.                   No lab exists for component: \"AGALPCRNB\"   .ID  No results found for: \"URINECULTURE\", \"BLOODCULT\", \"CSFCULTSMEAR\"    Images:  XR chest abdomen for OG NG placement  Narrative: Interpreted By:  Mel Serrano,   STUDY:  XR CHEST ABDOMEN FOR OG NG PLACEMENT; ;  8/6/2025 9:59 am      INDICATION:  Signs/Symptoms:vomiting around tube, poor suction, check for  positioning or kinking.          COMPARISON:  08/05/2025      ACCESSION NUMBER(S):  [id number]      ORDERING CLINICIAN:  KESHAV SARGENT      FINDINGS:  Nasogastric tube is noted with its tip projecting over the left upper  quadrant in the expected location of the gastric fundus. There is  mild diffuse gaseous distention of stomach, small bowel and large  bowel loops. These could be related to postsurgical ileus. Visualized  lungs are clear. Cardiac silhouette is mildly enlarged. There are  bandlike airspace opacities in the left lung base, likely favored to  represent atelectasis. No large pleural effusions or pneumothorax  within " limits of portable technique. No acute osseous findings.  Multiple metallic clips project over the midline abdomen and pelvis,  likely related to immediate postsurgical status.      Impression: 1. Nasogastric tube with its tip projecting over the expected  location of the stomach in the left upper quadrant.  2. Mild diffuse gaseous distention of stomach and bowel loops as  described above. This is most likely favored to represent  postsurgical ileus, especially given history of recent surgery.  However recommend clinical correlation for bowel obstruction.  3. Airspace opacity in the left lung base, likely favored to  represent atelectasis. However developing pneumonitis can also be  considered in the differential in appropriate clinical setting.  Recommend clinical and laboratory correlation.              MACRO:  None      Signed by: Mel Serrano 8/6/2025 10:32 AM  Dictation workstation:   [id number]  ECG 12 lead  Sinus rhythm with Premature supraventricular complexes  Nonspecific T wave abnormality  Abnormal ECG  When compared with ECG of 28-JUL-2025 20:37, (unconfirmed)  Sinus rhythm has replaced Electronic atrial pacemaker  Nonspecific T wave abnormality, worse in Anterolateral leads       Meds    Scheduled medications  Scheduled Medications[1]  Continuous medications  Continuous Medications[2]  PRN medications  PRN Medications[3]     Assessment and Plan    Alexis Armijo is a 89 y.o. male   POD 2, Ex lap with JOHN. NG output seems to be declining, still very dark. NPO, continue fluids, around 1.8 liters  positive. Bun is rising, may need more fluids. Urine output adequate  AF/AVR/CAD.  Non mi trop elevation. Appreciate cards assistance. Hgb seems stable, should be ok to resume anticoagulation-eliquis vs heparin.   Hypokalemia. Supplement with fluids, monitor daily.   Protein prisca malnutrition. Consider PPN , depending on how long we think his npo status will need to be maintained.   Hypertension, stable.  Trent Last MD         [1] acetaminophen, 1,000 mg, intravenous, q6h  [Held by provider] apixaban, 2.5 mg, oral, BID  aspirin, 81 mg, oral, Daily  bisacodyl, 10 mg, rectal, Daily  pantoprazole, 40 mg, intravenous, Daily  polyethylene glycol, 17 g, oral, Daily  [2] lactated Ringer's, 100 mL/hr, Last Rate: 100 mL/hr (08/07/25 0938)  lactated Ringer's, 100 mL/hr  [3] PRN medications: albuterol, HYDROmorphone, HYDROmorphone, naloxone, ondansetron **OR** ondansetron, oxygen, phenoL

## 2025-08-07 NOTE — PROGRESS NOTES
General/Trauma Surgery Daily Progress Note    Subjective   NG pulled out to 35cm by patient. Re-advanced and confirmed with XR. Abdominal pain more tolerable today, however distended. NG has dark bilious output. Continues with hiccups. Nursing reported bout of emesis this morning as well.        Objective   Last Recorded Vitals:  Blood pressure 156/86, pulse 65, temperature 36.3 °C (97.3 °F), temperature source Temporal, resp. rate 17, height 1.829 m (6'), weight 87.5 kg (193 lb), SpO2 90%.    Intake/Output last 3 Shifts:  I/O last 3 completed shifts:  In: 4328.3 (49.4 mL/kg) [I.V.:3868.3 (44.2 mL/kg); NG/GT:260; IV Piggyback:200]  Out: 1775 (20.3 mL/kg) [Urine:900 (0.3 mL/kg/hr); Emesis/NG output:850; Blood:25]  Weight: 87.5 kg     Pain Score:  0-10 (Numeric) Pain Score: 3     Physical Exam:  Constitutional: No acute distress, sitting up in bed. Ill appearing.   Neuro: Alert, oriented. Follows commands.   Eyes: EOMI. No scleral icterus. Conjunctiva pink.  ENT: Dry. NG with high output, bilious.  Heart: Nontachycardic.   Respiratory: No increased work of breathing or audible wheeze. Nasal cannula in place.   Abdomen: Soft, distended abdomen. Appropriately tender. Binder in place.   MSK: Moves all extremities.  Vascular: Palpable pulses throughout, no pitting edema.  Skin: No rashes.   Psychological: Appropriate mood and behavior.            Relevant Results  Laboratory Results:  CBC:   Lab Results   Component Value Date    WBC 11.5 (H) 08/07/2025    RBC 4.15 (L) 08/07/2025    HGB 12.7 (L) 08/07/2025    HCT 37.5 (L) 08/07/2025     08/07/2025       RFP:   Lab Results   Component Value Date     08/07/2025    K 3.4 (L) 08/07/2025     08/07/2025    CO2 25 08/07/2025    BUN 66 (H) 08/07/2025    CREATININE 1.56 (H) 08/07/2025    CALCIUM 8.1 (L) 08/07/2025    MG 2.36 08/07/2025    PHOS 2.9 08/07/2025        LFTs:   Lab Results   Component Value Date    PROT 6.2 (L) 08/05/2025    ALBUMIN 3.7 08/05/2025     BILITOT 1.4 (H) 08/05/2025    ALKPHOS 50 08/05/2025    AST 33 08/05/2025    ALT 52 08/05/2025           Imaging  XR chest abdomen for OG NG placement 08/07/2025    Narrative  Interpreted By:  Ish Hicks,  STUDY:  XR CHEST ABDOMEN FOR OG NG PLACEMENT    INDICATION:  Signs/Symptoms:patient pulled out NG. Replaced. Please confirm  placement.    COMPARISON:  August 6    ACCESSION NUMBER(S):  [id number]    ORDERING CLINICIAN:  KESHAV SARGENT    FINDINGS:  Nasogastric tube tip at the gastric cardia with the side port at the  level of the distal esophagus.    Some mild basilar atelectasis in the chest.    Findings likely of small-bowel dilatation similar to previous.    Impression  Nasogastric tube tip at the gastric cardia with the side port at the  level of the distal esophagus.    Signed by: Ish Hicks 8/7/2025 11:05 AM  Dictation workstation:   [id number]      Cardiology, Vascular, and Other Imaging  ECG 12 lead  Result Date: 8/6/2025  Sinus rhythm with Premature supraventricular complexes Nonspecific T wave abnormality Abnormal ECG When compared with ECG of 28-JUL-2025 20:37, (unconfirmed) Sinus rhythm has replaced Electronic atrial pacemaker Nonspecific T wave abnormality, worse in Anterolateral leads             Assessment/Plan   This is a 89 y.o. male POD 2 from exploratory laparotomy with lysis of adhesions for high grade bowel obstruction. Anticipate post operative ileus over the next several days. NG with thick bile. Medicine and cardiology are consulted to help with patient management.         Plan:   -- NG to LIWS, flush multiple times per day. Order in place  -- NPO, limit PO meds until NG output improves. OK for sips with meds and clamp NG for 30 minutes for meds that are needed  -- IV Ofirmev, prn dilaudid   -- Bowel regimen with suppository   -- Daily CBC, BMP, Mg, Phos. Will replete as needed  -- PT/OT ordered  -- Respiratory consulted  -- Bedside chair and activity would benefit greatly  -- Medicine  and cardiology is consulted, appreciate any recommendations  -- Resumed home ASA. Heparin gtt started by medicine team.      Dispo:   -- Await return of bowel function. Anticipate prolonged post operative ileus.               Discussed with Dr. Lynch who is in agreement with plan. Please secure chat with questions.     Rosa Field PA-C

## 2025-08-07 NOTE — CARE PLAN
The patient's goals for the shift include      The clinical goals for the shift include patient will report toelrable pain level this shift    Over the shift, the patient did not make progress toward the following goals. Barriers to progression include patient continues to be in pain with tylenol management . Recommendations to address these barriers include continue with plan of care.

## 2025-08-07 NOTE — DOCUMENTATION CLARIFICATION NOTE
"    PATIENT:               CECIL HOFFMAN  ACCT #:                  [id number]  MRN:                       [id number]  :                       1935  ADMIT DATE:       2025 8:30 PM  DISCH DATE:        2025 5:15 PM  RESPONDING PROVIDER #:        [id number]          PROVIDER RESPONSE TEXT:    Chronic Diastolic Congestive Heart Failure    CDI QUERY TEXT:    Clarification        Instruction:    Based on your assessment of the patient and the clinical information, please provide the requested documentation by clicking on the appropriate radio button and enter any additional information if prompted.    Question: Please further clarify the type and acuity of congestive heart failure    When answering this query, please exercise your independent professional judgment. The fact that a question is being asked, does not imply that any particular answer is desired or expected.    The patient's clinical indicators include:  Clinical Information:  89 year old admitted with TIA, unable to do MRI due to pacemaker.  History of CHF.    Clinical Indicators:    - on , 176 on     -TTE done on   \"1.Left ventricular ejection fraction is normal calculated by Gallo's biplane at 59%.  2.Spectral Doppler shows a Grade I (impaired relaxation pattern) of left ventricular diastolic filling with normal left atrial filling pressure.  3.Abnormal septal motion consistent with post-operative status.  4.No left ventricular thrombus visualized.  5.There is mildly reduced right ventricular systolic function.  6.Mildly enlarged right ventricle.  7.The left atrium is mildly dilated.  8.The right atrium is moderate to severely dilated.  9.There is mild mitral valve stenosis with a doppler estimated mean diastolic gradient of 3 mmHg.  10.There is moderate mitral annular calcification.  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  12.Echo findings are consistent with normal aortic valve prosthesis structure and function.\"    Treatment:  Cardiology consult.  echo.  Pacemaker interrogated.    Risk Factors:  Elderly, hx CHF, CABG, PCI, JENELLE.  Pacemaker.  HTN, previous MI.  Options provided:  -- Acute Systolic Congestive Heart Failure  -- Acute on Chronic Systolic Congestive Heart Failure  -- Chronic Systolic Congestive Heart Failure  -- Acute Diastolic Congestive Heart Failure  -- Acute on Chronic Diastolic Congestive Heart Failure  -- Chronic Diastolic Congestive Heart Failure  -- Acute combined systolic/diastolic Congestive Heart Failure  -- Acute on Chronic combined systolic/diastolic Congestive Heart Failure  -- Chronic combined systolic/diastolic Congestive Heart Failure  -- Other - I will add my own diagnosis  -- Refer to Clinical Documentation Reviewer    Query created by: Shonna Kessler on 8/6/2025 3:35 PM      Electronically signed by:  INDIA LAGOS 8/7/2025 8:20 AM          "

## 2025-08-07 NOTE — PROGRESS NOTES
"Alexis Vazquez" is a 89 y.o. male on day 1 of admission presenting with Small bowel obstruction (Multi).      Subjective   He is sitting up in the bed, denies abd pain. Does report intermittent shortness of breath.       Review of systems:  Constitutional: negative for fever, chills, or malaise  Neuro: negative for dizziness, headache, numbness, tingling  ENT: Negative for nasal congestion or sore throat  Resp: negative for shortness of breath, cough, or wheezing  CV: negative for chest pain, palpitations  GI: negative for abd pain, nausea, vomiting or diarrhea  : negative for dysuria, frequency, or urgency  Skin: negative for lesions, wounds, or rash  Musculoskeletal: Negative for weakness, myalgia, or arthralgia  Endocrine: Negative for polyuria or polydipsia         Objective   Constitutional: Well developed, awake/alert/oriented x3, no distress, alert and cooperative  Eyes: PERRL, EOMI, clear sclera  ENMT: mucous membranes moist, no apparent injury, no lesions seen  Head/Neck: Neck supple, no apparent injury, thyroid without mass or tenderness, No JVD, trachea midline, no bruits  Respiratory/Thorax: Patent airways, CTAB, normal breath sounds with good chest expansion, thorax symmetric  Cardiovascular: Regular, rate and rhythm, no murmurs, 2+ equal pulses of the extremities, normal S 1and S 2  Gastrointestinal: abd binder in place, BS hypoactive, NG tube to suction  Musculoskeletal: ROM intact, no joint swelling, normal strength  Extremities: normal extremities, no cyanosis edema, contusions or wounds, no clubbing  Neurological: alert and oriented x3, intact senses, motor, response and reflexes, normal strength  Lymphatic: No significant lymphadenopathy  Psychological: Appropriate mood and behavior  Skin: Warm and dry, no lesions, no rashes         Last Recorded Vitals  /85 (BP Location: Left arm, Patient Position: Lying)   Pulse 86   Temp 36.3 °C (97.3 °F) (Temporal)   Resp 18   Ht 1.829 m (6') "   Wt 87.5 kg (193 lb)   SpO2 95%   BMI 26.18 kg/m²     Intake/Output last 3 Shifts:  I/O last 3 completed shifts:  In: 4328.3 (49.4 mL/kg) [I.V.:3868.3 (44.2 mL/kg); NG/GT:260; IV Piggyback:200]  Out: 2975 (34 mL/kg) [Urine:900 (0.3 mL/kg/hr); Emesis/NG output:2050; Blood:25]  Weight: 87.5 kg   I/O this shift:  In: 50 [NG/GT:50]  Out: 400 [Emesis/NG output:400]    Relevant Results  Scheduled medications  Scheduled Medications[1]  Continuous medications  Continuous Medications[2]  PRN medications  PRN Medications[3]    Results for orders placed or performed during the hospital encounter of 08/05/25 (from the past 24 hours)   B-type natriuretic peptide   Result Value Ref Range     (H) 0 - 99 pg/mL   Magnesium   Result Value Ref Range    Magnesium 2.36 1.60 - 2.40 mg/dL   Phosphorus   Result Value Ref Range    Phosphorus 2.9 2.5 - 4.9 mg/dL   CBC   Result Value Ref Range    WBC 11.5 (H) 4.4 - 11.3 x10*3/uL    nRBC 0.0 0.0 - 0.0 /100 WBCs    RBC 4.15 (L) 4.50 - 5.90 x10*6/uL    Hemoglobin 12.7 (L) 13.5 - 17.5 g/dL    Hematocrit 37.5 (L) 41.0 - 52.0 %    MCV 90 80 - 100 fL    MCH 30.6 26.0 - 34.0 pg    MCHC 33.9 32.0 - 36.0 g/dL    RDW 14.6 (H) 11.5 - 14.5 %    Platelets 189 150 - 450 x10*3/uL   Basic Metabolic Panel   Result Value Ref Range    Glucose 130 (H) 74 - 99 mg/dL    Sodium 136 136 - 145 mmol/L    Potassium 3.4 (L) 3.5 - 5.3 mmol/L    Chloride 100 98 - 107 mmol/L    Bicarbonate 25 21 - 32 mmol/L    Anion Gap 14 10 - 20 mmol/L    Urea Nitrogen 66 (H) 6 - 23 mg/dL    Creatinine 1.56 (H) 0.50 - 1.30 mg/dL    eGFR 42 (L) >60 mL/min/1.73m*2    Calcium 8.1 (L) 8.6 - 10.3 mg/dL     *Note: Due to a large number of results and/or encounters for the requested time period, some results have not been displayed. A complete set of results can be found in Results Review.       XR chest abdomen for OG NG placement   Final Result   Nasogastric tube tip at the gastric cardia with the side port at the   level of the  distal esophagus.        Signed by: Ish Hicks 8/7/2025 11:05 AM   Dictation workstation:   [id number]      XR chest abdomen for OG NG placement   Final Result   1. Nasogastric tube with its tip projecting over the expected   location of the stomach in the left upper quadrant.   2. Mild diffuse gaseous distention of stomach and bowel loops as   described above. This is most likely favored to represent   postsurgical ileus, especially given history of recent surgery.   However recommend clinical correlation for bowel obstruction.   3. Airspace opacity in the left lung base, likely favored to   represent atelectasis. However developing pneumonitis can also be   considered in the differential in appropriate clinical setting.   Recommend clinical and laboratory correlation.                  MACRO:   None        Signed by: Mel Serrano 8/6/2025 10:32 AM   Dictation workstation:   [id number]      XR chest 1 view   Final Result   Support hardware as described above.        Cardiomegaly with mild interstitial prominence suggestive of   developing interstitial edema/CHF.        Subtle bibasilar and retrocardiac airspace opacities concerning for   developing airspace disease. Clinical correlation and attention on   continued short-term follow-up is advised.        MACRO:   None        Signed by: Corbin Mohr 8/5/2025 6:49 PM   Dictation workstation:   [id number]      CT abdomen pelvis w IV contrast   Final Result   High-grade small bowel obstruction with transition point in the   central mesentery image axial image 91). Proximal to this the bowel   is markedly dilated with perienteric fluid measuring up to 5 cm in   diameter. There is pneumatosis versus pseudo pneumatosis involving   several of the jejunal loops, the former favored. Distal to the   transition point in the small bowel is relatively decompressed with   fluid-filled there is a 2nd transition point in near anatomic   proximity to the 1st transition  point and there swirling of the   mesentery in this location. Therefore there is concern for a volvulus   as cause of the bowel obstruction with a close loop mechanism not   excluded. Surgical consultation is recommended.        Actively inflamed urinary bladder wall suspicious for cystitis.             MACRO:   Lenard Seo discussed the significance and urgency of this   critical finding via EPIC secure chat with confirmation of receipt   with DR SHERLEY ARMSTRONG on 8/5/2025 at 5:56 pm.  (**-RCF-**)   Findings:  See findings.        Signed by: Lenard Seo 8/5/2025 5:56 PM   Dictation workstation:   [id number]          Transthoracic Echo Complete  Result Date: 7/30/2025   [facility], [address] [place] [address]               Tel [phone] and Fax [phone] TRANSTHORACIC ECHOCARDIOGRAM REPORT  Patient Name:       CECIL Del Rio Physician:    [id number] Johnie Rowe MD Study Date:         7/30/2025           Ordering Provider:    [id number] MIRZA ELIZABETH MRN/PID:            [id number]            Fellow: Accession#:         [id number]        Nurse:                Natalie Golden RN Date of Birth/Age:  1935 / 89      Sonographer:          Christine morales RDCS Gender assigned at  M                   Additional Staff: Birth: Height:             180.34 cm           Admit Date:           7/28/2025 Weight:             89.36 kg            Admission Status:     Inpatient -                                                               Routine BSA / BMI:          2.10 m2 / 27.48     Encounter#:           [id number]                     kg/m2 Blood Pressure:     155/81 mmHg         Department Location:  [facility]                                                                Invasive Study Type:    TRANSTHORACIC ECHO (TTE) COMPLETE Diagnosis/ICD: Cerebral Infarction, unspecified-I63.9 Indication:    Cerebrovascular Accident CPT Code:      Echo Complete w Full Doppler-45036 Patient History: Pertinent History: HTN, CAD, Hyperlipidemia and CHF. H/O CABG, PCI, AVR #25mm                    Hoffmann, PAD. Study Detail: The following Echo studies were performed: 2D, M-Mode, Doppler and               color flow. Image quality for this study is suboptimal.               Technically challenging study due to patient lying in supine               position and poor acoustic windows. Definity used as a contrast               agent for endocardial border definition and agitated saline used               as a contrast agent for intraseptal flow evaluation. Total               contrast used for this procedure was 1.5 mL via IV push. The               patient was asleep.  PHYSICIAN INTERPRETATION: Left Ventricle: Left ventricular ejection fraction is normal calculated by Gallo's biplane at 59%. There are no regional left ventricular wall motion abnormalities. The left ventricular cavity size is upper limits of normal. There is normal septal and mildly increased posterior left ventricular wall thickness. There is left ventricular concentric remodeling. Abnormal (paradoxical) septal motion consistent with post-operative status. Spectral Doppler shows a Grade I (impaired relaxation pattern) of left ventricular diastolic filling with normal left atrial filling pressure. There is no definite left ventricular thrombus visualized. Left Atrium: The left atrium is mildly dilated. There is a mobile interatrial septum. A bubble study using agitated saline was performed. Bubble study is negative. Right Ventricle: The right ventricle is mildly enlarged. There is mildly reduced right ventricular systolic function. A device is visualized in the right ventricle. Right  Atrium: The right atrium is moderate to severely dilated. There is a device visualized in the right atrium. Aortic Valve: There is a prosthetic aortic valve present. The aortic valve area by VTI is 1.69 cmï¿½ with a peak velocity of 2.35 m/s. The peak and mean gradients are 22 mmHg and 13 mmHg, respectively with a dimensionless index of 0.54. 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 Echo findings are consistent with normal aortic valve prosthesis structure and function. There is trace aortic valve regurgitation. Valve is not well visualized. Mitral Valve: The mitral valve is mild to moderately thickened. The doppler estimated peak and mean diastolic pressure gradients are 6.5 mmHg and 3 mmHg, respectively. There is evidence of mild mitral valve stenosis. There is moderate mitral annular calcification. The mean gradient of the mitral valve is 3 mmHg. There is trace mitral valve regurgitation. The E Vmax is 0.98 m/s. Tricuspid Valve: The tricuspid valve is structurally normal. There is trace to mild tricuspid regurgitation. Pulmonic Valve: The pulmonic valve is not well visualized. There is physiologic pulmonic valve regurgitation. Pericardium: There is no pericardial effusion noted. Aorta: The aortic root is normal. The aortic root is at the upper limits of normal size. Systemic Veins: The inferior vena cava appears normal in size, IVC inspiratory collapse was not assessed. In comparison to the previous echocardiogram(s): Compared with study dated 2/13/2024,.  CONCLUSIONS:  1. Left ventricular ejection fraction is normal calculated by Gallo's biplane at 59%.  2. Spectral Doppler shows a Grade I (impaired relaxation pattern) of left ventricular diastolic filling with normal left atrial filling pressure.  3. Abnormal septal motion consistent with post-operative status.  4. No left ventricular thrombus visualized.  5. There is mildly reduced right ventricular  systolic function.  6. Mildly enlarged right ventricle.  7. The left atrium is mildly dilated.  8. The right atrium is moderate to severely dilated.  9. There is mild mitral valve stenosis with a doppler estimated mean diastolic gradient of 3 mmHg. 10. There is moderate mitral annular calcification. 11. 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 The peak and mean gradients are 22 mmHg and 13 mmHg respectively. 12. Echo findings are consistent with normal aortic valve prosthesis structure and function. QUANTITATIVE DATA SUMMARY:  2D MEASUREMENTS:          Normal Ranges: IVSd:            0.98 cm  (0.6-1.1cm) LVPWd:           1.07 cm  (0.6-1.1cm) LVIDd:           4.73 cm  (3.9-5.9cm) LVIDs:           3.30 cm LV Mass Index:   82 g/m2 LVEDV Index:     77 ml/m2 LV % FS          30.3 %  LEFT ATRIUM:                  Normal Ranges: LA Vol A4C:        74.3 ml    (22+/-6mL/m2) LA Vol A2C:        76.0 ml LA Vol BP:         76.3 ml LA Vol Index A4C:  35.5ml/m2 LA Vol Index A2C:  36.2 ml/m2 LA Vol Index BP:   36.4 ml/m2 LA Area A4C:       24.2 cm2 LA Area A2C:       24.1 cm2 LA Major Axis A4C: 6.7 cm LA Major Axis A2C: 6.5 cm LA Volume Index:   37.6 ml/m2 LA Vol A4C:        70.6 ml LA Vol A2C:        78.0 ml LA Vol Index BSA:  35.5 ml/m2  RIGHT ATRIUM:          Normal Ranges: RA Area A4C:  29.4 cm2  M-MODE MEASUREMENTS:         Normal Ranges: Ao Root:             3.80 cm (2.0-3.7cm) LAs:                 4.04 cm (2.7-4.0cm)  AORTA MEASUREMENTS:         Normal Ranges: Ao Sinus, d:        3.90 cm (2.1-3.5cm)  LV SYSTOLIC FUNCTION:                      Normal Ranges: EF-A4C View:    59 % (>=55%) EF-A2C View:    58 % EF-Biplane:     59 % LV EF Reported: 59 %  LV DIASTOLIC FUNCTION:             Normal Ranges: MV Peak E:             0.98 m/s    (0.7-1.2 m/s) MV Peak A:             1.28 m/s    (0.42-0.7 m/s) E/A Ratio:             0.76        (1.0-2.2) MV e'                  0.085 m/s    (>8.0) MV lateral e'          0.10 m/s MV medial e'           0.07 m/s MV A Dur:              193.77 msec E/e' Ratio:            11.47       (<8.0) PulmV Sys Jamie:         33.61 cm/s PulmV Boone Jamie:        25.01 cm/s PulmV S/D Jamie:         1.34 PulmV A Revs Jamie:      14.86 cm/s PulmV A Revs Dur:      117.65 msec  MITRAL VALVE:          Normal Ranges: MV Vmax:      1.27 m/s (<=1.3m/s) MV peak P.5 mmHg (<5mmHg) MV mean P.8 mmHg (<2mmHg) MV VTI:       52.27 cm (10-13cm) MV DT:        328 msec (150-240msec)  AORTIC VALVE:                      Normal Ranges: AoV Vmax:                2.35 m/s  (<=1.7m/s) AoV Peak P.2 mmHg (<20mmHg) AoV Mean P.6 mmHg (1.7-11.5mmHg) LVOT Max Jamie:            1.19 m/s  (<=1.1m/s) AoV VTI:                 48.18 cm  (18-25cm) LVOT VTI:                26.14 cm LVOT Diameter:           1.99 cm   (1.8-2.4cm) AoV Area, VTI:           1.69 cm2  (2.5-5.5cm2) AoV Area,Vmax:           1.57 cm2  (2.5-4.5cm2) AoV Dimensionless Index: 0.54  RIGHT VENTRICLE: RV Basal 4.50 cm RV Mid   2.80 cm RV Major 8.2 cm TAPSE:   15.0 mm RV s'    0.08 m/s  TRICUSPID VALVE/RVSP:          Normal Ranges: Peak TR Velocity:     2.44 m/s  PULMONIC VALVE:          Normal Ranges: PV Max Jamie:     1.0 m/s  (0.6-0.9m/s) PV Max P.2 mmHg  PULMONARY VEINS: PulmV A Revs Dur: 117.65 msec PulmV A Revs Jamie: 14.86 cm/s PulmV Boone Jamie:   25.01 cm/s PulmV S/D Jamie:    1.34 PulmV Sys Jamie:    33.61 cm/s  [id number] Johnie Rowe MD Electronically signed on 2025 at 2:29:48 PM  ** Final **            Assessment/Plan   Assessment & Plan  Small bowel obstruction (Multi)    Diastolic congestive heart failure    Stented coronary artery    Chronic renal insufficiency    Anticoagulant long-term use    Small bowel obstruction  -s/p ex lap with lysis of adhesions  -Surgery following  -AC on hold  -NG tube to suction  -NPO  -Pain meds     2. Paroxysmal atrial fibrillation  -Currently SR  -Eliquis on  hold for post op  -hgb stable, suggest starting heparin gtt until able to take PO  -Planning for outpt watchman     3. Hypertension  -stable  -monitor     4. Elevated troponins-Non MI elevation  -Hx of CAD   -s/p PCI/JENELLE to D1 and mRCA in 2006  -s/p CABG (LIMA-LAD, [id number]) in 2018  -troponin 57, 53  -I reviewed the EKG, no acute changes, ST elevation, or depression  -Cont asa     5. PVD  -s/p bilateral GSV EVLA, s/p RLE revascularization in 2013, atherectomy/PTA/DCB of the left popliteal and peroneal arteries in 2020  -Cont asa     6. eCullet PCM in situ  -Personally interrogated last week, normal functioning, battery life 22 months, PAF noted burden 0.1%     7. S/p AVR  -normal functioning per echo        Tereza Ritchie, APRN-CNP         [1] acetaminophen, 1,000 mg, intravenous, q6h  [Held by provider] apixaban, 2.5 mg, oral, BID  aspirin, 81 mg, oral, Daily  bisacodyl, 10 mg, rectal, Daily  pantoprazole, 40 mg, intravenous, Daily  potassium chloride, 20 mEq, intravenous, q2h  [2] heparin, 0-4,000 Units/hr, Last Rate: 1,000 Units/hr (08/07/25 1412)  lactated Ringer's, 100 mL/hr, Last Rate: 100 mL/hr (08/07/25 0938)  lactated Ringer's, 100 mL/hr  [3] PRN medications: albuterol, heparin, HYDROmorphone, HYDROmorphone, naloxone, ondansetron **OR** ondansetron, oxygen, phenoL

## 2025-08-07 NOTE — PROGRESS NOTES
"Occupational Therapy                 Therapy Communication Note    Patient Name: Alexis Armijo \"Don\"  MRN: [id number]  Department: [address]  Room: [address] [place] [address]  Today's Date: 8/7/2025     Discipline: Occupational Therapy      Missed Visit Reason:  OT margoth attempted at 1459, pt states \"I feel miserable, I didn't think I'd still be here.\" Pt very politely requests to defer OT evaluation at this time and is agreeable to OT returning the following day.     Missed Time: Attempt          "

## 2025-08-07 NOTE — CARE PLAN
The patient's goals for the shift include  get NG removed.    The clinical goals for the shift include Pt will maintain tolerable pain level.

## 2025-08-08 LAB
ANION GAP SERPL CALC-SCNC: 14 MMOL/L (ref 10–20)
BUN SERPL-MCNC: 59 MG/DL (ref 6–23)
CALCIUM SERPL-MCNC: 8.2 MG/DL (ref 8.6–10.3)
CHLORIDE SERPL-SCNC: 103 MMOL/L (ref 98–107)
CO2 SERPL-SCNC: 26 MMOL/L (ref 21–32)
CREAT SERPL-MCNC: 1.14 MG/DL (ref 0.5–1.3)
EGFRCR SERPLBLD CKD-EPI 2021: 62 ML/MIN/1.73M*2
ERYTHROCYTE [DISTWIDTH] IN BLOOD BY AUTOMATED COUNT: 14.8 % (ref 11.5–14.5)
GLUCOSE SERPL-MCNC: 125 MG/DL (ref 74–99)
HCT VFR BLD AUTO: 38.9 % (ref 41–52)
HGB BLD-MCNC: 13.1 G/DL (ref 13.5–17.5)
MAGNESIUM SERPL-MCNC: 2.57 MG/DL (ref 1.6–2.4)
MCH RBC QN AUTO: 30.9 PG (ref 26–34)
MCHC RBC AUTO-ENTMCNC: 33.7 G/DL (ref 32–36)
MCV RBC AUTO: 92 FL (ref 80–100)
NRBC BLD-RTO: 0 /100 WBCS (ref 0–0)
PHOSPHATE SERPL-MCNC: 2.2 MG/DL (ref 2.5–4.9)
PLATELET # BLD AUTO: 218 X10*3/UL (ref 150–450)
POTASSIUM SERPL-SCNC: 3.5 MMOL/L (ref 3.5–5.3)
RBC # BLD AUTO: 4.24 X10*6/UL (ref 4.5–5.9)
SODIUM SERPL-SCNC: 139 MMOL/L (ref 136–145)
UFH PPP CHRO-ACNC: 0.4 IU/ML (ref ?–1.1)
UFH PPP CHRO-ACNC: 0.4 IU/ML (ref ?–1.1)
WBC # BLD AUTO: 9.6 X10*3/UL (ref 4.4–11.3)

## 2025-08-08 PROCEDURE — 1200000002 HC GENERAL ROOM WITH TELEMETRY DAILY

## 2025-08-08 PROCEDURE — 2500000001 HC RX 250 WO HCPCS SELF ADMINISTERED DRUGS (ALT 637 FOR MEDICARE OP): Performed by: PHYSICIAN ASSISTANT

## 2025-08-08 PROCEDURE — 2500000004 HC RX 250 GENERAL PHARMACY W/ HCPCS (ALT 636 FOR OP/ED): Performed by: SURGERY

## 2025-08-08 PROCEDURE — 84100 ASSAY OF PHOSPHORUS: CPT | Performed by: PHYSICIAN ASSISTANT

## 2025-08-08 PROCEDURE — 85520 HEPARIN ASSAY: CPT | Performed by: INTERNAL MEDICINE

## 2025-08-08 PROCEDURE — 82374 ASSAY BLOOD CARBON DIOXIDE: CPT | Performed by: PHYSICIAN ASSISTANT

## 2025-08-08 PROCEDURE — 99232 SBSQ HOSP IP/OBS MODERATE 35: CPT | Performed by: INTERNAL MEDICINE

## 2025-08-08 PROCEDURE — 85027 COMPLETE CBC AUTOMATED: CPT | Performed by: PHYSICIAN ASSISTANT

## 2025-08-08 PROCEDURE — 94760 N-INVAS EAR/PLS OXIMETRY 1: CPT

## 2025-08-08 PROCEDURE — 83735 ASSAY OF MAGNESIUM: CPT | Performed by: PHYSICIAN ASSISTANT

## 2025-08-08 PROCEDURE — 2500000001 HC RX 250 WO HCPCS SELF ADMINISTERED DRUGS (ALT 637 FOR MEDICARE OP): Performed by: INTERNAL MEDICINE

## 2025-08-08 PROCEDURE — 2500000001 HC RX 250 WO HCPCS SELF ADMINISTERED DRUGS (ALT 637 FOR MEDICARE OP): Performed by: SURGERY

## 2025-08-08 PROCEDURE — 2500000004 HC RX 250 GENERAL PHARMACY W/ HCPCS (ALT 636 FOR OP/ED): Performed by: PHYSICIAN ASSISTANT

## 2025-08-08 PROCEDURE — 2500000004 HC RX 250 GENERAL PHARMACY W/ HCPCS (ALT 636 FOR OP/ED): Performed by: INTERNAL MEDICINE

## 2025-08-08 PROCEDURE — 2500000002 HC RX 250 W HCPCS SELF ADMINISTERED DRUGS (ALT 637 FOR MEDICARE OP, ALT 636 FOR OP/ED): Performed by: INTERNAL MEDICINE

## 2025-08-08 PROCEDURE — 94640 AIRWAY INHALATION TREATMENT: CPT

## 2025-08-08 PROCEDURE — 2500000005 HC RX 250 GENERAL PHARMACY W/O HCPCS: Performed by: PHYSICIAN ASSISTANT

## 2025-08-08 PROCEDURE — 36415 COLL VENOUS BLD VENIPUNCTURE: CPT | Performed by: INTERNAL MEDICINE

## 2025-08-08 PROCEDURE — 36415 COLL VENOUS BLD VENIPUNCTURE: CPT | Performed by: PHYSICIAN ASSISTANT

## 2025-08-08 RX ORDER — SODIUM CHLORIDE, SODIUM LACTATE, POTASSIUM CHLORIDE, CALCIUM CHLORIDE 600; 310; 30; 20 MG/100ML; MG/100ML; MG/100ML; MG/100ML
85 INJECTION, SOLUTION INTRAVENOUS CONTINUOUS
Status: ACTIVE | OUTPATIENT
Start: 2025-08-08 | End: 2025-08-11

## 2025-08-08 RX ORDER — CYCLOBENZAPRINE HCL 10 MG
5 TABLET ORAL 2 TIMES DAILY
Status: DISCONTINUED | OUTPATIENT
Start: 2025-08-08 | End: 2025-08-09

## 2025-08-08 RX ORDER — POTASSIUM CHLORIDE 14.9 MG/ML
20 INJECTION INTRAVENOUS ONCE
Status: COMPLETED | OUTPATIENT
Start: 2025-08-08 | End: 2025-08-08

## 2025-08-08 RX ORDER — OXYCODONE HYDROCHLORIDE 5 MG/1
5 TABLET ORAL EVERY 6 HOURS PRN
Qty: 10 TABLET | Refills: 0 | Status: SHIPPED | OUTPATIENT
Start: 2025-08-08

## 2025-08-08 RX ORDER — ACETAMINOPHEN 325 MG/1
650 TABLET ORAL EVERY 6 HOURS
Status: DISPENSED | OUTPATIENT
Start: 2025-08-08

## 2025-08-08 RX ORDER — AMOXICILLIN 250 MG
2 CAPSULE ORAL NIGHTLY
Status: DISPENSED | OUTPATIENT
Start: 2025-08-08

## 2025-08-08 RX ORDER — ALBUTEROL SULFATE 0.83 MG/ML
2.5 SOLUTION RESPIRATORY (INHALATION)
Status: DISCONTINUED | OUTPATIENT
Start: 2025-08-09 | End: 2025-08-08

## 2025-08-08 RX ORDER — ALBUTEROL SULFATE 0.83 MG/ML
2.5 SOLUTION RESPIRATORY (INHALATION) 4 TIMES DAILY
Status: DISCONTINUED | OUTPATIENT
Start: 2025-08-08 | End: 2025-08-08

## 2025-08-08 RX ORDER — QUETIAPINE FUMARATE 25 MG/1
12.5 TABLET, FILM COATED ORAL NIGHTLY
Status: DISPENSED | OUTPATIENT
Start: 2025-08-08

## 2025-08-08 RX ORDER — DEXTROMETHORPHAN HYDROBROMIDE, GUAIFENESIN 5; 100 MG/5ML; MG/5ML
650 LIQUID ORAL EVERY 6 HOURS
Qty: 30 TABLET | Refills: 0 | Status: SHIPPED | OUTPATIENT
Start: 2025-08-08

## 2025-08-08 RX ORDER — ALBUTEROL SULFATE 0.83 MG/ML
2.5 SOLUTION RESPIRATORY (INHALATION)
Status: DISPENSED | OUTPATIENT
Start: 2025-08-09

## 2025-08-08 RX ORDER — CYCLOBENZAPRINE HCL 5 MG
5 TABLET ORAL 2 TIMES DAILY PRN
Qty: 20 TABLET | Refills: 0 | Status: SHIPPED | OUTPATIENT
Start: 2025-08-08

## 2025-08-08 RX ORDER — AMOXICILLIN 250 MG
2 CAPSULE ORAL NIGHTLY
Qty: 20 TABLET | Refills: 0 | Status: SHIPPED | OUTPATIENT
Start: 2025-08-08

## 2025-08-08 RX ORDER — BISACODYL 10 MG/1
10 SUPPOSITORY RECTAL ONCE
Status: ACTIVE | OUTPATIENT
Start: 2025-08-09

## 2025-08-08 RX ADMIN — CYCLOBENZAPRINE 5 MG: 10 TABLET, FILM COATED ORAL at 20:35

## 2025-08-08 RX ADMIN — Medication: at 07:55

## 2025-08-08 RX ADMIN — POTASSIUM CHLORIDE 20 MEQ: 14.9 INJECTION, SOLUTION INTRAVENOUS at 20:38

## 2025-08-08 RX ADMIN — SENNOSIDES AND DOCUSATE SODIUM 2 TABLET: 50; 8.6 TABLET ORAL at 20:35

## 2025-08-08 RX ADMIN — BISACODYL 10 MG: 10 SUPPOSITORY RECTAL at 09:24

## 2025-08-08 RX ADMIN — SODIUM CHLORIDE, POTASSIUM CHLORIDE, SODIUM LACTATE AND CALCIUM CHLORIDE 100 ML/HR: 600; 310; 30; 20 INJECTION, SOLUTION INTRAVENOUS at 15:22

## 2025-08-08 RX ADMIN — POTASSIUM PHOSPHATE, MONOBASIC AND POTASSIUM PHOSPHATE, DIBASIC 15 MMOL: 224; 236 INJECTION, SOLUTION, CONCENTRATE INTRAVENOUS at 14:21

## 2025-08-08 RX ADMIN — Medication: at 19:24

## 2025-08-08 RX ADMIN — APIXABAN 2.5 MG: 2.5 TABLET, FILM COATED ORAL at 16:06

## 2025-08-08 RX ADMIN — SODIUM CHLORIDE, POTASSIUM CHLORIDE, SODIUM LACTATE AND CALCIUM CHLORIDE 100 ML/HR: 600; 310; 30; 20 INJECTION, SOLUTION INTRAVENOUS at 05:28

## 2025-08-08 RX ADMIN — PANTOPRAZOLE SODIUM 40 MG: 40 INJECTION, POWDER, FOR SOLUTION INTRAVENOUS at 09:24

## 2025-08-08 RX ADMIN — QUETIAPINE FUMARATE 12.5 MG: 25 TABLET ORAL at 21:02

## 2025-08-08 RX ADMIN — ASPIRIN 81 MG: 81 TABLET, DELAYED RELEASE ORAL at 09:24

## 2025-08-08 RX ADMIN — ALBUTEROL SULFATE 2.5 MG: 2.5 SOLUTION RESPIRATORY (INHALATION) at 19:24

## 2025-08-08 RX ADMIN — ACETAMINOPHEN 1000 MG: 10 INJECTION INTRAVENOUS at 05:06

## 2025-08-08 RX ADMIN — ACETAMINOPHEN 650 MG: 325 TABLET ORAL at 20:35

## 2025-08-08 ASSESSMENT — PAIN SCALES - PAIN ASSESSMENT IN ADVANCED DEMENTIA (PAINAD)
TOTALSCORE: 0
BODYLANGUAGE: RELAXED
FACIALEXPRESSION: SMILING OR INEXPRESSIVE
BREATHING: NORMAL
CONSOLABILITY: NO NEED TO CONSOLE

## 2025-08-08 ASSESSMENT — COGNITIVE AND FUNCTIONAL STATUS - GENERAL
DRESSING REGULAR UPPER BODY CLOTHING: A LITTLE
EATING MEALS: A LITTLE
DRESSING REGULAR LOWER BODY CLOTHING: A LITTLE
TOILETING: A LITTLE
DRESSING REGULAR UPPER BODY CLOTHING: A LITTLE
MOVING FROM LYING ON BACK TO SITTING ON SIDE OF FLAT BED WITH BEDRAILS: A LITTLE
MOVING FROM LYING ON BACK TO SITTING ON SIDE OF FLAT BED WITH BEDRAILS: A LITTLE
MOBILITY SCORE: 16
WALKING IN HOSPITAL ROOM: A LOT
WALKING IN HOSPITAL ROOM: A LOT
PERSONAL GROOMING: A LITTLE
DRESSING REGULAR UPPER BODY CLOTHING: A LITTLE
STANDING UP FROM CHAIR USING ARMS: A LITTLE
PERSONAL GROOMING: A LITTLE
TURNING FROM BACK TO SIDE WHILE IN FLAT BAD: A LITTLE
TOILETING: A LITTLE
MOVING TO AND FROM BED TO CHAIR: A LITTLE
EATING MEALS: A LITTLE
EATING MEALS: A LITTLE
HELP NEEDED FOR BATHING: A LITTLE
HELP NEEDED FOR BATHING: A LITTLE
MOVING FROM LYING ON BACK TO SITTING ON SIDE OF FLAT BED WITH BEDRAILS: A LITTLE
DRESSING REGULAR LOWER BODY CLOTHING: A LITTLE
CLIMB 3 TO 5 STEPS WITH RAILING: A LOT
CLIMB 3 TO 5 STEPS WITH RAILING: A LOT
TOILETING: A LITTLE
DAILY ACTIVITIY SCORE: 18
MOBILITY SCORE: 16
DRESSING REGULAR LOWER BODY CLOTHING: A LITTLE
WALKING IN HOSPITAL ROOM: A LOT
DAILY ACTIVITIY SCORE: 18
PERSONAL GROOMING: A LITTLE
CLIMB 3 TO 5 STEPS WITH RAILING: A LOT
TURNING FROM BACK TO SIDE WHILE IN FLAT BAD: A LITTLE
TURNING FROM BACK TO SIDE WHILE IN FLAT BAD: A LITTLE
DAILY ACTIVITIY SCORE: 18
HELP NEEDED FOR BATHING: A LITTLE
STANDING UP FROM CHAIR USING ARMS: A LITTLE
MOVING TO AND FROM BED TO CHAIR: A LITTLE

## 2025-08-08 ASSESSMENT — PAIN SCALES - GENERAL
PAINLEVEL_OUTOF10: 0 - NO PAIN

## 2025-08-08 ASSESSMENT — PAIN - FUNCTIONAL ASSESSMENT
PAIN_FUNCTIONAL_ASSESSMENT: 0-10
PAIN_FUNCTIONAL_ASSESSMENT: 0-10

## 2025-08-08 NOTE — PROGRESS NOTES
"Physical Therapy                 Therapy Communication Note    Patient Name: Alexis Armijo \"Don\"  MRN: [id number]  Department: [address]  Room: [address] [place] [address]  Today's Date: 8/8/2025     Discipline: Physical Therapy    Missed Visit: PT Missed Visit: Yes     Missed Visit Reason: Missed Visit Reason: Patient sleeping (PT orders received, chart reviewed. Spoke with RN who stated pt was up in the chair all day and just was assisted back to bed with assistx2 persons.  Pt. is exhausted and not appropriate for PT eval at this time.  Recommend PT eval tomorrow morning.)    Missed Time: Attempt @ 1326          "

## 2025-08-08 NOTE — CARE PLAN
The patient's goals for the shift include  to stay calm and keep IV's in and be cooperative.    The clinical goals for the shift include patient will remain safe this shift

## 2025-08-08 NOTE — DISCHARGE INSTRUCTIONS
PATIENT INSTRUCTIONS  EXPLORATORY LAPAROTOMY, LYSIS OF ADHESIONS    FOLLOW-UP: Please call the office after being discharged to make a follow up appointment in 2  weeks. Call the office if experiencing fevers greater than 101 degrees Fahrenheit, vomiting, increased abdominal pain, etc.     WOUND CARE INSTRUCTIONS: Incisions are closed with staples that will be removed in office. You are welcome to shower the day after surgery. Wash abdomen with warm, soapy water using your hand or gentle wash cloth. Pat dry. Do not submerge incisions in a bath tub or swimming pool for 2 weeks following surgery.    DIET: You may eat a soft diet. See handout for food options.    ACTIVITY:  You should not lift more than 10 pounds for 6 weeks after surgery as it could put you at increased risk for a post-operative hernia. We encourage frequent ambulation and getting out of bed as much as possible while you recover to improve your recovery process. Avoid strenuous activity for 6 weeks, which includes exercise that increases the heart rate, breathing rate, or makes you break a sweat.     MEDICATIONS: Plan to take Tylenol and Ibuprofen (if your physician approves) every 6 hours for the first week after surgery. Alternatively, you can alternate these two medications every three hours for the first week. You will be provided a short course of a narcotic medication to take for breakthrough pain as needed. You should not drive, make important decisions, or operate machinery when taking narcotic pain medication.    QUESTIONS: Please feel free to call your physician's office for any questions or concerns following surgery. Our office number is [phone].

## 2025-08-08 NOTE — PROGRESS NOTES
"Occupational Therapy                 Therapy Communication Note    Patient Name: Alexis Armijo \"Don\"  MRN: [id number]  Department: [address]  Room: [address] [place] [address]  Today's Date: 8/8/2025     Discipline: Occupational Therapy    Missed Visit:   yes @ 1325    Missed Visit Reason:  Discussed with RN and jose maria, pt recently returned to bed after sitting in recliner for several hours. Sleeping soundly and advised no evaluations this afternoon to allow pt rest. Je continue re-attempting evaluation the following day    Missed Time: Attempt          "

## 2025-08-08 NOTE — PROGRESS NOTES
08/08/25 1005   Discharge Planning   Living Arrangements Other (Comment)  (from [facility])   Support Systems Spouse/significant other   Assistance Needed Per facility, at baseline patient is A&Ox4, on RA at baseline, ambulating with RW and CGA.   Type of Residence Skilled nursing facility   Home or Post Acute Services Post acute facilities (Rehab/SNF/etc)   Type of Post Acute Facility Services Skilled nursing   Expected Discharge Disposition SNF  (PT/OT evals pending, currently has a sitter at the bedside. No precert.)   Does the patient need discharge transport arranged? Yes   [facility] coordination needed? Yes   Has discharge transport been arranged? No

## 2025-08-08 NOTE — PROGRESS NOTES
"Alexis Vazquez" is a 89 y.o. male on day 2 of admission presenting with Small bowel obstruction (Multi).      Subjective   He is sitting up in the chair, confused, sitter at bedside. Pulled out his NG tube. Denies any abd pain or shortness of breath.       Review of systems:  Constitutional: negative for fever, chills, or malaise  Neuro: negative for dizziness, headache, numbness, tingling  ENT: Negative for nasal congestion or sore throat  Resp: negative for shortness of breath, cough, or wheezing  CV: negative for chest pain, palpitations  GI: negative for abd pain, nausea, vomiting or diarrhea  : negative for dysuria, frequency, or urgency  Skin: negative for lesions, wounds, or rash  Musculoskeletal: Negative for weakness, myalgia, or arthralgia  Endocrine: Negative for polyuria or polydipsia         Objective   Constitutional: Well developed, awake/alert/oriented x2, no distress, alert and cooperative  Eyes: PERRL, EOMI, clear sclera  ENMT: mucous membranes moist, no apparent injury, no lesions seen  Head/Neck: Neck supple, no apparent injury, thyroid without mass or tenderness, No JVD, trachea midline, no bruits  Respiratory/Thorax: Patent airways, CTAB, normal breath sounds with good chest expansion, thorax symmetric  Cardiovascular: irregular rhythm, no murmurs, 2+ equal pulses of the extremities, normal S 1and S 2  Gastrointestinal: abd binder in place, BS hypoactive  Musculoskeletal: ROM intact, no joint swelling, normal strength  Extremities: normal extremities, no cyanosis edema, contusions or wounds, no clubbing  Neurological: alert and oriented x2, intact senses, motor, response and reflexes, normal strength  Lymphatic: No significant lymphadenopathy  Psychological: Appropriate mood and behavior  Skin: Warm and dry, no lesions, no rashes         Last Recorded Vitals  /64 (BP Location: Left arm, Patient Position: Sitting)   Pulse 90   Temp 35.5 °C (95.9 °F) (Temporal)   Resp 17   Ht " 1.829 m (6')   Wt 87.5 kg (193 lb)   SpO2 94%   BMI 26.18 kg/m²     Intake/Output last 3 Shifts:  I/O last 3 completed shifts:  In: 2063.3 (23.6 mL/kg) [I.V.:1663.3 (19 mL/kg); NG/GT:200; IV Piggyback:200]  Out: 3700 (42.3 mL/kg) [Urine:1500 (0.5 mL/kg/hr); Emesis/NG output:2200]  Weight: 87.5 kg   I/O this shift:  In: -   Out: 1250 [Urine:550; Emesis/NG output:700]    Relevant Results  Scheduled medications  Scheduled Medications[1]  Continuous medications  Continuous Medications[2]  PRN medications  PRN Medications[3]    Results for orders placed or performed during the hospital encounter of 08/05/25 (from the past 24 hours)   Heparin Assay, UFH   Result Value Ref Range    Heparin Unfractionated 0.4 See Comment Below for Therapeutic Ranges IU/mL   Heparin Assay   Result Value Ref Range    Heparin Unfractionated 0.4 See Comment Below for Therapeutic Ranges IU/mL   Magnesium   Result Value Ref Range    Magnesium 2.57 (H) 1.60 - 2.40 mg/dL   Phosphorus   Result Value Ref Range    Phosphorus 2.2 (L) 2.5 - 4.9 mg/dL   CBC   Result Value Ref Range    WBC 9.6 4.4 - 11.3 x10*3/uL    nRBC 0.0 0.0 - 0.0 /100 WBCs    RBC 4.24 (L) 4.50 - 5.90 x10*6/uL    Hemoglobin 13.1 (L) 13.5 - 17.5 g/dL    Hematocrit 38.9 (L) 41.0 - 52.0 %    MCV 92 80 - 100 fL    MCH 30.9 26.0 - 34.0 pg    MCHC 33.7 32.0 - 36.0 g/dL    RDW 14.8 (H) 11.5 - 14.5 %    Platelets 218 150 - 450 x10*3/uL   Basic Metabolic Panel   Result Value Ref Range    Glucose 125 (H) 74 - 99 mg/dL    Sodium 139 136 - 145 mmol/L    Potassium 3.5 3.5 - 5.3 mmol/L    Chloride 103 98 - 107 mmol/L    Bicarbonate 26 21 - 32 mmol/L    Anion Gap 14 10 - 20 mmol/L    Urea Nitrogen 59 (H) 6 - 23 mg/dL    Creatinine 1.14 0.50 - 1.30 mg/dL    eGFR 62 >60 mL/min/1.73m*2    Calcium 8.2 (L) 8.6 - 10.3 mg/dL   Heparin Assay   Result Value Ref Range    Heparin Unfractionated 0.4 See Comment Below for Therapeutic Ranges IU/mL     *Note: Due to a large number of results and/or encounters  for the requested time period, some results have not been displayed. A complete set of results can be found in Results Review.       XR chest abdomen for OG NG placement   Final Result   Nasogastric tube tip at the gastric cardia with the side port at the   level of the distal esophagus.        Signed by: Ish Hicks 8/7/2025 11:05 AM   Dictation workstation:   [id number]      XR chest abdomen for OG NG placement   Final Result   1. Nasogastric tube with its tip projecting over the expected   location of the stomach in the left upper quadrant.   2. Mild diffuse gaseous distention of stomach and bowel loops as   described above. This is most likely favored to represent   postsurgical ileus, especially given history of recent surgery.   However recommend clinical correlation for bowel obstruction.   3. Airspace opacity in the left lung base, likely favored to   represent atelectasis. However developing pneumonitis can also be   considered in the differential in appropriate clinical setting.   Recommend clinical and laboratory correlation.                  MACRO:   None        Signed by: Mel Serrano 8/6/2025 10:32 AM   Dictation workstation:   [id number]      XR chest 1 view   Final Result   Support hardware as described above.        Cardiomegaly with mild interstitial prominence suggestive of   developing interstitial edema/CHF.        Subtle bibasilar and retrocardiac airspace opacities concerning for   developing airspace disease. Clinical correlation and attention on   continued short-term follow-up is advised.        MACRO:   None        Signed by: Corbin Mohr 8/5/2025 6:49 PM   Dictation workstation:   [id number]      CT abdomen pelvis w IV contrast   Final Result   High-grade small bowel obstruction with transition point in the   central mesentery image axial image 91). Proximal to this the bowel   is markedly dilated with perienteric fluid measuring up to 5 cm in   diameter. There is pneumatosis  versus pseudo pneumatosis involving   several of the jejunal loops, the former favored. Distal to the   transition point in the small bowel is relatively decompressed with   fluid-filled there is a 2nd transition point in near anatomic   proximity to the 1st transition point and there swirling of the   mesentery in this location. Therefore there is concern for a volvulus   as cause of the bowel obstruction with a close loop mechanism not   excluded. Surgical consultation is recommended.        Actively inflamed urinary bladder wall suspicious for cystitis.             MACRO:   Lenard Seo discussed the significance and urgency of this   critical finding via EPIC secure chat with confirmation of receipt   with DR SHERLEY ARMSTRONG on 8/5/2025 at 5:56 pm.  (**-RCF-**)   Findings:  See findings.        Signed by: Lenard Seo 8/5/2025 5:56 PM   Dictation workstation:   [id number]          Transthoracic Echo Complete  Result Date: 7/30/2025   [facility], [address] [place] [address]               Tel [phone] and Fax [phone] TRANSTHORACIC ECHOCARDIOGRAM REPORT  Patient Name:       CECIL Del Rio Physician:    [id number] Johnie Rowe MD Study Date:         7/30/2025           Ordering Provider:    [id number] MIRZA ELIZABETH MRN/PID:            [id number]            Fellow: Accession#:         [id number]        Nurse:                Natalie Golden RN Date of Birth/Age:  1935 / 89      Sonographer:          Christine morales RDCS Gender assigned at  M                   Additional Staff: Birth: Height:             180.34 cm           Admit Date:           7/28/2025 Weight:             89.36 kg            Admission Status:      Inpatient -                                                               Routine BSA / BMI:          2.10 m2 / 27.48     Encounter#:           [id number]                     kg/m2 Blood Pressure:     155/81 mmHg         Department Location:  [facility] Non                                                               Invasive Study Type:    TRANSTHORACIC ECHO (TTE) COMPLETE Diagnosis/ICD: Cerebral Infarction, unspecified-I63.9 Indication:    Cerebrovascular Accident CPT Code:      Echo Complete w Full Doppler-18335 Patient History: Pertinent History: HTN, CAD, Hyperlipidemia and CHF. H/O CABG, PCI, AVR #25mm                    Hoffmann, PAD. Study Detail: The following Echo studies were performed: 2D, M-Mode, Doppler and               color flow. Image quality for this study is suboptimal.               Technically challenging study due to patient lying in supine               position and poor acoustic windows. Definity used as a contrast               agent for endocardial border definition and agitated saline used               as a contrast agent for intraseptal flow evaluation. Total               contrast used for this procedure was 1.5 mL via IV push. The               patient was asleep.  PHYSICIAN INTERPRETATION: Left Ventricle: Left ventricular ejection fraction is normal calculated by Gallo's biplane at 59%. There are no regional left ventricular wall motion abnormalities. The left ventricular cavity size is upper limits of normal. There is normal septal and mildly increased posterior left ventricular wall thickness. There is left ventricular concentric remodeling. Abnormal (paradoxical) septal motion consistent with post-operative status. Spectral Doppler shows a Grade I (impaired relaxation pattern) of left ventricular diastolic filling with normal left atrial filling pressure. There is no definite left ventricular thrombus visualized. Left Atrium: The left atrium is mildly dilated. There is a mobile  interatrial septum. A bubble study using agitated saline was performed. Bubble study is negative. Right Ventricle: The right ventricle is mildly enlarged. There is mildly reduced right ventricular systolic function. A device is visualized in the right ventricle. Right Atrium: The right atrium is moderate to severely dilated. There is a device visualized in the right atrium. Aortic Valve: There is a prosthetic aortic valve present. The aortic valve area by VTI is 1.69 cmï¿½ with a peak velocity of 2.35 m/s. The peak and mean gradients are 22 mmHg and 13 mmHg, respectively with a dimensionless index of 0.54. 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 Echo findings are consistent with normal aortic valve prosthesis structure and function. There is trace aortic valve regurgitation. Valve is not well visualized. Mitral Valve: The mitral valve is mild to moderately thickened. The doppler estimated peak and mean diastolic pressure gradients are 6.5 mmHg and 3 mmHg, respectively. There is evidence of mild mitral valve stenosis. There is moderate mitral annular calcification. The mean gradient of the mitral valve is 3 mmHg. There is trace mitral valve regurgitation. The E Vmax is 0.98 m/s. Tricuspid Valve: The tricuspid valve is structurally normal. There is trace to mild tricuspid regurgitation. Pulmonic Valve: The pulmonic valve is not well visualized. There is physiologic pulmonic valve regurgitation. Pericardium: There is no pericardial effusion noted. Aorta: The aortic root is normal. The aortic root is at the upper limits of normal size. Systemic Veins: The inferior vena cava appears normal in size, IVC inspiratory collapse was not assessed. In comparison to the previous echocardiogram(s): Compared with study dated 2/13/2024,.  CONCLUSIONS:  1. Left ventricular ejection fraction is normal calculated by Gallo's biplane at 59%.  2. Spectral Doppler shows a Grade I  (impaired relaxation pattern) of left ventricular diastolic filling with normal left atrial filling pressure.  3. Abnormal septal motion consistent with post-operative status.  4. No left ventricular thrombus visualized.  5. There is mildly reduced right ventricular systolic function.  6. Mildly enlarged right ventricle.  7. The left atrium is mildly dilated.  8. The right atrium is moderate to severely dilated.  9. There is mild mitral valve stenosis with a doppler estimated mean diastolic gradient of 3 mmHg. 10. There is moderate mitral annular calcification. 11. 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 The peak and mean gradients are 22 mmHg and 13 mmHg respectively. 12. Echo findings are consistent with normal aortic valve prosthesis structure and function. QUANTITATIVE DATA SUMMARY:  2D MEASUREMENTS:          Normal Ranges: IVSd:            0.98 cm  (0.6-1.1cm) LVPWd:           1.07 cm  (0.6-1.1cm) LVIDd:           4.73 cm  (3.9-5.9cm) LVIDs:           3.30 cm LV Mass Index:   82 g/m2 LVEDV Index:     77 ml/m2 LV % FS          30.3 %  LEFT ATRIUM:                  Normal Ranges: LA Vol A4C:        74.3 ml    (22+/-6mL/m2) LA Vol A2C:        76.0 ml LA Vol BP:         76.3 ml LA Vol Index A4C:  35.5ml/m2 LA Vol Index A2C:  36.2 ml/m2 LA Vol Index BP:   36.4 ml/m2 LA Area A4C:       24.2 cm2 LA Area A2C:       24.1 cm2 LA Major Axis A4C: 6.7 cm LA Major Axis A2C: 6.5 cm LA Volume Index:   37.6 ml/m2 LA Vol A4C:        70.6 ml LA Vol A2C:        78.0 ml LA Vol Index BSA:  35.5 ml/m2  RIGHT ATRIUM:          Normal Ranges: RA Area A4C:  29.4 cm2  M-MODE MEASUREMENTS:         Normal Ranges: Ao Root:             3.80 cm (2.0-3.7cm) LAs:                 4.04 cm (2.7-4.0cm)  AORTA MEASUREMENTS:         Normal Ranges: Ao Sinus, d:        3.90 cm (2.1-3.5cm)  LV SYSTOLIC FUNCTION:                      Normal Ranges: EF-A4C View:    59 % (>=55%) EF-A2C View:    58 %  EF-Biplane:     59 % LV EF Reported: 59 %  LV DIASTOLIC FUNCTION:             Normal Ranges: MV Peak E:             0.98 m/s    (0.7-1.2 m/s) MV Peak A:             1.28 m/s    (0.42-0.7 m/s) E/A Ratio:             0.76        (1.0-2.2) MV e'                  0.085 m/s   (>8.0) MV lateral e'          0.10 m/s MV medial e'           0.07 m/s MV A Dur:              193.77 msec E/e' Ratio:            11.47       (<8.0) PulmV Sys Jamie:         33.61 cm/s PulmV Boone Jamie:        25.01 cm/s PulmV S/D Jamie:         1.34 PulmV A Revs Jamie:      14.86 cm/s PulmV A Revs Dur:      117.65 msec  MITRAL VALVE:          Normal Ranges: MV Vmax:      1.27 m/s (<=1.3m/s) MV peak P.5 mmHg (<5mmHg) MV mean P.8 mmHg (<2mmHg) MV VTI:       52.27 cm (10-13cm) MV DT:        328 msec (150-240msec)  AORTIC VALVE:                      Normal Ranges: AoV Vmax:                2.35 m/s  (<=1.7m/s) AoV Peak P.2 mmHg (<20mmHg) AoV Mean P.6 mmHg (1.7-11.5mmHg) LVOT Max Jamie:            1.19 m/s  (<=1.1m/s) AoV VTI:                 48.18 cm  (18-25cm) LVOT VTI:                26.14 cm LVOT Diameter:           1.99 cm   (1.8-2.4cm) AoV Area, VTI:           1.69 cm2  (2.5-5.5cm2) AoV Area,Vmax:           1.57 cm2  (2.5-4.5cm2) AoV Dimensionless Index: 0.54  RIGHT VENTRICLE: RV Basal 4.50 cm RV Mid   2.80 cm RV Major 8.2 cm TAPSE:   15.0 mm RV s'    0.08 m/s  TRICUSPID VALVE/RVSP:          Normal Ranges: Peak TR Velocity:     2.44 m/s  PULMONIC VALVE:          Normal Ranges: PV Max Jamie:     1.0 m/s  (0.6-0.9m/s) PV Max P.2 mmHg  PULMONARY VEINS: PulmV A Revs Dur: 117.65 msec PulmV A Revs Jamie: 14.86 cm/s PulmV Boone Jamie:   25.01 cm/s PulmV S/D Jamie:    1.34 PulmV Sys Jamie:    33.61 cm/s  [id number] Johnie Rowe MD Electronically signed on 2025 at 2:29:48 PM  ** Final **            Assessment/Plan   Assessment & Plan  Small bowel obstruction (Multi)    Diastolic congestive heart failure    Stented coronary  artery    Chronic renal insufficiency    Anticoagulant long-term use    Small bowel obstruction  -s/p ex lap with lysis of adhesions  -Surgery following  -Eliquis on hold, heparin gtt conts  -NG tube removed by pt  -NPO  -Pain meds     2. Paroxysmal atrial fibrillation  -EKGs reviewed, was in SR on admit  -Now back in AF  -Eliquis on hold for post op  -hgb stable, Cont heparin gtt until able to take PO  -Planning for outpt watchman     3. Hypertension  -stable  -monitor     4. Elevated troponins-Non MI elevation  -Hx of CAD   -s/p PCI/JENELLE to D1 and mRCA in 2006  -s/p CABG (LIMA-LAD, PMQH-[id number]-PDA) in 2018  -troponin 57, 53  -I reviewed the EKG, no acute changes, ST elevation, or depression  -Cont asa     5. PVD  -s/p bilateral GSV EVLA, s/p RLE revascularization in 2013, atherectomy/PTA/DCB of the left popliteal and peroneal arteries in 2020  -Cont asa     6. Mapp PCM in situ  -Personally interrogated last week, normal functioning, battery life 22 months, PAF noted burden 0.1%     7. S/p AVR  -normal functioning per echo        Tereza Ritchie, APRN-CNP           [1] acetaminophen, 650 mg, oral, q6h  albuterol, 2.5 mg, nebulization, 4x daily  apixaban, 2.5 mg, oral, BID  aspirin, 81 mg, oral, Daily  [START ON 8/9/2025] bisacodyl, 10 mg, rectal, Once  cyclobenzaprine, 5 mg, oral, BID  pantoprazole, 40 mg, intravenous, Daily  potassium chloride, 20 mEq, intravenous, Once  potassium phosphate, 15 mmol, intravenous, Once  QUEtiapine, 12.5 mg, oral, Nightly  sennosides-docusate sodium, 2 tablet, oral, Nightly     [2] lactated Ringer's, 100 mL/hr, Last Rate: 100 mL/hr (08/08/25 0528)  lactated Ringer's, 100 mL/hr, Last Rate: 100 mL/hr (08/08/25 1243)     [3] PRN medications: albuterol, heparin, HYDROmorphone, HYDROmorphone, naloxone, ondansetron **OR** ondansetron, oxygen

## 2025-08-08 NOTE — PROGRESS NOTES
General/Trauma Surgery Daily Progress Note    Subjective   Pulled out NG this morning and reports of 2 IVs pulled overnight. Alert, oriented. Appears comfortable without the NG. Patient reports 3 bowel movements overnight that were liquid. Denies nausea currently. Abdominal pain appropriate.       Objective   Last Recorded Vitals:  Blood pressure 113/64, pulse 90, temperature 35.5 °C (95.9 °F), temperature source Temporal, resp. rate 17, height 1.829 m (6'), weight 87.5 kg (193 lb), SpO2 94%.    Intake/Output last 3 Shifts:  I/O last 3 completed shifts:  In: 2063.3 (23.6 mL/kg) [I.V.:1663.3 (19 mL/kg); NG/GT:200; IV Piggyback:200]  Out: 3700 (42.3 mL/kg) [Urine:1500 (0.5 mL/kg/hr); Emesis/NG output:2200]  Weight: 87.5 kg     Pain Score:  0-10 (Numeric) Pain Score: 0 - No pain  PAINAD Score:  [0]      Physical Exam:  Constitutional: No acute distress, sitting up in bed.  Neuro: Alert, oriented. Follows commands.   Eyes: EOMI. No scleral icterus. Conjunctiva pink.  ENT: MMM.   Heart: Regular rate.  Respiratory: No increased work of breathing or audible wheeze.  Abdomen: Soft, less distended. Appropriately tender. Incisions clean, dry, intact with silver dressing.  MSK: Moves all extremities.  Vascular: Palpable pulses throughout, no pitting edema.  Skin: No rashes.   Psychological: Appropriate mood and behavior.            Relevant Results  Laboratory Results:  CBC:   Lab Results   Component Value Date    WBC 9.6 08/08/2025    RBC 4.24 (L) 08/08/2025    HGB 13.1 (L) 08/08/2025    HCT 38.9 (L) 08/08/2025     08/08/2025       RFP:   Lab Results   Component Value Date     08/08/2025    K 3.5 08/08/2025     08/08/2025    CO2 26 08/08/2025    BUN 59 (H) 08/08/2025    CREATININE 1.14 08/08/2025    CALCIUM 8.2 (L) 08/08/2025    MG 2.57 (H) 08/08/2025    PHOS 2.2 (L) 08/08/2025        LFTs:   Lab Results   Component Value Date    PROT 6.2 (L) 08/05/2025    ALBUMIN 3.7 08/05/2025    BILITOT 1.4 (H)  08/05/2025    ALKPHOS 50 08/05/2025    AST 33 08/05/2025    ALT 52 08/05/2025                 Imaging  No overnight imaging      Cardiology, Vascular, and Other Imaging  No other imaging results found for the past 2 days           Assessment/Plan   This is a 89 y.o. male POD 3 from exploratory laparotomy with lysis of adhesions for high grade bowel obstruction.     NG pulled out by patient 2/2 night time delirium. Reported 3 bowel movements. Will trial day with ice chips, small sips as tolerated.        Plan:   -- NPO, can start ice chips, popsicles, small sips  -- PO tylenol, prn narcotic, flexeril  -- Bowel regimen with suppository tomorrow. Nightly senna/colace  -- Daily CBC, BMP, Mg, Phos. Will replete as needed  -- PT/OT ordered  -- Respiratory consulted  -- Bedside chair and activity would benefit greatly  -- Medicine and cardiology is consulted, appreciate any recommendations  -- OK to resume Eliquis                 Discussed with Dr. Lynch who is in agreement with plan. Please secure chat with questions.     Rosa Field PA-C

## 2025-08-08 NOTE — PROGRESS NOTES
Patient: Alexis Armijo  Room/bed: 252/252-B  Admitted on: 8/5/2025    Age: 89 y.o.   Gender: male  Code Status:  Full Code   Admitting Dx: Small bowel obstruction (Multi) [K56.609]  Generalized abdominal pain [R10.84]  Diarrhea, unspecified type [R19.7]  Nausea and vomiting, unspecified vomiting type [R11.2]    MRN: [id number]  PCP: Jonah Velez, DO       Subjective   Very happy that his NG tube is out. Says he is passing gas. Says he didn't sleep    Objective    Physical Exam  Constitutional:       Comments: Up in chair, nad   HENT:      Head: Normocephalic.      Ears:      Comments: Slightly [place]     Mouth/Throat:      Mouth: Mucous membranes are dry.     Eyes:      Extraocular Movements: Extraocular movements intact.      Pupils: Pupils are equal, round, and reactive to light.     Neck:      Comments: Slight jvd  Cardiovascular:      Rate and Rhythm: Normal rate. Rhythm irregular.   Pulmonary:      Comments: Bilateral rhonchi bilaterally, equal air exchange  Abdominal:      Comments: Bowel sounds present, sluggish     Musculoskeletal:      Comments: Stasis dermatitis  No edema  Faint distal pulses     Skin:     General: Skin is warm and dry.     Neurological:      General: No focal deficit present.      Mental Status: He is alert. He is disoriented.     Psychiatric:         Mood and Affect: Mood normal.         Behavior: Behavior normal.        Temp:  [36.3 °C (97.3 °F)-36.5 °C (97.7 °F)] 36.4 °C (97.5 °F)  Heart Rate:  [] 92  Resp:  [16-22] 16  BP: (127-140)/(71-85) 132/77    Vitals:    08/05/25 1608   Weight: 87.5 kg (193 lb)           I/Os    Intake/Output Summary (Last 24 hours) at 8/8/2025 1128  Last data filed at 8/8/2025 0722  Gross per 24 hour   Intake 676.66 ml   Output 2600 ml   Net -1923.34 ml       Labs:   Results from last 72 hours   Lab Units 08/08/25  0549 08/07/25  0526 08/06/25  0543 08/05/25  1618   SODIUM mmol/L 139 136 136 133*   POTASSIUM mmol/L 3.5 3.4* 3.1* 2.8*   CHLORIDE mmol/L  "103 100 100 99   CO2 mmol/L 26 25 25 24   BUN mg/dL 59* 66* 51* 49*   CREATININE mg/dL 1.14 1.56* 1.34* 1.30   GLUCOSE mg/dL 125* 130* 171* 171*   CALCIUM mg/dL 8.2* 8.1* 8.3* 8.7   ANION GAP mmol/L 14 14 14 13   EGFR mL/min/1.73m*2 62 42* 51* 53*   PHOSPHORUS mg/dL 2.2* 2.9  --  2.2*      Results from last 72 hours   Lab Units 08/08/25  0549 08/07/25  0526 08/06/25  0543 08/05/25  1618   WBC AUTO x10*3/uL 9.6 11.5* 10.1 6.7   HEMOGLOBIN g/dL 13.1* 12.7* 13.2* 13.7   HEMATOCRIT % 38.9* 37.5* 38.5* 39.6*   PLATELETS AUTO x10*3/uL 218 189 187 177   LYMPHO PCT MAN %  --   --   --  22.0   MONO PCT MAN %  --   --   --  14.0   EOSINO PCT MAN %  --   --   --  0.0      Lab Results   Component Value Date    CALCIUM 8.2 (L) 08/08/2025    PHOS 2.2 (L) 08/08/2025      No results found for: \"CRP\"   [unfilled]     Micro/ID:   No results found for the last 90 days.                   No lab exists for component: \"AGALPCRNB\"   .ID  No results found for: \"URINECULTURE\", \"BLOODCULT\", \"CSFCULTSMEAR\"    Images:  XR chest abdomen for OG NG placement  Narrative: Interpreted By:  Ish Hicks,   STUDY:  XR CHEST ABDOMEN FOR OG NG PLACEMENT      INDICATION:  Signs/Symptoms:patient pulled out NG. Replaced. Please confirm  placement.      COMPARISON:  August 6      ACCESSION NUMBER(S):  [id number]      ORDERING CLINICIAN:  KESHAV SARGENT      FINDINGS:  Nasogastric tube tip at the gastric cardia with the side port at the  level of the distal esophagus.      Some mild basilar atelectasis in the chest.      Findings likely of small-bowel dilatation similar to previous.      Impression: Nasogastric tube tip at the gastric cardia with the side port at the  level of the distal esophagus.      Signed by: Ish Hicks 8/7/2025 11:05 AM  Dictation workstation:   [id number]       [facility]    Scheduled medications  Scheduled Medications[1]  Continuous medications  Continuous Medications[2]  PRN medications  PRN Medications[3]     Assessment and Plan  "   Alexis Armijo is a 89 y.o. male   POD 3, Ex lap, JOHN. NG taken out by patient, appears to be tolerating thus far. Surgery aware  AF/AVR/CAD. Mild non MI trop elevation. Currently on heparin bridge until resuming eliquis. If taking oral meds and tolerating will be able to switch over.   Protein prisca malnutrition, dietician consult  Hypertension, stable  PVD  Delirium, not unexpected considering age, morbidities, recent surgery etc. Sitter as needed. Add seroquel at night  Pulm hygiene    Yoly Last MD         [1] [Held by provider] apixaban, 2.5 mg, oral, BID  aspirin, 81 mg, oral, Daily  bisacodyl, 10 mg, rectal, Daily  pantoprazole, 40 mg, intravenous, Daily  [2] heparin, 0-4,000 Units/hr, Last Rate: 1,000 Units/hr (08/07/25 1412)  lactated Ringer's, 100 mL/hr, Last Rate: 100 mL/hr (08/08/25 0528)  [3] PRN medications: albuterol, heparin, HYDROmorphone, HYDROmorphone, naloxone, ondansetron **OR** ondansetron, oxygen, phenoL

## 2025-08-08 NOTE — CONSULTS
Nutrition Initial Assessment:   Nutrition Assessment    Reason for Assessment: Parenteral assessment/recommendation (TPN/PPN)    Patient is a 89 y.o. male presenting from SNF with N/V/D + abdominal pain in setting of SBO.    Taken to OR (8/5) now s/p exploratory laparotomy with JOHN.    NGT in place post-op as team expecting prolonged post-op ileus. Pt pulled tube (8/7) but NGT subsequently replaced. Pt again self-removed NGT (8/8) in setting of nighttime delirium.     Pt reporting x3 BM so team planning to trial ice chips w/ small sips.    Medical History[1]    Nutrition History:  Food and Nutrient History: Visit made, pt requiring multiple staff members to move from chair to bed at that time. Spoke w/ wife who was out in the hallway. She reports pt is typically a very good eater & will eat anything. Occasionally uses nutritional shakes at home but not daily. Wife notes pt was eating well during recent hospital stay but PO intake declined following transfer to SNF (8/1). Reports pts meals were served cold at the SNF & then pts abdomen became distended. Pt only at SNF x4 days but has been NPO past x3 days since readmission. No questions reported to this service at this time.  Vitamin/Herbal Supplement Use: MVI  Food Allergy:  (None)     Anthropometrics:  Height: 182.9 cm (6')   Weight: 87.5 kg (193 lb)   BMI (Calculated): 26.17  IBW/kg (Dietitian Calculated): 80.9 kg  Percent of IBW: 108 %    Weight History:   Wt Readings from Last 50 Encounters:   08/05/25 87.5 kg (193 lb) --> Admit wt   07/29/25 86.6 kg (191 lb)   05/29/25 85.3 kg (188 lb)   01/24/25 88.2 kg (194 lb 7.1 oz)   08/21/24 86.5 kg (190 lb 9.6 oz)     Weight Change %:  Weight History / % Weight Change: Stable weight over the past year  Significant Weight Loss: No    Nutrition Focused Physical Exam Findings:  Defer: Multiple staff members moving pt from chair to bed    Edema:  Edema: none  Physical Findings:  Skin: Positive (Medial upper abdominal  incision)  Digestive System Findings: Abdominal bloating, Abdominal pain, Diarrhea, Nausea, Vomiting  Mouth Findings:  (None)    Nutrition Significant Labs:  BMP Trend:   Results from last 7 days   Lab Units 08/08/25  0549 08/07/25  0526 08/06/25  0543 08/05/25  1618   GLUCOSE mg/dL 125* 130* 171* 171*   CALCIUM mg/dL 8.2* 8.1* 8.3* 8.7   SODIUM mmol/L 139 136 136 133*   POTASSIUM mmol/L 3.5 3.4* 3.1* 2.8*   CO2 mmol/L 26 25 25 24   CHLORIDE mmol/L 103 100 100 99   BUN mg/dL 59* 66* 51* 49*   CREATININE mg/dL 1.14 1.56* 1.34* 1.30      Nutrition Specific Medications:  Scheduled medications  Scheduled Medications[2]  Continuous medications  Continuous Medications[3]  PRN medications  PRN Medications[4]    I/O:   LBM: 8/8/25 ; Stool Appearance: Soft, Formed (08/08/25 0900)    Dietary Orders (From admission, onward)       Start     Ordered    08/08/25 1222  NPO Diet Except: Ice chips, Sips with meds, Sips of clear liquids; Effective now  Diet effective now        Comments: Ok to do sips of water, juice, or coffee. Ok for popsicles.   Question Answer Comment   Except: Ice chips    Except: Sips with meds    Except: Sips of clear liquids        08/08/25 1227    08/06/25 0123  May Participate in Room Service  ( ROOM SERVICE MAY PARTICIPATE)  Once        Question:  .  Answer:  Yes    08/06/25 0122             Estimated Needs:   Total Energy Estimated Needs in 24 hours (kCal):  (2000-2200)  Method for Estimating Needs: 25-27 kcals/kg x IBW  Total Protein Estimated Needs in 24 Hours (g):  ()  Method for Estimating 24 Hour Protein Needs: 1.2-1.3 g/kg x IBW  Total Fluid Estimated Needs in 24 Hours (mL):  (2000-2200)  Method for Estimating 24 Hour Fluid Needs: 1mL/kcal or per team        Nutrition Diagnosis   Malnutrition Diagnosis  Patient has Malnutrition Diagnosis: No (pt deemed low threshold for developing acute malnutrition)    Nutrition Diagnosis  Patient has Nutrition Diagnosis: Yes  Diagnosis Status (1):  New  Nutrition Diagnosis 1: Altered GI function  Related to (1): SBO  As Evidenced by (1): inability to tolerate PO diet w/ NPO status x 3 days       Nutrition Interventions/Recommendations      Nutrition Recommendations:  Continue NPO until advancement to CLD deemed medically feasible    If PPN desired, check RFP + Mg daily prior to initiation - replete electrolytes as needed.    Start PPN of Clinimix E 4.25% Amino Acids, 5% Dextrose @ 40mL/hr  After 24 hours, advance to goal rate of 83mL/hr  Do not advance if major shift in electrolytes or blood sugars occur  Include MVI + trace elements    Provide standard intralipids @ 20.8mL/hr x 12 hours overnight daily (250mL total)    PPN monitoring:  Daily weights  RFP + Mg daily; replete lytes PRN  LFTs + TGs weekly  Accuchecks q6h    Provides: 1178 kcals, 85g protein, 100g dextrose, 42% kcal from fat (meeting 59% kcal & 89% protein needs)      Nutrition Interventions/Goals:   Parenteral Nutrition: Management of delivery rate of parenteral nutrition  Goal: Tolerate PPN advancement to goal      Nutrition Monitoring and Evaluation   Enteral and Parenteral Nutrition Intake Determination: Parenteral nutrition intake - Tolerate TPN at goal rate    Time Spent (min): 60 minutes              [1]   Past Medical History:  Diagnosis Date    Age-related nuclear cataract, unspecified eye 05/26/2022    Nuclear sclerosis    Atherosclerosis of native arteries of extremities with intermittent claudication, bilateral legs 01/03/2023    Atherosclerosis of native artery of both lower extremities with intermittent claudication    Atherosclerotic heart disease of native coronary artery without angina pectoris 10/04/2022    CAD (coronary artery disease)    Atrial fibrillation (Multi)     Cellulitis of lower extremity 04/09/2024    Community acquired pneumonia 04/09/2024    Diplopia 02/18/2022    Diplopia    Essential (primary) hypertension 10/04/2022    Benign essential hypertension    Heart  disease 2010    Heart valve disease     Hyperlipidemia     Other abnormal glucose     Hemoglobin A1c less than 7.0%    Other intervertebral disc degeneration, lumbar region     Degenerative disc disease, lumbar    Pain in extremity 04/09/2024    Personal history of colonic polyps     History of colonic polyps    Personal history of malignant neoplasm of other organs and systems     History of squamous cell carcinoma    Personal history of other diseases of the circulatory system 04/16/2018    History of hypertension    Personal history of other endocrine, nutritional and metabolic disease 04/16/2018    History of hyperlipidemia    Personal history of other medical treatment     History of stress test    Presence of prosthetic heart valve 01/03/2023    S/P AVR (aortic valve replacement)    Spondylolysis, lumbar region     Lumbar spondylolysis   [2] acetaminophen, 650 mg, oral, q6h  albuterol, 2.5 mg, nebulization, 4x daily  [Held by provider] apixaban, 2.5 mg, oral, BID  aspirin, 81 mg, oral, Daily  [START ON 8/9/2025] bisacodyl, 10 mg, rectal, Once  cyclobenzaprine, 5 mg, oral, BID  pantoprazole, 40 mg, intravenous, Daily  potassium chloride, 20 mEq, intravenous, Once  potassium phosphate, 15 mmol, intravenous, Once  QUEtiapine, 12.5 mg, oral, Nightly  sennosides-docusate sodium, 2 tablet, oral, Nightly  [3] heparin, 0-4,000 Units/hr, Last Rate: 1,000 Units/hr (08/07/25 1412)  lactated Ringer's, 100 mL/hr, Last Rate: 100 mL/hr (08/08/25 0528)  lactated Ringer's, 100 mL/hr, Last Rate: 100 mL/hr (08/08/25 1243)  [4] PRN medications: albuterol, heparin, HYDROmorphone, HYDROmorphone, naloxone, ondansetron **OR** ondansetron, oxygen

## 2025-08-09 ENCOUNTER — APPOINTMENT (OUTPATIENT)
Dept: RADIOLOGY | Facility: HOSPITAL | Age: 89
DRG: 335 | End: 2025-08-09
Payer: MEDICARE

## 2025-08-09 LAB
AMORPH CRY #/AREA UR COMP ASSIST: ABNORMAL /HPF
ANION GAP SERPL CALC-SCNC: 13 MMOL/L (ref 10–20)
APPEARANCE UR: ABNORMAL
ATRIAL RATE: 64 BPM
BACTERIA #/AREA URNS AUTO: ABNORMAL /HPF
BILIRUB UR STRIP.AUTO-MCNC: NEGATIVE MG/DL
BUN SERPL-MCNC: 45 MG/DL (ref 6–23)
CALCIUM SERPL-MCNC: 8 MG/DL (ref 8.6–10.3)
CHLORIDE SERPL-SCNC: 107 MMOL/L (ref 98–107)
CO2 SERPL-SCNC: 27 MMOL/L (ref 21–32)
COLOR UR: YELLOW
CREAT SERPL-MCNC: 0.9 MG/DL (ref 0.5–1.3)
EGFRCR SERPLBLD CKD-EPI 2021: 82 ML/MIN/1.73M*2
ERYTHROCYTE [DISTWIDTH] IN BLOOD BY AUTOMATED COUNT: 15.1 % (ref 11.5–14.5)
GLUCOSE BLD MANUAL STRIP-MCNC: 102 MG/DL (ref 74–99)
GLUCOSE BLD MANUAL STRIP-MCNC: 103 MG/DL (ref 74–99)
GLUCOSE BLD MANUAL STRIP-MCNC: 117 MG/DL (ref 74–99)
GLUCOSE SERPL-MCNC: 109 MG/DL (ref 74–99)
GLUCOSE UR STRIP.AUTO-MCNC: NORMAL MG/DL
HCT VFR BLD AUTO: 38.6 % (ref 41–52)
HGB BLD-MCNC: 12.7 G/DL (ref 13.5–17.5)
KETONES UR STRIP.AUTO-MCNC: ABNORMAL MG/DL
LEUKOCYTE ESTERASE UR QL STRIP.AUTO: NEGATIVE
MAGNESIUM SERPL-MCNC: 2.52 MG/DL (ref 1.6–2.4)
MCH RBC QN AUTO: 31.1 PG (ref 26–34)
MCHC RBC AUTO-ENTMCNC: 32.9 G/DL (ref 32–36)
MCV RBC AUTO: 95 FL (ref 80–100)
MRSA DNA SPEC QL NAA+PROBE: NOT DETECTED
NITRITE UR QL STRIP.AUTO: NEGATIVE
NRBC BLD-RTO: 0 /100 WBCS (ref 0–0)
P AXIS: 85 DEGREES
P OFFSET: 147 MS
P ONSET: 108 MS
PH UR STRIP.AUTO: 6 [PH]
PHOSPHATE SERPL-MCNC: 2.7 MG/DL (ref 2.5–4.9)
PLATELET # BLD AUTO: 203 X10*3/UL (ref 150–450)
POTASSIUM SERPL-SCNC: 3.6 MMOL/L (ref 3.5–5.3)
PR INTERVAL: 378 MS
PROT UR STRIP.AUTO-MCNC: ABNORMAL MG/DL
Q ONSET: 217 MS
QRS COUNT: 10 BEATS
QRS DURATION: 86 MS
QT INTERVAL: 406 MS
QTC CALCULATION(BAZETT): 418 MS
QTC FREDERICIA: 414 MS
R AXIS: -8 DEGREES
RBC # BLD AUTO: 4.08 X10*6/UL (ref 4.5–5.9)
RBC # UR STRIP.AUTO: NEGATIVE MG/DL
RBC #/AREA URNS AUTO: ABNORMAL /HPF
SODIUM SERPL-SCNC: 143 MMOL/L (ref 136–145)
SP GR UR STRIP.AUTO: 1.03
T AXIS: -10 DEGREES
T OFFSET: 420 MS
UFH PPP CHRO-ACNC: 0.4 IU/ML (ref ?–1.1)
UROBILINOGEN UR STRIP.AUTO-MCNC: NORMAL MG/DL
VENTRICULAR RATE: 64 BPM
WBC # BLD AUTO: 9 X10*3/UL (ref 4.4–11.3)
WBC #/AREA URNS AUTO: ABNORMAL /HPF

## 2025-08-09 PROCEDURE — 85520 HEPARIN ASSAY: CPT | Performed by: INTERNAL MEDICINE

## 2025-08-09 PROCEDURE — 36415 COLL VENOUS BLD VENIPUNCTURE: CPT | Performed by: PHYSICIAN ASSISTANT

## 2025-08-09 PROCEDURE — 87641 MR-STAPH DNA AMP PROBE: CPT | Performed by: INTERNAL MEDICINE

## 2025-08-09 PROCEDURE — 94640 AIRWAY INHALATION TREATMENT: CPT

## 2025-08-09 PROCEDURE — 71046 X-RAY EXAM CHEST 2 VIEWS: CPT

## 2025-08-09 PROCEDURE — 36415 COLL VENOUS BLD VENIPUNCTURE: CPT | Performed by: INTERNAL MEDICINE

## 2025-08-09 PROCEDURE — 2500000004 HC RX 250 GENERAL PHARMACY W/ HCPCS (ALT 636 FOR OP/ED): Performed by: INTERNAL MEDICINE

## 2025-08-09 PROCEDURE — 87040 BLOOD CULTURE FOR BACTERIA: CPT | Mod: GEALAB | Performed by: INTERNAL MEDICINE

## 2025-08-09 PROCEDURE — 84100 ASSAY OF PHOSPHORUS: CPT | Performed by: PHYSICIAN ASSISTANT

## 2025-08-09 PROCEDURE — 85027 COMPLETE CBC AUTOMATED: CPT | Performed by: PHYSICIAN ASSISTANT

## 2025-08-09 PROCEDURE — 2500000002 HC RX 250 W HCPCS SELF ADMINISTERED DRUGS (ALT 637 FOR MEDICARE OP, ALT 636 FOR OP/ED): Performed by: INTERNAL MEDICINE

## 2025-08-09 PROCEDURE — 1200000002 HC GENERAL ROOM WITH TELEMETRY DAILY

## 2025-08-09 PROCEDURE — 99232 SBSQ HOSP IP/OBS MODERATE 35: CPT | Performed by: INTERNAL MEDICINE

## 2025-08-09 PROCEDURE — 94664 DEMO&/EVAL PT USE INHALER: CPT

## 2025-08-09 PROCEDURE — 2500000004 HC RX 250 GENERAL PHARMACY W/ HCPCS (ALT 636 FOR OP/ED): Performed by: SURGERY

## 2025-08-09 PROCEDURE — 71046 X-RAY EXAM CHEST 2 VIEWS: CPT | Performed by: RADIOLOGY

## 2025-08-09 PROCEDURE — 94760 N-INVAS EAR/PLS OXIMETRY 1: CPT

## 2025-08-09 PROCEDURE — 82374 ASSAY BLOOD CARBON DIOXIDE: CPT | Performed by: PHYSICIAN ASSISTANT

## 2025-08-09 PROCEDURE — 81001 URINALYSIS AUTO W/SCOPE: CPT | Performed by: INTERNAL MEDICINE

## 2025-08-09 PROCEDURE — 82947 ASSAY GLUCOSE BLOOD QUANT: CPT

## 2025-08-09 PROCEDURE — 99024 POSTOP FOLLOW-UP VISIT: CPT | Performed by: SURGERY

## 2025-08-09 PROCEDURE — 83735 ASSAY OF MAGNESIUM: CPT | Performed by: PHYSICIAN ASSISTANT

## 2025-08-09 PROCEDURE — 2500000001 HC RX 250 WO HCPCS SELF ADMINISTERED DRUGS (ALT 637 FOR MEDICARE OP): Performed by: INTERNAL MEDICINE

## 2025-08-09 PROCEDURE — 2500000001 HC RX 250 WO HCPCS SELF ADMINISTERED DRUGS (ALT 637 FOR MEDICARE OP): Performed by: PHYSICIAN ASSISTANT

## 2025-08-09 RX ORDER — OXYCODONE HYDROCHLORIDE 5 MG/1
2.5 TABLET ORAL EVERY 4 HOURS PRN
Refills: 0 | Status: ACTIVE | OUTPATIENT
Start: 2025-08-09

## 2025-08-09 RX ORDER — VANCOMYCIN HYDROCHLORIDE 1 G/20ML
INJECTION, POWDER, LYOPHILIZED, FOR SOLUTION INTRAVENOUS DAILY PRN
Status: DISCONTINUED | OUTPATIENT
Start: 2025-08-09 | End: 2025-08-10

## 2025-08-09 RX ORDER — OXYCODONE HYDROCHLORIDE 5 MG/1
5 TABLET ORAL EVERY 4 HOURS PRN
Refills: 0 | Status: ACTIVE | OUTPATIENT
Start: 2025-08-09

## 2025-08-09 RX ADMIN — APIXABAN 2.5 MG: 2.5 TABLET, FILM COATED ORAL at 10:38

## 2025-08-09 RX ADMIN — VANCOMYCIN HYDROCHLORIDE 1500 MG: 10 INJECTION, POWDER, LYOPHILIZED, FOR SOLUTION INTRAVENOUS at 20:29

## 2025-08-09 RX ADMIN — PIPERACILLIN SODIUM AND TAZOBACTAM SODIUM 4.5 G: 4; .5 INJECTION, SOLUTION INTRAVENOUS at 18:39

## 2025-08-09 RX ADMIN — APIXABAN 2.5 MG: 2.5 TABLET, FILM COATED ORAL at 20:26

## 2025-08-09 RX ADMIN — CYCLOBENZAPRINE 5 MG: 10 TABLET, FILM COATED ORAL at 10:38

## 2025-08-09 RX ADMIN — SENNOSIDES AND DOCUSATE SODIUM 2 TABLET: 50; 8.6 TABLET ORAL at 20:26

## 2025-08-09 RX ADMIN — SODIUM CHLORIDE, POTASSIUM CHLORIDE, SODIUM LACTATE AND CALCIUM CHLORIDE 85 ML/HR: 600; 310; 30; 20 INJECTION, SOLUTION INTRAVENOUS at 20:18

## 2025-08-09 RX ADMIN — ACETAMINOPHEN 650 MG: 325 TABLET ORAL at 20:27

## 2025-08-09 RX ADMIN — Medication: at 19:16

## 2025-08-09 RX ADMIN — ALBUTEROL SULFATE 2.5 MG: 2.5 SOLUTION RESPIRATORY (INHALATION) at 19:16

## 2025-08-09 RX ADMIN — ASPIRIN 81 MG: 81 TABLET, DELAYED RELEASE ORAL at 10:38

## 2025-08-09 RX ADMIN — PANTOPRAZOLE SODIUM 40 MG: 40 INJECTION, POWDER, FOR SOLUTION INTRAVENOUS at 10:40

## 2025-08-09 ASSESSMENT — COGNITIVE AND FUNCTIONAL STATUS - GENERAL
MOVING FROM LYING ON BACK TO SITTING ON SIDE OF FLAT BED WITH BEDRAILS: A LITTLE
STANDING UP FROM CHAIR USING ARMS: A LOT
DRESSING REGULAR LOWER BODY CLOTHING: A LITTLE
HELP NEEDED FOR BATHING: A LOT
MOVING FROM LYING ON BACK TO SITTING ON SIDE OF FLAT BED WITH BEDRAILS: A LITTLE
PERSONAL GROOMING: A LITTLE
TOILETING: A LITTLE
WALKING IN HOSPITAL ROOM: A LOT
DRESSING REGULAR LOWER BODY CLOTHING: A LITTLE
EATING MEALS: A LITTLE
HELP NEEDED FOR BATHING: A LOT
MOVING TO AND FROM BED TO CHAIR: A LOT
DAILY ACTIVITIY SCORE: 17
STANDING UP FROM CHAIR USING ARMS: A LOT
PERSONAL GROOMING: A LITTLE
TOILETING: A LITTLE
DAILY ACTIVITIY SCORE: 17
MOBILITY SCORE: 14
MOBILITY SCORE: 14
DRESSING REGULAR UPPER BODY CLOTHING: A LITTLE
EATING MEALS: A LITTLE
CLIMB 3 TO 5 STEPS WITH RAILING: A LOT
WALKING IN HOSPITAL ROOM: A LOT
MOVING TO AND FROM BED TO CHAIR: A LOT
DRESSING REGULAR UPPER BODY CLOTHING: A LITTLE
TURNING FROM BACK TO SIDE WHILE IN FLAT BAD: A LITTLE
CLIMB 3 TO 5 STEPS WITH RAILING: A LOT
TURNING FROM BACK TO SIDE WHILE IN FLAT BAD: A LITTLE

## 2025-08-09 ASSESSMENT — PAIN SCALES - GENERAL
PAINLEVEL_OUTOF10: 4
PAINLEVEL_OUTOF10: 0 - NO PAIN

## 2025-08-09 ASSESSMENT — PAIN - FUNCTIONAL ASSESSMENT
PAIN_FUNCTIONAL_ASSESSMENT: 0-10
PAIN_FUNCTIONAL_ASSESSMENT: UNABLE TO SELF-REPORT
PAIN_FUNCTIONAL_ASSESSMENT: UNABLE TO SELF-REPORT
PAIN_FUNCTIONAL_ASSESSMENT: 0-10

## 2025-08-09 NOTE — CONSULTS
Vancomycin Dosing by Pharmacy- INITIAL    Alexis Armijo is a 89 y.o. year old male who Pharmacy has been consulted for vancomycin dosing for pneumonia. Based on the patient's indication and renal status this patient will be dosed based on a goal AUC of 400-600.     Renal function is currently improving.    Visit Vitals  /75 (BP Location: Left arm, Patient Position: Lying)   Pulse 70   Temp 36.7 °C (98.1 °F) (Temporal)   Resp 21        Lab Results   Component Value Date    CREATININE 0.90 2025    CREATININE 1.14 2025    CREATININE 1.56 (H) 2025    CREATININE 1.34 (H) 2025    CREATININE 0.98 2025        Patient weight is as follows:   Vitals:    25 1608   Weight: 87.5 kg (193 lb)       Cultures:  No results found for the encounter in last 14 days.        I/O last 3 completed shifts:  In: 3251.8 (37.1 mL/kg) [P.O.:120; I.V.:2726.8 (31.1 mL/kg); NG/GT:50; IV Piggyback:355]  Out: 2650 (30.3 mL/kg) [Urine:1950 (0.6 mL/kg/hr); Emesis/NG output:700]  Weight: 87.5 kg   I/O during current shift:  I/O this shift:  In: -   Out: 250 [Urine:250]    Temp (24hrs), Av.7 °C (98.1 °F), Min:36.5 °C (97.7 °F), Max:37.1 °C (98.8 °F)         Assessment/Plan     Patient will not be given a loading dose.  Will initiate vancomycin maintenance, 1500 mg every 24 hours.    This dosing regimen is predicted by [employer] to result in the following pharmacokinetic parameters:  Loading dose: N/A  Regimen: 1500 mg IV every 24 hours.  Start time: 17:24 on 2025  Exposure target: AUC24 (range) 400-600 mg/L.hr   [id number]: 364 mg/L.hr  AUC24,ss: 500 mg/L.hr  Probability of AUC24 > 400: 74 %  Ctrough,ss: 14.6 mg/L  Probability of Ctrough,ss > 20: 24 %      Follow-up level will be ordered on 08/10 at am unless clinically indicated sooner.  Will continue to monitor renal function daily while on vancomycin and order serum creatinine at least every 48 hours if not already ordered.  Follow for continued  vancomycin needs, clinical response, and signs/symptoms of toxicity.       TUNDE [employer]ARTE[employer]I[facility]

## 2025-08-09 NOTE — CARE PLAN
The patient's goals for the shift include      The clinical goals for the shift include Pt will remain safe this shift    Over the shift, the patient did make progress toward the following goals. Pt monitored and medicated as ordered this shift. Pt remained safe throughout the shift.

## 2025-08-09 NOTE — PROGRESS NOTES
"Occupational Therapy                 Therapy Communication Note    Patient Name: Alexis Armijo \"Ronal\"  MRN: [id number]  Department: [address]  Room: [address] [place] [address]  Today's Date: 8/9/2025     Discipline: Occupational Therapy    Missed Visit: OT Missed Visit: Yes     Missed Visit Reason: Missed Visit Reason: Patient sleeping. Attempted therapy evaluations 2x this date. Both times pt lethargic, unable to maintain alertness, falls back asleep moments after being awoken. Not currently appropriate to participate meaningfully in OT evaluation at this time.    Missed Time: Attempt @ 3306, 0479      "

## 2025-08-09 NOTE — CONSULTS
"Consults  Referred by CHRISSIE Last MD: Jonah Velez, DO    Reason For Consult  pneumonia    History Of Present Illness  Alexis Armijo \"Ronal\" is a 89 y.o. male, , hx of HTN, hx of CAD, hx of TIA, hx of AF, hx of pacemaker, hx of AVR, he was admitted for abdominal pain, he was dx with bowel obstruction, he underwent exploration and lysis of adhesions 8/5, he was noted to be more lethargic today, he is on O2, the cxr with bilateral infiltrates, the hx is limited, no fever, minimal cough, no chest pain, no abdominal pain, no emesis, WBC are N     Past Medical History  He has a past medical history of Age-related nuclear cataract, unspecified eye (05/26/2022), Atherosclerosis of native arteries of extremities with intermittent claudication, bilateral legs (01/03/2023), Atherosclerotic heart disease of native coronary artery without angina pectoris (10/04/2022), Atrial fibrillation (Multi), Cellulitis of lower extremity (04/09/2024), Community acquired pneumonia (04/09/2024), Diplopia (02/18/2022), Essential (primary) hypertension (10/04/2022), Heart disease (2010), Heart valve disease, Hyperlipidemia, Other abnormal glucose, Other intervertebral disc degeneration, lumbar region, Pain in extremity (04/09/2024), Personal history of colonic polyps, 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    Surgical History  He has a past surgical history that includes Other surgical history (02/10/2015); Other surgical history (07/20/2015); Cardiac pacemaker placement (04/16/2018); Total knee arthroplasty (04/16/2018); Colonoscopy w/ polypectomy (01/25/2017); Knee arthroscopy w/ debridement (01/25/2017); " Appendectomy (01/25/2017); Coronary angioplasty with stent (01/25/2017); Esophagogastroduodenoscopy (01/25/2017); Hernia repair (01/25/2017); Other surgical history (01/25/2017); CT angio aorta and bilateral iliofemoral runoff including without contrast if performed (11/23/2019); Vein Surgery; Cardiac valve replacement; and Cataract extraction, bilateral.     Social History     Occupational History    Not on file   Tobacco Use    Smoking status: Never    Smokeless tobacco: Never   Vaping Use    Vaping status: Never Used   Substance and Sexual Activity    Alcohol use: Never    Drug use: Never    Sexual activity: Not Currently     Partners: Female     Family History  Family History[1], no immunodeficiency  Allergies  Terbinafine hcl     Immunization History   Administered Date(s) Administered    Flu vaccine, trivalent, preservative free, HIGH-DOSE, age 65y+ (Fluzone) 09/26/2017, 10/04/2022, 10/03/2023, 10/21/2024    Influenza, Unspecified 11/05/2008, 09/11/2009, 10/11/2011, 09/06/2012, 10/02/2014, 10/17/2016, 09/17/2018    Influenza, seasonal, injectable 09/04/2019, 09/11/2020, 10/04/2021    Pfizer Purple Cap SARS-CoV-2 01/28/2021, 02/25/2021    Pneumococcal conjugate vaccine, 13-valent (PREVNAR 13) 08/17/2015, 11/01/2017    Pneumococcal polysaccharide vaccine, 23-valent, age 2 years and older (PNEUMOVAX 23) 12/01/2003, 03/08/2012    RSV-MAb 03/31/2025    Td vaccine, age 7 years and older (TENIVAC) 02/20/2015    Tdap vaccine, age 7 year and older (BOOSTRIX, ADACEL) 09/08/2024    Zoster, Unspecified 12/27/2018, 03/28/2019    Zoster, live 02/13/2011     Medications  Home medications:  Prescriptions Prior to Admission[2]  Current medications:  Scheduled medications  Scheduled Medications[3]  Continuous medications  Continuous Medications[4]  PRN medications  PRN Medications[5]    Review of Systems   All other systems reviewed and are negative.       Objective  Range of Vitals (last 24 hours)  Heart Rate:  [60-96]   Temp:  " [36.5 °C (97.7 °F)-36.8 °C (98.2 °F)]   Resp:  [18-21]   BP: (117-156)/(61-88)   SpO2:  [95 %-99 %]   Oxygen Therapy  Pulse Ox (24 hr min): 95  Medical Gas Therapy: Supplemental oxygen  Medical Gas Delivery Method: Nasal cannula  O2 Flow Rate (L/min): 4 L/min      Daily Weight  08/05/25 : 87.5 kg (193 lb)    Body mass index is 26.18 kg/m².     Physical Exam  Constitutional:       Appearance: Normal appearance.   HENT:      Head: Normocephalic and atraumatic.      Mouth/Throat:      Mouth: Mucous membranes are moist.      Pharynx: Oropharynx is clear.     Eyes:      Pupils: Pupils are equal, round, and reactive to light.       Cardiovascular:      Rate and Rhythm: Normal rate and regular rhythm.      Heart sounds: Normal heart sounds.   Pulmonary:      Effort: Pulmonary effort is normal.      Breath sounds: Rales present.   Abdominal:      General: Abdomen is flat. Bowel sounds are normal.      Palpations: Abdomen is soft.      Comments: Dry dressing, no cellulitis     Musculoskeletal:      Cervical back: Normal range of motion.          Relevant Results  Outside Hospital Results  reviewed  Labs  Results from last 72 hours   Lab Units 08/09/25  0438 08/08/25  0549 08/07/25  0526   WBC AUTO x10*3/uL 9.0 9.6 11.5*   HEMOGLOBIN g/dL 12.7* 13.1* 12.7*   HEMATOCRIT % 38.6* 38.9* 37.5*   PLATELETS AUTO x10*3/uL 203 218 189     Results from last 72 hours   Lab Units 08/09/25  0438 08/08/25  0549 08/07/25  0526   SODIUM mmol/L 143 139 136   POTASSIUM mmol/L 3.6 3.5 3.4*   CHLORIDE mmol/L 107 103 100   CO2 mmol/L 27 26 25   BUN mg/dL 45* 59* 66*   CREATININE mg/dL 0.90 1.14 1.56*   GLUCOSE mg/dL 109* 125* 130*   CALCIUM mg/dL 8.0* 8.2* 8.1*   ANION GAP mmol/L 13 14 14   EGFR mL/min/1.73m*2 82 62 42*   PHOSPHORUS mg/dL 2.7 2.2* 2.9         Estimated Creatinine Clearance: 62.3 mL/min (by ERIBERTO-G formula based on SCr of 0.9 mg/dL).  No results found for: \"CRP\", \"SEDRATE\"  No results found for: \"HIV1X2\", \"HIVCONF\", \"UFAAMK3PO\"  No " "results found for: \"HEPCABINIT\", \"HEPCAB\", \"HCVPCRQUANT\"  Microbiology  Reviewed  Imaging  Reviewed      Assessment/Plan   Respiratory failure / atelectasis / possible pneumonia  Encephalopathy  Bowel obstruction sp surgery    Recommendations :  Continue Zosyn and Vancomycin  Cultures  Inflammatory markers  Chest PT / incentive spirometry    I spent minutes in the professional and overall care of this patient.  The cultures and susceptibilities were reviewed and discussed with the micro lab, the antibiotics stewardship guidelines were reviewed, the infection control protocols and recommendations were reviewed with the patient and the staff            Adalgisa Lozano MD         [1] No family history on file.  [2]   Medications Prior to Admission   Medication Sig Dispense Refill Last Dose/Taking    apixaban (Eliquis) 2.5 mg tablet Take 1 tablet (2.5 mg) by mouth 2 times a day.   8/5/2025 at  9:00 AM    aspirin 81 mg EC tablet Take 1 tablet (81 mg) by mouth once daily. 90 tablet 3 Unknown    atorvastatin (Lipitor) 80 mg tablet Take 1 tablet (80 mg) by mouth once daily at bedtime. 30 tablet 0 Unknown    valsartan (Diovan) 80 mg tablet Take 1 tablet (80 mg) by mouth once daily. 100 tablet 3 Unknown    acetaminophen (Tylenol) 325 mg tablet Take 2 tablets (650 mg) by mouth every 4 hours if needed for mild pain (1 - 3).   Unknown    b complex vitamins capsule Take 1 capsule by mouth once daily at bedtime.   Unknown    cholecalciferol (Vitamin D3) 25 MCG (1000 UT) tablet Take 1 tablet (1,000 Units) by mouth once daily.   Unknown    collagen, hydrolysate, bovine, (COLLAGEN, HYDR, BOVINE,, BULK, MISC) Take 1 tablet by mouth once daily.   Unknown    krill-om-3-dha-epa-phospho-ast (Omega-3 Krill Oil) 232-87-82-50 mg capsule Take 1 capsule by mouth once daily in the morning.   Unknown    lactobacillus combination no.4 (Probiotic) 3 billion cell capsule Take 1 capsule by mouth once daily.   Unknown    lutein-zeaxanthin 25-5 mg " capsule Take 1 capsule by mouth once daily in the morning.   Unknown    multivitamin with minerals (multivitamin with folic acid) tablet Take 1 tablet by mouth 2 times a day.   Unknown    ondansetron (Zofran) 4 mg tablet Take 1 tablet (4 mg) by mouth every 6 hours if needed for nausea or vomiting.   Unknown    polyethylene glycol (Glycolax, Miralax) 17 gram packet Take 17 g by mouth once daily.   Unknown    [] potassium chloride CR 20 mEq ER tablet Take 2 tablets (40 mEq) by mouth 2 times a day. Do not crush or chew.   Unknown    TURMERIC ORAL Take 500 mg by mouth once daily.   Unknown   [3] acetaminophen, 650 mg, oral, q6h  albuterol, 2.5 mg, nebulization, TID  apixaban, 2.5 mg, oral, BID  aspirin, 81 mg, oral, Daily  bisacodyl, 10 mg, rectal, Once  pantoprazole, 40 mg, intravenous, Daily  piperacillin-tazobactam, 4.5 g, intravenous, q6h  [Held by provider] QUEtiapine, 12.5 mg, oral, Nightly  sennosides-docusate sodium, 2 tablet, oral, Nightly  vancomycin, 1,500 mg, intravenous, q24h    [4] lactated Ringer's, 85 mL/hr, Last Rate: 85 mL/hr (25 1535)    [5] PRN medications: albuterol, ondansetron **OR** ondansetron, oxyCODONE, oxyCODONE, oxygen, vancomycin

## 2025-08-09 NOTE — CARE PLAN
The patient's goals for the shift include      The clinical goals for the shift include patient will remain safe during shift

## 2025-08-09 NOTE — PROGRESS NOTES
"Alexis Vazquez" is a 89 y.o. male on day 3 of admission presenting with Small bowel obstruction (Multi).      Subjective     Alexis was laying in bed with sitter at bedside, lethargic, slept through exam    Review of systems: unable to perform    Objective   Constitutional: Well developed, awake/alert/oriented x3, no distress, alert and cooperative  Eyes: PERRL, EOMI, clear sclera  ENMT: mucous membranes moist, no apparent injury, no lesions seen  Head/Neck: Neck supple, no apparent injury, thyroid without mass or tenderness, No JVD, trachea midline, no bruits  Respiratory/Thorax: Patent airways, CTAB, normal breath sounds with good chest expansion, thorax symmetric  Cardiovascular: Irregular, rate and rhythm, no murmurs, 2+ equal pulses of the extremities, normal S 1and S 2  Gastrointestinal: soft, +BS, non distended, surgical incision,  abd binder in place  Musculoskeletal: ROM intact, no joint swelling, normal strength  Extremities: normal extremities, no cyanosis edema, contusions or wounds, no clubbing  Neurological: alert and oriented x3, intact senses, motor, response and reflexes, normal strength  Lymphatic: No significant lymphadenopathy  Psychological: Appropriate mood and behavior  Skin: Warm and dry, no lesions, no rashes      Last Recorded Vitals  /74 (BP Location: Left arm, Patient Position: Lying)   Pulse 65   Temp 36.7 °C (98.1 °F) (Temporal)   Resp 19   Ht 1.829 m (6')   Wt 87.5 kg (193 lb)   SpO2 96%   BMI 26.18 kg/m²     Intake/Output last 3 Shifts:  I/O last 3 completed shifts:  In: 3251.8 (37.1 mL/kg) [P.O.:120; I.V.:2726.8 (31.1 mL/kg); NG/GT:50; IV Piggyback:355]  Out: 2650 (30.3 mL/kg) [Urine:1950 (0.6 mL/kg/hr); Emesis/NG output:700]  Weight: 87.5 kg   No intake/output data recorded.    Relevant Results  Scheduled medications  Scheduled Medications[1]  Continuous medications  Continuous Medications[2]  PRN medications  PRN Medications[3]    Results for orders placed or " performed during the hospital encounter of 08/05/25 (from the past 24 hours)   Magnesium   Result Value Ref Range    Magnesium 2.52 (H) 1.60 - 2.40 mg/dL   Phosphorus   Result Value Ref Range    Phosphorus 2.7 2.5 - 4.9 mg/dL   CBC   Result Value Ref Range    WBC 9.0 4.4 - 11.3 x10*3/uL    nRBC 0.0 0.0 - 0.0 /100 WBCs    RBC 4.08 (L) 4.50 - 5.90 x10*6/uL    Hemoglobin 12.7 (L) 13.5 - 17.5 g/dL    Hematocrit 38.6 (L) 41.0 - 52.0 %    MCV 95 80 - 100 fL    MCH 31.1 26.0 - 34.0 pg    MCHC 32.9 32.0 - 36.0 g/dL    RDW 15.1 (H) 11.5 - 14.5 %    Platelets 203 150 - 450 x10*3/uL   Basic Metabolic Panel   Result Value Ref Range    Glucose 109 (H) 74 - 99 mg/dL    Sodium 143 136 - 145 mmol/L    Potassium 3.6 3.5 - 5.3 mmol/L    Chloride 107 98 - 107 mmol/L    Bicarbonate 27 21 - 32 mmol/L    Anion Gap 13 10 - 20 mmol/L    Urea Nitrogen 45 (H) 6 - 23 mg/dL    Creatinine 0.90 0.50 - 1.30 mg/dL    eGFR 82 >60 mL/min/1.73m*2    Calcium 8.0 (L) 8.6 - 10.3 mg/dL   Heparin Assay, UFH   Result Value Ref Range    Heparin Unfractionated 0.4 See Comment Below for Therapeutic Ranges IU/mL   POCT GLUCOSE   Result Value Ref Range    POCT Glucose 103 (H) 74 - 99 mg/dL   POCT GLUCOSE   Result Value Ref Range    POCT Glucose 102 (H) 74 - 99 mg/dL     *Note: Due to a large number of results and/or encounters for the requested time period, some results have not been displayed. A complete set of results can be found in Results Review.       XR chest 2 views   Final Result   Diffuse interstitial infiltrates bilaterally, as above. Clinical   correlation and continued follow-up until clearing is recommended.        MACRO:   None.        Signed by: Bhavesh Le 8/9/2025 1:06 PM   Dictation workstation:   [id number]      XR chest abdomen for OG NG placement   Final Result   Nasogastric tube tip at the gastric cardia with the side port at the   level of the distal esophagus.        Signed by: Ish Hicks 8/7/2025 11:05 AM   Dictation workstation:    [id number]      XR chest abdomen for OG NG placement   Final Result   1. Nasogastric tube with its tip projecting over the expected   location of the stomach in the left upper quadrant.   2. Mild diffuse gaseous distention of stomach and bowel loops as   described above. This is most likely favored to represent   postsurgical ileus, especially given history of recent surgery.   However recommend clinical correlation for bowel obstruction.   3. Airspace opacity in the left lung base, likely favored to   represent atelectasis. However developing pneumonitis can also be   considered in the differential in appropriate clinical setting.   Recommend clinical and laboratory correlation.                  MACRO:   None        Signed by: Mel Serrano 8/6/2025 10:32 AM   Dictation workstation:   [id number]      XR chest 1 view   Final Result   Support hardware as described above.        Cardiomegaly with mild interstitial prominence suggestive of   developing interstitial edema/CHF.        Subtle bibasilar and retrocardiac airspace opacities concerning for   developing airspace disease. Clinical correlation and attention on   continued short-term follow-up is advised.        MACRO:   None        Signed by: Corbin Mohr 8/5/2025 6:49 PM   Dictation workstation:   [id number]      CT abdomen pelvis w IV contrast   Final Result   High-grade small bowel obstruction with transition point in the   central mesentery image axial image 91). Proximal to this the bowel   is markedly dilated with perienteric fluid measuring up to 5 cm in   diameter. There is pneumatosis versus pseudo pneumatosis involving   several of the jejunal loops, the former favored. Distal to the   transition point in the small bowel is relatively decompressed with   fluid-filled there is a 2nd transition point in near anatomic   proximity to the 1st transition point and there swirling of the   mesentery in this location. Therefore there is concern for a  volvulus   as cause of the bowel obstruction with a close loop mechanism not   excluded. Surgical consultation is recommended.        Actively inflamed urinary bladder wall suspicious for cystitis.             MACRO:   Lenard Seo discussed the significance and urgency of this   critical finding via EPIC secure chat with confirmation of receipt   with DR SHERLEY ARMSTRONG on 8/5/2025 at 5:56 pm.  (**-RCF-**)   Findings:  See findings.        Signed by: Lenard Seo 8/5/2025 5:56 PM   Dictation workstation:   [id number]          Transthoracic Echo Complete  Result Date: 7/30/2025   [facility], [address] [place] [address]               Tel [phone] and Fax [phone] TRANSTHORACIC ECHOCARDIOGRAM REPORT  Patient Name:       CECIL Del Rio Physician:    [id number] Johnie Rowe MD Study Date:         7/30/2025           Ordering Provider:    [id number] MIRZA ELIZABETH MRN/PID:            [id number]            Fellow: Accession#:         [id number]        Nurse:                Natalie Golden RN Date of Birth/Age:  1935 / 89      Sonographer:          Christine morales RDCS Gender assigned at  M                   Additional Staff: Birth: Height:             180.34 cm           Admit Date:           7/28/2025 Weight:             89.36 kg            Admission Status:     Inpatient -                                                               Routine BSA / BMI:          2.10 m2 / 27.48     Encounter#:           [id number]                     kg/m2 Blood Pressure:     155/81 mmHg         Department Location:  [facility]                                                               Invasive Study Type:    TRANSTHORACIC ECHO  (TTE) COMPLETE Diagnosis/ICD: Cerebral Infarction, unspecified-I63.9 Indication:    Cerebrovascular Accident CPT Code:      Echo Complete w Full Doppler-[id number] Patient History: Pertinent History: HTN, CAD, Hyperlipidemia and CHF. H/O CABG, PCI, AVR #25mm                    Hoffmann, PAD. Study Detail: The following Echo studies were performed: 2D, M-Mode, Doppler and               color flow. Image quality for this study is suboptimal.               Technically challenging study due to patient lying in supine               position and poor acoustic windows. Definity used as a contrast               agent for endocardial border definition and agitated saline used               as a contrast agent for intraseptal flow evaluation. Total               contrast used for this procedure was 1.5 mL via IV push. The               patient was asleep.  PHYSICIAN INTERPRETATION: Left Ventricle: Left ventricular ejection fraction is normal calculated by Gallo's biplane at 59%. There are no regional left ventricular wall motion abnormalities. The left ventricular cavity size is upper limits of normal. There is normal septal and mildly increased posterior left ventricular wall thickness. There is left ventricular concentric remodeling. Abnormal (paradoxical) septal motion consistent with post-operative status. Spectral Doppler shows a Grade I (impaired relaxation pattern) of left ventricular diastolic filling with normal left atrial filling pressure. There is no definite left ventricular thrombus visualized. Left Atrium: The left atrium is mildly dilated. There is a mobile interatrial septum. A bubble study using agitated saline was performed. Bubble study is negative. Right Ventricle: The right ventricle is mildly enlarged. There is mildly reduced right ventricular systolic function. A device is visualized in the right ventricle. Right Atrium: The right atrium is moderate to severely dilated. There is a device visualized in the  right atrium. Aortic Valve: There is a prosthetic aortic valve present. The aortic valve area by VTI is 1.69 cmï¿½ with a peak velocity of 2.35 m/s. The peak and mean gradients are 22 mmHg and 13 mmHg, respectively with a dimensionless index of 0.54. 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 Echo findings are consistent with normal aortic valve prosthesis structure and function. There is trace aortic valve regurgitation. Valve is not well visualized. Mitral Valve: The mitral valve is mild to moderately thickened. The doppler estimated peak and mean diastolic pressure gradients are 6.5 mmHg and 3 mmHg, respectively. There is evidence of mild mitral valve stenosis. There is moderate mitral annular calcification. The mean gradient of the mitral valve is 3 mmHg. There is trace mitral valve regurgitation. The E Vmax is 0.98 m/s. Tricuspid Valve: The tricuspid valve is structurally normal. There is trace to mild tricuspid regurgitation. Pulmonic Valve: The pulmonic valve is not well visualized. There is physiologic pulmonic valve regurgitation. Pericardium: There is no pericardial effusion noted. Aorta: The aortic root is normal. The aortic root is at the upper limits of normal size. Systemic Veins: The inferior vena cava appears normal in size, IVC inspiratory collapse was not assessed. In comparison to the previous echocardiogram(s): Compared with study dated 2/13/2024,.  CONCLUSIONS:  1. Left ventricular ejection fraction is normal calculated by Gallo's biplane at 59%.  2. Spectral Doppler shows a Grade I (impaired relaxation pattern) of left ventricular diastolic filling with normal left atrial filling pressure.  3. Abnormal septal motion consistent with post-operative status.  4. No left ventricular thrombus visualized.  5. There is mildly reduced right ventricular systolic function.  6. Mildly enlarged right ventricle.  7. The left atrium is mildly dilated.  8.  The right atrium is moderate to severely dilated.  9. There is mild mitral valve stenosis with a doppler estimated mean diastolic gradient of 3 mmHg. 10. There is moderate mitral annular calcification. 11. 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 The peak and mean gradients are 22 mmHg and 13 mmHg respectively. 12. Echo findings are consistent with normal aortic valve prosthesis structure and function. QUANTITATIVE DATA SUMMARY:  2D MEASUREMENTS:          Normal Ranges: IVSd:            0.98 cm  (0.6-1.1cm) LVPWd:           1.07 cm  (0.6-1.1cm) LVIDd:           4.73 cm  (3.9-5.9cm) LVIDs:           3.30 cm LV Mass Index:   82 g/m2 LVEDV Index:     77 ml/m2 LV % FS          30.3 %  LEFT ATRIUM:                  Normal Ranges: LA Vol A4C:        74.3 ml    (22+/-6mL/m2) LA Vol A2C:        76.0 ml LA Vol BP:         76.3 ml LA Vol Index A4C:  35.5ml/m2 LA Vol Index A2C:  36.2 ml/m2 LA Vol Index BP:   36.4 ml/m2 LA Area A4C:       24.2 cm2 LA Area A2C:       24.1 cm2 LA Major Axis A4C: 6.7 cm LA Major Axis A2C: 6.5 cm LA Volume Index:   37.6 ml/m2 LA Vol A4C:        70.6 ml LA Vol A2C:        78.0 ml LA Vol Index BSA:  35.5 ml/m2  RIGHT ATRIUM:          Normal Ranges: RA Area A4C:  29.4 cm2  M-MODE MEASUREMENTS:         Normal Ranges: Ao Root:             3.80 cm (2.0-3.7cm) LAs:                 4.04 cm (2.7-4.0cm)  AORTA MEASUREMENTS:         Normal Ranges: Ao Sinus, d:        3.90 cm (2.1-3.5cm)  LV SYSTOLIC FUNCTION:                      Normal Ranges: EF-A4C View:    59 % (>=55%) EF-A2C View:    58 % EF-Biplane:     59 % LV EF Reported: 59 %  LV DIASTOLIC FUNCTION:             Normal Ranges: MV Peak E:             0.98 m/s    (0.7-1.2 m/s) MV Peak A:             1.28 m/s    (0.42-0.7 m/s) E/A Ratio:             0.76        (1.0-2.2) MV e'                  0.085 m/s   (>8.0) MV lateral e'          0.10 m/s MV medial e'           0.07 m/s MV A Dur:               193.77 msec E/e' Ratio:            11.47       (<8.0) PulmV Sys Jamie:         33.61 cm/s PulmV Boone Jamie:        25.01 cm/s PulmV S/D Jamie:         1.34 PulmV A Revs Jamie:      14.86 cm/s PulmV A Revs Dur:      117.65 msec  MITRAL VALVE:          Normal Ranges: MV Vmax:      1.27 m/s (<=1.3m/s) MV peak P.5 mmHg (<5mmHg) MV mean P.8 mmHg (<2mmHg) MV VTI:       52.27 cm (10-13cm) MV DT:        328 msec (150-240msec)  AORTIC VALVE:                      Normal Ranges: AoV Vmax:                2.35 m/s  (<=1.7m/s) AoV Peak P.2 mmHg (<20mmHg) AoV Mean P.6 mmHg (1.7-11.5mmHg) LVOT Max Jamie:            1.19 m/s  (<=1.1m/s) AoV VTI:                 48.18 cm  (18-25cm) LVOT VTI:                26.14 cm LVOT Diameter:           1.99 cm   (1.8-2.4cm) AoV Area, VTI:           1.69 cm2  (2.5-5.5cm2) AoV Area,Vmax:           1.57 cm2  (2.5-4.5cm2) AoV Dimensionless Index: 0.54  RIGHT VENTRICLE: RV Basal 4.50 cm RV Mid   2.80 cm RV Major 8.2 cm TAPSE:   15.0 mm RV s'    0.08 m/s  TRICUSPID VALVE/RVSP:          Normal Ranges: Peak TR Velocity:     2.44 m/s  PULMONIC VALVE:          Normal Ranges: PV Max Jamie:     1.0 m/s  (0.6-0.9m/s) PV Max P.2 mmHg  PULMONARY VEINS: PulmV A Revs Dur: 117.65 msec PulmV A Revs Jamie: 14.86 cm/s PulmV Boone Jamie:   25.01 cm/s PulmV S/D Jamie:    1.34 PulmV Sys Jamie:    33.61 cm/s  [id number] Johnie Rowe MD Electronically signed on 2025 at 2:29:48 PM  ** Final **            Assessment/Plan   Assessment & Plan  Small bowel obstruction (Multi)    Diastolic congestive heart failure    Stented coronary artery    Chronic renal insufficiency    Anticoagulant long-term use      Small bowel obstruction  -s/p ex lap with lysis of adhesions  -Surgery following  -Eliquis on hold, heparin gtt conts  -NG tube removed by pt  -NPO  -Pain meds     2. Paroxysmal atrial fibrillation  -EKGs reviewed, was in SR on admit  -Now back in AF  -Eliquis on hold for post op  -hgb  stable, Cont heparin gtt until able to take PO  -Planning for outpt watchman     3. Hypertension  -stable  -monitor     4. Elevated troponins-Non MI elevation  -Hx of CAD   -s/p PCI/JENELLE to D1 and mRCA in 2006  -s/p CABG (LIMA-LAD, [id number]) in 2018  -troponin 57, 53  -I reviewed the EKG, no acute changes, ST elevation, or depression  -Cont asa     5. PVD  -s/p bilateral GSV EVLA, s/p RLE revascularization in 2013, atherectomy/PTA/DCB of the left popliteal and peroneal arteries in 2020  -Cont asa     6. [facility] PCM in situ  -Personally interrogated last week, normal functioning, battery life 22 months, PAF noted burden 0.1%     7. S/p AVR  -normal functioning per echo       RITU Alvarado-CNP        [1] acetaminophen, 650 mg, oral, q6h  albuterol, 2.5 mg, nebulization, TID  apixaban, 2.5 mg, oral, BID  aspirin, 81 mg, oral, Daily  bisacodyl, 10 mg, rectal, Once  cyclobenzaprine, 5 mg, oral, BID  pantoprazole, 40 mg, intravenous, Daily  [Held by provider] QUEtiapine, 12.5 mg, oral, Nightly  sennosides-docusate sodium, 2 tablet, oral, Nightly  [2] lactated Ringer's, 100 mL/hr, Last Rate: 100 mL/hr (08/09/25 8159)  [3] PRN medications: albuterol, heparin, ondansetron **OR** ondansetron, oxyCODONE, oxyCODONE, oxygen

## 2025-08-09 NOTE — PROGRESS NOTES
"Physical Therapy                 Therapy Communication Note    Patient Name: Alexis Armijo \"Ronal\"  MRN: [id number]  Department: [address]  Room: [address] [place] [address]  Today's Date: 8/9/2025     Discipline: Physical Therapy    Missed Visit: PT Missed Visit: Yes     Missed Visit Reason: Missed Visit Reason: Patient sleeping (Cleared per nursing.  Pt in and out of alertness and still sleeping majority of the time per nursing.  Attempted PT evaluation.  Pt very drowsy, keeps eyes closed and falls asleep mid-conversation.  Pt not safe and appropriate at this time to participate in mobility assessment.  Hold PT eval, no eval completed this date.)    Missed Time: Attempts at 8:19 am and 10:51 am    Comment:  Initial attempt at 8:19 am, nursing advised to hold therapy evals secondary to patient sleeping and being very drowsy this AM.  Recommended coming back at a later time.       "

## 2025-08-09 NOTE — PROGRESS NOTES
GENERAL SURGERY PROGRESS NOTE    PATIENT NAME: Alexis Armijo  MRN: [id number]  DATE: 8/9/2025    SUMMARY OF CURRENT STATUS  - POD # 4 s/p exlap and JOHN  - having daily Bms, passing gas  - good uop  - patient is very lethargic after receiving seroquel yesterday - slept through PT, OT and his wife's visit this morning - seroquel has since been discontinued by medical team  - denies any major abdominal pain, nausea, SOB, chest pain, leg pain    TODAY'S ASSESSMENT:  - New complications over the past 24 hours: No  - No concerns overnight  - Pain controlled: Yes  - DVT prophylaxis:  Yes -eliquis  - Diet is currently: npo  - Nausea:  No  - Emesis:  No  - Pt has sat in the chair / ambulated    ON EXAMINATION:  /74 (BP Location: Left arm, Patient Position: Lying)   Pulse 65   Temp 36.7 °C (98.1 °F) (Temporal)   Resp 19   Ht 1.829 m (6')   Wt 87.5 kg (193 lb)   SpO2 96%   BMI 26.18 kg/m²   APPEARANCE: NAD, looks well.  ABDOMEN:  Soft, mekhi-incisional tenderness, nondistended  WOUND(S):  Incisions Clean / Dry / Intact    INS/OUTS:    Intake/Output Summary (Last 24 hours) at 8/9/2025 1423  Last data filed at 8/9/2025 0500  Gross per 24 hour   Intake 3201.84 ml   Output 950 ml   Net 2251.84 ml         BLOOD WORK:  Results for orders placed or performed during the hospital encounter of 08/05/25 (from the past 24 hours)   Magnesium   Result Value Ref Range    Magnesium 2.52 (H) 1.60 - 2.40 mg/dL   Phosphorus   Result Value Ref Range    Phosphorus 2.7 2.5 - 4.9 mg/dL   CBC   Result Value Ref Range    WBC 9.0 4.4 - 11.3 x10*3/uL    nRBC 0.0 0.0 - 0.0 /100 WBCs    RBC 4.08 (L) 4.50 - 5.90 x10*6/uL    Hemoglobin 12.7 (L) 13.5 - 17.5 g/dL    Hematocrit 38.6 (L) 41.0 - 52.0 %    MCV 95 80 - 100 fL    MCH 31.1 26.0 - 34.0 pg    MCHC 32.9 32.0 - 36.0 g/dL    RDW 15.1 (H) 11.5 - 14.5 %    Platelets 203 150 - 450 x10*3/uL   Basic Metabolic Panel   Result Value Ref Range    Glucose 109 (H) 74 - 99 mg/dL    Sodium 143 136 - 145  mmol/L    Potassium 3.6 3.5 - 5.3 mmol/L    Chloride 107 98 - 107 mmol/L    Bicarbonate 27 21 - 32 mmol/L    Anion Gap 13 10 - 20 mmol/L    Urea Nitrogen 45 (H) 6 - 23 mg/dL    Creatinine 0.90 0.50 - 1.30 mg/dL    eGFR 82 >60 mL/min/1.73m*2    Calcium 8.0 (L) 8.6 - 10.3 mg/dL   Heparin Assay, UFH   Result Value Ref Range    Heparin Unfractionated 0.4 See Comment Below for Therapeutic Ranges IU/mL   POCT GLUCOSE   Result Value Ref Range    POCT Glucose 103 (H) 74 - 99 mg/dL   POCT GLUCOSE   Result Value Ref Range    POCT Glucose 102 (H) 74 - 99 mg/dL     *Note: Due to a large number of results and/or encounters for the requested time period, some results have not been displayed. A complete set of results can be found in Results Review.       IMPRESSION:  - Pt is doing well / as expected s/p ex lap and JOHN  - No new issues/concerns    PLAN POD4:  - clear liquid diet, ADAT when patient is more awake and alert  - Multimodal pain regimen: scheduled tylenol, switch from IV pain medicine to PO PRN oxycodone  - Encourage Ambulation / use of incentive spirometry  - VTE prophylaxis - Continue with willard Huitron MD  Bariatric and Minimally Invasive General Surgery

## 2025-08-09 NOTE — PROGRESS NOTES
Patient: Alexis Armijo  Room/bed: 252/252-B  Admitted on: 8/5/2025    Age: 89 y.o.   Gender: male  Code Status:  Full Code   Admitting Dx: Small bowel obstruction (Multi) [K56.609]  Generalized abdominal pain [R10.84]  Diarrhea, unspecified type [R19.7]  Nausea and vomiting, unspecified vomiting type [R11.2]    MRN: [id number]  PCP: Jonah Velez,        Subjective   Says he is ok, just tired  Per staff, has been sleeping all day, and overnight as well    Objective    Physical Exam  Constitutional:       Comments: Fatigued elderly man, nad   HENT:      Head:      Comments: [place].atraumatic. mucosa is dry    Eyes:      Extraocular Movements: Extraocular movements intact.       Cardiovascular:      Rate and Rhythm: Normal rate.      Comments: Regular currently. 3-4/6 harsh systolic murmur  Pulmonary:      Comments: Decreased breath sounds throughout.   No wheezing  Abdominal:      Comments: Occasional bowel sounds noted.  Tender to palpation. Binder is in place   Genitourinary:     Comments: [email or web address]    Musculoskeletal:      Comments: No edema  Faint distal pulses. No distal hair growth.      Skin:     Comments: Cool, dry     Neurological:      Comments: Did respond to me but kept his eyes closed much of time  Followed commands. Moved all extremities. Opened mouth.   No focal deficits   Psychiatric:      Comments: Cooperative at this time          Temp:  [35.5 °C (95.9 °F)-37.1 °C (98.8 °F)] 36.8 °C (98.2 °F)  Heart Rate:  [60-91] 60  Resp:  [16-28] 19  BP: (113-139)/(61-78) 139/78    Vitals:    08/05/25 1608   Weight: 87.5 kg (193 lb)           I/Os    Intake/Output Summary (Last 24 hours) at 8/9/2025 1119  Last data filed at 8/9/2025 0500  Gross per 24 hour   Intake 3201.84 ml   Output 950 ml   Net 2251.84 ml       Labs:   Results from last 72 hours   Lab Units 08/09/25  0438 08/08/25  0549 08/07/25  0526   SODIUM mmol/L 143 139 136   POTASSIUM mmol/L 3.6 3.5 3.4*   CHLORIDE mmol/L 107 103 100   CO2 mmol/L 27 26 25  "  BUN mg/dL 45* 59* 66*   CREATININE mg/dL 0.90 1.14 1.56*   GLUCOSE mg/dL 109* 125* 130*   CALCIUM mg/dL 8.0* 8.2* 8.1*   ANION GAP mmol/L 13 14 14   EGFR mL/min/1.73m*2 82 62 42*   PHOSPHORUS mg/dL 2.7 2.2* 2.9      Results from last 72 hours   Lab Units 08/09/25  0438 08/08/25  0549 08/07/25  0526   WBC AUTO x10*3/uL 9.0 9.6 11.5*   HEMOGLOBIN g/dL 12.7* 13.1* 12.7*   HEMATOCRIT % 38.6* 38.9* 37.5*   PLATELETS AUTO x10*3/uL 203 218 189      Lab Results   Component Value Date    CALCIUM 8.0 (L) 08/09/2025    PHOS 2.7 08/09/2025      No results found for: \"CRP\"   [unfilled]     Micro/ID:   No results found for the last 90 days.                   No lab exists for component: \"AGALPCRNB\"   .ID  No results found for: \"URINECULTURE\", \"BLOODCULT\", \"CSFCULTSMEAR\"    Images:       Meds    Scheduled medications  Scheduled Medications[1]  Continuous medications  Continuous Medications[2]  PRN medications  PRN Medications[3]     Assessment and Plan    Alexis Armijo is a 89 y.o. male   POD 4, Ex lap/JOHN. Pulled out his NG 2 nights ago. Seems to be tolerating without. Oral as able. Pain meds only as needed-surgery to manage  AF/AVR/CAD. Appreciate cards assistance. Back on anticoagulation. Cardiac status appears stable  Protein prisca malnutrition. Dietician on consult. Consider iv nutrition  Delirium. Not agitated, just very sleepy now. Continue to monitor closely.encourage interaction  Hypertension, stable.   PVD      Yoly Last MD         [1] acetaminophen, 650 mg, oral, q6h  albuterol, 2.5 mg, nebulization, TID  apixaban, 2.5 mg, oral, BID  aspirin, 81 mg, oral, Daily  bisacodyl, 10 mg, rectal, Once  cyclobenzaprine, 5 mg, oral, BID  pantoprazole, 40 mg, intravenous, Daily  QUEtiapine, 12.5 mg, oral, Nightly  sennosides-docusate sodium, 2 tablet, oral, Nightly  [2] lactated Ringer's, 100 mL/hr, Last Rate: 100 mL/hr (08/09/25 0249)  [3] PRN medications: albuterol, heparin, HYDROmorphone, HYDROmorphone, naloxone, " ondansetron **OR** ondansetron, oxygen

## 2025-08-10 VITALS
DIASTOLIC BLOOD PRESSURE: 80 MMHG | SYSTOLIC BLOOD PRESSURE: 128 MMHG | RESPIRATION RATE: 16 BRPM | OXYGEN SATURATION: 100 % | BODY MASS INDEX: 26.14 KG/M2 | HEIGHT: 72 IN | TEMPERATURE: 97.7 F | WEIGHT: 193 LBS | HEART RATE: 82 BPM

## 2025-08-10 LAB
ANION GAP SERPL CALC-SCNC: 13 MMOL/L (ref 10–20)
BACTERIA BLD CULT: NORMAL
BUN SERPL-MCNC: 36 MG/DL (ref 6–23)
CALCIUM SERPL-MCNC: 8.1 MG/DL (ref 8.6–10.3)
CHLORIDE SERPL-SCNC: 107 MMOL/L (ref 98–107)
CO2 SERPL-SCNC: 28 MMOL/L (ref 21–32)
CREAT SERPL-MCNC: 0.87 MG/DL (ref 0.5–1.3)
EGFRCR SERPLBLD CKD-EPI 2021: 83 ML/MIN/1.73M*2
GLUCOSE SERPL-MCNC: 143 MG/DL (ref 74–99)
HOLD SPECIMEN: NORMAL
PHOSPHATE SERPL-MCNC: 3.1 MG/DL (ref 2.5–4.9)
POTASSIUM SERPL-SCNC: 3.9 MMOL/L (ref 3.5–5.3)
SODIUM SERPL-SCNC: 144 MMOL/L (ref 136–145)
VANCOMYCIN SERPL-MCNC: 10.3 UG/ML (ref 5–20)

## 2025-08-10 PROCEDURE — 2500000004 HC RX 250 GENERAL PHARMACY W/ HCPCS (ALT 636 FOR OP/ED): Performed by: SURGERY

## 2025-08-10 PROCEDURE — 80202 ASSAY OF VANCOMYCIN: CPT | Performed by: SURGERY

## 2025-08-10 PROCEDURE — 99024 POSTOP FOLLOW-UP VISIT: CPT | Performed by: SURGERY

## 2025-08-10 PROCEDURE — 36415 COLL VENOUS BLD VENIPUNCTURE: CPT | Performed by: PHYSICIAN ASSISTANT

## 2025-08-10 PROCEDURE — 2500000004 HC RX 250 GENERAL PHARMACY W/ HCPCS (ALT 636 FOR OP/ED): Performed by: INTERNAL MEDICINE

## 2025-08-10 PROCEDURE — 2500000001 HC RX 250 WO HCPCS SELF ADMINISTERED DRUGS (ALT 637 FOR MEDICARE OP): Performed by: INTERNAL MEDICINE

## 2025-08-10 PROCEDURE — 84145 PROCALCITONIN (PCT): CPT | Mod: GEALAB | Performed by: INTERNAL MEDICINE

## 2025-08-10 PROCEDURE — 2500000002 HC RX 250 W HCPCS SELF ADMINISTERED DRUGS (ALT 637 FOR MEDICARE OP, ALT 636 FOR OP/ED): Performed by: INTERNAL MEDICINE

## 2025-08-10 PROCEDURE — 94760 N-INVAS EAR/PLS OXIMETRY 1: CPT

## 2025-08-10 PROCEDURE — 94640 AIRWAY INHALATION TREATMENT: CPT

## 2025-08-10 PROCEDURE — 99232 SBSQ HOSP IP/OBS MODERATE 35: CPT | Performed by: INTERNAL MEDICINE

## 2025-08-10 PROCEDURE — 2500000001 HC RX 250 WO HCPCS SELF ADMINISTERED DRUGS (ALT 637 FOR MEDICARE OP): Performed by: PHYSICIAN ASSISTANT

## 2025-08-10 PROCEDURE — 97165 OT EVAL LOW COMPLEX 30 MIN: CPT | Mod: GO

## 2025-08-10 PROCEDURE — 84100 ASSAY OF PHOSPHORUS: CPT | Performed by: PHYSICIAN ASSISTANT

## 2025-08-10 PROCEDURE — 80048 BASIC METABOLIC PNL TOTAL CA: CPT | Performed by: PHYSICIAN ASSISTANT

## 2025-08-10 PROCEDURE — 97162 PT EVAL MOD COMPLEX 30 MIN: CPT | Mod: GP

## 2025-08-10 PROCEDURE — 1200000002 HC GENERAL ROOM WITH TELEMETRY DAILY

## 2025-08-10 RX ADMIN — ALBUTEROL SULFATE 2.5 MG: 2.5 SOLUTION RESPIRATORY (INHALATION) at 13:53

## 2025-08-10 RX ADMIN — ACETAMINOPHEN 650 MG: 325 TABLET ORAL at 18:22

## 2025-08-10 RX ADMIN — PIPERACILLIN SODIUM AND TAZOBACTAM SODIUM 4.5 G: 4; .5 INJECTION, SOLUTION INTRAVENOUS at 05:56

## 2025-08-10 RX ADMIN — PIPERACILLIN SODIUM AND TAZOBACTAM SODIUM 4.5 G: 4; .5 INJECTION, SOLUTION INTRAVENOUS at 12:37

## 2025-08-10 RX ADMIN — PIPERACILLIN SODIUM AND TAZOBACTAM SODIUM 4.5 G: 4; .5 INJECTION, SOLUTION INTRAVENOUS at 00:44

## 2025-08-10 RX ADMIN — ALBUTEROL SULFATE 2.5 MG: 2.5 SOLUTION RESPIRATORY (INHALATION) at 07:26

## 2025-08-10 RX ADMIN — ASPIRIN 81 MG: 81 TABLET, DELAYED RELEASE ORAL at 09:26

## 2025-08-10 RX ADMIN — PIPERACILLIN SODIUM AND TAZOBACTAM SODIUM 4.5 G: 4; .5 INJECTION, SOLUTION INTRAVENOUS at 18:22

## 2025-08-10 RX ADMIN — SENNOSIDES AND DOCUSATE SODIUM 2 TABLET: 50; 8.6 TABLET ORAL at 20:41

## 2025-08-10 RX ADMIN — ONDANSETRON 4 MG: 2 INJECTION, SOLUTION INTRAMUSCULAR; INTRAVENOUS at 04:08

## 2025-08-10 RX ADMIN — ACETAMINOPHEN 650 MG: 325 TABLET ORAL at 09:26

## 2025-08-10 RX ADMIN — PANTOPRAZOLE SODIUM 40 MG: 40 INJECTION, POWDER, FOR SOLUTION INTRAVENOUS at 09:26

## 2025-08-10 RX ADMIN — APIXABAN 2.5 MG: 2.5 TABLET, FILM COATED ORAL at 20:40

## 2025-08-10 RX ADMIN — Medication: at 07:41

## 2025-08-10 RX ADMIN — SODIUM CHLORIDE, POTASSIUM CHLORIDE, SODIUM LACTATE AND CALCIUM CHLORIDE 85 ML/HR: 600; 310; 30; 20 INJECTION, SOLUTION INTRAVENOUS at 22:53

## 2025-08-10 RX ADMIN — Medication: at 19:27

## 2025-08-10 RX ADMIN — ALBUTEROL SULFATE 2.5 MG: 2.5 SOLUTION RESPIRATORY (INHALATION) at 19:27

## 2025-08-10 RX ADMIN — APIXABAN 2.5 MG: 2.5 TABLET, FILM COATED ORAL at 09:26

## 2025-08-10 RX ADMIN — PIPERACILLIN SODIUM AND TAZOBACTAM SODIUM 4.5 G: 4; .5 INJECTION, SOLUTION INTRAVENOUS at 23:45

## 2025-08-10 ASSESSMENT — COGNITIVE AND FUNCTIONAL STATUS - GENERAL
TURNING FROM BACK TO SIDE WHILE IN FLAT BAD: A LITTLE
DAILY ACTIVITIY SCORE: 11
MOVING FROM LYING ON BACK TO SITTING ON SIDE OF FLAT BED WITH BEDRAILS: A LITTLE
DRESSING REGULAR UPPER BODY CLOTHING: A LOT
DAILY ACTIVITIY SCORE: 17
MOBILITY SCORE: 11
EATING MEALS: A LITTLE
MOVING FROM LYING ON BACK TO SITTING ON SIDE OF FLAT BED WITH BEDRAILS: A LOT
EATING MEALS: A LITTLE
MOVING TO AND FROM BED TO CHAIR: A LOT
DRESSING REGULAR UPPER BODY CLOTHING: A LITTLE
WALKING IN HOSPITAL ROOM: A LOT
CLIMB 3 TO 5 STEPS WITH RAILING: A LOT
TOILETING: TOTAL
PERSONAL GROOMING: A LITTLE
MOVING TO AND FROM BED TO CHAIR: A LOT
TOILETING: A LITTLE
HELP NEEDED FOR BATHING: A LOT
MOBILITY SCORE: 14
STANDING UP FROM CHAIR USING ARMS: A LOT
STANDING UP FROM CHAIR USING ARMS: A LOT
CLIMB 3 TO 5 STEPS WITH RAILING: TOTAL
DRESSING REGULAR LOWER BODY CLOTHING: A LOT
HELP NEEDED FOR BATHING: TOTAL
PERSONAL GROOMING: A LOT
WALKING IN HOSPITAL ROOM: A LOT
DRESSING REGULAR LOWER BODY CLOTHING: A LITTLE
TURNING FROM BACK TO SIDE WHILE IN FLAT BAD: A LOT

## 2025-08-10 ASSESSMENT — ACTIVITIES OF DAILY LIVING (ADL)
BATHING_ASSISTANCE: TOTAL
ADL_ASSISTANCE: INDEPENDENT

## 2025-08-10 ASSESSMENT — PAIN - FUNCTIONAL ASSESSMENT
PAIN_FUNCTIONAL_ASSESSMENT: 0-10

## 2025-08-10 ASSESSMENT — PAIN SCALES - GENERAL
PAINLEVEL_OUTOF10: 0 - NO PAIN

## 2025-08-10 NOTE — PROGRESS NOTES
Subjective Data:  No chest pain has weakness no dyspnea    Overnight Events:    NoNE     Objective Data:  Last Recorded Vitals:  Vitals:    08/10/25 0635 08/10/25 0741 08/10/25 0812 08/10/25 1224   BP:   146/85 142/79   BP Location:   Left arm Left arm   Patient Position:   Lying Lying   Pulse: (!) 16  89 85   Resp:   15 16   Temp:   36.5 °C (97.7 °F) 36.5 °C (97.7 °F)   TempSrc:   Temporal Temporal   SpO2: 97% 95% 96% 99%   Weight:       Height:           Last Labs:  CBC - 8/9/2025:  4:38 AM  9.0 12.7 203    38.6      CMP - 8/10/2025:  5:56 AM  8.1 6.2 33 --- 1.4   3.1 3.7 52 50      PTT - 7/28/2025:  9:59 PM  1.4   15.4 39     TROPHS   Date/Time Value Ref Range Status   08/05/2025 05:33 PM 53 0 - 20 ng/L Final     Comment:     Previous result verified on 8/5/2025 1649 on specimen/case [id number] called with component [facility] for procedure Troponin I, High Sensitivity, Initial with value 57 ng/L.   08/05/2025 04:18 PM 57 0 - 20 ng/L Final   07/29/2025 12:14 AM 9 0 - 20 ng/L Final     BNP   Date/Time Value Ref Range Status   08/07/2025 05:26  0 - 99 pg/mL Final   07/29/2025 07:18  0 - 99 pg/mL Final     HGBA1C   Date/Time Value Ref Range Status   07/29/2025 07:18 AM 5.3 See comment % Final   02/12/2024 12:21 PM 5.4 see below % Final     LDLCALC   Date/Time Value Ref Range Status   07/29/2025 07:18 AM 51 <=99 mg/dL Final     Comment:                                 Near   Borderline      AGE      Desirable  Optimal    High     High     Very High     0-19 Y     0 - 109     ---    110-129   >/= 130     ----    20-24 Y     0 - 119     ---    120-159   >/= 160     ----      >24 Y     0 -  99   100-129  130-159   160-189     >/=190    LDL Cholesterol is calculated using the Friedewald equation.   05/22/2024 12:03 PM 50 <=99 mg/dL Final     Comment:                                 Near   Borderline      AGE      Desirable  Optimal    High     High     Very High     0-19 Y     0 - 109     ---    110-129   >/=  130     ----    20-24 Y     0 - 119     ---    120-159   >/= 160     ----      >24 Y     0 -  99   100-129  130-159   160-189     >/=190     10/05/2023 08:05 AM 64 140 - 190 mg/dL Final     Comment:                                 Near   Borderline      AGE      Desirable  Optimal    High     High     Very High     0-19 Y     0 - 109     ---    110-129   >/= 130     ----    20-24 Y     0 - 119     ---    120-159   >/= 160     ----      >24 Y     0 -  99   100-129  130-159   160-189     >/=190       VLDL   Date/Time Value Ref Range Status   07/29/2025 07:18 AM 15 0 - 40 mg/dL Final   05/22/2024 12:03 PM 11 0 - 40 mg/dL Final   10/05/2023 08:05 AM 11 0 - 40 mg/dL Final      Last I/O:  I/O last 3 completed shifts:  In: 2723.8 (31.1 mL/kg) [P.O.:360; I.V.:1208.8 (13.8 mL/kg); IV Piggyback:1155]  Out: 1100 (12.6 mL/kg) [Urine:1100 (0.3 mL/kg/hr)]  Weight: 87.5 kg     Ejection Fractions:  EF   Date/Time Value Ref Range Status   07/30/2025 10:09 AM 59 %    02/13/2024 10:21 AM 53 %      Physical Exam:  Constitutional: Well developed, awake/alert/oriented x3, no distress, alert and cooperative  Eyes: PERRL, EOMI, clear sclera  ENMT: mucous membranes moist, no apparent injury, no lesions seen  Head/Neck: Neck supple, no apparent injury, thyroid without mass or tenderness, No JVD, trachea midline, no bruits  Respiratory/Thorax: Patent airways, CTAB, normal breath sounds with good chest expansion, thorax symmetric  Cardiovascular: Regular, rate and rhythm, no murmurs, 2+ equal pulses of the extremities, normal S 1and S 2  Gastrointestinal: Nondistended, soft, non-tender, no rebound tenderness or guarding, no masses palpable, no organomegaly, +BS, no bruits  Musculoskeletal: ROM intact, no joint swelling, normal strength  Extremities: normal extremities, no cyanosis edema, contusions or wounds, no clubbing  Neurological: alert and oriented x3, intact senses, motor, response and reflexes, normal strength  Lymphatic: No  significant lymphadenopathy  Psychological: Appropriate mood and behavior  Skin: Warm and dry, no lesions, no rashes       Assessment/Plan   Small bowel obstruction (Multi)     Diastolic congestive heart failure     Stented coronary artery     Chronic renal insufficiency     Anticoagulant long-term use        Small bowel obstruction  -s/p ex lap with lysis of adhesions  -Surgery following  -Eliquis resumed  -NG tube removed by pt  -NPO  -Pain meds     2. Paroxysmal atrial fibrillation  -EKGs reviewed, was in SR on admit  -Eliquis resumed  -hgb stable  -Planning for outpt watchman     3. Hypertension  -stable  -monitor     4. Elevated troponins-Non MI elevation  -Hx of CAD   -s/p PCI/JENELLE to D1 and mRCA in 2006  -s/p CABG (LIMA-LAD, RKOC-IZY-XC2-PDA) in 2018  -troponin 57, 53  -I reviewed the EKG, no acute changes, ST elevation, or depression  -Cont asa     5. PVD  -s/p bilateral GSV EVLA, s/p RLE revascularization in 2013, atherectomy/PTA/DCB of the left popliteal and peroneal arteries in 2020  -Cont asa     6. [employer] PCM in situ  -Personally interrogated last week, normal functioning, battery life 22 months, PAF noted burden 0.1%     7. S/p AVR  -normal functioning per echo     Peripheral IV 08/05/25 20 G Anterior;Left Forearm (Active)   Site Assessment Clean;Dry;Intact 08/09/25 2100   Dressing Status Clean;Dry 08/09/25 2100   Number of days: 5       Peripheral IV 08/08/25 22 G Anterior;Right Forearm (Active)   Site Assessment Clean;Dry;Intact 08/09/25 2100   Dressing Status Clean;Dry 08/09/25 2100   Number of days: 2       Code Status:  Full Code    I spent 15 minutes in the professional and overall care of this patient.        Domenic Bill MD

## 2025-08-10 NOTE — PROGRESS NOTES
"Alexis Armijo \"Radha" is a 89 y.o. male on day 4 of admission presenting with Small bowel obstruction (Multi).    Subjective   Interval History: no fever, no new complaints        Review of Systems    Objective   Range of Vitals (last 24 hours)  Heart Rate:  []   Temp:  [36.4 °C (97.5 °F)-36.7 °C (98.1 °F)]   Resp:  [15-19]   BP: (142-156)/(79-88)   SpO2:  [95 %-99 %]   Oxygen Therapy  Pulse Ox (24 hr min): 95  Medical Gas Therapy: Supplemental oxygen  Medical Gas Delivery Method: Nasal cannula  O2 Flow Rate (L/min): 4 L/min      Daily Weight  08/05/25 : 87.5 kg (193 lb)    Body mass index is 26.18 kg/m².    Physical Exam  Constitutional:       Appearance: Normal appearance.   HENT:      Head: Normocephalic and atraumatic.      Mouth/Throat:      Mouth: Mucous membranes are moist.      Pharynx: Oropharynx is clear.     Eyes:      Pupils: Pupils are equal, round, and reactive to light.       Cardiovascular:      Rate and Rhythm: Normal rate and regular rhythm.      Heart sounds: Normal heart sounds.   Pulmonary:      Effort: Pulmonary effort is normal.      Breath sounds: Rales present.   Abdominal:      General: Abdomen is flat. Bowel sounds are normal.      Palpations: Abdomen is soft.      Comments: Dry dressing, no cellulitis     Musculoskeletal:      Cervical back: Normal range of motion.     Neurological:      Mental Status: He is alert.         Antibiotics  piperacillin-tazobactam - 4.5 gram/100 mL  vancomycin  vancomycin (Vancocin) IV 1500 mg in 500 mL D5W    Relevant Results  Labs  Results from last 72 hours   Lab Units 08/09/25  0438 08/08/25  0549   WBC AUTO x10*3/uL 9.0 9.6   HEMOGLOBIN g/dL 12.7* 13.1*   HEMATOCRIT % 38.6* 38.9*   PLATELETS AUTO x10*3/uL 203 218     Results from last 72 hours   Lab Units 08/10/25  0556 08/09/25  0438 08/08/25  0549   SODIUM mmol/L 144 143 139   POTASSIUM mmol/L 3.9 3.6 3.5   CHLORIDE mmol/L 107 107 103   CO2 mmol/L 28 27 26   BUN mg/dL 36* 45* 59*   CREATININE " "mg/dL 0.87 0.90 1.14   GLUCOSE mg/dL 143* 109* 125*   CALCIUM mg/dL 8.1* 8.0* 8.2*   ANION GAP mmol/L 13 13 14   EGFR mL/min/1.73m*2 83 82 62   PHOSPHORUS mg/dL 3.1 2.7 2.2*         Estimated Creatinine Clearance: 64.4 mL/min (by C-G formula based on SCr of 0.87 mg/dL).  No results found for: \"CRP\"  Microbiology  Reviewed  Imaging  Reviewed      Assessment/Plan   Respiratory failure / atelectasis / possible pneumonia, MRSA screen is negative  Encephalopathy  Bowel obstruction sp surgery     Recommendations :  Continue Zosyn      I spent minutes in the professional and overall care of this patient.  The cultures and susceptibilities were reviewed and discussed with the micro lab, the antibiotics stewardship guidelines were reviewed, the infection control protocols and recommendations were reviewed with the patient and the             Adalgisa Lozano MD  "

## 2025-08-10 NOTE — CARE PLAN
The patient's goals for the shift include comfort and rest     The clinical goals for the shift include Pt will remain safe and free from injury throughout shift.      Problem: Safety - Adult  Goal: Free from fall injury  Outcome: Progressing     Problem: Chronic Conditions and Co-morbidities  Goal: Patient's chronic conditions and co-morbidity symptoms are monitored and maintained or improved  Outcome: Progressing

## 2025-08-10 NOTE — PROGRESS NOTES
"Occupational Therapy    Evaluation    Patient Name: Alexis Armijo \"Radha"  MRN: [id number]  Department: [address]  Room: [address] [place] [address]  Today's Date: 8/10/2025  Time Calculation  Start Time: 1055  Stop Time: 1120  Time Calculation (min): 25 min        Assessment:  OT Assessment: OT assessment complete to assess pt's ADLs, IADLs, functional t/f, functional mobility, general executive functional skills, and safety with AE and LRD in the inpatient environment to help give skilled insight for pt's need for OT services inside the inpatient setting and d/c location.  Prognosis: Good  Barriers to Discharge Home: No anticipated barriers  Evaluation/Treatment Tolerance: Other (Comment) (still lethargic)  Medical Staff Made Aware: Yes  End of Session Communication: Bedside nurse  End of Session Patient Position: Bed, 3 rail up, Alarm on  OT Assessment Results: Decreased ADL status, Decreased upper extremity strength, Decreased safe judgment during ADL, Decreased endurance, Decreased cognition, Decreased fine motor control, Decreased functional mobility, Decreased gross motor control, Decreased IADLs  Prognosis: Good  Barriers to Discharge: None  Evaluation/Treatment Tolerance: Other (Comment) (still lethargic)  Medical Staff Made Aware: Yes  Strengths: Ability to acquire knowledge, Support of extended family/friends  Barriers to Participation: Comorbidities  Plan:  Treatment Interventions: Functional transfer training, ADL retraining, UE strengthening/ROM, Endurance training, Cognitive reorientation, Patient/family training, Fine motor coordination activities, Compensatory technique education  OT Frequency: 3 times per week (Based on current functional status and rehab potential, patient is anticipated to tolerate and benefit from 5 or more days per week of skilled rehabilitative therapy after discharge from this acute inpatient hospitalization.)  OT Discharge Recommendations: Moderate intensity level of continued care (Based on " current functional status and rehab potential, patient is anticipated to tolerate and benefit from 5 or more days per week of skilled rehabilitative therapy after discharge from this acute inpatient hospitalization.)  Equipment Recommended upon Discharge: Standard walker (FWW)  OT Recommended Transfer Status: Moderate assist, Assist of 2  OT - OK to Discharge: Yes  Treatment Interventions: Functional transfer training, ADL retraining, UE strengthening/ROM, Endurance training, Cognitive reorientation, Patient/family training, Fine motor coordination activities, Compensatory technique education    Subjective   Current Problem:  1. Small bowel obstruction (Multi)  Case Request Operating Room: LAPAROTOMY, EXPLORATORY    Case Request Operating Room: LAPAROTOMY, EXPLORATORY    cyclobenzaprine (Flexeril) 5 mg tablet    sennosides-docusate sodium (Lis-Colace) 8.6-50 mg tablet    oxyCODONE (Roxicodone) 5 mg immediate release tablet    acetaminophen (Tylenol 8 HOUR) 650 mg ER tablet      2. Nausea and vomiting, unspecified vomiting type        3. Generalized abdominal pain        4. Diarrhea, unspecified type          OT Visit Info:  OT Received On: 08/10/25  General:  General  Reason for Referral: pt presenting to ER  with abdominal pain, nausea, vomiting, diarrhea and found to have bowel obstruction  Referred By: Rosa Field PA-C  Past Medical History Relevant to Rehab: afib, cellulitis of LE, pneumonia, HD, HTN  Family/Caregiver Present: Yes (came near the end of OT evaluation)  Caregiver Feedback: pt's spouse seemed to be encourage that pt was seat EOB and at the end of session spouse was assisting pt with eating  Co-Treatment: PT  Co-Treatment Reason: to ensure safety and maximize outcomes while creating a hollistic evaluation  Prior to Session Communication: Bedside nurse  Patient Position Received: Bed, 3 rail up, Alarm on  Preferred Learning Style: auditory, kinesthetic, verbal, visual, written,  Other:(comment)  General Comment: pt had eyes closed when entered room but pt opened eyes when OT engaged and was able to maintain attention during evaluation with min encouragement  Precautions:  Medical Precautions: Fall precautions ([place]- pt does have hearing aids and were placed in both ears by end of session)     Date/Time Vitals Session Patient Position Pulse Resp SpO2 BP MAP (mmHg)    08/10/25 1224 --  --  85  16  99 %  142/79  100            Pain:  Pain Assessment  Pain Assessment: 0-10  0-10 (Numeric) Pain Score: 0 - No pain    Objective   Cognition:  Overall Cognitive Status: Impaired (pt was able to answer questions accurately and verified answers when spouse arrived but pt still shows signs of lethargic; pt was able to make needs know with saying he was thirsty and drink was provided)           Home Living:  Type of Home: House  Lives With: Spouse  Home Adaptive Equipment: Walker rolling or standard, Cane  Home Layout: One level  Home Access: No concerns  Bathroom Shower/Tub: Walk-in shower  Bathroom Toilet: Other (Comment) (raised toilete)  Bathroom Equipment: Grab bars in shower, Grab bars around toilet, Shower chair with back  Prior Function:  Level of [place]: Independent with ADLs and functional transfers, Independent with homemaking with ambulation  Receives Help From: Family  ADL Assistance: Independent  Homemaking Assistance: Independent  Ambulatory Assistance: Independent  Prior Function Comments: pt reported and spouse verified that hired privately a women to come in to clean home  IADL History:  Homemaking Responsibilities: Yes  Current License: Yes (pt's spouse reported that pt is primary  due to cormobilitied of spouse with BUE)  Mode of Transportation: Car  ADL:  Eating Assistance: Minimal  Eating Deficit: Bringing food to mouth assist, Increased time to complete, Supervision/safety, Verbal cueing  Grooming Assistance: Moderate  Grooming Deficit: Setup, Increased time to complete,  Wash/dry hands, Wash/dry face, Teeth care, Brushing hair  Bathing Assistance: Total  Bathing Deficit: Steadying, Supervision/safety, Increased time to complete , Right lower leg including foot, Left lower leg including foot, Perineal area, Right arm, Left arm, Chest  UE Dressing Assistance: Maximal  UE Dressing Deficit: Thread RUE, Thread LUE, Pull over head, Pull around back, Pull down in back, Fasteners, Increased time to complete, Steadying  LE Dressing Assistance: Maximal  LE Dressing Deficit: Steadying, Supervision/safety, Increased time to complete, Don/doff R sock, Don/doff L sock, Thread RLE into pants, Thread LLE into pants, Thread RLE into underwear, Thread LLE into underwear, Pull up over hips, Fasteners  Toileting Assistance with Device: Total  Toileting Deficit: Verbal cueing, Steadying, Supervison/safety, Increased time to complete, Use of bedpan/urinal setup, Grab bar use, Clothing management up, Clothing management down, Perineal hygiene, Incontinent  Functional Assistance: Maximal  Functional Deficit: Steadying, Verbal cueing, Supervision/safety, Increased time to complete, Shower transfer, Toilet transfer (x2)  ADL Comments: pt requires mod a x2 for funtional t/f and mod to max vc  Activity Tolerance:  Endurance: Tolerates less than 10 min exercise, no significant change in vital signs  Bed Mobility/Transfers: Bed Mobility  Bed Mobility: Yes  Bed Mobility 1  Bed Mobility 1: Supine to sitting, Sitting to supine  Level of Assistance 1: Maximum assistance  Bed Mobility Comments 1: with UB and BLE    Transfers  Transfer: Yes  Transfer 1  Transfer From 1: Bed to, Stand to  Transfer to 1: Stand, Bed  Technique 1: Sit to stand, Stand to sit  Transfer Device 1: Walker  Transfer Level of Assistance 1: Moderate assistance, Maximum verbal cues (x2)  Trials/Comments 1: pt benefits from vcs to ensure proper use of FWW and decrease fall risks      Functional Mobility:  Functional Mobility  Functional Mobility  Performed: Yes  Functional Mobility 1  Surface 1: Level tile  Device 1: Rolling walker  Assistance 1: Moderate assistance, Moderate verbal cues, Maximum assistance, Maximum verbal cues  Comments 1: side step to the head of bed 8 steps     Sensation:  Light Touch: No apparent deficits  Strength:  Strength Comments: +3/5 BUE  Perception:  Inattention/Neglect: Appears intact  Coordination:      Hand Function:  Gross Grasp: Functional (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  Learner: Significant Other  Readiness: Eager  Method: Explanation  Response: Verbalizes Understanding  Comment: explained pt's functional participation with OT and PT evaluation and need for assistance with eating at this time and spouse was observed assisting pt with eating    Body Mechanics, taught by Ligia Chatman OT at 8/10/2025 12:31 PM.  Learner: Significant Other  Readiness: Eager  Method: Explanation  Response: Verbalizes Understanding  Comment: explained pt's functional participation with OT and PT evaluation and need for assistance with eating at this time and spouse was observed assisting pt with eating    Precautions, taught by Ligia Chatman OT at 8/10/2025 12:31 PM.  Learner: Significant Other  Readiness: Eager  Method: Explanation  Response: Verbalizes Understanding  Comment: explained pt's functional participation with OT and PT evaluation and need for assistance with eating at this time and spouse was observed assisting pt  with eating    Home Exercise Program, taught by Ligia Chatman OT at 8/10/2025 12:31 PM.  Learner: Significant Other  Readiness: Eager  Method: Explanation  Response: Verbalizes Understanding  Comment: explained pt's functional participation with OT and PT evaluation and need for assistance with eating at this time and spouse was observed assisting pt with eating    ADL Training, taught by Ligia Chatman OT at 8/10/2025 12:31 PM.  Learner: Significant Other  Readiness: Eager  Method: Explanation  Response: Verbalizes Understanding  Comment: explained pt's functional participation with OT and PT evaluation and need for assistance with eating at this time and spouse was observed assisting pt with eating    Education Comments  No comments found.        Goals:  Encounter Problems       Encounter Problems (Active)       OT Goals       Pt will complete bed mobility stand by assist with <1 verbal cue on  proper sequencing and body mechanics to increase safety and decrease fall risks during functional activities.       Start:  08/10/25    Expected End:  08/24/25            Pt will demo improved activity tolerance evidenced and active engagement by participation in BADL and/or functional mobility x10 mins with </=1 rest break.        Start:  08/10/25    Expected End:  08/24/25            Pt will complete eating and grooming with one vc cue for safety while demonstrating adequate endurance and strength without showing signs of moderate pain, SOB, and/or fatigue.        Start:  08/10/25    Expected End:  08/24/25            Pt will complete dressing and toileting with min a and  two vc cue for safety and LRD while demonstrating adequate endurance and strength without showing signs of moderate pain, SOB, and/or fatigue.        Start:  08/10/25    Expected End:  08/24/25

## 2025-08-10 NOTE — PROGRESS NOTES
Patient: Alexis Armijo  Room/bed: 238/238-B  Admitted on: 8/5/2025    Age: 89 y.o.   Gender: male  Code Status:  Full Code   Admitting Dx: Small bowel obstruction (Multi) [K56.609]  Generalized abdominal pain [R10.84]  Diarrhea, unspecified type [R19.7]  Nausea and vomiting, unspecified vomiting type [R11.2]    MRN: [id number]  PCP: Jonah Velez, DO       Subjective   Thirsty, also says he is hungry but hasn't eaten yet. Cooperating with therapy    Objective    Physical Exam  Constitutional:       Comments: Fairly alert. If left alone will doze off but is working with therapy   HENT:      Head: Normocephalic.      Nose: Nose normal.      Mouth/Throat:      Mouth: Mucous membranes are dry.     Eyes:      Extraocular Movements: Extraocular movements intact.      Pupils: Pupils are equal, round, and reactive to light.       Cardiovascular:      Rate and Rhythm: Tachycardia present. Rhythm irregular.      Pulses: Normal pulses.   Pulmonary:      Comments: Inspiratory crackles.  No wheezing. Easy respirations  Abdominal:      Comments: Has bowel sounds  Has bm     Musculoskeletal:      Comments: Marked kyphosis  No significant edema. Pulses equal     Skin:     General: Skin is dry.     Neurological:      Motor: Weakness present.     Psychiatric:         Mood and Affect: Mood normal.        Temp:  [36.5 °C (97.7 °F)-36.7 °C (98.1 °F)] 36.5 °C (97.7 °F)  Heart Rate:  [] 89  Resp:  [15-21] 15  BP: (117-156)/(74-88) 146/85    Vitals:    08/05/25 1608   Weight: 87.5 kg (193 lb)           I/Os    Intake/Output Summary (Last 24 hours) at 8/10/2025 1107  Last data filed at 8/10/2025 0648  Gross per 24 hour   Intake 1632.17 ml   Output 700 ml   Net 932.17 ml       Labs:   Results from last 72 hours   Lab Units 08/10/25  0556 08/09/25  0438 08/08/25  0549   SODIUM mmol/L 144 143 139   POTASSIUM mmol/L 3.9 3.6 3.5   CHLORIDE mmol/L 107 107 103   CO2 mmol/L 28 27 26   BUN mg/dL 36* 45* 59*   CREATININE mg/dL 0.87 0.90  "1.14   GLUCOSE mg/dL 143* 109* 125*   CALCIUM mg/dL 8.1* 8.0* 8.2*   ANION GAP mmol/L 13 13 14   EGFR mL/min/1.73m*2 83 82 62   PHOSPHORUS mg/dL 3.1 2.7 2.2*      Results from last 72 hours   Lab Units 08/09/25  0438 08/08/25  0549   WBC AUTO x10*3/uL 9.0 9.6   HEMOGLOBIN g/dL 12.7* 13.1*   HEMATOCRIT % 38.6* 38.9*   PLATELETS AUTO x10*3/uL 203 218      Lab Results   Component Value Date    CALCIUM 8.1 (L) 08/10/2025    PHOS 3.1 08/10/2025      No results found for: \"CRP\"   [unfilled]     Micro/ID:   No results found for the last 90 days.                   No lab exists for component: \"AGALPCRNB\"   .ID  Lab Results   Component Value Date    BLOODCULT Loaded on Instrument - Culture in progress 08/09/2025       Images:       Meds    Scheduled medications  Scheduled Medications[1]  Continuous medications  Continuous Medications[2]  PRN medications  PRN Medications[3]     Assessment and Plan    Alexis Armijo is a 89 y.o. male   POD 5. Ex lap/JOHN. Progressing slowly. Bowels are moving. Encourage oral intake. Binder still in place.  AF/CAD/AVR. He is on eliquis now, heparin discontinued. Appreciate cards input  Hypertension. Continue to monitor  Delirium. Slowly improving. Think maybe the seroquel dose was a little too much for him. Alertness is improving  Possible pneumonia. Appreciate ID input. Continue vanco/zosyn. Cultures pending. CXR with increased infiltrates. Afebrile  Therapy as tolerated    Yoly Last MD         [1] acetaminophen, 650 mg, oral, q6h  albuterol, 2.5 mg, nebulization, TID  apixaban, 2.5 mg, oral, BID  aspirin, 81 mg, oral, Daily  bisacodyl, 10 mg, rectal, Once  pantoprazole, 40 mg, intravenous, Daily  piperacillin-tazobactam, 4.5 g, intravenous, q6h  [Held by provider] QUEtiapine, 12.5 mg, oral, Nightly  sennosides-docusate sodium, 2 tablet, oral, Nightly  vancomycin, 1,500 mg, intravenous, q24h  [2] lactated Ringer's, 85 mL/hr, Last Rate: 85 mL/hr (08/09/25 2234)  [3] PRN medications: " albuterol, ondansetron **OR** ondansetron, oxyCODONE, oxyCODONE, oxygen, vancomycin

## 2025-08-10 NOTE — PROGRESS NOTES
Vancomycin Dosing by Pharmacy- FOLLOW UP    Alexis Armijo is a 89 y.o. year old male who Pharmacy has been consulted for vancomycin dosing for pneumonia. Based on the patient's indication and renal status this patient is being dosed based on a goal AUC of 400-600.     Renal function is currently stable.    Current vancomycin dose: 1500 mg given every 24 hours    Estimated vancomycin AUC on current dose: 490 mg/L.hr     Visit Vitals  /88 (BP Location: Left arm, Patient Position: Lying)   Pulse 97   Temp 36.7 °C (98.1 °F) (Temporal)   Resp 19        Lab Results   Component Value Date    CREATININE 0.87 08/10/2025    CREATININE 0.90 2025    CREATININE 1.14 2025    CREATININE 1.56 (H) 2025    CREATININE 0.98 2025        Patient weight is as follows:   Vitals:    25 1608   Weight: 87.5 kg (193 lb)       Cultures:  No results found for the encounter in last 14 days.       I/O last 3 completed shifts:  In: 3796.8 (43.4 mL/kg) [P.O.:360; I.V.:2981.8 (34.1 mL/kg); IV Piggyback:455]  Out: 2700 (30.8 mL/kg) [Urine:2000 (0.6 mL/kg/hr); Emesis/NG output:700]  Weight: 87.5 kg   I/O during current shift:  I/O this shift:  In: 937.2 [I.V.:337.2; IV Piggyback:600]  Out: 200 [Urine:200]    Temp (24hrs), Av.7 °C (98.1 °F), Min:36.7 °C (98.1 °F), Max:36.8 °C (98.2 °F)      Assessment/Plan    Within goal AUC range. Continue current vancomycin regimen.    This dosing regimen is predicted by [employer] to result in the following pharmacokinetic parameters:    Regimen: 1500 mg IV every 24 hours.  Start time: 20:29 on 08/10/2025  Exposure target: AUC24 (range) 400-600 mg/L.hr   VOX41-02: 437 mg/L.hr  AUC24,ss: 490 mg/L.hr  Probability of AUC24 > 400: 80 %  Ctrough,ss: 14.3 mg/L  Probability of Ctrough,ss > 20: 17 %    The next level will be obtained on  at 5a. May be obtained sooner if clinically indicated.   Will continue to monitor renal function daily while on vancomycin and order serum  creatinine at least every 48 hours if not already ordered.  Follow for continued vancomycin needs, clinical response, and signs/symptoms of toxicity.       Sumit Ospina, PharmD

## 2025-08-10 NOTE — PROGRESS NOTES
"Physical Therapy    Physical Therapy Evaluation    Patient Name: Alexis Armijo \"Ronal\"  MRN: [id number]  Department: [address]  Room: [address] [place] [address]  Today's Date: 8/10/2025   Time Calculation  Start Time: 1056  Stop Time: 1126  Time Calculation (min): 30 min    Assessment/Plan   PT Assessment  PT Assessment Results: Decreased strength, Decreased mobility (deconditioning)  Rehab Prognosis: Good  Barriers to Discharge Home: Caregiver assistance, Physical needs  Caregiver Assistance: Caregiver assistance needed per identified barriers - however, level of patient's required assistance exceeds assistance available at home  Physical Needs: 24hr mobility assistance needed, High falls risk due to function or environment  Evaluation/Treatment Tolerance: Patient limited by fatigue  Medical Staff Made Aware: Yes  Strengths: Ability to acquire knowledge  Barriers to Participation: Comorbidities  End of Session Communication: Bedside nurse  End of Session Patient Position: Bed, 3 rail up, Alarm on  IP OR SWING BED PT PLAN  Inpatient or Swing Bed: Inpatient  PT Plan  Treatment/Interventions: Bed mobility, Transfer training, Gait training, Strengthening, Therapeutic exercise  PT Plan: Ongoing PT  PT Frequency: 3 times per week  PT Discharge Recommendations: Moderate intensity level of continued care  Equipment Recommended upon Discharge: Wheeled walker  PT Recommended Transfer Status: Assist x1  PT - OK to Discharge: Yes (Per PT POC)    Subjective     PT Visit Info:  PT Received On: 08/10/25  General Visit Information:  General  Reason for Referral: 89 yo male presenting to ER  with abdominal pain, nausea, vomiting, diarrhea and found to have bowel obstruction  Referred By: Rosa Field PA-C  Past Medical History Relevant to Rehab: CAD, arteriosclerosis, A Fib, cellulitis B LE, HTN, HLD, lumbar sponylosis  Family/Caregiver Present: Yes (Pt's spouse arrived at the end of Pt's session)  Co-Treatment: OT  Co-Treatment Reason: to maximize " Pt's outcomes and safety  Prior to Session Communication: Bedside nurse  Patient Position Received: Bed, 3 rail up, Alarm off, not on at start of session  General Comment: Pt is sleepy and lethargic. He is agreeable to work with therapy. Pt is moving slowly. Based on Pt's current status, recommend MODERATE follow up services  Home Living:  Home Living  Type of Home: Condo  Lives With: Spouse  Home Adaptive Equipment: Walker rolling or standard, Cane (Rollator)  Home Layout: One level (Pt lives with his spouse at the [place] [facility] [place] in a private [facility])  Home Access: Level entry  Bathroom Shower/Tub: Walk-in shower  Bathroom Equipment: Grab bars in shower, Shower chair with back, Raised toilet seat with rails  Prior Level of Function:  Prior Function Per Pt/Caregiver Report  Level of [place]: Needs assistance with ADLs, Needs assistance with homemaking, Needs assistance with functional transfers  Ambulatory Assistance: Needs assistance  Precautions:         Date/Time Vitals Session Patient Position Pulse Resp SpO2 BP MAP (mmHg)    08/10/25 1224 --  --  85  16  99 %  142/79  100            Objective   Pain:  Pain Assessment  Pain Assessment: 0-10  0-10 (Numeric) Pain Score: 0 - No pain  Cognition:  Cognition  Overall Cognitive Status: Within Functional Limits    General Assessments:  General Observation  General Observation: Pt is cautious with movement and requires substantial assist with mobility               Activity Tolerance  Endurance: Tolerates less than 10 min exercise, no significant change in vital signs    Sensation  Light Touch: No apparent deficits    Strength  Strength Comments: B LE are 4/4+ of 5  Static Sitting Balance  Static Sitting-Balance Support: Bilateral upper extremity supported, Feet supported  Static Sitting-Level of Assistance: Close supervision    Static Standing Balance  Static Standing-Balance Support: Bilateral upper extremity supported (FWW)  Static Standing-Level of  Assistance: Minimum assistance, Moderate assistance  Dynamic Standing Balance  Dynamic Standing-Balance Support: Bilateral upper extremity supported (FWW)  Dynamic Standing-Level of Assistance: Moderate assistance, Maximum assistance  Functional Assessments:  Bed Mobility  Bed Mobility: Yes  Bed Mobility 1  Bed Mobility 1: Supine to sitting, Sitting to supine  Level of Assistance 1: Maximum assistance  Bed Mobility Comments 1: assist with B LE and trunk    Transfers  Transfer: Yes  Transfer 1  Transfer From 1: Bed to  Transfer to 1: Stand  Technique 1: Sit to stand, Stand to sit  Transfer Device 1:  (FWW)  Transfer Level of Assistance 1: Moderate assistance    Ambulation/Gait Training  Ambulation/Gait Training Performed: Yes  Ambulation/Gait Training 1  Surface 1: Level tile  Device 1: Rolling walker  Assistance 1: Moderate assistance, Maximum assistance  Quality of Gait 1: Decreased step length  Comments/Distance (ft) 1: 8 side steps up to HOB    Stairs  Stairs: No  Extremity/Trunk Assessments:  RLE   RLE : Within Functional Limits  LLE   LLE : Within Functional Limits  Outcome Measures:  [facility] Basic Mobility  Turning from your back to your side while in a flat bed without using bedrails: A lot  Moving from lying on your back to sitting on the side of a flat bed without using bedrails: A lot  Moving to and from bed to chair (including a wheelchair): A lot  Standing up from a chair using your arms (e.g. wheelchair or bedside chair): A lot  To walk in hospital room: A lot  Climbing 3-5 steps with railing: Total  Basic Mobility - Total Score: 11    Encounter Problems       Encounter Problems (Active)       Mobility       STG - Patient will ambulate 50 feet close supervision with FWW (Progressing)       Start:  08/10/25    Expected End:  08/24/25               PT Transfers       STG - Patient to transfer to and from sit to supine close supervision (Progressing)       Start:  08/10/25    Expected End:  08/24/25             STG - Patient will transfer sit to and from stand close supervision with FWW (Progressing)       Start:  08/10/25    Expected End:  08/24/25                   Education Documentation  No documentation found.  Education Comments  No comments found.

## 2025-08-10 NOTE — CARE PLAN
The patient's goals for the shift include      The clinical goals for the shift include patient remain safe during shift      Problem: Heart Failure  Goal: Improved gas exchange this shift  Outcome: Progressing     Problem: Pain - Adult  Goal: Verbalizes/displays adequate comfort level or baseline comfort level  Outcome: Progressing     Problem: Safety - Adult  Goal: Free from fall injury  Outcome: Progressing     Problem: Discharge Planning  Goal: Discharge to home or other facility with appropriate resources  Outcome: Progressing     Problem: Nutrition  Goal: Nutrient intake appropriate for maintaining nutritional needs  Outcome: Progressing

## 2025-08-10 NOTE — PROGRESS NOTES
Vancomycin Dosing by Pharmacy- Cessation of Therapy    Consult to pharmacy for vancomycin dosing has been discontinued by the prescriber, pharmacy will sign off at this time.    Please call pharmacy if there are further questions or re-enter a consult if vancomycin is resumed.     Julien Painting, PharmD

## 2025-08-10 NOTE — PROGRESS NOTES
GENERAL SURGERY PROGRESS NOTE    PATIENT NAME: Alexis Armijo  MRN: [id number]  DATE: 8/10/2025    SUMMARY OF CURRENT STATUS  - POD # 5 s/p exlap and JOHN  - no BM yet today, denies passing gas  - good uop  - patient is more awake today and did work with PT and OT  - tolerating some clear liquids PO  - denies any major abdominal pain, nausea, SOB, chest pain, leg pain    TODAY'S ASSESSMENT:  - New complications over the past 24 hours: No  - No concerns overnight  - Pain controlled: Yes  - DVT prophylaxis:  Yes -eliquis  - Diet is currently: npo  - Nausea:  No  - Emesis:  once  - Pt has sat in the chair / ambulated    ON EXAMINATION:  /79 (BP Location: Left arm, Patient Position: Lying)   Pulse 85   Temp 36.5 °C (97.7 °F) (Temporal)   Resp 16   Ht 1.829 m (6')   Wt 87.5 kg (193 lb)   SpO2 99%   BMI 26.18 kg/m²   APPEARANCE: NAD, looks well.  ABDOMEN:  Soft, mekhi-incisional tenderness, distended  WOUND(S):  Incisions Clean / Dry / Intact    INS/OUTS:    Intake/Output Summary (Last 24 hours) at 8/10/2025 1458  Last data filed at 8/10/2025 1304  Gross per 24 hour   Intake 2208.33 ml   Output 1000 ml   Net 1208.33 ml         BLOOD WORK:  Results for orders placed or performed during the hospital encounter of 08/05/25 (from the past 24 hours)   POCT GLUCOSE   Result Value Ref Range    POCT Glucose 117 (H) 74 - 99 mg/dL   Blood Culture    Specimen: Peripheral Venipuncture; Blood culture   Result Value Ref Range    Blood Culture Loaded on Instrument - Culture in progress    MRSA Surveillance for Vancomycin De-escalation, PCR    Specimen: Anterior Nares; Swab   Result Value Ref Range    MRSA PCR Not Detected Not Detected   Urinalysis with Reflex Microscopic   Result Value Ref Range    Color, Urine Yellow Light-Yellow, Yellow, Dark-Yellow    Appearance, Urine Turbid (N) Clear    Specific Gravity, Urine 1.033 1.005 - 1.035    pH, Urine 6.0 5.0, 5.5, 6.0, 6.5, 7.0, 7.5, 8.0    Protein, Urine 30 (1+) (A) NEGATIVE, 10  (TRACE), 20 (TRACE) mg/dL    Glucose, Urine Normal Normal mg/dL    Blood, Urine NEGATIVE NEGATIVE mg/dL    Ketones, Urine 20 (1+) (A) NEGATIVE mg/dL    Bilirubin, Urine NEGATIVE NEGATIVE mg/dL    Urobilinogen, Urine Normal Normal mg/dL    Nitrite, Urine NEGATIVE NEGATIVE    Leukocyte Esterase, Urine NEGATIVE NEGATIVE   Microscopic Only, Urine   Result Value Ref Range    WBC, Urine 6-10 (A) 1-5, NONE /HPF    RBC, Urine 3-5 NONE, 1-2, 3-5 /HPF    Bacteria, Urine 1+ (A) NONE SEEN /HPF    Amorphous Crystals, Urine 2+ NONE, 1+, 2+ /HPF   Lavender Top   Result Value Ref Range    Extra Tube Hold for add-ons.    Phosphorus   Result Value Ref Range    Phosphorus 3.1 2.5 - 4.9 mg/dL   Basic Metabolic Panel   Result Value Ref Range    Glucose 143 (H) 74 - 99 mg/dL    Sodium 144 136 - 145 mmol/L    Potassium 3.9 3.5 - 5.3 mmol/L    Chloride 107 98 - 107 mmol/L    Bicarbonate 28 21 - 32 mmol/L    Anion Gap 13 10 - 20 mmol/L    Urea Nitrogen 36 (H) 6 - 23 mg/dL    Creatinine 0.87 0.50 - 1.30 mg/dL    eGFR 83 >60 mL/min/1.73m*2    Calcium 8.1 (L) 8.6 - 10.3 mg/dL   Vancomycin   Result Value Ref Range    Vancomycin 10.3 5.0 - 20.0 ug/mL     *Note: Due to a large number of results and/or encounters for the requested time period, some results have not been displayed. A complete set of results can be found in Results Review.       IMPRESSION:  - Pt is doing well / as expected s/p ex lap and JOHN  - No new issues/concerns    PLAN POD5:  - clear liquid diet, ADAT when sure of bowel function  - Multimodal pain regimen: scheduled tylenol, switch from IV pain medicine to PO PRN oxycodone  - Encourage Ambulation / use of incentive spirometry  - VTE prophylaxis - Continue with willard Huitron MD  Bariatric and Minimally Invasive General Surgery

## 2025-08-11 LAB
ALBUMIN SERPL BCP-MCNC: 2.8 G/DL (ref 3.4–5)
ALP SERPL-CCNC: 51 U/L (ref 33–136)
ALT SERPL W P-5'-P-CCNC: 28 U/L (ref 10–52)
ANION GAP SERPL CALC-SCNC: 9 MMOL/L (ref 10–20)
AST SERPL W P-5'-P-CCNC: 30 U/L (ref 9–39)
BASOPHILS # BLD AUTO: 0.07 X10*3/UL (ref 0–0.1)
BASOPHILS NFR BLD AUTO: 0.6 %
BILIRUB SERPL-MCNC: 0.9 MG/DL (ref 0–1.2)
BUN SERPL-MCNC: 28 MG/DL (ref 6–23)
CALCIUM SERPL-MCNC: 7.8 MG/DL (ref 8.6–10.3)
CHLORIDE SERPL-SCNC: 106 MMOL/L (ref 98–107)
CO2 SERPL-SCNC: 31 MMOL/L (ref 21–32)
CREAT SERPL-MCNC: 0.8 MG/DL (ref 0.5–1.3)
CRP SERPL-MCNC: 9.37 MG/DL
EGFRCR SERPLBLD CKD-EPI 2021: 85 ML/MIN/1.73M*2
EOSINOPHIL # BLD AUTO: 0.16 X10*3/UL (ref 0–0.4)
EOSINOPHIL NFR BLD AUTO: 1.5 %
ERYTHROCYTE [DISTWIDTH] IN BLOOD BY AUTOMATED COUNT: 15.2 % (ref 11.5–14.5)
GLUCOSE SERPL-MCNC: 128 MG/DL (ref 74–99)
HCT VFR BLD AUTO: 36.5 % (ref 41–52)
HGB BLD-MCNC: 12.2 G/DL (ref 13.5–17.5)
HOLD SPECIMEN: NORMAL
IMM GRANULOCYTES # BLD AUTO: 0.58 X10*3/UL (ref 0–0.5)
IMM GRANULOCYTES NFR BLD AUTO: 5.3 % (ref 0–0.9)
LYMPHOCYTES # BLD AUTO: 1.14 X10*3/UL (ref 0.8–3)
LYMPHOCYTES NFR BLD AUTO: 10.4 %
MCH RBC QN AUTO: 31.1 PG (ref 26–34)
MCHC RBC AUTO-ENTMCNC: 33.4 G/DL (ref 32–36)
MCV RBC AUTO: 93 FL (ref 80–100)
MONOCYTES # BLD AUTO: 0.91 X10*3/UL (ref 0.05–0.8)
MONOCYTES NFR BLD AUTO: 8.3 %
NEUTROPHILS # BLD AUTO: 8.11 X10*3/UL (ref 1.6–5.5)
NEUTROPHILS NFR BLD AUTO: 73.9 %
NRBC BLD-RTO: 0 /100 WBCS (ref 0–0)
PHOSPHATE SERPL-MCNC: 3.2 MG/DL (ref 2.5–4.9)
PLATELET # BLD AUTO: 257 X10*3/UL (ref 150–450)
POTASSIUM SERPL-SCNC: 3.6 MMOL/L (ref 3.5–5.3)
PROCALCITONIN SERPL-MCNC: 2.3 NG/ML
PROT SERPL-MCNC: 4.9 G/DL (ref 6.4–8.2)
RBC # BLD AUTO: 3.92 X10*6/UL (ref 4.5–5.9)
RBC MORPH BLD: NORMAL
SODIUM SERPL-SCNC: 142 MMOL/L (ref 136–145)
WBC # BLD AUTO: 11 X10*3/UL (ref 4.4–11.3)

## 2025-08-11 PROCEDURE — 2500000004 HC RX 250 GENERAL PHARMACY W/ HCPCS (ALT 636 FOR OP/ED): Performed by: SURGERY

## 2025-08-11 PROCEDURE — 2500000004 HC RX 250 GENERAL PHARMACY W/ HCPCS (ALT 636 FOR OP/ED): Performed by: INTERNAL MEDICINE

## 2025-08-11 PROCEDURE — 97535 SELF CARE MNGMENT TRAINING: CPT | Mod: GO,CO

## 2025-08-11 PROCEDURE — 2500000002 HC RX 250 W HCPCS SELF ADMINISTERED DRUGS (ALT 637 FOR MEDICARE OP, ALT 636 FOR OP/ED): Performed by: INTERNAL MEDICINE

## 2025-08-11 PROCEDURE — 99233 SBSQ HOSP IP/OBS HIGH 50: CPT | Performed by: INTERNAL MEDICINE

## 2025-08-11 PROCEDURE — 94640 AIRWAY INHALATION TREATMENT: CPT

## 2025-08-11 PROCEDURE — 2500000001 HC RX 250 WO HCPCS SELF ADMINISTERED DRUGS (ALT 637 FOR MEDICARE OP): Performed by: PHYSICIAN ASSISTANT

## 2025-08-11 PROCEDURE — 84100 ASSAY OF PHOSPHORUS: CPT | Performed by: PHYSICIAN ASSISTANT

## 2025-08-11 PROCEDURE — 80053 COMPREHEN METABOLIC PANEL: CPT | Performed by: INTERNAL MEDICINE

## 2025-08-11 PROCEDURE — 92610 EVALUATE SWALLOWING FUNCTION: CPT | Mod: GN | Performed by: PHARMACIST

## 2025-08-11 PROCEDURE — 2500000005 HC RX 250 GENERAL PHARMACY W/O HCPCS: Performed by: SURGERY

## 2025-08-11 PROCEDURE — 86140 C-REACTIVE PROTEIN: CPT | Performed by: INTERNAL MEDICINE

## 2025-08-11 PROCEDURE — 94760 N-INVAS EAR/PLS OXIMETRY 1: CPT

## 2025-08-11 PROCEDURE — 85025 COMPLETE CBC W/AUTO DIFF WBC: CPT | Performed by: INTERNAL MEDICINE

## 2025-08-11 PROCEDURE — 36415 COLL VENOUS BLD VENIPUNCTURE: CPT | Performed by: INTERNAL MEDICINE

## 2025-08-11 PROCEDURE — 1200000002 HC GENERAL ROOM WITH TELEMETRY DAILY

## 2025-08-11 PROCEDURE — 2500000004 HC RX 250 GENERAL PHARMACY W/ HCPCS (ALT 636 FOR OP/ED): Performed by: PHYSICIAN ASSISTANT

## 2025-08-11 PROCEDURE — 97530 THERAPEUTIC ACTIVITIES: CPT | Mod: GO,CO

## 2025-08-11 PROCEDURE — 2500000001 HC RX 250 WO HCPCS SELF ADMINISTERED DRUGS (ALT 637 FOR MEDICARE OP): Performed by: INTERNAL MEDICINE

## 2025-08-11 RX ORDER — POLYETHYLENE GLYCOL 3350 17 G/17G
17 POWDER, FOR SOLUTION ORAL 2 TIMES DAILY
Status: DISCONTINUED | OUTPATIENT
Start: 2025-08-11 | End: 2025-08-12

## 2025-08-11 RX ORDER — ERYTHROMYCIN 500 MG/1
500 TABLET, COATED ORAL EVERY 12 HOURS SCHEDULED
Status: DISCONTINUED | OUTPATIENT
Start: 2025-08-11 | End: 2025-08-12

## 2025-08-11 RX ORDER — METOCLOPRAMIDE HYDROCHLORIDE 5 MG/ML
5 INJECTION INTRAMUSCULAR; INTRAVENOUS EVERY 8 HOURS SCHEDULED
Status: COMPLETED | OUTPATIENT
Start: 2025-08-11 | End: 2025-08-12

## 2025-08-11 RX ADMIN — PIPERACILLIN SODIUM AND TAZOBACTAM SODIUM 4.5 G: 4; .5 INJECTION, SOLUTION INTRAVENOUS at 18:14

## 2025-08-11 RX ADMIN — ALBUTEROL SULFATE 2.5 MG: 2.5 SOLUTION RESPIRATORY (INHALATION) at 15:00

## 2025-08-11 RX ADMIN — APIXABAN 2.5 MG: 2.5 TABLET, FILM COATED ORAL at 11:04

## 2025-08-11 RX ADMIN — ASPIRIN 81 MG: 81 TABLET, DELAYED RELEASE ORAL at 11:04

## 2025-08-11 RX ADMIN — ONDANSETRON 4 MG: 2 INJECTION, SOLUTION INTRAMUSCULAR; INTRAVENOUS at 14:33

## 2025-08-11 RX ADMIN — ERYTHROMYCIN 500 MG: 500 TABLET, FILM COATED ORAL at 20:46

## 2025-08-11 RX ADMIN — SENNOSIDES AND DOCUSATE SODIUM 2 TABLET: 50; 8.6 TABLET ORAL at 20:45

## 2025-08-11 RX ADMIN — ACETAMINOPHEN 650 MG: 325 TABLET ORAL at 11:04

## 2025-08-11 RX ADMIN — POLYETHYLENE GLYCOL 3350 17 G: 17 POWDER, FOR SOLUTION ORAL at 11:04

## 2025-08-11 RX ADMIN — Medication 1 DOSE: at 19:34

## 2025-08-11 RX ADMIN — APIXABAN 2.5 MG: 2.5 TABLET, FILM COATED ORAL at 20:46

## 2025-08-11 RX ADMIN — METOCLOPRAMIDE 5 MG: 5 INJECTION, SOLUTION INTRAMUSCULAR; INTRAVENOUS at 18:12

## 2025-08-11 RX ADMIN — ALBUTEROL SULFATE 2.5 MG: 2.5 SOLUTION RESPIRATORY (INHALATION) at 19:34

## 2025-08-11 RX ADMIN — ALBUTEROL SULFATE 2.5 MG: 2.5 SOLUTION RESPIRATORY (INHALATION) at 08:08

## 2025-08-11 RX ADMIN — PIPERACILLIN SODIUM AND TAZOBACTAM SODIUM 4.5 G: 4; .5 INJECTION, SOLUTION INTRAVENOUS at 05:31

## 2025-08-11 RX ADMIN — ACETAMINOPHEN 650 MG: 325 TABLET ORAL at 20:46

## 2025-08-11 RX ADMIN — POLYETHYLENE GLYCOL 3350 17 G: 17 POWDER, FOR SOLUTION ORAL at 20:46

## 2025-08-11 ASSESSMENT — COGNITIVE AND FUNCTIONAL STATUS - GENERAL
MOBILITY SCORE: 15
PERSONAL GROOMING: A LITTLE
MOVING TO AND FROM BED TO CHAIR: A LITTLE
DAILY ACTIVITIY SCORE: 10
PERSONAL GROOMING: A LOT
TURNING FROM BACK TO SIDE WHILE IN FLAT BAD: A LITTLE
DAILY ACTIVITIY SCORE: 13
STANDING UP FROM CHAIR USING ARMS: A LOT
DRESSING REGULAR LOWER BODY CLOTHING: TOTAL
EATING MEALS: A LOT
STANDING UP FROM CHAIR USING ARMS: A LOT
EATING MEALS: A LITTLE
MOVING FROM LYING ON BACK TO SITTING ON SIDE OF FLAT BED WITH BEDRAILS: A LITTLE
TOILETING: TOTAL
DRESSING REGULAR UPPER BODY CLOTHING: A LITTLE
DRESSING REGULAR UPPER BODY CLOTHING: A LOT
HELP NEEDED FOR BATHING: A LOT
HELP NEEDED FOR BATHING: TOTAL
DAILY ACTIVITIY SCORE: 18
HELP NEEDED FOR BATHING: A LITTLE
WALKING IN HOSPITAL ROOM: A LOT
DRESSING REGULAR UPPER BODY CLOTHING: A LOT
TOILETING: A LOT
CLIMB 3 TO 5 STEPS WITH RAILING: A LOT
TURNING FROM BACK TO SIDE WHILE IN FLAT BAD: A LITTLE
CLIMB 3 TO 5 STEPS WITH RAILING: TOTAL
DRESSING REGULAR LOWER BODY CLOTHING: A LITTLE
WALKING IN HOSPITAL ROOM: A LOT
MOBILITY SCORE: 13
MOVING FROM LYING ON BACK TO SITTING ON SIDE OF FLAT BED WITH BEDRAILS: A LITTLE
DRESSING REGULAR LOWER BODY CLOTHING: A LITTLE
EATING MEALS: A LITTLE
MOVING TO AND FROM BED TO CHAIR: A LOT
TOILETING: A LITTLE
PERSONAL GROOMING: A LOT

## 2025-08-11 ASSESSMENT — PAIN - FUNCTIONAL ASSESSMENT
PAIN_FUNCTIONAL_ASSESSMENT: 0-10

## 2025-08-11 ASSESSMENT — ACTIVITIES OF DAILY LIVING (ADL): HOME_MANAGEMENT_TIME_ENTRY: 24

## 2025-08-11 ASSESSMENT — PAIN SCALES - GENERAL
PAINLEVEL_OUTOF10: 0 - NO PAIN
PAINLEVEL_OUTOF10: 0 - NO PAIN
PAINLEVEL_OUTOF10: 2

## 2025-08-11 NOTE — PROGRESS NOTES
"Speech-Language Pathology Clinical Swallow Evaluation    Patient Name: Alexis Armijo \"Ronal\"  MRN: [id number]  : 1935  Today's Date: 25  Start Time: 923  Stop Time: 942  Time Calculation (min): 19 min      ASSESSMENT  Impressions:  Suspected mild oral phase dysphagia; no suspected pharyngeal phase dysphagia based on clinical swallow evaluation. SLP will continue to follow x1-2 sessions to determine safest diet when cleared for solid intake by surgical/medical team.    PLAN    RECOMMENDATIONS:  Is MBSS recommended? No; no pharyngeal dysphagia suspected.  Solid consistency: NA; on clear liquid diet  Liquid consistency: Thin (IDDSI 0)  Medication administration: As best tolerated    Compensatory swallow strategies:  - Upright positioning for all PO intake    Recommended frequency/duration:  Skilled SLP services recommended: Yes  Frequency: 2x/week  Duration: 2 weeks  Discharge recommendation: Home with no further SLP  Treatment/Interventions: Assess diet tolerance, Bolus trials, Diet recommendations  Prognosis: Good  Strengths: Cognition and Baseline functional status  Barriers to participation in tx: Comorbidities    Goals (start date 2025):  - Pt will consume prescribed diet (current diet is clear liquid) without overt s/sx aspiration/penetration in 95% of observed trials.      SUBJECTIVE    PMHx relevant to rehab:    Age-related nuclear cataract, unspecified eye 2022     Nuclear sclerosis    Atherosclerosis of native arteries of extremities with intermittent claudication, bilateral legs 2023     Atherosclerosis of native artery of both lower extremities with intermittent claudication    Atherosclerotic heart disease of native coronary artery without angina pectoris 10/04/2022     CAD (coronary artery disease)    Atrial fibrillation (Multi)      Cellulitis of lower extremity 2024    Community acquired pneumonia 2024    Diplopia 2022     Diplopia    Essential (primary) " hypertension 10/04/2022     Benign essential hypertension    Heart disease 2010    Heart valve disease      Hyperlipidemia      Other abnormal glucose       Hemoglobin A1c less than 7.0%    Other intervertebral disc degeneration, lumbar region       Degenerative disc disease, lumbar    Pain in extremity 04/09/2024    Personal history of colonic polyps       History of colonic polyps    Personal history of malignant neoplasm of other organs and systems       History of squamous cell carcinoma    Personal history of other diseases of the circulatory system 04/16/2018     History of hypertension    Personal history of other endocrine, nutritional and metabolic disease 04/16/2018     History of hyperlipidemia    Personal history of other medical treatment       History of stress test    Presence of prosthetic heart valve 01/03/2023     S/P AVR (aortic valve replacement)    Spondylolysis, lumbar region       Lumbar spondylolysis       Chief complaint: Pt was admitted on 8/5/25 due to   Chief Complaint   Patient presents with    Abdominal Pain     Pt presents to ED via EMS from [facility] for abdominal pain, diarrhea, nausea and vomiting since Friday.  Pt denies shortness of breath or chest pain.    . He was found to have Small bowel obstruction (Multi).    Relevant imaging results:  CXR 8/5/25: IMPRESSION:  Support hardware as described above.    Cardiomegaly with mild interstitial prominence suggestive of  developing interstitial edema/CHF.    Subtle bibasilar and retrocardiac airspace opacities concerning for  developing airspace disease. Clinical correlation and attention on  continued short-term follow-up is advised.    CXR 8/9/25: Mild-to-moderate diffuse interstitial infiltrates are  seen bilaterally, increased from the prior study, and may represent  edema and/or pneumonia. No pleural effusion or pneumothorax is  identified. The cardiac silhouette is within normal limits for size.  Mild discogenic  degenerative changes are seen throughout the thoracic  spine.    General Visit Information:  SLP Received On: 08/11/25  Patient Class: Inpatient  Living Environment: Home  Ordering Physician: Dr. Lynch  Reason for Referral: Swallow evaluation in setting of new possible PNA; occasional coughing after drinking per order  Prior to Session Communication: Bedside nurse    RN cleared pt to participate in session.    Pt reported no difficulty swallowing liquids. He did report mild difficulty chewing/swallowing meats, but stated that this is more related to preference than to dysphagia. Pt reported that he broke his neck and jaw (among other injuries) last year and worked with an SLP after that.    Date of Onset: 08/05/25  Date of Order: 08/10/25  Base[employer] Diet: Regular/thin  Current Diet : Clear liquid s/p surgery    Status at time of evaluation:  Pain Assessment  Pain Assessment: 0-10  0-10 (Numeric) Pain Score: 0 - No pain    Pt was alert and cooperative for session. Pt reporting he needs to go home.    Orientation: Oriented to self, Oriented to location, Oriented to situation, and Did not assess orientation to time  Ability to follow functional commands: WFL  Nutritional status: Appears well-nourished/no concerns    Respiratory status: Room air  Baseline Vocal Quality: Normal  Volitional Cough: Strong  Volitional Swallow: Within Functional Limits  Patient positioning: Partially reclined in bed      OBJECTIVE  Clinical swallow evaluation completed and consisted of interview, oral motor assessment, and limited PO trials (thin water x4-5oz via straw). Clear liquid restriction maintained.    ORAL PHASE: Natural dentition in fair condition. Oral mucosa were pink, moist, and free of obvious lesions. Lingual strength and ROM were diminished bilaterally. Labial seal was adequate. A/P transit and oral clearance of liquids were prompt and complete.    PHARYNGEAL PHASE: Laryngeal elevation was visualized or palpated with all trials,  however adequacy of hyolaryngeal elevation/excursion cannot be determined at bedside. No immediate or delayed s/sx aspiration/penetration were observed with any consistencies.    Was 3oz challenge administered: Yes; pt drank 3oz of thin liquid in one attempt, without breaking, with no overt s/sx aspiration/penetration observed.     Treatment/Education:  Results and recommendations were relayed to: Patient and Bedside nurse  Education provided: Yes   Learner: Patient   Barriers to learning: None   Method of teaching: Verbal   Topic: results of assessment and risk for aspiration   Outcome of teaching: Pt verbalized understanding  Treatment provided: No    Next Treatment Priority: upgrade trials when cleared by surgery

## 2025-08-11 NOTE — PROGRESS NOTES
General/Trauma Surgery Daily Progress Note    Subjective   Doing well, wishes to go home. Tolerating clear liquid diet. Abdominal pain is minimal. Denies n/v. Has been having flatus, patient reports BM 2 days ago, however BM was noted during bed change yesterday as well.        Objective   Last Recorded Vitals:  Blood pressure (!) 152/92, pulse 73, temperature 36.4 °C (97.5 °F), resp. rate 14, height 1.829 m (6'), weight 87.5 kg (193 lb), SpO2 96%.    Intake/Output last 3 Shifts:  I/O last 3 completed shifts:  In: 1613.3 (18.4 mL/kg) [P.O.:180; I.V.:733.3 (8.4 mL/kg); IV Piggyback:700]  Out: 1025 (11.7 mL/kg) [Urine:1025 (0.3 mL/kg/hr)]  Weight: 87.5 kg     Pain Score:  0-10 (Numeric) Pain Score: 0 - No pain     Physical Exam:  Constitutional: No acute distress, sitting up in bed.  Neuro: Alert, oriented. Follows commands.   Eyes: EOMI. No scleral icterus. Conjunctiva pink.  ENT: MMM.   Heart: Regular rate.  Respiratory: No increased work of breathing or audible wheeze.  Abdomen: Soft, nondistended. Appropriately tender. Incisions clean, dry, intact.   MSK: Moves all extremities.  Vascular: Palpable pulses throughout, no pitting edema.  Skin: No rashes.   Psychological: Appropriate mood and behavior.            Relevant Results  Laboratory Results:  CBC:   Lab Results   Component Value Date    WBC 11.0 08/11/2025    RBC 3.92 (L) 08/11/2025    HGB 12.2 (L) 08/11/2025    HCT 36.5 (L) 08/11/2025     08/11/2025       RFP:   Lab Results   Component Value Date     08/11/2025    K 3.6 08/11/2025     08/11/2025    CO2 31 08/11/2025    BUN 28 (H) 08/11/2025    CREATININE 0.80 08/11/2025    CALCIUM 7.8 (L) 08/11/2025    MG 2.52 (H) 08/09/2025    PHOS 3.2 08/11/2025        LFTs:   Lab Results   Component Value Date    PROT 4.9 (L) 08/11/2025    ALBUMIN 2.8 (L) 08/11/2025    BILITOT 0.9 08/11/2025    ALKPHOS 51 08/11/2025    AST 30 08/11/2025    ALT 28 08/11/2025           Imaging  No results  found.    Cardiology, Vascular, and Other Imaging  No other imaging results found for the past 2 days           Assessment/Plan   This is a 89 y.o. male POD 6 from exploratory laparotomy with lysis of adhesions for high grade bowel obstruction.             Plan:   -- Clears - anticipate diet advancement tomorrow  -- PO tylenol, prn narcotic  -- Miralax BID. Nightly senna/colace  -- Daily CBC, BMP, Mg, Phos. Will replete as needed  -- PT/OT ordered  -- Respiratory consulted  -- Bedside chair and ambulation would benefit greatly  -- Resumed Eliquis   -- Possible DC tomorrow              Discussed with Dr. Lynch who is in agreement with plan. Please secure chat with questions.     Rosa Field PA-C

## 2025-08-11 NOTE — CARE PLAN
The clinical goals for the shift include patient remain safe during shift    Problem: Heart Failure  Goal: Improved gas exchange this shift  Outcome: Progressing  Goal: Improved urinary output this shift  Outcome: Progressing  Goal: Reduction in peripheral edema within 24 hours  Outcome: Progressing  Goal: Report improvement of dyspnea/breathlessness this shift  Outcome: Progressing  Goal: Weight from fluid excess reduced over 2-3 days, then stabilize  Outcome: Progressing  Goal: Increase self care and/or family involvement in 24 hours  Outcome: Progressing     Problem: Pain - Adult  Goal: Verbalizes/displays adequate comfort level or baseline comfort level  Outcome: Progressing     Problem: Safety - Adult  Goal: Free from fall injury  Outcome: Progressing     Problem: Discharge Planning  Goal: Discharge to home or other facility with appropriate resources  Outcome: Progressing     Problem: Chronic Conditions and Co-morbidities  Goal: Patient's chronic conditions and co-morbidity symptoms are monitored and maintained or improved  Outcome: Progressing     Problem: Nutrition  Goal: Nutrient intake appropriate for maintaining nutritional needs  Outcome: Progressing     Problem: Skin  Goal: Decreased wound size/increased tissue granulation at next dressing change  Outcome: Progressing  Goal: Participates in plan/prevention/treatment measures  Outcome: Progressing  Goal: Prevent/manage excess moisture  Outcome: Progressing  Goal: Prevent/minimize sheer/friction injuries  Outcome: Progressing  Goal: Promote/optimize nutrition  Outcome: Progressing  Goal: Promote skin healing  Outcome: Progressing

## 2025-08-11 NOTE — PROGRESS NOTES
"Alexis Armijo \"Radha" is a 89 y.o. male on day 5 of admission presenting with Small bowel obstruction (Multi).    Subjective   Interval History: no fever, no new complaints        Review of Systems    Objective   Range of Vitals (last 24 hours)  Heart Rate:  [72-85]   Temp:  [36.4 °C (97.5 °F)-36.6 °C (97.9 °F)]   Resp:  [14-16]   BP: (128-152)/(79-92)   SpO2:  [96 %-100 %]        Daily Weight  08/05/25 : 87.5 kg (193 lb)    Body mass index is 26.18 kg/m².    Physical Exam  Constitutional:       Appearance: Normal appearance.   HENT:      Head: Normocephalic and atraumatic.      Mouth/Throat:      Mouth: Mucous membranes are moist.      Pharynx: Oropharynx is clear.     Eyes:      Pupils: Pupils are equal, round, and reactive to light.       Cardiovascular:      Rate and Rhythm: Normal rate and regular rhythm.      Heart sounds: Normal heart sounds.   Pulmonary:      Effort: Pulmonary effort is normal.      Breath sounds: Rales present.   Abdominal:      General: Abdomen is flat. Bowel sounds are normal. There is distension.      Palpations: Abdomen is soft.      Comments: Dry dressing, no cellulitis     Musculoskeletal:      Cervical back: Normal range of motion.     Neurological:      Mental Status: He is alert.         Antibiotics  piperacillin-tazobactam - 4.5 gram/100 mL    Relevant Results  Labs  Results from last 72 hours   Lab Units 08/11/25  0532 08/09/25  0438   WBC AUTO x10*3/uL 11.0 9.0   HEMOGLOBIN g/dL 12.2* 12.7*   HEMATOCRIT % 36.5* 38.6*   PLATELETS AUTO x10*3/uL 257 203   NEUTROS PCT AUTO % 73.9  --    LYMPHS PCT AUTO % 10.4  --    MONOS PCT AUTO % 8.3  --    EOS PCT AUTO % 1.5  --      Results from last 72 hours   Lab Units 08/11/25  0532 08/10/25  0556 08/09/25  0438   SODIUM mmol/L 142 144 143   POTASSIUM mmol/L 3.6 3.9 3.6   CHLORIDE mmol/L 106 107 107   CO2 mmol/L 31 28 27   BUN mg/dL 28* 36* 45*   CREATININE mg/dL 0.80 0.87 0.90   GLUCOSE mg/dL 128* 143* 109*   CALCIUM mg/dL 7.8* 8.1* 8.0* "   ANION GAP mmol/L 9* 13 13   EGFR mL/min/1.73m*2 85 83 82   PHOSPHORUS mg/dL 3.2 3.1 2.7     Results from last 72 hours   Lab Units 08/11/25  0532   ALK PHOS U/L 51   BILIRUBIN TOTAL mg/dL 0.9   PROTEIN TOTAL g/dL 4.9*   ALT U/L 28   AST U/L 30   ALBUMIN g/dL 2.8*     Estimated Creatinine Clearance: 70.1 mL/min (by C-G formula based on SCr of 0.8 mg/dL).  C-Reactive Protein   Date Value Ref Range Status   08/11/2025 9.37 (H) <1.00 mg/dL Final     Microbiology  Reviewed  Imaging  Reviewed      Assessment/Plan   Respiratory failure / atelectasis / possible pneumonia, MRSA screen is negative  Encephalopathy  Bowel obstruction sp surgery     Recommendations :  Continue Zosyn   Up in the chair     I spent minutes in the professional and overall care of this patient.  The cultures and susceptibilities were reviewed and discussed with the micro lab, the antibiotics stewardship guidelines were reviewed, the infection control protocols and recommendations were reviewed with the patient and the             Adalgisa Lozano MD

## 2025-08-11 NOTE — PROGRESS NOTES
"Alexis Vazquez" is a 89 y.o. male on day 5 of admission presenting with Small bowel obstruction (Multi).      Subjective     Alexis was laying in bed with wife at bedside. He is complaining of feeling very weak and would like to try physical therapy to help him regain strength.     Review of systems:  Constitutional: generalized weakness  Neuro: negative for dizziness, headache, numbness, tingling  ENT: Negative for nasal congestion or sore throat  Resp: negative for shortness of breath, cough, or wheezing  CV: negative for chest pain, palpitations  GI: negative for abd pain, nausea, vomiting or diarrhea  : negative for dysuria, frequency, or urgency  Skin: negative for lesions, wounds, or rash  Musculoskeletal: positive for weakness, myalgia, or arthralgia  Endocrine: Negative for polyuria or polydipsia       Objective   Constitutional: Well developed, awake/alert/oriented x3, no distress, alert and cooperative  Eyes: PERRL, EOMI, clear sclera  ENMT: mucous membranes moist, no apparent injury, no lesions seen  Head/Neck: Neck supple, no apparent injury, thyroid without mass or tenderness, No JVD, trachea midline, no bruits  Respiratory/Thorax: Patent airways, CTAB, normal breath sounds with good chest expansion, thorax symmetric  Cardiovascular: Irregular, rate and rhythm, no murmurs, 2+ equal pulses of the extremities, normal S 1and S 2  Gastrointestinal: soft, +BS, non distended, surgical incision,  abd binder in place  Musculoskeletal: ROM intact, no joint swelling, normal strength  Extremities: normal extremities, no cyanosis edema, contusions or wounds, no clubbing  Neurological: alert and oriented x3, intact senses, motor, response and reflexes, normal strength  Lymphatic: No significant lymphadenopathy  Psychological: Appropriate mood and behavior  Skin: Warm and dry, no lesions, no rashes      Last Recorded Vitals  /74 (BP Location: Left arm, Patient Position: Lying)   Pulse 72   Temp 36.5 " °C (97.7 °F) (Temporal)   Resp 14   Ht 1.829 m (6')   Wt 87.5 kg (193 lb)   SpO2 98%   BMI 26.18 kg/m²     Intake/Output last 3 Shifts:  I/O last 3 completed shifts:  In: 1613.3 (18.4 mL/kg) [P.O.:180; I.V.:733.3 (8.4 mL/kg); IV Piggyback:700]  Out: 1025 (11.7 mL/kg) [Urine:1025 (0.3 mL/kg/hr)]  Weight: 87.5 kg   I/O this shift:  In: 1816 [P.O.:960; I.V.:856]  Out: 300 [Urine:300]    Relevant Results  Scheduled medications  Scheduled Medications[1]  Continuous medications  Continuous Medications[2]  PRN medications  PRN Medications[3]    Results for orders placed or performed during the hospital encounter of 08/05/25 (from the past 24 hours)   Phosphorus   Result Value Ref Range    Phosphorus 3.2 2.5 - 4.9 mg/dL   CBC and Auto Differential   Result Value Ref Range    WBC 11.0 4.4 - 11.3 x10*3/uL    nRBC 0.0 0.0 - 0.0 /100 WBCs    RBC 3.92 (L) 4.50 - 5.90 x10*6/uL    Hemoglobin 12.2 (L) 13.5 - 17.5 g/dL    Hematocrit 36.5 (L) 41.0 - 52.0 %    MCV 93 80 - 100 fL    MCH 31.1 26.0 - 34.0 pg    MCHC 33.4 32.0 - 36.0 g/dL    RDW 15.2 (H) 11.5 - 14.5 %    Platelets 257 150 - 450 x10*3/uL    Neutrophils % 73.9 40.0 - 80.0 %    Immature Granulocytes %, Automated 5.3 (H) 0.0 - 0.9 %    Lymphocytes % 10.4 13.0 - 44.0 %    Monocytes % 8.3 2.0 - 10.0 %    Eosinophils % 1.5 0.0 - 6.0 %    Basophils % 0.6 0.0 - 2.0 %    Neutrophils Absolute 8.11 (H) 1.60 - 5.50 x10*3/uL    Immature Granulocytes Absolute, Automated 0.58 (H) 0.00 - 0.50 x10*3/uL    Lymphocytes Absolute 1.14 0.80 - 3.00 x10*3/uL    Monocytes Absolute 0.91 (H) 0.05 - 0.80 x10*3/uL    Eosinophils Absolute 0.16 0.00 - 0.40 x10*3/uL    Basophils Absolute 0.07 0.00 - 0.10 x10*3/uL   Comprehensive metabolic panel   Result Value Ref Range    Glucose 128 (H) 74 - 99 mg/dL    Sodium 142 136 - 145 mmol/L    Potassium 3.6 3.5 - 5.3 mmol/L    Chloride 106 98 - 107 mmol/L    Bicarbonate 31 21 - 32 mmol/L    Anion Gap 9 (L) 10 - 20 mmol/L    Urea Nitrogen 28 (H) 6 - 23 mg/dL     Creatinine 0.80 0.50 - 1.30 mg/dL    eGFR 85 >60 mL/min/1.73m*2    Calcium 7.8 (L) 8.6 - 10.3 mg/dL    Albumin 2.8 (L) 3.4 - 5.0 g/dL    Alkaline Phosphatase 51 33 - 136 U/L    Total Protein 4.9 (L) 6.4 - 8.2 g/dL    AST 30 9 - 39 U/L    Bilirubin, Total 0.9 0.0 - 1.2 mg/dL    ALT 28 10 - 52 U/L   C-reactive protein   Result Value Ref Range    C-Reactive Protein 9.37 (H) <1.00 mg/dL   Morphology   Result Value Ref Range    RBC Morphology No significant RBC morphology present      *Note: Due to a large number of results and/or encounters for the requested time period, some results have not been displayed. A complete set of results can be found in Results Review.       XR chest 2 views   Final Result   Diffuse interstitial infiltrates bilaterally, as above. Clinical   correlation and continued follow-up until clearing is recommended.        MACRO:   None.        Signed by: Bhavesh Le 8/9/2025 1:06 PM   Dictation workstation:   [id number]      XR chest abdomen for OG NG placement   Final Result   Nasogastric tube tip at the gastric cardia with the side port at the   level of the distal esophagus.        Signed by: Ish Hicks 8/7/2025 11:05 AM   Dictation workstation:   [id number]      XR chest abdomen for OG NG placement   Final Result   1. Nasogastric tube with its tip projecting over the expected   location of the stomach in the left upper quadrant.   2. Mild diffuse gaseous distention of stomach and bowel loops as   described above. This is most likely favored to represent   postsurgical ileus, especially given history of recent surgery.   However recommend clinical correlation for bowel obstruction.   3. Airspace opacity in the left lung base, likely favored to   represent atelectasis. However developing pneumonitis can also be   considered in the differential in appropriate clinical setting.   Recommend clinical and laboratory correlation.                  MACRO:   None        Signed by: Mel Serrano  8/6/2025 10:32 AM   Dictation workstation:   [id number]      XR chest 1 view   Final Result   Support hardware as described above.        Cardiomegaly with mild interstitial prominence suggestive of   developing interstitial edema/CHF.        Subtle bibasilar and retrocardiac airspace opacities concerning for   developing airspace disease. Clinical correlation and attention on   continued short-term follow-up is advised.        MACRO:   None        Signed by: Corbin Mohr 8/5/2025 6:49 PM   Dictation workstation:   [id number]      CT abdomen pelvis w IV contrast   Final Result   High-grade small bowel obstruction with transition point in the   central mesentery image axial image 91). Proximal to this the bowel   is markedly dilated with perienteric fluid measuring up to 5 cm in   diameter. There is pneumatosis versus pseudo pneumatosis involving   several of the jejunal loops, the former favored. Distal to the   transition point in the small bowel is relatively decompressed with   fluid-filled there is a 2nd transition point in near anatomic   proximity to the 1st transition point and there swirling of the   mesentery in this location. Therefore there is concern for a volvulus   as cause of the bowel obstruction with a close loop mechanism not   excluded. Surgical consultation is recommended.        Actively inflamed urinary bladder wall suspicious for cystitis.             MACRO:   Lenard Seo discussed the significance and urgency of this   critical finding via EPIC secure chat with confirmation of receipt   with DR SHERLEY ARMSTRONG on 8/5/2025 at 5:56 pm.  (**-RCF-**)   Findings:  See findings.        Signed by: Lenard Seo 8/5/2025 5:56 PM   Dictation workstation:   [id number]          Transthoracic Echo Complete  Result Date: 7/30/2025   [facility], [address] [address]               Tel [phone] and Fax [phone] TRANSTHORACIC ECHOCARDIOGRAM REPORT   Patient Name:       CECIL HOFFMAN     Eliane Physician:    [id number] Johnie Rowe MD Study Date:         7/30/2025           Ordering Provider:    [id number] MIRZA ELIZABETH MRN/PID:            [id number]            Fellow: Accession#:         [id number]        Nurse:                Natalie Golden RN Date of Birth/Age:  1935 / 89      Sonographer:          Christine morales RDCS Gender assigned at  M                   Additional Staff: Birth: Height:             180.34 cm           Admit Date:           7/28/2025 Weight:             89.36 kg            Admission Status:     Inpatient -                                                               Routine BSA / BMI:          2.10 m2 / 27.48     Encounter#:           [id number]                     kg/m2 Blood Pressure:     155/81 mmHg         Department Location:  [facility] Non                                                               Invasive Study Type:    TRANSTHORACIC ECHO (TTE) COMPLETE Diagnosis/ICD: Cerebral Infarction, unspecified-I63.9 Indication:    Cerebrovascular Accident CPT Code:      Echo Complete w Full Doppler-50804 Patient History: Pertinent History: HTN, CAD, Hyperlipidemia and CHF. H/O CABG, PCI, AVR #25mm                    Hoffmann, PAD. Study Detail: The following Echo studies were performed: 2D, M-Mode, Doppler and               color flow. Image quality for this study is suboptimal.               Technically challenging study due to patient lying in supine               position and poor acoustic windows. Definity used as a contrast               agent for endocardial border definition and agitated saline used               as a contrast agent for intraseptal flow evaluation. Total                contrast used for this procedure was 1.5 mL via IV push. The               patient was asleep.  PHYSICIAN INTERPRETATION: Left Ventricle: Left ventricular ejection fraction is normal calculated by Gallo's biplane at 59%. There are no regional left ventricular wall motion abnormalities. The left ventricular cavity size is upper limits of normal. There is normal septal and mildly increased posterior left ventricular wall thickness. There is left ventricular concentric remodeling. Abnormal (paradoxical) septal motion consistent with post-operative status. Spectral Doppler shows a Grade I (impaired relaxation pattern) of left ventricular diastolic filling with normal left atrial filling pressure. There is no definite left ventricular thrombus visualized. Left Atrium: The left atrium is mildly dilated. There is a mobile interatrial septum. A bubble study using agitated saline was performed. Bubble study is negative. Right Ventricle: The right ventricle is mildly enlarged. There is mildly reduced right ventricular systolic function. A device is visualized in the right ventricle. Right Atrium: The right atrium is moderate to severely dilated. There is a device visualized in the right atrium. Aortic Valve: There is a prosthetic aortic valve present. The aortic valve area by VTI is 1.69 cmï¿½ with a peak velocity of 2.35 m/s. The peak and mean gradients are 22 mmHg and 13 mmHg, respectively with a dimensionless index of 0.54. 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 Echo findings are consistent with normal aortic valve prosthesis structure and function. There is trace aortic valve regurgitation. Valve is not well visualized. Mitral Valve: The mitral valve is mild to moderately thickened. The doppler estimated peak and mean diastolic pressure gradients are 6.5 mmHg and 3 mmHg, respectively. There is evidence of mild mitral valve stenosis. There is moderate mitral annular  calcification. The mean gradient of the mitral valve is 3 mmHg. There is trace mitral valve regurgitation. The E Vmax is 0.98 m/s. Tricuspid Valve: The tricuspid valve is structurally normal. There is trace to mild tricuspid regurgitation. Pulmonic Valve: The pulmonic valve is not well visualized. There is physiologic pulmonic valve regurgitation. Pericardium: There is no pericardial effusion noted. Aorta: The aortic root is normal. The aortic root is at the upper limits of normal size. Systemic Veins: The inferior vena cava appears normal in size, IVC inspiratory collapse was not assessed. In comparison to the previous echocardiogram(s): Compared with study dated 2/13/2024,.  CONCLUSIONS:  1. Left ventricular ejection fraction is normal calculated by Gallo's biplane at 59%.  2. Spectral Doppler shows a Grade I (impaired relaxation pattern) of left ventricular diastolic filling with normal left atrial filling pressure.  3. Abnormal septal motion consistent with post-operative status.  4. No left ventricular thrombus visualized.  5. There is mildly reduced right ventricular systolic function.  6. Mildly enlarged right ventricle.  7. The left atrium is mildly dilated.  8. The right atrium is moderate to severely dilated.  9. There is mild mitral valve stenosis with a doppler estimated mean diastolic gradient of 3 mmHg. 10. There is moderate mitral annular calcification. 11. 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 The peak and mean gradients are 22 mmHg and 13 mmHg respectively. 12. Echo findings are consistent with normal aortic valve prosthesis structure and function. QUANTITATIVE DATA SUMMARY:  2D MEASUREMENTS:          Normal Ranges: IVSd:            0.98 cm  (0.6-1.1cm) LVPWd:           1.07 cm  (0.6-1.1cm) LVIDd:           4.73 cm  (3.9-5.9cm) LVIDs:           3.30 cm LV Mass Index:   82 g/m2 LVEDV Index:     77 ml/m2 LV % FS          30.3 %  LEFT ATRIUM:                   Normal Ranges: LA Vol A4C:        74.3 ml    (22+/-6mL/m2) LA Vol A2C:        76.0 ml LA Vol BP:         76.3 ml LA Vol Index A4C:  35.5ml/m2 LA Vol Index A2C:  36.2 ml/m2 LA Vol Index BP:   36.4 ml/m2 LA Area A4C:       24.2 cm2 LA Area A2C:       24.1 cm2 LA Major Axis A4C: 6.7 cm LA Major Axis A2C: 6.5 cm LA Volume Index:   37.6 ml/m2 LA Vol A4C:        70.6 ml LA Vol A2C:        78.0 ml LA Vol Index BSA:  35.5 ml/m2  RIGHT ATRIUM:          Normal Ranges: RA Area A4C:  29.4 cm2  M-MODE MEASUREMENTS:         Normal Ranges: Ao Root:             3.80 cm (2.0-3.7cm) LAs:                 4.04 cm (2.7-4.0cm)  AORTA MEASUREMENTS:         Normal Ranges: Ao Sinus, d:        3.90 cm (2.1-3.5cm)  LV SYSTOLIC FUNCTION:                      Normal Ranges: EF-A4C View:    59 % (>=55%) EF-A2C View:    58 % EF-Biplane:     59 % LV EF Reported: 59 %  LV DIASTOLIC FUNCTION:             Normal Ranges: MV Peak E:             0.98 m/s    (0.7-1.2 m/s) MV Peak A:             1.28 m/s    (0.42-0.7 m/s) E/A Ratio:             0.76        (1.0-2.2) MV e'                  0.085 m/s   (>8.0) MV lateral e'          0.10 m/s MV medial e'           0.07 m/s MV A Dur:              193.77 msec E/e' Ratio:            11.47       (<8.0) PulmV Sys Jamie:         33.61 cm/s PulmV Boone Jamie:        25.01 cm/s PulmV S/D Jamie:         1.34 PulmV A Revs Jamie:      14.86 cm/s PulmV A Revs Dur:      117.65 msec  MITRAL VALVE:          Normal Ranges: MV Vmax:      1.27 m/s (<=1.3m/s) MV peak P.5 mmHg (<5mmHg) MV mean P.8 mmHg (<2mmHg) MV VTI:       52.27 cm (10-13cm) MV DT:        328 msec (150-240msec)  AORTIC VALVE:                      Normal Ranges: AoV Vmax:                2.35 m/s  (<=1.7m/s) AoV Peak P.2 mmHg (<20mmHg) AoV Mean P.6 mmHg (1.7-11.5mmHg) LVOT Max Jamie:            1.19 m/s  (<=1.1m/s) AoV VTI:                 48.18 cm  (18-25cm) LVOT VTI:                26.14 cm LVOT Diameter:            1.99 cm   (1.8-2.4cm) AoV Area, VTI:           1.69 cm2  (2.5-5.5cm2) AoV Area,Vmax:           1.57 cm2  (2.5-4.5cm2) AoV Dimensionless Index: 0.54  RIGHT VENTRICLE: RV Basal 4.50 cm RV Mid   2.80 cm RV Major 8.2 cm TAPSE:   15.0 mm RV s'    0.08 m/s  TRICUSPID VALVE/RVSP:          Normal Ranges: Peak TR Velocity:     2.44 m/s  PULMONIC VALVE:          Normal Ranges: PV Max Jamie:     1.0 m/s  (0.6-0.9m/s) PV Max P.2 mmHg  PULMONARY VEINS: PulmV A Revs Dur: 117.65 msec PulmV A Revs Jamie: 14.86 cm/s PulmV Booen Jamie:   25.01 cm/s PulmV S/D Jamie:    1.34 PulmV Sys Jamie:    33.61 cm/s  [id number] Johnie Rowe MD Electronically signed on 2025 at 2:29:48 PM  ** Final **            Assessment/Plan   Assessment & Plan  Small bowel obstruction (Multi)    Diastolic congestive heart failure    Stented coronary artery    Chronic renal insufficiency    Anticoagulant long-term use      Small bowel obstruction  -s/p ex lap with lysis of adhesions  -Surgery following  -Eliquis resumed  -NG tube removed by pt  -NPO  -Pain meds     2. Paroxysmal atrial fibrillation  -EKGs reviewed, was in SR on admit  -Eliquis resumed  -hgb stable  -Planning for outpt watchman     3. Hypertension  -stable  -monitor     4. Elevated troponins-Non MI elevation  -Hx of CAD   -s/p PCI/JENELLE to D1 and mRCA in   -s/p CABG (LIMA-LAD, [id number]) in   -troponin 57, 53  -I reviewed the EKG, no acute changes, ST elevation, or depression  -Cont asa     5. PVD  -s/p bilateral GSV EVLA, s/p RLE revascularization in , atherectomy/PTA/DCB of the left popliteal and peroneal arteries in   -Cont asa     6. PiCloud PCM in situ  -Personally interrogated last week, normal functioning, battery life 22 months, PAF noted burden 0.1%     7. S/p AVR  -normal functioning per echo      Belkys Walsh, APRN-CNP        [1] acetaminophen, 650 mg, oral, q6h  albuterol, 2.5 mg, nebulization, TID  apixaban, 2.5 mg, oral, BID  aspirin, 81 mg, oral,  Daily  pantoprazole, 40 mg, intravenous, Daily  piperacillin-tazobactam, 4.5 g, intravenous, q6h  polyethylene glycol, 17 g, oral, BID  sennosides-docusate sodium, 2 tablet, oral, Nightly  [2]    [3] PRN medications: albuterol, ondansetron **OR** ondansetron, oxyCODONE, oxyCODONE, oxygen

## 2025-08-11 NOTE — PROGRESS NOTES
08/11/25 0919   Discharge Planning   Living Arrangements Other (Comment)  (from [facility])   Support Systems Spouse/significant other;Family members   Assistance Needed Per facility, at baseline patient is A&Ox4, on RA at baseline, ambulating with RW and CGA.   Type of Residence Skilled nursing facility   Do you have animals or pets at home? No   Who is requesting discharge planning? Provider   Home or Post Acute Services Post acute facilities (Rehab/SNF/etc)   Type of Post Acute Facility Services Skilled nursing   Expected Discharge Disposition SNF  (PT/OT evaluated patient, recommend MOD. Plan remains for patient to return to [facility] once medically ready.)   Does the patient need discharge transport arranged? Yes   [facility] coordination needed? Yes   Has discharge transport been arranged? No   Patient Choice   Provider Choice list and CMS website (https://medicare.gov/care-compare#search) for post-acute Quality and Resource Measure Data were provided and reviewed with: Family   Patient / Family choosing to utilize agency / facility established prior to hospitalization Yes   Stroke Family Assessment   Stroke Family Assessment Needed No   Intensity of Service   Intensity of Service 0-30 min

## 2025-08-11 NOTE — PROGRESS NOTES
"Occupational Therapy    Occupational Therapy Treatment    Name: Alexis Armijo \"Radha"  MRN: [id number]  Department: [address]  Room: [address] [place] [address]  Date: 08/11/25  Time Calculation  Start Time: 1420  Stop Time: 1507  Time Calculation (min): 47 min    Assessment:  OT Assessment: Pt continues to demonstrate improvement in ADLs, functional t/f, and functional mobility. Continue to recommend MOD level of OT in the  the inpatient environment and in d/c location.  Prognosis: Good  Barriers to Discharge Home: No anticipated barriers  Evaluation/Treatment Tolerance: Patient limited by fatigue, Patient tolerated treatment well (limited by nausea.)  Medical Staff Made Aware: Yes  End of Session Communication: Bedside nurse  End of Session Patient Position: Bed, 3 rail up, Alarm on  Plan:  Treatment Interventions: Functional transfer training, ADL retraining, UE strengthening/ROM, Endurance training, Cognitive reorientation, Patient/family training, Fine motor coordination activities, Compensatory technique education  OT Frequency: 3 times per week (Based on current functional status and rehab potential, patient is anticipated to tolerate and benefit from 5 or more days per week of skilled rehabilitative therapy after discharge from this acute inpatient hospitalization.)  OT Discharge Recommendations: Moderate intensity level of continued care (Based on current functional status and rehab potential, patient is anticipated to tolerate and benefit from 5 or more days per week of skilled rehabilitative therapy after discharge from this acute inpatient hospitalization.)  Equipment Recommended upon Discharge: Wheeled walker  OT Recommended Transfer Status: Assist of 2  OT - OK to Discharge: Yes (In accord with OT POC)    Subjective     OT Visit Info:  OT Received On: 08/11/25  General:  General  Reason for Referral: 89 yo male presenting to ER  with abdominal pain, nausea, vomiting, diarrhea and found to have bowel obstruction. OT referred " for impaired ADL and related mobility.  Referred By: Rosa Field PA-C  Past Medical History Relevant to Rehab: CAD, arteriosclerosis, A Fib, cellulitis B LE, HTN, HLD, lumbar sponylosis  Family/Caregiver Present: Yes  Caregiver Feedback: Spouse present over course of session appropriately encouraging patient participation.  Prior to Session Communication: Bedside nurse  Patient Position Received: Bed, 3 rail up, Alarm off, not on at start of session  Preferred Learning Style: auditory, kinesthetic, verbal, visual, written, Other:(comment)  General Comment: Pt naseus with initial treatment provided with cool wet washcloth and requested medication from nurse, which was provided in course of session.  Precautions:  Hearing/Visual Limitations: [place] requires B hearing aides.  Medical Precautions: Fall precautions (IV, telemetry)  Post-Surgical Precautions: Abdominal surgery precautions    Pain Assessment:  Pain Assessment  Pain Assessment: 0-10  0-10 (Numeric) Pain Score: 2  Pain Type: Surgical pain  Pain Location: Abdomen  Patient's Stated Pain Goal: No pain  Pain Interventions: Repositioned, Ambulation/increased activity  Response to Interventions: Resting quietly, Decrease in pain    Objective   Cognition:  Overall Cognitive Status: Within Functional Limits  Orientation Level: Disoriented to situation  Activities of Daily Living: UE Dressing  UE Dressing Level of Assistance: Setup, Moderate assistance  UE Dressing Where Assessed: Edge of bed  UE Dressing Comments: Assist needed to thread arms through sleeves    LE Dressing  LE Dressing: Yes  Sock Level of Assistance: Dependent  Adult Briefs Level of Assistance: Dependent  LE Dressing Where Assessed: Recliner  LE Dressing Comments: Unable to free hands in sitting for effective use of tools.    Toileting  Toileting Level of Assistance: Dependent  Where Assessed: Bedside commode  Toileting Comments: Pt dependent for clothing management and hygiene.     Bed  Mobility/Transfers: Bed Mobility  Bed Mobility: Yes  Bed Mobility 1  Bed Mobility 1: Supine to sitting  Level of Assistance 1: Maximum assistance, Maximum verbal cues  Bed Mobility Comments 1: Assist with trunk and B LE's.    Transfers  Transfer: Yes  Transfer 1  Transfer From 1: Bed to  Transfer to 1: Stand  Technique 1: Sit to stand, Stand to sit  Transfer Device 1: Walker  Transfer Level of Assistance 1: Moderate assistance, Moderate verbal cues  Trials/Comments 1: Tactile and verbal cues are needed for safe transition hands to/from device and for safe descent.  Transfers 2  Transfer From 2: Stand to  Transfer to 2: Toilet, Chair with arms  Technique 2: Stand pivot  Transfer Device 2: Walker  Transfer Level of Assistance 2: Moderate assistance  Trials/Comments 2: Pt with slow and inconsistent steps used in course of simple pivots. assist needed to counter retro-lean in turning and backing.    Outcome Measures:  [facility] Daily Activity  Putting on and taking off regular lower body clothing: Total  Bathing (including washing, rinsing, drying): Total  Putting on and taking off regular upper body clothing: A lot  Toileting, which includes using toilet, bedpan or urinal: Total  Taking care of personal grooming such as brushing teeth: A lot  Eating Meals: A little  Daily Activity - Total Score: 10    Education Documentation  Body Mechanics, taught by BARB Dutton at 8/11/2025  4:08 PM.  Learner: Patient  Readiness: Acceptance  Method: Explanation, Demonstration  Response: Verbalizes Understanding, Demonstrated Understanding, Needs Reinforcement  Comment: Pt educated on falls precautions, device safety, effective body mechanics, use of adaptive techniques and tools for self-care, abdominal precautions. Pt responsive to instruction provided in course session. Continued reinforcement is indicated.    Precautions, taught by BARB Dutton at 8/11/2025  4:08 PM.  Learner: Patient  Readiness: Acceptance  Method:  Explanation, Demonstration  Response: Verbalizes Understanding, Demonstrated Understanding, Needs Reinforcement  Comment: Pt educated on falls precautions, device safety, effective body mechanics, use of adaptive techniques and tools for self-care, abdominal precautions. Pt responsive to instruction provided in course session. Continued reinforcement is indicated.    ADL Training, taught by BARB Dutton at 8/11/2025  4:08 PM.  Learner: Patient  Readiness: Acceptance  Method: Explanation, Demonstration  Response: Verbalizes Understanding, Demonstrated Understanding, Needs Reinforcement  Comment: Pt educated on falls precautions, device safety, effective body mechanics, use of adaptive techniques and tools for self-care, abdominal precautions. Pt responsive to instruction provided in course session. Continued reinforcement is indicated.    Goals:  Encounter Problems       Encounter Problems (Active)       OT Goals       Pt will complete bed mobility stand by assist with <1 verbal cue on  proper sequencing and body mechanics to increase safety and decrease fall risks during functional activities. (Progressing)       Start:  08/10/25    Expected End:  08/24/25            Pt will demo improved activity tolerance evidenced and active engagement by participation in BADL and/or functional mobility x10 mins with </=1 rest break.  (Progressing)       Start:  08/10/25    Expected End:  08/24/25            Pt will complete eating and grooming with one vc cue for safety while demonstrating adequate endurance and strength without showing signs of moderate pain, SOB, and/or fatigue.        Start:  08/10/25    Expected End:  08/24/25            Pt will complete dressing and toileting with min a and  two vc cue for safety and LRD while demonstrating adequate endurance and strength without showing signs of moderate pain, SOB, and/or fatigue.  (Progressing)       Start:  08/10/25    Expected End:  08/24/25

## 2025-08-11 NOTE — CARE PLAN
The patient's goals for the shift include      The clinical goals for the shift include remain hemodynamicalaly stable, safety    Over the shift, the patient did not make progress toward the following goals. Barriers to progression include ,. Recommendations to address these barriers include ..

## 2025-08-11 NOTE — PROGRESS NOTES
Patient: Alexis Armijo  Room/bed: 238/238-B  Admitted on: 8/5/2025    Age: 89 y.o.   Gender: male  Code Status:  Full Code   Admitting Dx: Small bowel obstruction (Multi) [K56.609]  Generalized abdominal pain [R10.84]  Diarrhea, unspecified type [R19.7]  Nausea and vomiting, unspecified vomiting type [R11.2]    MRN: [id number]  PCP: Jonah Velez, DO       Subjective   Would like more food. Also getting anxious bout getting out of hospital    Objective    Physical Exam  HENT:      Head: Normocephalic.      Nose: Nose normal.      Mouth/Throat:      Mouth: Mucous membranes are moist.     Eyes:      Extraocular Movements: Extraocular movements intact.      Pupils: Pupils are equal, round, and reactive to light.     Neck:      Comments: No jvd  Cardiovascular:      Pulses: Normal pulses.      Comments: Irregular, rate controlled  1/6 short systolic murmur  Pulmonary:      Comments: Improved air exchange  No rhonchi right now, some coarse breath sounds in right middle and lower lung  Abdominal:      Comments: Abdomen is soft, pretty good bowel sounds  Not much discomfort to palpation  Positive BM     Musculoskeletal:      Comments: No edema  Pulses equal     Skin:     General: Skin is warm and dry.      Findings: Bruising present.     Neurological:      Mental Status: He is alert.      Comments: Much more alert, oriented, appropriate  Cranial nerves intact  Generalized weakness   Psychiatric:         Mood and Affect: Mood normal.         Behavior: Behavior normal.          Temp:  [36.4 °C (97.5 °F)-36.6 °C (97.9 °F)] 36.4 °C (97.5 °F)  Heart Rate:  [72-85] 73  Resp:  [14-16] 14  BP: (128-152)/(79-92) 152/92    Vitals:    08/05/25 1608   Weight: 87.5 kg (193 lb)           I/Os    Intake/Output Summary (Last 24 hours) at 8/11/2025 1025  Last data filed at 8/11/2025 0900  Gross per 24 hour   Intake 1056.16 ml   Output 1125 ml   Net -68.84 ml       Labs:   Results from last 72 hours   Lab Units 08/11/25  3274  "08/10/25  0556 08/09/25  0438   SODIUM mmol/L 142 144 143   POTASSIUM mmol/L 3.6 3.9 3.6   CHLORIDE mmol/L 106 107 107   CO2 mmol/L 31 28 27   BUN mg/dL 28* 36* 45*   CREATININE mg/dL 0.80 0.87 0.90   GLUCOSE mg/dL 128* 143* 109*   CALCIUM mg/dL 7.8* 8.1* 8.0*   ANION GAP mmol/L 9* 13 13   EGFR mL/min/1.73m*2 85 83 82   PHOSPHORUS mg/dL 3.2 3.1 2.7      Results from last 72 hours   Lab Units 08/11/25  0532 08/09/25  0438   WBC AUTO x10*3/uL 11.0 9.0   HEMOGLOBIN g/dL 12.2* 12.7*   HEMATOCRIT % 36.5* 38.6*   PLATELETS AUTO x10*3/uL 257 203   NEUTROS PCT AUTO % 73.9  --    LYMPHS PCT AUTO % 10.4  --    MONOS PCT AUTO % 8.3  --    EOS PCT AUTO % 1.5  --       Lab Results   Component Value Date    CALCIUM 7.8 (L) 08/11/2025    PHOS 3.2 08/11/2025      Lab Results   Component Value Date    CRP 9.37 (H) 08/11/2025      [unfilled]     Micro/ID:   No results found for the last 90 days.                   No lab exists for component: \"AGALPCRNB\"   .ID  Lab Results   Component Value Date    BLOODCULT No growth at 1 day 08/09/2025       Images:  XR chest 2 views  Narrative: Interpreted By:  Bhavesh Le,   STUDY:  XR CHEST 2 VIEWS  8/9/2025 10:17 am      INDICATION:  Signs/Symptoms:pneumonia      COMPARISON:  08/07/2025      ACCESSION NUMBER(S):  [id number]      ORDERING CLINICIAN:  DEANN CAICEDO      TECHNIQUE:  PA and lateral views of the chest were obtained.      FINDINGS:  Cardiac monitoring leads are seen over the chest. Sternal wires and  mediastinal surgical clips are present. A 2 lead pacer is seen over  the left chest. Mild-to-moderate diffuse interstitial infiltrates are  seen bilaterally, increased from the prior study, and may represent  edema and/or pneumonia. No pleural effusion or pneumothorax is  identified. The cardiac silhouette is within normal limits for size.  Mild discogenic degenerative changes are seen throughout the thoracic  spine.      Impression: Diffuse interstitial infiltrates bilaterally, " as above. Clinical  correlation and continued follow-up until clearing is recommended.      MACRO:  None.      Signed by: Bhavesh Le 8/9/2025 1:06 PM  Dictation workstation:   [id number]  ECG 12 lead  Atrial-paced rhythm with prolonged AV conduction  Minimal voltage criteria for LVH, may be normal variant ( R in aVL )  Abnormal ECG  When compared with ECG of 03-FEB-2025 07:36, (unconfirmed)  Electronic atrial pacemaker has replaced Electronic ventricular pacemaker  See ED provider note for full interpretation and clinical correlation  Confirmed by Gabriela Garcia ([id number]) on 8/9/2025 8:01:17 AM       Meds    Scheduled medications  Scheduled Medications[1]  Continuous medications  Continuous Medications[2]  PRN medications  PRN Medications[3]     Assessment and Plan    Alexis Armijo is a 89 y.o. male   POD 6, Exploratory lap, with JOHN. NG removed , tolerating diet and would like advanced. Pain control is fine.   AF, stable. Continue eliquis  Pneumonia, HCA. Likely gram neg/possibly aspiration. Crp and PRC are both elevated. Continue zosyn, appreciate ID input. MRSA is neg so vanco dc's. Afebrile. He has been weaned to room air so far today as well  Encephalopathy, much improved today. Continue supportive care. Delirium appears to have resolved  Deconditioning, ambulatory dysfunction. Therapy is recommending SNF. He will be difficult to convince  CAD/AVR  Protein prisca malnutrition. Diet, supplements.     Yoly Last MD         [1] acetaminophen, 650 mg, oral, q6h  albuterol, 2.5 mg, nebulization, TID  apixaban, 2.5 mg, oral, BID  aspirin, 81 mg, oral, Daily  pantoprazole, 40 mg, intravenous, Daily  piperacillin-tazobactam, 4.5 g, intravenous, q6h  polyethylene glycol, 17 g, oral, BID  [Held by provider] QUEtiapine, 12.5 mg, oral, Nightly  sennosides-docusate sodium, 2 tablet, oral, Nightly  [2] lactated Ringer's, 85 mL/hr, Last Rate: 85 mL/hr (08/10/25 2745)  [3] PRN medications: albuterol, ondansetron **OR**  ondansetron, oxyCODONE, oxyCODONE, oxygen

## 2025-08-12 VITALS
HEIGHT: 72 IN | TEMPERATURE: 97.9 F | BODY MASS INDEX: 27.77 KG/M2 | HEART RATE: 78 BPM | SYSTOLIC BLOOD PRESSURE: 119 MMHG | OXYGEN SATURATION: 100 % | DIASTOLIC BLOOD PRESSURE: 79 MMHG | WEIGHT: 205 LBS | RESPIRATION RATE: 12 BRPM

## 2025-08-12 PROBLEM — K56.609 SMALL BOWEL OBSTRUCTION (MULTI): Status: RESOLVED | Noted: 2025-08-05 | Resolved: 2025-08-12

## 2025-08-12 PROCEDURE — 94760 N-INVAS EAR/PLS OXIMETRY 1: CPT

## 2025-08-12 PROCEDURE — 2500000004 HC RX 250 GENERAL PHARMACY W/ HCPCS (ALT 636 FOR OP/ED): Performed by: PHYSICIAN ASSISTANT

## 2025-08-12 PROCEDURE — 94640 AIRWAY INHALATION TREATMENT: CPT

## 2025-08-12 PROCEDURE — 97110 THERAPEUTIC EXERCISES: CPT | Mod: GP,CQ

## 2025-08-12 PROCEDURE — 2500000001 HC RX 250 WO HCPCS SELF ADMINISTERED DRUGS (ALT 637 FOR MEDICARE OP): Performed by: INTERNAL MEDICINE

## 2025-08-12 PROCEDURE — 2500000004 HC RX 250 GENERAL PHARMACY W/ HCPCS (ALT 636 FOR OP/ED): Performed by: INTERNAL MEDICINE

## 2025-08-12 PROCEDURE — 99232 SBSQ HOSP IP/OBS MODERATE 35: CPT | Performed by: FAMILY MEDICINE

## 2025-08-12 PROCEDURE — 2500000001 HC RX 250 WO HCPCS SELF ADMINISTERED DRUGS (ALT 637 FOR MEDICARE OP): Performed by: PHYSICIAN ASSISTANT

## 2025-08-12 PROCEDURE — 2500000004 HC RX 250 GENERAL PHARMACY W/ HCPCS (ALT 636 FOR OP/ED): Performed by: SURGERY

## 2025-08-12 PROCEDURE — 97116 GAIT TRAINING THERAPY: CPT | Mod: GP,CQ

## 2025-08-12 PROCEDURE — 2500000002 HC RX 250 W HCPCS SELF ADMINISTERED DRUGS (ALT 637 FOR MEDICARE OP, ALT 636 FOR OP/ED): Performed by: INTERNAL MEDICINE

## 2025-08-12 RX ORDER — OXYCODONE HYDROCHLORIDE 5 MG/1
5 TABLET ORAL EVERY 6 HOURS PRN
Qty: 10 TABLET | Refills: 0 | Status: SHIPPED | OUTPATIENT
Start: 2025-08-12

## 2025-08-12 RX ORDER — METOCLOPRAMIDE 5 MG/1
5 TABLET ORAL 2 TIMES DAILY
Qty: 10 TABLET | Refills: 0 | Status: SHIPPED | OUTPATIENT
Start: 2025-08-12 | End: 2025-08-17

## 2025-08-12 RX ORDER — AMOXICILLIN AND CLAVULANATE POTASSIUM 875; 125 MG/1; MG/1
1 TABLET, FILM COATED ORAL 2 TIMES DAILY
Qty: 10 TABLET | Refills: 0 | Status: SHIPPED | OUTPATIENT
Start: 2025-08-12 | End: 2025-08-17

## 2025-08-12 RX ADMIN — PIPERACILLIN SODIUM AND TAZOBACTAM SODIUM 4.5 G: 4; .5 INJECTION, SOLUTION INTRAVENOUS at 06:11

## 2025-08-12 RX ADMIN — ACETAMINOPHEN 650 MG: 325 TABLET ORAL at 06:12

## 2025-08-12 RX ADMIN — ALBUTEROL SULFATE 2.5 MG: 2.5 SOLUTION RESPIRATORY (INHALATION) at 08:34

## 2025-08-12 RX ADMIN — PANTOPRAZOLE SODIUM 40 MG: 40 INJECTION, POWDER, FOR SOLUTION INTRAVENOUS at 08:13

## 2025-08-12 RX ADMIN — METOCLOPRAMIDE 5 MG: 5 INJECTION, SOLUTION INTRAMUSCULAR; INTRAVENOUS at 11:15

## 2025-08-12 RX ADMIN — POLYETHYLENE GLYCOL 3350 17 G: 17 POWDER, FOR SOLUTION ORAL at 08:13

## 2025-08-12 RX ADMIN — PIPERACILLIN SODIUM AND TAZOBACTAM SODIUM 4.5 G: 4; .5 INJECTION, SOLUTION INTRAVENOUS at 00:31

## 2025-08-12 RX ADMIN — PIPERACILLIN SODIUM AND TAZOBACTAM SODIUM 4.5 G: 4; .5 INJECTION, SOLUTION INTRAVENOUS at 11:15

## 2025-08-12 RX ADMIN — METOCLOPRAMIDE 5 MG: 5 INJECTION, SOLUTION INTRAMUSCULAR; INTRAVENOUS at 02:37

## 2025-08-12 RX ADMIN — APIXABAN 2.5 MG: 2.5 TABLET, FILM COATED ORAL at 08:13

## 2025-08-12 RX ADMIN — ASPIRIN 81 MG: 81 TABLET, DELAYED RELEASE ORAL at 08:13

## 2025-08-12 RX ADMIN — ERYTHROMYCIN 500 MG: 500 TABLET, FILM COATED ORAL at 08:13

## 2025-08-12 ASSESSMENT — COGNITIVE AND FUNCTIONAL STATUS - GENERAL
TURNING FROM BACK TO SIDE WHILE IN FLAT BAD: A LOT
MOBILITY SCORE: 12
WALKING IN HOSPITAL ROOM: A LOT
MOVING FROM LYING ON BACK TO SITTING ON SIDE OF FLAT BED WITH BEDRAILS: A LOT
CLIMB 3 TO 5 STEPS WITH RAILING: A LOT
STANDING UP FROM CHAIR USING ARMS: A LOT
MOVING TO AND FROM BED TO CHAIR: A LOT

## 2025-08-12 ASSESSMENT — PAIN - FUNCTIONAL ASSESSMENT
PAIN_FUNCTIONAL_ASSESSMENT: 0-10
PAIN_FUNCTIONAL_ASSESSMENT: 0-10

## 2025-08-12 ASSESSMENT — PAIN SCALES - GENERAL
PAINLEVEL_OUTOF10: 0 - NO PAIN
PAINLEVEL_OUTOF10: 0 - NO PAIN

## 2025-08-12 NOTE — DISCHARGE SUMMARY
Discharge Diagnosis  Small bowel obstruction (Multi)    Issues Requiring Follow-Up  Follow up Dr. Lynch 1 week    Test Results Pending At Discharge  Pending Labs       Order Current Status    Blood Culture Preliminary result            Hospital Course   Patient admitted for small bowel obstruction s/p exploratory laparotomy with adhesiolysis. Post operatively had an NG in place with high, bilious output. He removed NG POD2 2/2 night time delirium, but was having bowel function at this time. Patient was evaluated by cardiology and medical teams while here. PT/OT was also working with patient. Over the weekend, patient was started on anibiotics for pneumonia by our medicine team, possible aspiration PNA. Infectious disease was also consulted who recommends 5 days Augmentin at discharge. Patient's pain controlled, had good bowel function, tolerated soft diet and is discharged back to his facility with follow up with Dr. Lynch in 1 week for staple removal.     Visit Vitals  /73 (BP Location: Left arm, Patient Position: Lying)   Pulse 60   Temp 36.4 °C (97.5 °F) (Temporal)   Resp 12     Vitals:    08/12/25 0616   Weight: 93 kg (205 lb)       Immunization History   Administered Date(s) Administered    Flu vaccine, trivalent, preservative free, HIGH-DOSE, age 65y+ (Fluzone) 09/26/2017, 10/04/2022, 10/03/2023, 10/21/2024    Influenza, Unspecified 11/05/2008, 09/11/2009, 10/11/2011, 09/06/2012, 10/02/2014, 10/17/2016, 09/17/2018    Influenza, seasonal, injectable 09/04/2019, 09/11/2020, 10/04/2021    Pfizer Purple Cap SARS-CoV-2 01/28/2021, 02/25/2021    Pneumococcal conjugate vaccine, 13-valent (PREVNAR 13) 08/17/2015, 11/01/2017    Pneumococcal polysaccharide vaccine, 23-valent, age 2 years and older (PNEUMOVAX 23) 12/01/2003, 03/08/2012    RSV-MAb 03/31/2025    Td vaccine, age 7 years and older (TENIVAC) 02/20/2015    Tdap vaccine, age 7 year and older (BOOSTRIX, ADACEL) 09/08/2024    Zoster, Unspecified 12/27/2018,  03/28/2019    Zoster, live 02/13/2011           Pertinent Physical Exam At Time of Discharge  Physical Exam  Constitutional: No acute distress, sitting up in bed.  Neuro: Alert, oriented. Follows commands.   Eyes: EOMI. No scleral icterus. Conjunctiva pink.  ENT: MMM.   Heart: Regular rate.  Respiratory: No increased work of breathing or audible wheeze.  Abdomen: Soft, nondistended. Appropriately tender. Incisions clean, dry, intact.   MSK: Moves all extremities.  Vascular: Palpable pulses throughout, no pitting edema.  Skin: No rashes.   Psychological: Appropriate mood and behavior.       Home Medications     Medication List      START taking these medications     amoxicillin-clavulanate 875-125 mg tablet; Commonly known as: Augmentin;   Take 1 tablet by mouth 2 times a day for 5 days.   cyclobenzaprine 5 mg tablet; Commonly known as: Flexeril; Take 1 tablet   (5 mg) by mouth 2 times a day as needed for muscle spasms.   metoclopramide 5 mg tablet; Commonly known as: Reglan; Take 1 tablet (5   mg) by mouth 2 times a day for 5 days.   oxyCODONE 5 mg immediate release tablet; Commonly known as: Roxicodone;   Take 1 tablet (5 mg) by mouth every 6 hours if needed for severe pain (7 -   10).   sennosides-docusate sodium 8.6-50 mg tablet; Commonly known as:   Lis-Colace; Take 2 tablets by mouth once daily at bedtime.     CHANGE how you take these medications     * acetaminophen 325 mg tablet; Commonly known as: Tylenol; What changed:   Another medication with the same name was added. Make sure you understand   how and when to take each.   * acetaminophen 650 mg ER tablet; Commonly known as: Tylenol 8 HOUR;   Take 1 tablet (650 mg) by mouth every 6 hours. Do not crush, chew, or   split.; What changed: You were already taking a medication with the same   name, and this prescription was added. Make sure you understand how and   when to take each.  * This list has 2 medication(s) that are the same as other medications    prescribed for you. Read the directions carefully, and ask your doctor or   other care provider to review them with you.     CONTINUE taking these medications     apixaban 2.5 mg tablet; Commonly known as: Eliquis   aspirin 81 mg EC tablet; Take 1 tablet (81 mg) by mouth once daily.   atorvastatin 80 mg tablet; Commonly known as: Lipitor; Take 1 tablet (80   mg) by mouth once daily at bedtime.   b complex vitamins capsule   COLLAGEN, HYDR (BOVINE) (BULK) MISC   lutein-zeaxanthin 25-5 mg capsule   multivitamin tablet   Omega-3 Krill Oil 032-98-30-50 mg capsule; Generic drug:   krill-om-3-dha-epa-phospho-ast   ondansetron 4 mg tablet; Commonly known as: Zofran   polyethylene glycol 17 gram packet; Commonly known as: Glycolax, Miralax   Probiotic 3 billion cell capsule; Generic drug: lactobacillus   combination no.4   TURMERIC ORAL   valsartan 80 mg tablet; Commonly known as: Diovan; Take 1 tablet (80 mg)   by mouth once daily.   Vitamin D3 25 mcg (1,000 units) tablet; Generic drug: cholecalciferol     STOP taking these medications     potassium chloride CR 20 mEq ER tablet; Commonly known as: Klor-Con M20       Outpatient Follow-Up  Future Appointments   Date Time Provider Department Center   8/18/2025  2:00 PM PAULIE Guzmán   8/25/2025  1:00 PM JUANIS RAMIRES 1 [facility]   10/3/2025 10:30 AM Jonah Velez DO [id number]   1/23/2026 11:30 AM Nicole Mar PA-C [id number]       Rosa Field PA-C

## 2025-08-12 NOTE — NURSING NOTE
Pt has 1 hearing aid in place at this time, states his wife took 1 of his hearing aids and  home with her, planning to meet him at [place]

## 2025-08-12 NOTE — PROGRESS NOTES
08/12/25 1121   Discharge Planning   Living Arrangements Other (Comment)  (from [facility] at [facility])   Support Systems Spouse/significant other;Family members   Assistance Needed Per facility, at baseline patient is A&Ox4, on RA at baseline, ambulating with RW and CGA.   Type of Residence Skilled nursing facility   Do you have animals or pets at home? No   Who is requesting discharge planning? Provider   Home or Post Acute Services Post acute facilities (Rehab/SNF/etc)   Type of Post Acute Facility Services Skilled nursing   Expected Discharge Disposition SNF  (Pt medically ready for d/c to return to [facility]. Transport arranged for 8/12 @ 2pm. Pt, spouse, RN made aware. Facility aware and final orders sent in [facility]. RN provided with report number. Confirmed with [facility]SC no [id number] needed for return.)   Does the patient need discharge transport arranged? Yes   [facility] coordination needed? Yes   Has discharge transport been arranged? Yes   What day is the transport expected? 08/12/25   What time is the transport expected? 1400   Stroke Family Assessment   Stroke Family Assessment Needed No   Intensity of Service   Intensity of Service 0-30 min

## 2025-08-12 NOTE — PROGRESS NOTES
"Physical Therapy    Physical Therapy Treatment    Patient Name: Alexis Armijo \"Radha"  MRN: [id number]  Department: [address]  Room: [address] [place] [address]  Today's Date: 8/12/2025  Time Calculation  Start Time: 1025  Stop Time: 1050  Time Calculation (min): 25 min         Assessment/Plan   PT Assessment  PT Assessment Results: Decreased strength, Decreased mobility (deconditioning)  Rehab Prognosis: Good  Barriers to Discharge Home: Caregiver assistance, Physical needs  Caregiver Assistance: Caregiver assistance needed per identified barriers - however, level of patient's required assistance exceeds assistance available at home  Physical Needs: 24hr mobility assistance needed, High falls risk due to function or environment  End of Session Communication: Bedside nurse  Assessment Comment: Recommend Mod intensity skilled PT at [facility] for transfers, gait, abdominal precatuions, endurance building, hig falls risk, requires 1-2 Assist.  End of Session Patient Position: Alarm on, Up in chair     PT Plan  Treatment/Interventions: Bed mobility, Transfer training, Gait training, Strengthening, Therapeutic exercise  PT Plan: Ongoing PT  PT Frequency: 3 times per week (During this acute inpatient hospitalization)  PT Discharge Recommendations: Moderate intensity level of continued care (Based on current functional status and rehab potential, patient is anticipated to tolerate and benefit from 5 or more days per week of skilled rehabilatative therapy after discharge from this acute inpatient hospitalization.)  Equipment Recommended upon Discharge: Wheeled walker  PT Recommended Transfer Status: Assist x2, Assistive device  PT - OK to Discharge: Yes    PT Visit Info:  PT Received On: 08/12/25     General Visit Information:   General  Reason for Referral: 89 yo male presenting to ER  with abdominal pain, nausea, vomiting, diarrhea and found to have bowel obstruction  Referred By: Rosa Field PA-C  Past Medical History Relevant to Rehab: CAD, " "arteriosclerosis, A Fib, cellulitis B LE, HTN, HLD, lumbar sponylosis  Family/Caregiver Present: Yes (Pt's spouse arrived at the end of Pt's session)  Prior to Session Communication: Bedside nurse  Patient Position Received: Bed, 3 rail up, Alarm off, not on at start of session  General Comment: AXOX3, forgetful. \"Oh I'm so ready to get out of here, I am getting too weak\" stated p.t. S/p SBO with open surgery. + midabdominal icision, BARB statpes intact, appears dry. Abd precautions and abdominal  binder on incorrectly. IV. \"no pain\" stated patient.    Subjective   Precautions:        Date/Time Vitals Session Patient Position Pulse Resp SpO2 BP MAP (mmHg)    08/12/25 1203 --  --  78  12  100 %  119/79  --            Objective   Pain:  Pain Assessment  Pain Assessment: 0-10  0-10 (Numeric) Pain Score: 0 - No pain  Cognition:  Cognition  Orientation Level: Oriented X4  Coordination:  Coordination Comment: stiff and jerky  Postural Control:  Static Sitting Balance  Static Sitting-Balance Support: Bilateral upper extremity supported, Feet supported  Static Sitting-Level of Assistance: Close supervision  Static Sitting-Comment/Number of Minutes: 8 minutes  Dynamic Sitting Balance  Dynamic Sitting-Balance Support: Feet supported, No upper extremity supported  Dynamic Sitting-Level of Assistance: Close supervision  Dynamic Sitting-Balance: Reaching for objects, Reaching across midline, Trunk control activities, Forward lean  Static Standing Balance  Static Standing-Balance Support: Bilateral upper extremity supported (FWW)  Static Standing-Level of Assistance: Moderate assistance (VC for safety and technique, Mod A x2)  Static Standing-Comment/Number of Minutes: 30 seconds x2  Dynamic Standing Balance  Dynamic Standing-Balance Support: Bilateral upper extremity supported (FWW)  Dynamic Standing-Level of Assistance: Moderate assistance, Maximum assistance  Extremity/Trunk Assessments:    Activity Tolerance:  Activity " Tolerance  Endurance: Tolerates 30 min exercise with multiple rests  Treatments:  Therapeutic Exercise  Therapeutic Exercise Performed: Yes         Therapeutic Activity  Therapeutic Activity Performed: Yes  Therapeutic Activity 1: reviewed abd precautions. Hedley/donning abd binder in sitting. Dependent Assist, VC for safety and technique, patient verbalized understanding,unsure of carry owver seocndary to forgetfullness.                   Bed Mobility  Bed Mobility: Yes  Bed Mobility 1  Bed Mobility 1: Supine to sitting, Sitting to supine  Level of Assistance 1: Maximum assistance, Maximum verbal cues, Maximum tactile cues    Ambulation/Gait Training  Ambulation/Gait Training Performed: Yes  Ambulation/Gait Training 1  Surface 1: Level tile  Device 1: Rolling walker  Assistance 1: Moderate assistance, Maximum assistance, Maximum tactile cues, Maximum verbal cues (x2)  Quality of Gait 1: Decreased step length, Shuffling gait, Soft knee(s), Festinating (cautious and weak)  Comments/Distance (ft) 1: 7ft x2, toileted at bedsice commode in between. Dependent in standing for personal care.  Transfers  Transfer: Yes  Transfer 1  Transfer From 1: Sit to  Transfer to 1: Stand  Technique 1: Sit to stand, Stand to sit  Transfer Device 1:  (FWW)  Transfer Level of Assistance 1: Maximum assistance, Maximum verbal cues, Maximum tactile cues, +2  Trials/Comments 1: x2    Stairs  Stairs: No                     Outcome Measures:  [facility] Basic Mobility  Turning from your back to your side while in a flat bed without using bedrails: A lot  Moving from lying on your back to sitting on the side of a flat bed without using bedrails: A lot  Moving to and from bed to chair (including a wheelchair): A lot  Standing up from a chair using your arms (e.g. wheelchair or bedside chair): A lot  To walk in hospital room: A lot  Climbing 3-5 steps with railing: A lot  Basic Mobility - Total Score: 12    Education Documentation  Handouts, taught by  Nicole Valentino PTA at 8/12/2025 12:14 PM.  Learner: Patient  Readiness: Acceptance  Method: Explanation, Demonstration  Response: Needs Reinforcement    Precautions, taught by Nicole Valentino PTA at 8/12/2025 12:14 PM.  Learner: Patient  Readiness: Acceptance  Method: Explanation, Demonstration  Response: Needs Reinforcement    Body Mechanics, taught by Nicole Valentino PTA at 8/12/2025 12:14 PM.  Learner: Patient  Readiness: Acceptance  Method: Explanation, Demonstration  Response: Needs Reinforcement    Home Exercise Program, taught by Nicole Valentino PTA at 8/12/2025 12:14 PM.  Learner: Patient  Readiness: Acceptance  Method: Explanation, Demonstration  Response: Needs Reinforcement    Mobility Training, taught by Nicole Valentino PTA at 8/12/2025 12:14 PM.  Learner: Patient  Readiness: Acceptance  Method: Explanation, Demonstration  Response: Needs Reinforcement    Education Comments  No comments found.        OP EDUCATION:       Encounter Problems       Encounter Problems (Active)       Mobility       STG - Patient will ambulate 50 feet close supervision with FWW (Progressing)       Start:  08/10/25    Expected End:  08/24/25               PT Transfers       STG - Patient to transfer to and from sit to supine close supervision (Progressing)       Start:  08/10/25    Expected End:  08/24/25            STG - Patient will transfer sit to and from stand close supervision with FWW (Progressing)       Start:  08/10/25    Expected End:  08/24/25

## 2025-08-12 NOTE — PROGRESS NOTES
General/Trauma Surgery Daily Progress Note    Subjective   One episode of emesis yesterday afternoon, however did have 3 recorded bowel movements and there is currently a loose bowel movement in the bed. Denies further nausea or vomiting. Wishes to go home. Passing flatus. Abdominal pain appropriate. Denies bloating.      Objective   Last Recorded Vitals:  Blood pressure 138/73, pulse 60, temperature 36.4 °C (97.5 °F), temperature source Temporal, resp. rate 12, height 1.829 m (6'), weight 93 kg (205 lb), SpO2 100%.    Intake/Output last 3 Shifts:  I/O last 3 completed shifts:  In: 2736 (29.4 mL/kg) [P.O.:1680; I.V.:856 (9.2 mL/kg); IV Piggyback:200]  Out: 1250 (13.4 mL/kg) [Urine:950 (0.3 mL/kg/hr); Emesis/NG output:300]  Weight: 93 kg     Pain Score:  0-10 (Numeric) Pain Score: 0 - No pain     Physical Exam:  Constitutional: No acute distress, sitting up in bed.  Neuro: Alert, oriented. Follows commands.   Eyes: EOMI. No scleral icterus. Conjunctiva pink.  ENT: MMM.   Heart: Regular rate.  Respiratory: No increased work of breathing or audible wheeze.  Abdomen: Soft, nondistended. Appropriately tender. Incisions clean, dry, intact.   MSK: Moves all extremities.  Vascular: Palpable pulses throughout, no pitting edema.  Skin: No rashes.   Psychological: Appropriate mood and behavior.            Relevant Results  Laboratory Results:  CBC:   Lab Results   Component Value Date    WBC 11.0 08/11/2025    RBC 3.92 (L) 08/11/2025    HGB 12.2 (L) 08/11/2025    HCT 36.5 (L) 08/11/2025     08/11/2025       RFP:   Lab Results   Component Value Date     08/11/2025    K 3.6 08/11/2025     08/11/2025    CO2 31 08/11/2025    BUN 28 (H) 08/11/2025    CREATININE 0.80 08/11/2025    CALCIUM 7.8 (L) 08/11/2025    PHOS 3.2 08/11/2025        LFTs:   Lab Results   Component Value Date    PROT 4.9 (L) 08/11/2025    ALBUMIN 2.8 (L) 08/11/2025    BILITOT 0.9 08/11/2025    ALKPHOS 51 08/11/2025    AST 30 08/11/2025    ALT  28 08/11/2025           Imaging  No results found.    Cardiology, Vascular, and Other Imaging  No other imaging results found for the past 2 days           Assessment/Plan   This is a 89 y.o. male POD 7 from exploratory laparotomy with lysis of adhesions for high grade bowel obstruction.               Plan:   -- Advance to low fiber diet  -- PO tylenol, prn narcotic  -- Nightly senna/colace  -- Daily CBC, BMP, Mg, Phos. Will replete as needed  -- PT/OT ordered  -- Respiratory consulted  -- Bedside chair and ambulation would benefit greatly  -- Resumed Eliquis   -- Discharge today              Discussed with Dr. Lynch who is in agreement with plan. Please secure chat with questions.     Rosa Field PA-C

## 2025-08-12 NOTE — PROGRESS NOTES
"Alexis Armijo \"Radha" is a 89 y.o. male on day 6 of admission presenting with Small bowel obstruction (Multi).    Subjective   Interval History: no fever, no new complaints        Review of Systems    Objective   Range of Vitals (last 24 hours)  Heart Rate:  [60-94]   Temp:  [36.1 °C (97 °F)-36.5 °C (97.7 °F)]   Resp:  [12-22]   BP: (107-141)/(61-77)   Weight:  [93 kg (205 lb)]   SpO2:  [94 %-100 %]        Daily Weight  08/12/25 : 93 kg (205 lb)    Body mass index is 27.8 kg/m².    Physical Exam  Constitutional:       Appearance: Normal appearance.   HENT:      Head: Normocephalic and atraumatic.      Mouth/Throat:      Mouth: Mucous membranes are moist.      Pharynx: Oropharynx is clear.     Eyes:      Pupils: Pupils are equal, round, and reactive to light.       Cardiovascular:      Rate and Rhythm: Normal rate and regular rhythm.      Heart sounds: Normal heart sounds.   Pulmonary:      Effort: Pulmonary effort is normal.      Breath sounds: Rales present.   Abdominal:      General: Abdomen is flat. Bowel sounds are normal.      Palpations: Abdomen is soft.      Comments: Clean incision,, no cellulitis     Musculoskeletal:      Cervical back: Normal range of motion.     Neurological:      Mental Status: He is alert.         Antibiotics  amoxicillin-clavulanate - 875-125 mg  piperacillin-tazobactam - 4.5 gram/100 mL    Relevant Results  Labs  Results from last 72 hours   Lab Units 08/11/25  0532   WBC AUTO x10*3/uL 11.0   HEMOGLOBIN g/dL 12.2*   HEMATOCRIT % 36.5*   PLATELETS AUTO x10*3/uL 257   NEUTROS PCT AUTO % 73.9   LYMPHS PCT AUTO % 10.4   MONOS PCT AUTO % 8.3   EOS PCT AUTO % 1.5     Results from last 72 hours   Lab Units 08/11/25  0532 08/10/25  0556   SODIUM mmol/L 142 144   POTASSIUM mmol/L 3.6 3.9   CHLORIDE mmol/L 106 107   CO2 mmol/L 31 28   BUN mg/dL 28* 36*   CREATININE mg/dL 0.80 0.87   GLUCOSE mg/dL 128* 143*   CALCIUM mg/dL 7.8* 8.1*   ANION GAP mmol/L 9* 13   EGFR mL/min/1.73m*2 85 83 "   PHOSPHORUS mg/dL 3.2 3.1     Results from last 72 hours   Lab Units 08/11/25  0532   ALK PHOS U/L 51   BILIRUBIN TOTAL mg/dL 0.9   PROTEIN TOTAL g/dL 4.9*   ALT U/L 28   AST U/L 30   ALBUMIN g/dL 2.8*     Estimated Creatinine Clearance: 70.1 mL/min (by C-G formula based on SCr of 0.8 mg/dL).  C-Reactive Protein   Date Value Ref Range Status   08/11/2025 9.37 (H) <1.00 mg/dL Final     Microbiology  Reviewed  Imaging  Reviewed      Assessment/Plan   Respiratory failure / atelectasis / possible pneumonia, MRSA screen is negative  Encephalopathy  Bowel obstruction sp surgery     Recommendations :  Continue Zosyn   Increase the activity     I spent minutes in the professional and overall care of this patient.  The cultures and susceptibilities were reviewed and discussed with the micro lab, the antibiotics stewardship guidelines were reviewed, the infection control protocols and recommendations were reviewed with the patient and the             Adalgisa Lozano MD

## 2025-08-12 NOTE — PROGRESS NOTES
"Alexis Armijo \"Radha" is a 89 y.o. male on day 6 of admission presenting with Small bowel obstruction (Multi).      Subjective   Patient resting in bed drinking juice and eating jello, denies any current complaints and asking for discharge to SNF to get his strength back. Denies any nausea, abdominal pain, did pass gas and have BM yesterday.    Objective     Last Recorded Vitals  /73 (BP Location: Left arm, Patient Position: Lying)   Pulse 60   Temp 36.4 °C (97.5 °F) (Temporal)   Resp 12   Wt 93 kg (205 lb)   SpO2 100%   Intake/Output last 3 Shifts:    Intake/Output Summary (Last 24 hours) at 8/12/2025 0936  Last data filed at 8/12/2025 0908  Gross per 24 hour   Intake 2256 ml   Output 850 ml   Net 1406 ml       Admission Weight  Weight: 87.5 kg (193 lb) (08/05/25 1608)    Daily Weight  08/12/25 : 93 kg (205 lb)      Physical Exam  Constitutional: alert and oriented x 3, awake, cooperative, no acute distress  Skin: warm and dry  Head/Neck: Normocephalic, atraumatic  Eyes: clear sclera  ENMT: mucous membranes moist  Cardio: irregularly irregular rhythm but rate controlled  Resp: CTA bilaterally, good respiratory effort  Gastrointestinal: Soft, nondistended, tender only around surgical site with palpation  Musculoskeletal: generalized weakness  Neuro: alert and oriented x 3  Psychological: Appropriate mood and behavior      Relevant Results  Scheduled medications  Scheduled Medications[1]  Continuous medications  Continuous Medications[2]  PRN medications  PRN Medications[3]        Assessment & Plan    89yo CM with PMH of Afib on eliquis, CAD s/p stent, PPM, TIA, PVD, HTN, and HLD that presented to the ED with abdominal pain and was taken to OR for possible volvulus. Medicine was consulted post-op for medical management of chronic conditions and possible pneumonia.     Small bowel obstruction, resolved  - initially thought to be volvulus but taken for ex lap from ED and underwent lysis of adhesions on " 8/5  - mgmt via primary surgical team  - per surgery, advanced to low fiber diet today    Pneumonia vs atelectasis  - due to poor mobility and prolonged post-op course, cannot rule out pneumonia  - ID consulted, MRSA negative and will continue zosyn for now  - if discharging soon would rec just 7day total course of abx    HTN, Afib, CAD s/p stent, PPM  - continue home ASA, eliquis  - restart home valsartan     Protein calorie malnutrition  - dietician following  - continue with supplements    DVT Proph: eliquis    Dispo: Patient appears medically stable for discharge per primary surgical team.         Mili Delacruz DO           [1] acetaminophen, 650 mg, oral, q6h  albuterol, 2.5 mg, nebulization, TID  apixaban, 2.5 mg, oral, BID  aspirin, 81 mg, oral, Daily  metoclopramide, 5 mg, intravenous, q8h AGUEDA  pantoprazole, 40 mg, intravenous, Daily  piperacillin-tazobactam, 4.5 g, intravenous, q6h  sennosides-docusate sodium, 2 tablet, oral, Nightly  [2]    [3] PRN medications: albuterol, ondansetron **OR** ondansetron, oxyCODONE, oxyCODONE, oxygen

## 2025-08-12 NOTE — CONSULTS
Heart Failure Nurse Navigator    The role of the HF nurse navigator is to (1) characterize risk profiles of patients with heart failure transitioning from [id number] after hospitalization, (2) recommend interventions to improve care and reduce risks of worse post-hospitalization outcomes. Potential recommendations may touch base on patient/family education, additional consults that may bring value, and appointment scheduling.    History        Patients Cardiologist(s):   --Damian Padilla MD   --Viraj Lopez, DO     Patients Primary Care Provider: Jonah Velez DO     1. Medical Domain  What is the patient's most recent LVEF? ***  HFrEF (LVEF <= 40%) Quadruple therapy recommended  HFmrEF (LVEF 41-49%) Quadruple therapy recommended  HFpEF (LVEF >= 50%) Minimum recommendations: SGLT2i and MRA  Is the patient on OP GDMT for their condition?   ARNI/ACEI/ARB: No None  BB: No None  MRA: No None  SGLT2i: No None  Is the patient prescribed a diuretic? No  {LHDiuretic:77715}  Does this patient have an implanted device (ie cardioMEMS, ICD, CRT-D)?  Device type: ***  Could this patient have advanced heart failure (Stage D heart failure)?: {YES/NA/NO:04942}   If yes, the potential markers of advanced heart failure include: ***    REFERENCE: Potential markers of advanced HF   Inotrope (dobutamine or milrinone) used during this admission?   LVEF<=25%?   >=2 hospitalizations for ADHF in the last year?   Severe symptoms of HF (fatigue, dyspnea, confusion, edema) despite medical therapy?   Downtitration of GDMT as compared to home medications?   Discontinuation of GDMT because of hypotension or renal intolerance?   Recurrent arrhythmias (AF, VT with ICD shocks)?   Cardiac cachexia (i.e., unintentional weight loss due to HF)?   High-risk biomarker profile (e.g., hyponatremia [Na<135], very elevated BNP, worsening kidney function)   Escalating doses of diuretics or persistent edema despite escalation     2. Mind  "and Emotion  Does this patient have possible cognitive impairment?: {YES/NA/NO:09426} (The Mini-Cog score ***/5)  Ask the patient to memorize these 3 words: banana, sunrise, chair  Ask the patient to draw a clock with hands pointing at \"20 minutes after 8\"  Ask the patient to recall the 3 words  Score: Add number of words recalled + clock drawing (0 points for any errors, 2 points if correct)  Interpretation: A score of 0-2 suggests cognitive impairment is present, a score of 3-5 suggests cognitive impairment is absent  Does this patient have major depression?: {YES/NA/NO:77669} (PH-Q2 score ***/6)  Over the last 2 weeks: Little interest or pleasure in doing things? (Not at all +0, Several days +1, More than half the days +2, Nearly every day +3)  Over the last 2 weeks: Feeling down, depressed or hopeless? (Not at all +0, Several days +1, More than half the days +2, Nearly every day +3)  Score: Add points  Interpretation: A score of 3 or more suggests that major depression is likely.     3. Physical Function  Could this patient be frail?: {YES/NA/NO:75843}   Defined by presence of all of these: slowness, weakness, shrinking, inactivity, exhaustion  Is this patient at risk for falling?: {YES/NA/NO:58896}   Defined by having experienced a fall in the last 12 months.    4. Social Determinants of Health  Transportation deficits?: {YES/NA/NO:35034}   Lack of insurance?: {YES/NA/NO:75666}   Poor financial resources?: {YES/NA/NO:16819}   Living conditions (homelessness, unstable home)?: {YES/NA/NO:98092}   Poor family/social support?: {YES/NA/NO:59997}   Poor personal care?: {YES/NA/NO:04918}   Food insecurity?: {YES/NA/NO:19191}   Lack of HCPOA?: {YES/NA/NO:58282}    I provided the following heart failure education:  - Living With Heart Failure book provided.  - HF signs and symptoms, heart failure zones and when to call cardiologist.   - Controlling Heart Failure at Home: medication adherence, following up with " cardiologist at least once yearly, staying healthy and active, limiting sodium and fluid intake as directed by cardiologist.  - Daily Weight Education    Assessment  I met with Alexis Armijo at the bedside, and my impressions include: ***      IP Medications:  ARNi/ACEi/ARB: {LHACE/ARB/ARNI:31830}  BB: {LHbetablocker:31799}  MRA: {LHMRA:08310}  SGLT2i: {VFGDXR2d:46101}  Diuretic: {LHDiuretic:91662}    Recommendations/ Plan  {HF nurse navigator recommendations:50117}      *Cardiology Discharge Appointment Follow-up Plan:

## 2025-08-12 NOTE — CARE PLAN
Pt found on 2L sating 99%. Pt baseline is RA. Removed oxygen. Pt now on RA. Will continue to monitor

## 2025-08-13 ENCOUNTER — NURSING HOME VISIT (OUTPATIENT)
Dept: POST ACUTE CARE | Facility: EXTERNAL LOCATION | Age: 89
End: 2025-08-13
Payer: MEDICARE

## 2025-08-13 DIAGNOSIS — D72.829 LEUKOCYTOSIS, UNSPECIFIED TYPE: ICD-10-CM

## 2025-08-13 DIAGNOSIS — K56.609 SBO (SMALL BOWEL OBSTRUCTION) (MULTI): ICD-10-CM

## 2025-08-13 DIAGNOSIS — R11.2 NAUSEA AND VOMITING IN ADULT: ICD-10-CM

## 2025-08-13 DIAGNOSIS — K59.00 CONSTIPATION, UNSPECIFIED CONSTIPATION TYPE: ICD-10-CM

## 2025-08-13 DIAGNOSIS — M62.81 MUSCLE WEAKNESS (GENERALIZED): Primary | ICD-10-CM

## 2025-08-13 DIAGNOSIS — E87.6 HYPOKALEMIA: ICD-10-CM

## 2025-08-13 DIAGNOSIS — E56.9 VITAMIN DEFICIENCY: ICD-10-CM

## 2025-08-13 DIAGNOSIS — M13.0 POLYARTHRITIS: ICD-10-CM

## 2025-08-13 DIAGNOSIS — I25.10 CORONARY ARTERY DISEASE INVOLVING NATIVE HEART WITHOUT ANGINA PECTORIS, UNSPECIFIED VESSEL OR LESION TYPE: ICD-10-CM

## 2025-08-13 DIAGNOSIS — I10 BENIGN ESSENTIAL HYPERTENSION: ICD-10-CM

## 2025-08-13 LAB — BACTERIA BLD CULT: NORMAL

## 2025-08-13 PROCEDURE — 99309 SBSQ NF CARE MODERATE MDM 30: CPT | Performed by: NURSE PRACTITIONER

## 2025-08-14 ENCOUNTER — PATIENT OUTREACH (OUTPATIENT)
Dept: CARE COORDINATION | Age: 89
End: 2025-08-14
Payer: MEDICARE

## 2025-08-14 ENCOUNTER — NURSING HOME VISIT (OUTPATIENT)
Dept: POST ACUTE CARE | Facility: EXTERNAL LOCATION | Age: 89
End: 2025-08-14
Payer: MEDICARE

## 2025-08-14 DIAGNOSIS — K56.609 SBO (SMALL BOWEL OBSTRUCTION) (MULTI): ICD-10-CM

## 2025-08-14 DIAGNOSIS — J69.0 ASPIRATION PNEUMONIA, UNSPECIFIED ASPIRATION PNEUMONIA TYPE, UNSPECIFIED LATERALITY, UNSPECIFIED PART OF LUNG (MULTI): ICD-10-CM

## 2025-08-14 DIAGNOSIS — I48.19 ATRIAL FIBRILLATION, PERSISTENT (MULTI): ICD-10-CM

## 2025-08-14 DIAGNOSIS — I10 BENIGN ESSENTIAL HYPERTENSION: Primary | ICD-10-CM

## 2025-08-14 PROCEDURE — 99305 1ST NF CARE MODERATE MDM 35: CPT | Performed by: FAMILY MEDICINE

## 2025-08-14 ASSESSMENT — ENCOUNTER SYMPTOMS
ABDOMINAL PAIN: 0
COUGH: 0
NECK STIFFNESS: 0
WOUND: 1
POLYDIPSIA: 0
BRUISES/BLEEDS EASILY: 0
HEMATURIA: 0
WEAKNESS: 1
RHINORRHEA: 0
SORE THROAT: 0
DYSURIA: 0
ABDOMINAL PAIN: 0
BRUISES/BLEEDS EASILY: 0
POLYDIPSIA: 0
SORE THROAT: 0
EYE PAIN: 0
DYSURIA: 0
HEMATURIA: 0
SHORTNESS OF BREATH: 0
ADENOPATHY: 0
FEVER: 0
WEAKNESS: 1
CONFUSION: 1
BACK PAIN: 0
BLOOD IN STOOL: 0
HALLUCINATIONS: 0
EYE REDNESS: 0
HEADACHES: 0
WOUND: 0
HEADACHES: 0
RHINORRHEA: 0
PALPITATIONS: 0
EYE REDNESS: 0
FEVER: 0
EYE PAIN: 0
BACK PAIN: 0
CHILLS: 0
BLOOD IN STOOL: 0
COUGH: 0
FATIGUE: 0
PALPITATIONS: 0
ADENOPATHY: 0
CHILLS: 0
NECK STIFFNESS: 0
SHORTNESS OF BREATH: 0
FATIGUE: 0
CONFUSION: 1
HALLUCINATIONS: 0

## 2025-08-15 ENCOUNTER — TELEPHONE (OUTPATIENT)
Dept: CARDIOLOGY | Facility: HOSPITAL | Age: 89
End: 2025-08-15
Payer: MEDICARE

## 2025-08-15 ENCOUNTER — PATIENT OUTREACH (OUTPATIENT)
Dept: CARE COORDINATION | Age: 89
End: 2025-08-15
Payer: MEDICARE

## 2025-08-16 ENCOUNTER — PATIENT OUTREACH (OUTPATIENT)
Dept: CARE COORDINATION | Age: 89
End: 2025-08-16
Payer: MEDICARE

## 2025-08-18 ENCOUNTER — TELEPHONE (OUTPATIENT)
Dept: CARDIOLOGY | Facility: HOSPITAL | Age: 89
End: 2025-08-18
Payer: MEDICARE

## 2025-08-18 ENCOUNTER — APPOINTMENT (OUTPATIENT)
Dept: VASCULAR SURGERY | Facility: HOSPITAL | Age: 89
End: 2025-08-18
Payer: MEDICARE

## 2025-08-18 ENCOUNTER — NURSING HOME VISIT (OUTPATIENT)
Dept: POST ACUTE CARE | Facility: EXTERNAL LOCATION | Age: 89
End: 2025-08-18
Payer: MEDICARE

## 2025-08-18 DIAGNOSIS — I25.10 CORONARY ARTERY DISEASE INVOLVING NATIVE HEART WITHOUT ANGINA PECTORIS, UNSPECIFIED VESSEL OR LESION TYPE: Primary | ICD-10-CM

## 2025-08-18 DIAGNOSIS — I10 BENIGN ESSENTIAL HYPERTENSION: ICD-10-CM

## 2025-08-18 DIAGNOSIS — I48.19 ATRIAL FIBRILLATION, PERSISTENT (MULTI): ICD-10-CM

## 2025-08-18 PROCEDURE — 99309 SBSQ NF CARE MODERATE MDM 30: CPT | Performed by: FAMILY MEDICINE

## 2025-08-20 ENCOUNTER — TELEPHONE (OUTPATIENT)
Dept: CARDIOLOGY | Facility: HOSPITAL | Age: 89
End: 2025-08-20
Payer: MEDICARE

## 2025-08-20 ENCOUNTER — NURSING HOME VISIT (OUTPATIENT)
Dept: POST ACUTE CARE | Facility: EXTERNAL LOCATION | Age: 89
End: 2025-08-20
Payer: MEDICARE

## 2025-08-20 DIAGNOSIS — E87.6 HYPOKALEMIA: ICD-10-CM

## 2025-08-20 DIAGNOSIS — K59.00 CONSTIPATION, UNSPECIFIED CONSTIPATION TYPE: ICD-10-CM

## 2025-08-20 DIAGNOSIS — D72.829 LEUKOCYTOSIS, UNSPECIFIED TYPE: ICD-10-CM

## 2025-08-20 DIAGNOSIS — R11.2 NAUSEA AND VOMITING IN ADULT: ICD-10-CM

## 2025-08-20 DIAGNOSIS — K56.609 SBO (SMALL BOWEL OBSTRUCTION) (MULTI): ICD-10-CM

## 2025-08-20 DIAGNOSIS — I10 BENIGN ESSENTIAL HYPERTENSION: ICD-10-CM

## 2025-08-20 DIAGNOSIS — M13.0 POLYARTHRITIS: ICD-10-CM

## 2025-08-20 DIAGNOSIS — E56.9 VITAMIN DEFICIENCY: ICD-10-CM

## 2025-08-20 DIAGNOSIS — M62.81 MUSCLE WEAKNESS (GENERALIZED): Primary | ICD-10-CM

## 2025-08-20 DIAGNOSIS — I25.10 CORONARY ARTERY DISEASE INVOLVING NATIVE HEART WITHOUT ANGINA PECTORIS, UNSPECIFIED VESSEL OR LESION TYPE: ICD-10-CM

## 2025-08-20 PROCEDURE — 99309 SBSQ NF CARE MODERATE MDM 30: CPT | Performed by: NURSE PRACTITIONER

## 2025-08-21 ENCOUNTER — DOCUMENTATION (OUTPATIENT)
Dept: PRIMARY CARE | Facility: CLINIC | Age: 89
End: 2025-08-21
Payer: MEDICARE

## 2025-08-21 LAB
ATRIAL RATE: 97 BPM
P OFFSET: 185 MS
P ONSET: 133 MS
PR INTERVAL: 168 MS
Q ONSET: 217 MS
QRS COUNT: 15 BEATS
QRS DURATION: 100 MS
QT INTERVAL: 314 MS
QTC CALCULATION(BAZETT): 388 MS
QTC FREDERICIA: 362 MS
R AXIS: 0 DEGREES
T AXIS: 201 DEGREES
T OFFSET: 374 MS
VENTRICULAR RATE: 92 BPM

## 2025-08-25 ENCOUNTER — NURSING HOME VISIT (OUTPATIENT)
Dept: POST ACUTE CARE | Facility: EXTERNAL LOCATION | Age: 89
End: 2025-08-25
Payer: MEDICARE

## 2025-08-25 ENCOUNTER — APPOINTMENT (OUTPATIENT)
Dept: VASCULAR MEDICINE | Facility: HOSPITAL | Age: 89
End: 2025-08-25
Payer: MEDICARE

## 2025-08-25 DIAGNOSIS — I50.30 DIASTOLIC CONGESTIVE HEART FAILURE, UNSPECIFIED HF CHRONICITY: Primary | ICD-10-CM

## 2025-08-25 DIAGNOSIS — R13.10 DYSPHAGIA, UNSPECIFIED TYPE: ICD-10-CM

## 2025-08-25 DIAGNOSIS — I48.19 ATRIAL FIBRILLATION, PERSISTENT (MULTI): ICD-10-CM

## 2025-08-25 PROCEDURE — 99309 SBSQ NF CARE MODERATE MDM 30: CPT | Performed by: FAMILY MEDICINE

## 2025-08-26 ENCOUNTER — APPOINTMENT (OUTPATIENT)
Dept: SURGERY | Facility: CLINIC | Age: 89
End: 2025-08-26
Payer: MEDICARE

## 2025-08-26 VITALS — OXYGEN SATURATION: 98 % | HEART RATE: 80 BPM | SYSTOLIC BLOOD PRESSURE: 106 MMHG | DIASTOLIC BLOOD PRESSURE: 67 MMHG

## 2025-08-26 DIAGNOSIS — Z09 POSTOPERATIVE EXAMINATION: Primary | ICD-10-CM

## 2025-08-26 PROCEDURE — 99024 POSTOP FOLLOW-UP VISIT: CPT | Performed by: SURGERY

## 2025-08-26 PROCEDURE — 1111F DSCHRG MED/CURRENT MED MERGE: CPT | Performed by: SURGERY

## 2025-08-26 PROCEDURE — 3078F DIAST BP <80 MM HG: CPT | Performed by: SURGERY

## 2025-08-26 PROCEDURE — 3074F SYST BP LT 130 MM HG: CPT | Performed by: SURGERY

## 2025-08-27 ENCOUNTER — NURSING HOME VISIT (OUTPATIENT)
Dept: POST ACUTE CARE | Facility: EXTERNAL LOCATION | Age: 89
End: 2025-08-27
Payer: MEDICARE

## 2025-08-27 DIAGNOSIS — M62.81 MUSCLE WEAKNESS (GENERALIZED): Primary | ICD-10-CM

## 2025-08-27 DIAGNOSIS — E87.6 HYPOKALEMIA: ICD-10-CM

## 2025-08-27 DIAGNOSIS — K59.00 CONSTIPATION, UNSPECIFIED CONSTIPATION TYPE: ICD-10-CM

## 2025-08-27 DIAGNOSIS — E56.9 VITAMIN DEFICIENCY: ICD-10-CM

## 2025-08-27 DIAGNOSIS — R11.2 NAUSEA AND VOMITING IN ADULT: ICD-10-CM

## 2025-08-27 DIAGNOSIS — I25.10 CORONARY ARTERY DISEASE INVOLVING NATIVE HEART WITHOUT ANGINA PECTORIS, UNSPECIFIED VESSEL OR LESION TYPE: ICD-10-CM

## 2025-08-27 DIAGNOSIS — I10 BENIGN ESSENTIAL HYPERTENSION: ICD-10-CM

## 2025-08-27 DIAGNOSIS — K56.609 SBO (SMALL BOWEL OBSTRUCTION) (MULTI): ICD-10-CM

## 2025-08-27 DIAGNOSIS — M13.0 POLYARTHRITIS: ICD-10-CM

## 2025-08-27 PROCEDURE — 99309 SBSQ NF CARE MODERATE MDM 30: CPT | Performed by: NURSE PRACTITIONER

## 2025-08-28 ENCOUNTER — DOCUMENTATION (OUTPATIENT)
Dept: PRIMARY CARE | Facility: CLINIC | Age: 89
End: 2025-08-28
Payer: MEDICARE

## 2025-09-02 ENCOUNTER — PATIENT OUTREACH (OUTPATIENT)
Dept: PRIMARY CARE | Facility: CLINIC | Age: 89
End: 2025-09-02
Payer: MEDICARE

## 2025-09-02 DIAGNOSIS — K56.609 SMALL BOWEL OBSTRUCTION (MULTI): ICD-10-CM

## 2025-09-02 DIAGNOSIS — G45.9 TRANSIENT ISCHEMIC ATTACK (TIA): ICD-10-CM

## 2025-09-10 ENCOUNTER — APPOINTMENT (OUTPATIENT)
Dept: PRIMARY CARE | Facility: CLINIC | Age: 89
End: 2025-09-10
Payer: MEDICARE

## 2025-09-30 ENCOUNTER — APPOINTMENT (OUTPATIENT)
Dept: SURGERY | Facility: CLINIC | Age: 89
End: 2025-09-30
Payer: MEDICARE

## 2025-10-03 ENCOUNTER — APPOINTMENT (OUTPATIENT)
Dept: PRIMARY CARE | Facility: CLINIC | Age: 89
End: 2025-10-03
Payer: MEDICARE

## (undated) DEVICE — DRAPE PACK, LAPAROSCOPIC CHOLECYSTECTOMY, CUSTOM, GEAUGA

## (undated) DEVICE — HANDPIECE, POOLE SUCTION, W/O TUBING

## (undated) DEVICE — TRAY, FOLEY, ADVANCE, 16FR, SILICONE, W/STATLOCK

## (undated) DEVICE — SUTURE, PDS II, 0, 27 IN, CT-2, VIOLET

## (undated) DEVICE — COVER, TABLE, 44X90

## (undated) DEVICE — APPLICATOR, CHLORAPREP, W/ORANGE TINT, 26ML

## (undated) DEVICE — LIGASURE IMPACT, 18CM